# Patient Record
Sex: FEMALE | Race: OTHER | HISPANIC OR LATINO | URBAN - METROPOLITAN AREA
[De-identification: names, ages, dates, MRNs, and addresses within clinical notes are randomized per-mention and may not be internally consistent; named-entity substitution may affect disease eponyms.]

---

## 2017-05-01 ENCOUNTER — OUTPATIENT (OUTPATIENT)
Dept: OUTPATIENT SERVICES | Facility: HOSPITAL | Age: 52
LOS: 1 days | End: 2017-05-01
Payer: MEDICAID

## 2017-05-01 DIAGNOSIS — Z98.89 OTHER SPECIFIED POSTPROCEDURAL STATES: Chronic | ICD-10-CM

## 2017-05-01 DIAGNOSIS — N18.9 CHRONIC KIDNEY DISEASE, UNSPECIFIED: Chronic | ICD-10-CM

## 2017-05-01 DIAGNOSIS — I77.0 ARTERIOVENOUS FISTULA, ACQUIRED: Chronic | ICD-10-CM

## 2017-05-03 DIAGNOSIS — R69 ILLNESS, UNSPECIFIED: ICD-10-CM

## 2017-07-01 PROCEDURE — G9001: CPT

## 2018-02-18 ENCOUNTER — INPATIENT (INPATIENT)
Facility: HOSPITAL | Age: 53
LOS: 12 days | Discharge: HOME | DRG: 557 | End: 2018-03-03
Attending: INTERNAL MEDICINE | Admitting: INTERNAL MEDICINE
Payer: COMMERCIAL

## 2018-02-18 VITALS
SYSTOLIC BLOOD PRESSURE: 165 MMHG | TEMPERATURE: 98 F | HEIGHT: 65.5 IN | RESPIRATION RATE: 18 BRPM | HEART RATE: 65 BPM | DIASTOLIC BLOOD PRESSURE: 85 MMHG | WEIGHT: 194.01 LBS | OXYGEN SATURATION: 99 %

## 2018-02-18 DIAGNOSIS — K29.70 GASTRITIS, UNSPECIFIED, WITHOUT BLEEDING: ICD-10-CM

## 2018-02-18 DIAGNOSIS — Z98.89 OTHER SPECIFIED POSTPROCEDURAL STATES: Chronic | ICD-10-CM

## 2018-02-18 DIAGNOSIS — E78.5 HYPERLIPIDEMIA, UNSPECIFIED: ICD-10-CM

## 2018-02-18 DIAGNOSIS — N18.9 CHRONIC KIDNEY DISEASE, UNSPECIFIED: Chronic | ICD-10-CM

## 2018-02-18 DIAGNOSIS — I10 ESSENTIAL (PRIMARY) HYPERTENSION: ICD-10-CM

## 2018-02-18 DIAGNOSIS — E11.9 TYPE 2 DIABETES MELLITUS WITHOUT COMPLICATIONS: ICD-10-CM

## 2018-02-18 DIAGNOSIS — M79.601 PAIN IN RIGHT ARM: ICD-10-CM

## 2018-02-18 DIAGNOSIS — Z29.9 ENCOUNTER FOR PROPHYLACTIC MEASURES, UNSPECIFIED: ICD-10-CM

## 2018-02-18 DIAGNOSIS — I77.0 ARTERIOVENOUS FISTULA, ACQUIRED: Chronic | ICD-10-CM

## 2018-02-18 DIAGNOSIS — M25.511 PAIN IN RIGHT SHOULDER: ICD-10-CM

## 2018-02-18 DIAGNOSIS — M79.89 OTHER SPECIFIED SOFT TISSUE DISORDERS: ICD-10-CM

## 2018-02-18 DIAGNOSIS — N18.6 END STAGE RENAL DISEASE: ICD-10-CM

## 2018-02-18 LAB
ALBUMIN SERPL ELPH-MCNC: 3.5 G/DL — SIGNIFICANT CHANGE UP (ref 3.5–5)
ALP SERPL-CCNC: 131 U/L — HIGH (ref 40–120)
ALT FLD-CCNC: 23 U/L DA — SIGNIFICANT CHANGE UP (ref 10–60)
ANION GAP SERPL CALC-SCNC: 10 MMOL/L — SIGNIFICANT CHANGE UP (ref 5–17)
APTT BLD: 33.5 SEC — SIGNIFICANT CHANGE UP (ref 27.5–37.4)
AST SERPL-CCNC: 14 U/L — SIGNIFICANT CHANGE UP (ref 10–40)
BASOPHILS # BLD AUTO: 0.1 K/UL — SIGNIFICANT CHANGE UP (ref 0–0.2)
BASOPHILS NFR BLD AUTO: 0.8 % — SIGNIFICANT CHANGE UP (ref 0–2)
BILIRUB SERPL-MCNC: 0.4 MG/DL — SIGNIFICANT CHANGE UP (ref 0.2–1.2)
BUN SERPL-MCNC: 38 MG/DL — HIGH (ref 7–18)
CALCIUM SERPL-MCNC: 9.5 MG/DL — SIGNIFICANT CHANGE UP (ref 8.4–10.5)
CHLORIDE SERPL-SCNC: 98 MMOL/L — SIGNIFICANT CHANGE UP (ref 96–108)
CO2 SERPL-SCNC: 26 MMOL/L — SIGNIFICANT CHANGE UP (ref 22–31)
CREAT SERPL-MCNC: 8.14 MG/DL — HIGH (ref 0.5–1.3)
EOSINOPHIL # BLD AUTO: 0.1 K/UL — SIGNIFICANT CHANGE UP (ref 0–0.5)
EOSINOPHIL NFR BLD AUTO: 1.3 % — SIGNIFICANT CHANGE UP (ref 0–6)
GLUCOSE SERPL-MCNC: 93 MG/DL — SIGNIFICANT CHANGE UP (ref 70–99)
HCT VFR BLD CALC: 36.8 % — SIGNIFICANT CHANGE UP (ref 34.5–45)
HGB BLD-MCNC: 12.1 G/DL — SIGNIFICANT CHANGE UP (ref 11.5–15.5)
INR BLD: 1.08 RATIO — SIGNIFICANT CHANGE UP (ref 0.88–1.16)
LYMPHOCYTES # BLD AUTO: 1.6 K/UL — SIGNIFICANT CHANGE UP (ref 1–3.3)
LYMPHOCYTES # BLD AUTO: 20.2 % — SIGNIFICANT CHANGE UP (ref 13–44)
MCHC RBC-ENTMCNC: 32.5 PG — SIGNIFICANT CHANGE UP (ref 27–34)
MCHC RBC-ENTMCNC: 32.8 GM/DL — SIGNIFICANT CHANGE UP (ref 32–36)
MCV RBC AUTO: 98.9 FL — SIGNIFICANT CHANGE UP (ref 80–100)
MONOCYTES # BLD AUTO: 0.4 K/UL — SIGNIFICANT CHANGE UP (ref 0–0.9)
MONOCYTES NFR BLD AUTO: 5.6 % — SIGNIFICANT CHANGE UP (ref 2–14)
NEUTROPHILS # BLD AUTO: 5.6 K/UL — SIGNIFICANT CHANGE UP (ref 1.8–7.4)
NEUTROPHILS NFR BLD AUTO: 72.1 % — SIGNIFICANT CHANGE UP (ref 43–77)
PLATELET # BLD AUTO: 195 K/UL — SIGNIFICANT CHANGE UP (ref 150–400)
POTASSIUM SERPL-MCNC: 5 MMOL/L — SIGNIFICANT CHANGE UP (ref 3.5–5.3)
POTASSIUM SERPL-SCNC: 5 MMOL/L — SIGNIFICANT CHANGE UP (ref 3.5–5.3)
PROT SERPL-MCNC: 7.5 G/DL — SIGNIFICANT CHANGE UP (ref 6–8.3)
PROTHROM AB SERPL-ACNC: 11.8 SEC — SIGNIFICANT CHANGE UP (ref 9.8–12.7)
RBC # BLD: 3.72 M/UL — LOW (ref 3.8–5.2)
RBC # FLD: 14.4 % — SIGNIFICANT CHANGE UP (ref 10.3–14.5)
SODIUM SERPL-SCNC: 134 MMOL/L — LOW (ref 135–145)
WBC # BLD: 7.8 K/UL — SIGNIFICANT CHANGE UP (ref 3.8–10.5)
WBC # FLD AUTO: 7.8 K/UL — SIGNIFICANT CHANGE UP (ref 3.8–10.5)

## 2018-02-18 PROCEDURE — 73200 CT UPPER EXTREMITY W/O DYE: CPT | Mod: 26,RT

## 2018-02-18 PROCEDURE — 99285 EMERGENCY DEPT VISIT HI MDM: CPT

## 2018-02-18 PROCEDURE — 99221 1ST HOSP IP/OBS SF/LOW 40: CPT

## 2018-02-18 PROCEDURE — 73030 X-RAY EXAM OF SHOULDER: CPT | Mod: 26,RT

## 2018-02-18 RX ORDER — LABETALOL HCL 100 MG
200 TABLET ORAL DAILY
Qty: 0 | Refills: 0 | Status: DISCONTINUED | OUTPATIENT
Start: 2018-02-18 | End: 2018-03-03

## 2018-02-18 RX ORDER — ATORVASTATIN CALCIUM 80 MG/1
40 TABLET, FILM COATED ORAL AT BEDTIME
Qty: 0 | Refills: 0 | Status: DISCONTINUED | OUTPATIENT
Start: 2018-02-18 | End: 2018-03-03

## 2018-02-18 RX ORDER — AMLODIPINE BESYLATE 2.5 MG/1
10 TABLET ORAL DAILY
Qty: 0 | Refills: 0 | Status: DISCONTINUED | OUTPATIENT
Start: 2018-02-18 | End: 2018-03-03

## 2018-02-18 RX ORDER — PANTOPRAZOLE SODIUM 20 MG/1
40 TABLET, DELAYED RELEASE ORAL
Qty: 0 | Refills: 0 | Status: DISCONTINUED | OUTPATIENT
Start: 2018-02-18 | End: 2018-03-03

## 2018-02-18 RX ORDER — HYDROMORPHONE HYDROCHLORIDE 2 MG/ML
0.5 INJECTION INTRAMUSCULAR; INTRAVENOUS; SUBCUTANEOUS ONCE
Qty: 0 | Refills: 0 | Status: DISCONTINUED | OUTPATIENT
Start: 2018-02-18 | End: 2018-02-18

## 2018-02-18 RX ORDER — OMEGA-3 ACID ETHYL ESTERS 1 G
2 CAPSULE ORAL
Qty: 0 | Refills: 0 | Status: DISCONTINUED | OUTPATIENT
Start: 2018-02-18 | End: 2018-03-03

## 2018-02-18 RX ORDER — OXYCODONE AND ACETAMINOPHEN 5; 325 MG/1; MG/1
1 TABLET ORAL ONCE
Qty: 0 | Refills: 0 | Status: DISCONTINUED | OUTPATIENT
Start: 2018-02-18 | End: 2018-02-18

## 2018-02-18 RX ORDER — GABAPENTIN 400 MG/1
100 CAPSULE ORAL DAILY
Qty: 0 | Refills: 0 | Status: DISCONTINUED | OUTPATIENT
Start: 2018-02-18 | End: 2018-03-03

## 2018-02-18 RX ORDER — ASPIRIN/CALCIUM CARB/MAGNESIUM 324 MG
81 TABLET ORAL DAILY
Qty: 0 | Refills: 0 | Status: DISCONTINUED | OUTPATIENT
Start: 2018-02-18 | End: 2018-03-03

## 2018-02-18 RX ORDER — INSULIN LISPRO 100/ML
VIAL (ML) SUBCUTANEOUS
Qty: 0 | Refills: 0 | Status: DISCONTINUED | OUTPATIENT
Start: 2018-02-18 | End: 2018-02-19

## 2018-02-18 RX ORDER — SODIUM CHLORIDE 9 MG/ML
500 INJECTION INTRAMUSCULAR; INTRAVENOUS; SUBCUTANEOUS ONCE
Qty: 0 | Refills: 0 | Status: COMPLETED | OUTPATIENT
Start: 2018-02-18 | End: 2018-02-18

## 2018-02-18 RX ORDER — MORPHINE SULFATE 50 MG/1
6 CAPSULE, EXTENDED RELEASE ORAL ONCE
Qty: 0 | Refills: 0 | Status: DISCONTINUED | OUTPATIENT
Start: 2018-02-18 | End: 2018-02-18

## 2018-02-18 RX ORDER — HEPARIN SODIUM 5000 [USP'U]/ML
5000 INJECTION INTRAVENOUS; SUBCUTANEOUS EVERY 8 HOURS
Qty: 0 | Refills: 0 | Status: DISCONTINUED | OUTPATIENT
Start: 2018-02-18 | End: 2018-03-03

## 2018-02-18 RX ORDER — HYDROMORPHONE HYDROCHLORIDE 2 MG/ML
0.5 INJECTION INTRAMUSCULAR; INTRAVENOUS; SUBCUTANEOUS EVERY 6 HOURS
Qty: 0 | Refills: 0 | Status: DISCONTINUED | OUTPATIENT
Start: 2018-02-18 | End: 2018-02-24

## 2018-02-18 RX ADMIN — HEPARIN SODIUM 5000 UNIT(S): 5000 INJECTION INTRAVENOUS; SUBCUTANEOUS at 23:59

## 2018-02-18 RX ADMIN — OXYCODONE AND ACETAMINOPHEN 1 TABLET(S): 5; 325 TABLET ORAL at 17:59

## 2018-02-18 RX ADMIN — HYDROMORPHONE HYDROCHLORIDE 0.5 MILLIGRAM(S): 2 INJECTION INTRAMUSCULAR; INTRAVENOUS; SUBCUTANEOUS at 21:14

## 2018-02-18 RX ADMIN — MORPHINE SULFATE 6 MILLIGRAM(S): 50 CAPSULE, EXTENDED RELEASE ORAL at 15:46

## 2018-02-18 RX ADMIN — ATORVASTATIN CALCIUM 40 MILLIGRAM(S): 80 TABLET, FILM COATED ORAL at 23:59

## 2018-02-18 RX ADMIN — OXYCODONE AND ACETAMINOPHEN 1 TABLET(S): 5; 325 TABLET ORAL at 14:49

## 2018-02-18 RX ADMIN — SODIUM CHLORIDE 1000 MILLILITER(S): 9 INJECTION INTRAMUSCULAR; INTRAVENOUS; SUBCUTANEOUS at 20:49

## 2018-02-18 RX ADMIN — HYDROMORPHONE HYDROCHLORIDE 0.5 MILLIGRAM(S): 2 INJECTION INTRAMUSCULAR; INTRAVENOUS; SUBCUTANEOUS at 20:41

## 2018-02-18 RX ADMIN — MORPHINE SULFATE 6 MILLIGRAM(S): 50 CAPSULE, EXTENDED RELEASE ORAL at 17:59

## 2018-02-18 NOTE — CONSULT NOTE ADULT - SUBJECTIVE AND OBJECTIVE BOX
HPI: 52 y/o F pt with PMHx of Anemia, Asthma, CKD, Claustrophobia (on CPAP), DM (type II), ESRD (on dialysis Monday, Wednesday, and Friday), HTN, GERD, HLD, Morbid Obesity, MI, AUSTIN, Uterine Leiomyoma, and Vertigo and PSHx of AV Fistula, R Antecubital AVF, , Craniotomy, and Hernia Repair.    Presents to ED c/o R arm pain with associated R arm swelling x4 days. Pt describes R arm pain as severe. Per pt, pt visited [place] ED yesterday for R arm pain and swelling; a CTA of the R arm was performed at the time, as well as labs which were normal, and pt was d/c home. Pt denies fever, chills, or any other complaints. Pt also denies recent trauma to the R arm. NKDA.      PAST MEDICAL & SURGICAL HISTORY:  Myocardial infarct, old:   ESRD (end stage renal disease) on dialysis: since 6/15/2015 (M/W/F)  Asthma occurring only with upper respiratory infection: never hospitalized or intubated, last use of inhalator last year with winter  Anemia in chronic renal disease  Claustrophobia: when CPAP mask was put on her  Uterine leiomyoma, unspecified location  AUSTIN (obstructive sleep apnea)  Gastroesophageal reflux disease without esophagitis  Vertigo  HLD (hyperlipidemia)  Essential hypertension  Chronic kidney disease, unspecified stage  Diabetes mellitus, type 2  Morbid obesity  AV fistula: 3/18/2016  S/P craniotomy: - s/p fall from 3 floors, no hx of seizure after surgery  S/P hernia repair: incisional-  S/P  section: ,   CRF (chronic renal failure): s/p right antecubital AV formation- 2015, s/p left antecubital formation AV - - not working, s/p right chest permacath 07/15/15      MEDICATIONS  (STANDING):    MEDICATIONS  (PRN):      Allergies    No Known Drug Allergies  pineapple (Hives)    Intolerances        SOCIAL HISTORY:  No drug use    FAMILY HISTORY:  Family history of chronic renal failure (Mother)  Family history of hypertension (Father, Mother)  Family history of stroke (Father, Mother)  Diabetes mellitus, type 2 (Father, Mother)      REVIEW OF SYSTEMS:  CONSTITUTIONAL: In no acute distress.  EYES: No eye pain, visual disturbances, or discharge  ENMT:  No difficulty hearing, tinnitus, vertigo; No sinus or throat pain  NECK: No pain or stiffness  BREASTS: No pain, masses, or nipple discharge  RESPIRATORY: No cough, wheezing, chills or hemoptysis; No shortness of breath  CARDIOVASCULAR: No chest pain, palpitations, dizziness, or leg swelling  GASTROINTESTINAL: No abdominal or epigastric pain. No nausea, vomiting, or hematemesis; No diarrhea or constipation. No melena or hematochezia.  GENITOURINARY: No dysuria, frequency, hematuria, or incontinence  NEUROLOGICAL: No headaches, memory loss, loss of strength, numbness, or tremors  SKIN: No itching, burning, rashes, or lesions   LYMPH NODES: No enlarged glands  ENDOCRINE: No heat or cold intolerance; No hair loss  MUSCULOSKELETAL: right arm pain  PSYCHIATRIC: No depression, anxiety, mood swings, or difficulty sleeping  HEME/LYMPH: No easy bruising, or bleeding gums  ALLERGY AND IMMUNOLOGIC: No hives or eczema      Vital Signs Last 24 Hrs  T(C): 36.7 (2018 12:14), Max: 36.7 (2018 12:14)  T(F): 98.1 (2018 12:14), Max: 98.1 (2018 12:14)  HR: 65 (2018 12:14) (65 - 65)  BP: 165/85 (2018 12:14) (165/85 - 165/85)  BP(mean): --  RR: 18 (2018 12:14) (18 - 18)  SpO2: 99% (2018 12:14) (99% - 99%)    Daily Height in cm: 166.37 (2018 12:14)    Daily     PHYSICAL EXAM:  GENERAL: NAD, well-groomed, well-developed  HEAD:  Atraumatic, Normocephalic  EYES: EOMI, PERRL, conjunctiva and sclera clear  ENMT: Moist mucous membranes, Good dentition, No lesions  NECK: Supple, No JVD  NERVOUS SYSTEM:  Alert & Oriented X3, Good concentration  CHEST/LUNG: Clear to percussion bilaterally; Normal respiratory effort  HEART: Regular rate and rhythm  ABDOMEN: Soft, Nontender, Nondistended; Bowel sounds present  : normal external genitalia  BREASTS: no breast lumps  EXTREMITIES:  right shoulder and arm tenderness, mild swelling, no cellulitis  palpable thrill in AVF, palpable radial and ulnar pulses  VASC: equal peripheral pulses  LYMPH: No lymphadenopathy noted  SKIN: No rashes or lesions  PSYCH: normal affect      RADIOLOGY & ADDITIONAL STUDIES:    < from: CT Upper Extremity No Cont, Right (18 @ 15:19) >  There is a right upper extremity dialysis fistula. This is not well   evaluated without intravenous contrast. Evaluation is also limited   secondary to patient motion. The distal extent of the fistula was not   completely imaged. Parts of the fistula show peripheral calcifications.    There is no obvious subcutaneous swelling or edema. There is no   intramuscular abnormality. There is no hematoma.    The visualized lung is unremarkable.    The osseous structures show calcification of the supraspinatus tendon   insertion at the humeral head. No acute fracture or dislocation is noted.   There are mild degenerativechanges at the acromioclavicular joint.    Impression:    No finding to explain the patient's right arm pain and swelling on this   motion limited noncontrast examination. Evaluation of the right upper   extremity fistula would be better performed with ultrasound.    < end of copied text >      < from: Xray Shoulder 2 Views, Right (18 @ 14:15) >  FINDINGS:  The bones are intact. Rotator cuff calcific tendinosis.  The joint spaces are preserved.  No significant soft tissue swelling.  Surgical staples project over the right upper arm.    IMPRESSION:  No radiographic evidence of fracture. Rotatorcuff calcific tendinosis.      < end of copied text > HPI: 54 y/o F pt with PMHx of Anemia, Asthma, CKD, Claustrophobia (on CPAP), DM (type II), ESRD (on dialysis Monday, Wednesday, and Friday), HTN, GERD, HLD, Morbid Obesity, MI, AUSTIN, Uterine Leiomyoma, and Vertigo and PSHx of AV Fistula, R Antecubital AVF, , Craniotomy, and Hernia Repair.    Presents to ED c/o R arm pain with associated R arm swelling x4 days. Pt describes R arm pain as severe. Per pt, pt visited [place] ED yesterday for R arm pain and swelling; a CTA of the R arm was performed at the time, as well as labs which were normal, and pt was d/c home. Pt denies fever, chills, or any other complaints. Pt also denies recent trauma to the R arm. NKDA.  pt denies any injuries falls or any problems with last dialysis      PAST MEDICAL & SURGICAL HISTORY:  Myocardial infarct, old:   ESRD (end stage renal disease) on dialysis: since 6/15/2015 (M/W/F)  Asthma occurring only with upper respiratory infection: never hospitalized or intubated, last use of inhalator last year with winter  Anemia in chronic renal disease  Claustrophobia: when CPAP mask was put on her  Uterine leiomyoma, unspecified location  AUSTIN (obstructive sleep apnea)  Gastroesophageal reflux disease without esophagitis  Vertigo  HLD (hyperlipidemia)  Essential hypertension  Chronic kidney disease, unspecified stage  Diabetes mellitus, type 2  Morbid obesity  AV fistula: 3/18/2016  S/P craniotomy: - s/p fall from 3 floors, no hx of seizure after surgery  S/P hernia repair: incisional-  S/P  section: ,   CRF (chronic renal failure): s/p right antecubital AV formation- 2015, s/p left antecubital formation AV - - not working, s/p right chest permacath 07/15/15      MEDICATIONS  (STANDING):    MEDICATIONS  (PRN):      Allergies    No Known Drug Allergies  pineapple (Hives)    Intolerances        SOCIAL HISTORY:  No drug use    FAMILY HISTORY:  Family history of chronic renal failure (Mother)  Family history of hypertension (Father, Mother)  Family history of stroke (Father, Mother)  Diabetes mellitus, type 2 (Father, Mother)      REVIEW OF SYSTEMS:  CONSTITUTIONAL: In no acute distress.  EYES: No eye pain, visual disturbances, or discharge  ENMT:  No difficulty hearing, tinnitus, vertigo; No sinus or throat pain  NECK: No pain or stiffness  BREASTS: No pain, masses, or nipple discharge  RESPIRATORY: No cough, wheezing, chills or hemoptysis; No shortness of breath  CARDIOVASCULAR: No chest pain, palpitations, dizziness, or leg swelling  GASTROINTESTINAL: No abdominal or epigastric pain. No nausea, vomiting, or hematemesis; No diarrhea or constipation. No melena or hematochezia.  GENITOURINARY: No dysuria, frequency, hematuria, or incontinence  NEUROLOGICAL: No headaches, memory loss, loss of strength, numbness, or tremors  SKIN: No itching, burning, rashes, or lesions   LYMPH NODES: No enlarged glands  ENDOCRINE: No heat or cold intolerance; No hair loss  MUSCULOSKELETAL: right arm pain  PSYCHIATRIC: No depression, anxiety, mood swings, or difficulty sleeping  HEME/LYMPH: No easy bruising, or bleeding gums  ALLERGY AND IMMUNOLOGIC: No hives or eczema      Vital Signs Last 24 Hrs  T(C): 36.7 (2018 12:14), Max: 36.7 (2018 12:14)  T(F): 98.1 (2018 12:14), Max: 98.1 (2018 12:14)  HR: 65 (2018 12:14) (65 - 65)  BP: 165/85 (2018 12:14) (165/85 - 165/85)  BP(mean): --  RR: 18 (2018 12:14) (18 - 18)  SpO2: 99% (2018 12:14) (99% - 99%)    Daily Height in cm: 166.37 (2018 12:14)    Daily     PHYSICAL EXAM:  GENERAL: NAD, well-groomed, well-developed  HEAD:  Atraumatic, Normocephalic  EYES: EOMI, PERRL, conjunctiva and sclera clear  ENMT: Moist mucous membranes, Good dentition, No lesions  NECK: Supple, No JVD  NERVOUS SYSTEM:  Alert & Oriented X3, Good concentration  CHEST/LUNG: Clear to percussion bilaterally; Normal respiratory effort  HEART: Regular rate and rhythm  ABDOMEN: Soft, Nontender, Nondistended; Bowel sounds present  : normal external genitalia  BREASTS: no breast lumps  EXTREMITIES:  right shoulder and arm tenderness, mild swelling, no cellulitis  palpable thrill in AVF, palpable radial and ulnar pulses  VASC: equal peripheral pulses  LYMPH: No lymphadenopathy noted  SKIN: No rashes or lesions  PSYCH: normal affect      RADIOLOGY & ADDITIONAL STUDIES:    < from: CT Upper Extremity No Cont, Right (18 @ 15:19) >  There is a right upper extremity dialysis fistula. This is not well   evaluated without intravenous contrast. Evaluation is also limited   secondary to patient motion. The distal extent of the fistula was not   completely imaged. Parts of the fistula show peripheral calcifications.    There is no obvious subcutaneous swelling or edema. There is no   intramuscular abnormality. There is no hematoma.    The visualized lung is unremarkable.    The osseous structures show calcification of the supraspinatus tendon   insertion at the humeral head. No acute fracture or dislocation is noted.   There are mild degenerativechanges at the acromioclavicular joint.    Impression:    No finding to explain the patient's right arm pain and swelling on this   motion limited noncontrast examination. Evaluation of the right upper   extremity fistula would be better performed with ultrasound.    < end of copied text >      < from: Xray Shoulder 2 Views, Right (18 @ 14:15) >  FINDINGS:  The bones are intact. Rotator cuff calcific tendinosis.  The joint spaces are preserved.  No significant soft tissue swelling.  Surgical staples project over the right upper arm.    IMPRESSION:  No radiographic evidence of fracture. Rotatorcuff calcific tendinosis.      < end of copied text >

## 2018-02-18 NOTE — H&P ADULT - RS GEN PE MLT RESP DETAILS PC
good air movement/normal/respirations non-labored/airway patent/clear to auscultation bilaterally/breath sounds equal

## 2018-02-18 NOTE — ED PROVIDER NOTE - PSH
AV fistula  3/18/2016  CRF (chronic renal failure)  s/p right antecubital AV formation- 2015, s/p left antecubital formation AV - - not working, s/p right chest permacath 07/15/15  S/P  section  ,   S/P craniotomy  - s/p fall from 3 floors, no hx of seizure after surgery  S/P hernia repair  incisional-

## 2018-02-18 NOTE — ED PROVIDER NOTE - UPPER EXTREMITY EXAM, RIGHT
swelling and TTP to R shoulder and R arm; 2+ radial pulse in R upper extremity; good thrill on AV Fistula in R arm; R arm AV Fistula site is non-tender and not erythematous; swelling of R arm compared to L arm; slightly dusky colored R hand; R hand is slightly cool-to-touch swelling and TTP to R shoulder and R arm; 2+ radial pulse in R upper extremity; good thrill on AV Fistula in R arm; R arm AV Fistula site is non-tender and not erythematous; swelling of R arm compared to L arm; slightly dusky colored R hand; R hand is slightly cooler-to-touch than left hand

## 2018-02-18 NOTE — H&P ADULT - NEUROLOGICAL DETAILS
sensation intact/deep reflexes intact/alert and oriented x 3/cranial nerves intact/responds to pain/responds to verbal commands

## 2018-02-18 NOTE — H&P ADULT - EXTREMITIES COMMENTS
no warmth, redness, pain over fistula site noted   tenderness noted over right posterior arm and shoulder region

## 2018-02-18 NOTE — H&P ADULT - HISTORY OF PRESENT ILLNESS
53 y/oF with PMHx of Anemia, Asthma, ESRD on HD(MWF), Claustrophobia, Morbid Obesity (on CPAP), DMII,HTN, GERD, HLD, MI, AUSTIN, Uterine Leiomyoma, and Vertigo and PSHx of AV Fistula, R Antecubital AVF, , Craniotomy, and Hernia Repair presented to ED c/o R arm pain with associated R arm swelling x3 days. Pt describes R arm pain as severe. Park visited [place] ED yesterday for R arm pain and swelling; a CTA of the R arm and labs performed at [place] which were negative so patient was discharged, Pain did not improved [facility]. Pt denies fever, chills, recent trauma to the R arm. 53 y/oF with PMHx of Anemia, Asthma, ESRD on HD(MWF), Claustrophobia, Morbid Obesity (on CPAP), DMII,HTN, GERD, HLD, MI, AUSTIN, Uterine Leiomyoma, and Vertigo and PSHx of AV Fistula, R Antecubital AVF, , Craniotomy, and Hernia Repair presented to ED c/o R arm pain with associated R arm swelling x4 days. Pt describes R arm pain as severe, 10/10 in severity, on and off, started over elbow and moved to shoulder(no more pain over elbow). Patient have right arm AV fistula which does not look infected and pain not related to fistula site.  Park visited [place] ED yesterday for R arm pain and swelling; a CTA of the R arm was done with patent arteries and labsh were WNL so patient was discharged, Pain did not improved so came to [facility] today. Pt denies fever, chills, recent trauma to the R arm.   Patient reports similar pain last year on left arm, injection in bone was given after which pain improved. 53 y/oF with PMHx of Anemia, Asthma, ESRD on HD(MWF), Claustrophobia, Morbid Obesity (on CPAP), DMII,HTN, GERD, HLD, MI, AUSTIN, Uterine Leiomyoma, and Vertigo and PSHx of AV Fistula, R Antecubital AVF, , Craniotomy, and Hernia Repair presented to ED c/o R arm and shoulder pain x4 days. Pt describes R arm pain as severe, 10/10 in severity, on and off, started over elbow and moved to shoulder(no more pain over elbow). Patient have right arm AV fistula which does not look infected and pain not related to fistula site.  Park visited [place] ED yesterday for R arm pain and swelling; a CTA of the R arm was done with patent arteries and labsh were WNL so patient was discharged, Pain did not improved so came to [facility] today. Pt denies fever, chills, recent trauma to the R arm.   Patient reports similar pain last year on left arm, injection in bone was given after which pain improved.

## 2018-02-18 NOTE — ED PROVIDER NOTE - NS_ATTENDINGSCRIBE_ED_ALL_ED
Aortic valve stenosis    BPH (benign prostatic hypertrophy)    CAD (coronary artery disease)    GERD (gastroesophageal reflux disease)    Hashimoto's thyroiditis    Hemorrhoids    Murmur    Right inguinal hernia I personally performed the service described in the documentation recorded by the scribe in my presence, and it accurately and completely records my words and actions.

## 2018-02-18 NOTE — H&P ADULT - ASSESSMENT
53 y/oF with PMHx of Anemia, Asthma, ESRD on HD(MWF), Claustrophobia, Morbid Obesity (on CPAP), DMII,HTN, GERD, HLD, MI, AUSTIN, Uterine Leiomyoma, and Vertigo and PSHx of AV Fistula, R Antecubital AVF, , Craniotomy, and Hernia Repair presented to ED c/o R arm and shoulder pain x4 days.

## 2018-02-18 NOTE — ED PROVIDER NOTE - SKIN, MLM
Skin normal color for race, warm, dry and intact. No evidence of rash. R hand is slightly cool-to-touch.

## 2018-02-18 NOTE — ED PROVIDER NOTE - MEDICAL DECISION MAKING DETAILS
52 y/o F pt presents with severe, intractable R arm pain and notable R arm swelling. Will admit for pain control, vascular evaluation, and orthopedic evaluation. Pt requires dialysis tomorrow. 54 y/o F pt presents with severe, intractable R arm pain and notable R arm swelling. Will admit for pain control, vascular evaluation, and orthopedic evaluation. Pt requires dialysis tomorrow. Concern for possible vascular compromise

## 2018-02-18 NOTE — H&P ADULT - FAMILY HISTORY
Diabetes mellitus, type 2     Family history of stroke     Family history of chronic renal failure     Father  Still living? Unknown  Family history of hypertension, Age at diagnosis: Age Unknown     Mother  Still living? Unknown  Family history of hypertension, Age at diagnosis: Age Unknown

## 2018-02-18 NOTE — H&P ADULT - PROBLEM SELECTOR PLAN 8
IMPROVE VTE Individual Risk Assessment          RISK                                                          Points  [  ] Previous VTE                                                3  [  ] Thrombophilia                                             2  [  ] Lower limb paralysis                                   2        (unable to hold up >15 seconds)    [  ] Current Cancer                                             2         (within 6 months)  [ x ] Immobilization > 24 hrs                              1  [  ] ICU/CCU stay > 24 hours                             1  [  ] Age > 60                                                         1    IMPROVE VTE Score: 1  heparin 5000 q8

## 2018-02-18 NOTE — H&P ADULT - PROBLEM SELECTOR PLAN 5
home medication rosuvastatin 10 mg once aday  continue atorvastatin 40 mg at bedtime  follow up with lipid profile

## 2018-02-18 NOTE — ED ADULT TRIAGE NOTE - CHIEF COMPLAINT QUOTE
C/o RIGHT SHOULDER PAIN X 3 DAYS. Dialysis pt M-W-F. DIALYSIS ACCESS RIGHT ARM, used last Friday but it was hurting her per daughter

## 2018-02-18 NOTE — ED ADULT NURSE NOTE - OBJECTIVE STATEMENT
pt complained  right shoulder pain X 3 DAYS. Dialysis pt M-W-F. DIALYSIS ACCESS RIGHT ARM, used last Friday but it was hurting her per daughter

## 2018-02-18 NOTE — H&P ADULT - PROBLEM SELECTOR PLAN 6
on lantus 10 units at home   holding Lantus as patient is not eating   on accucheck and sliding scale

## 2018-02-18 NOTE — H&P ADULT - PROBLEM SELECTOR PLAN 1
severe pain radiating to right arm  CT Cervical spine done in [facility] done yesterday- was normal  Xray shoulder - Rotatorcuff calcific tendinosis.  Dilaudid 0.5 mg PRN q6 for pain management    primary team to consult ortho as per surgery recommendation

## 2018-02-18 NOTE — H&P ADULT - PROBLEM SELECTOR PLAN 2
over posterior arm   not related to fistula site- no fistula site infection/pain noted  CTA arm done at [facility] done - arteries are patent  vascular surgery consulted - follow up with us duplex right upper extrimity

## 2018-02-18 NOTE — H&P ADULT - NSHPLABSRESULTS_GEN_ALL_CORE
12.1   7.8   )-----------( 195      ( 18 Feb 2018 17:53 )             36.8       02-18    134<L>  |  98  |  38<H>  ----------------------------<  93  5.0   |  26  |  8.14<H>    Ca    9.5      18 Feb 2018 17:53    TPro  7.5  /  Alb  3.5  /  TBili  0.4  /  DBili  x   /  AST  14  /  ALT  23  /  AlkPhos  131<H>  02-18        < from: CT Upper Extremity No Cont, Right (02.18.18 @ 15:19) >    Impression:    No finding to explain the patient's right arm pain and swelling on this   motion limited noncontrast examination. Evaluation of the right upper   extremity fistula would be better performed with ultrasound.      < end of copied text >    < from: Xray Shoulder 2 Views, Right (02.18.18 @ 14:15) >    IMPRESSION:  No radiographic evidence of fracture. Rotatorcuff calcific tendinosis.      < end of copied text >

## 2018-02-18 NOTE — CONSULT NOTE ADULT - PROBLEM SELECTOR RECOMMENDATION 9
Right shoulder and arm pain  Ho Right AVF  Right shoulder and arm tenderness, mild swelling, no cellulitis.  Palpable thrill in AVF, palpable radial and ulnar pulses.    Recommend Duplex US  Obtain Ortho consult as above

## 2018-02-18 NOTE — CONSULT NOTE ADULT - ASSESSMENT
53 y old female w Right shoulder and arm pain of acute onset    no acute vascular  issues at this time'  recommend rue avf duplex   recommend ortho eval  will follow

## 2018-02-18 NOTE — ED PROVIDER NOTE - OBJECTIVE STATEMENT
52 y/o F pt with PMHx of Anemia, Asthma, CKD, Claustrophobia (on CPAP), DM (type II), ESRD (on dialysis Monday, Wednesday, and Friday), HTN, GERD, HLD, Morbid Obesity, MI, AUSTIN, Uterine Leiomyoma, and Vertigo and PSHx of AV Fistula, R Antecubital AV Formation, , Craniotomy, and Hernia Repair presents to ED c/o R arm pain with associated R arm swelling x3 days. Pt describes R arm pain as severe. Per pt, pt visited [place] ED yesterday for R arm pain and swelling; a CTA of the R arm was performed at the time, which was normal, and pt was d/c home. Pt denies fever, chills, or any other complaints. Pt also denies recent trauma to the R arm. NKDA. 52 y/o F pt with PMHx of Anemia, Asthma, CKD, Claustrophobia (on CPAP), DM (type II), ESRD (on dialysis Monday, Wednesday, and Friday), HTN, GERD, HLD, Morbid Obesity, MI, AUSTIN, Uterine Leiomyoma, and Vertigo and PSHx of AV Fistula, R Antecubital AVF, , Craniotomy, and Hernia Repair presents to ED c/o R arm pain with associated R arm swelling x3 days. Pt describes R arm pain as severe. Per pt, pt visited [place] ED yesterday for R arm pain and swelling; a CTA of the R arm was performed at the time, as well as labs which were normal, and pt was d/c home. Pt denies fever, chills, or any other complaints. Pt also denies recent trauma to the R arm. NKDA.

## 2018-02-18 NOTE — ED PROVIDER NOTE - PMH
Anemia in chronic renal disease    Asthma occurring only with upper respiratory infection  never hospitalized or intubated, last use of inhalator last year with winter  Chronic kidney disease, unspecified stage    Claustrophobia  when CPAP mask was put on her  Diabetes mellitus, type 2    ESRD (end stage renal disease) on dialysis  since 6/15/2015 (M/W/F)  Essential hypertension    Gastroesophageal reflux disease without esophagitis    HLD (hyperlipidemia)    Morbid obesity    Myocardial infarct, old  2011  AUSTIN (obstructive sleep apnea)    Uterine leiomyoma, unspecified location    Vertigo

## 2018-02-19 DIAGNOSIS — M25.511 PAIN IN RIGHT SHOULDER: ICD-10-CM

## 2018-02-19 LAB
24R-OH-CALCIDIOL SERPL-MCNC: 16.7 NG/ML — LOW (ref 30–80)
ANION GAP SERPL CALC-SCNC: 13 MMOL/L — SIGNIFICANT CHANGE UP (ref 5–17)
BUN SERPL-MCNC: 44 MG/DL — HIGH (ref 7–18)
CALCIUM SERPL-MCNC: 8.9 MG/DL — SIGNIFICANT CHANGE UP (ref 8.4–10.5)
CHLORIDE SERPL-SCNC: 99 MMOL/L — SIGNIFICANT CHANGE UP (ref 96–108)
CHOLEST SERPL-MCNC: 141 MG/DL — SIGNIFICANT CHANGE UP (ref 10–199)
CO2 SERPL-SCNC: 23 MMOL/L — SIGNIFICANT CHANGE UP (ref 22–31)
CREAT SERPL-MCNC: 8.9 MG/DL — HIGH (ref 0.5–1.3)
FOLATE SERPL-MCNC: 7.1 NG/ML — SIGNIFICANT CHANGE UP (ref 4.8–24.2)
GLUCOSE BLDC GLUCOMTR-MCNC: 103 MG/DL — HIGH (ref 70–99)
GLUCOSE BLDC GLUCOMTR-MCNC: 109 MG/DL — HIGH (ref 70–99)
GLUCOSE BLDC GLUCOMTR-MCNC: 117 MG/DL — HIGH (ref 70–99)
GLUCOSE BLDC GLUCOMTR-MCNC: 117 MG/DL — HIGH (ref 70–99)
GLUCOSE BLDC GLUCOMTR-MCNC: 75 MG/DL — SIGNIFICANT CHANGE UP (ref 70–99)
GLUCOSE SERPL-MCNC: 67 MG/DL — LOW (ref 70–99)
HBA1C BLD-MCNC: 4.6 % — SIGNIFICANT CHANGE UP (ref 4–5.6)
HBV SURFACE AG SER-ACNC: SIGNIFICANT CHANGE UP
HCT VFR BLD CALC: 36.5 % — SIGNIFICANT CHANGE UP (ref 34.5–45)
HDLC SERPL-MCNC: 42 MG/DL — SIGNIFICANT CHANGE UP (ref 40–125)
HGB BLD-MCNC: 11.7 G/DL — SIGNIFICANT CHANGE UP (ref 11.5–15.5)
LIPID PNL WITH DIRECT LDL SERPL: 74 MG/DL — SIGNIFICANT CHANGE UP
MAGNESIUM SERPL-MCNC: 2.2 MG/DL — SIGNIFICANT CHANGE UP (ref 1.6–2.6)
MCHC RBC-ENTMCNC: 31.3 PG — SIGNIFICANT CHANGE UP (ref 27–34)
MCHC RBC-ENTMCNC: 32 GM/DL — SIGNIFICANT CHANGE UP (ref 32–36)
MCV RBC AUTO: 97.9 FL — SIGNIFICANT CHANGE UP (ref 80–100)
PHOSPHATE SERPL-MCNC: 8.4 MG/DL — HIGH (ref 2.5–4.5)
PLATELET # BLD AUTO: 182 K/UL — SIGNIFICANT CHANGE UP (ref 150–400)
POTASSIUM SERPL-MCNC: 4.7 MMOL/L — SIGNIFICANT CHANGE UP (ref 3.5–5.3)
POTASSIUM SERPL-SCNC: 4.7 MMOL/L — SIGNIFICANT CHANGE UP (ref 3.5–5.3)
PTH-INTACT FLD-MCNC: 340 PG/ML — HIGH (ref 15–65)
RBC # BLD: 3.73 M/UL — LOW (ref 3.8–5.2)
RBC # FLD: 14.3 % — SIGNIFICANT CHANGE UP (ref 10.3–14.5)
SODIUM SERPL-SCNC: 135 MMOL/L — SIGNIFICANT CHANGE UP (ref 135–145)
TOTAL CHOLESTEROL/HDL RATIO MEASUREMENT: 3.4 RATIO — SIGNIFICANT CHANGE UP (ref 3.3–7.1)
TRIGL SERPL-MCNC: 124 MG/DL — SIGNIFICANT CHANGE UP (ref 10–149)
TSH SERPL-MCNC: 1.59 UU/ML — SIGNIFICANT CHANGE UP (ref 0.34–4.82)
URATE SERPL-MCNC: 7 MG/DL — SIGNIFICANT CHANGE UP (ref 2.5–7)
VIT B12 SERPL-MCNC: 334 PG/ML — SIGNIFICANT CHANGE UP (ref 232–1245)
WBC # BLD: 7.4 K/UL — SIGNIFICANT CHANGE UP (ref 3.8–10.5)
WBC # FLD AUTO: 7.4 K/UL — SIGNIFICANT CHANGE UP (ref 3.8–10.5)

## 2018-02-19 PROCEDURE — 93971 EXTREMITY STUDY: CPT | Mod: 26,RT

## 2018-02-19 RX ORDER — SEVELAMER CARBONATE 2400 MG/1
1600 POWDER, FOR SUSPENSION ORAL
Qty: 0 | Refills: 0 | Status: DISCONTINUED | OUTPATIENT
Start: 2018-02-19 | End: 2018-03-03

## 2018-02-19 RX ORDER — HYDROMORPHONE HYDROCHLORIDE 2 MG/ML
0.5 INJECTION INTRAMUSCULAR; INTRAVENOUS; SUBCUTANEOUS ONCE
Qty: 0 | Refills: 0 | Status: DISCONTINUED | OUTPATIENT
Start: 2018-02-19 | End: 2018-02-19

## 2018-02-19 RX ADMIN — Medication 2 GRAM(S): at 06:00

## 2018-02-19 RX ADMIN — HYDROMORPHONE HYDROCHLORIDE 0.5 MILLIGRAM(S): 2 INJECTION INTRAMUSCULAR; INTRAVENOUS; SUBCUTANEOUS at 18:23

## 2018-02-19 RX ADMIN — HYDROMORPHONE HYDROCHLORIDE 0.5 MILLIGRAM(S): 2 INJECTION INTRAMUSCULAR; INTRAVENOUS; SUBCUTANEOUS at 17:45

## 2018-02-19 RX ADMIN — SEVELAMER CARBONATE 1600 MILLIGRAM(S): 2400 POWDER, FOR SUSPENSION ORAL at 17:11

## 2018-02-19 RX ADMIN — HYDROMORPHONE HYDROCHLORIDE 0.5 MILLIGRAM(S): 2 INJECTION INTRAMUSCULAR; INTRAVENOUS; SUBCUTANEOUS at 06:59

## 2018-02-19 RX ADMIN — HEPARIN SODIUM 5000 UNIT(S): 5000 INJECTION INTRAVENOUS; SUBCUTANEOUS at 21:06

## 2018-02-19 RX ADMIN — Medication 81 MILLIGRAM(S): at 12:06

## 2018-02-19 RX ADMIN — AMLODIPINE BESYLATE 10 MILLIGRAM(S): 2.5 TABLET ORAL at 05:55

## 2018-02-19 RX ADMIN — HYDROMORPHONE HYDROCHLORIDE 0.5 MILLIGRAM(S): 2 INJECTION INTRAMUSCULAR; INTRAVENOUS; SUBCUTANEOUS at 17:12

## 2018-02-19 RX ADMIN — HYDROMORPHONE HYDROCHLORIDE 0.5 MILLIGRAM(S): 2 INJECTION INTRAMUSCULAR; INTRAVENOUS; SUBCUTANEOUS at 12:08

## 2018-02-19 RX ADMIN — PANTOPRAZOLE SODIUM 40 MILLIGRAM(S): 20 TABLET, DELAYED RELEASE ORAL at 06:00

## 2018-02-19 RX ADMIN — HYDROMORPHONE HYDROCHLORIDE 0.5 MILLIGRAM(S): 2 INJECTION INTRAMUSCULAR; INTRAVENOUS; SUBCUTANEOUS at 00:00

## 2018-02-19 RX ADMIN — HYDROMORPHONE HYDROCHLORIDE 0.5 MILLIGRAM(S): 2 INJECTION INTRAMUSCULAR; INTRAVENOUS; SUBCUTANEOUS at 19:05

## 2018-02-19 RX ADMIN — GABAPENTIN 100 MILLIGRAM(S): 400 CAPSULE ORAL at 12:06

## 2018-02-19 RX ADMIN — HEPARIN SODIUM 5000 UNIT(S): 5000 INJECTION INTRAVENOUS; SUBCUTANEOUS at 05:55

## 2018-02-19 RX ADMIN — ATORVASTATIN CALCIUM 40 MILLIGRAM(S): 80 TABLET, FILM COATED ORAL at 21:06

## 2018-02-19 RX ADMIN — Medication 200 MILLIGRAM(S): at 05:56

## 2018-02-19 RX ADMIN — Medication 1 TABLET(S): at 12:06

## 2018-02-19 RX ADMIN — SEVELAMER CARBONATE 1600 MILLIGRAM(S): 2400 POWDER, FOR SUSPENSION ORAL at 12:05

## 2018-02-19 RX ADMIN — HYDROMORPHONE HYDROCHLORIDE 0.5 MILLIGRAM(S): 2 INJECTION INTRAMUSCULAR; INTRAVENOUS; SUBCUTANEOUS at 05:56

## 2018-02-19 RX ADMIN — HYDROMORPHONE HYDROCHLORIDE 0.5 MILLIGRAM(S): 2 INJECTION INTRAMUSCULAR; INTRAVENOUS; SUBCUTANEOUS at 12:40

## 2018-02-19 RX ADMIN — Medication 2 GRAM(S): at 17:16

## 2018-02-19 RX ADMIN — HYDROMORPHONE HYDROCHLORIDE 0.5 MILLIGRAM(S): 2 INJECTION INTRAMUSCULAR; INTRAVENOUS; SUBCUTANEOUS at 00:30

## 2018-02-19 NOTE — PROGRESS NOTE ADULT - SUBJECTIVE AND OBJECTIVE BOX
HPI:  53 y/oF with PMHx of Anemia, Asthma, ESRD on HD(MWF), Claustrophobia, Morbid Obesity (on CPAP), DMII,HTN, GERD, HLD, MI, AUSTIN, Uterine Leiomyoma, and Vertigo and PSHx of AV Fistula, R Antecubital AVF, , Craniotomy, and Hernia Repair presented to ED c/o R arm and shoulder pain x4 days.    Interval history:  Patient is still complaining the shoulder pain which worsens on movements. She is on Dilaudid prn for pain control. She denies fever, chills, SOB, palpitations, chest pain, abdominal pain, nausea, vomiting, diarrhea, constipation, dizziness.      INTERVAL HPI/OVERNIGHT EVENTS:  T(C): 37.2 (18 @ 12:20), Max: 37.2 (18 @ 12:20)  HR: 72 (18 @ 12:20) (71 - 74)  BP: 131/59 (18 @ 12:20) (131/59 - 150/77)  RR: 16 (18 @ 12:20) (16 - 18)  SpO2: 98% (18 @ 12:20) (95% - 99%)    MEDICATIONS  (STANDING):  amLODIPine   Tablet 10 milliGRAM(s) Oral daily  aspirin  chewable 81 milliGRAM(s) Oral daily  atorvastatin 40 milliGRAM(s) Oral at bedtime  calcium carbonate  625 mG + Vitamin D (OsCal 250 + D) 1 Tablet(s) Oral daily  gabapentin 100 milliGRAM(s) Oral daily  heparin  Injectable 5000 Unit(s) SubCutaneous every 8 hours  insulin lispro (HumaLOG) corrective regimen sliding scale   SubCutaneous three times a day before meals  labetalol 200 milliGRAM(s) Oral daily  omega-3-Acid Ethyl Esters 2 Gram(s) Oral two times a day  pantoprazole    Tablet 40 milliGRAM(s) Oral before breakfast  sevelamer hydrochloride 1600 milliGRAM(s) Oral three times a day with meals    MEDICATIONS  (PRN):  HYDROmorphone  Injectable 0.5 milliGRAM(s) IV Push every 6 hours PRN Moderate Pain (4 - 6)      PHYSICAL EXAM:  GENERAL: NAD, well-groomed, well-developed  HEAD:  Atraumatic, Normocephalic  EYES: EOMI, PERRLA, conjunctiva and sclera clear  ENMT: No tonsillar erythema, exudates, or enlargement; Moist mucous membranes, Good dentition, No lesions  NECK: Supple, No JVD, Normal thyroid  NERVOUS SYSTEM:  Alert & Oriented X3, Good concentration; Motor Strength 5/5 B/L upper and lower extremities; DTRs 2+ intact and symmetric  CHEST/LUNG: Clear to percussion bilaterally; No rales, rhonchi, wheezing, or rubs  HEART: Regular rate and rhythm; No murmurs, rubs, or gallops  ABDOMEN: Soft, Nontender, Nondistended; Bowel sounds present  EXTREMITIES:  right shoulder tenderness worsens with movement.  LYMPH: No lymphadenopathy noted  SKIN: No rashes or lesions  LABS:                        11.7   7.4   )-----------( 182      ( 2018 06:00 )             36.5     -    135  |  99  |  44<H>  ----------------------------<  67<L>  4.7   |  23  |  8.90<H>    Ca    8.9      2018 06:00  Phos  8.4     -  Mg     2.2     -    TPro  7.5  /  Alb  3.5  /  TBili  0.4  /  DBili  x   /  AST  14  /  ALT  23  /  AlkPhos  131<H>  02-18    PT/INR - ( 2018 20:34 )   PT: 11.8 sec;   INR: 1.08 ratio         PTT - ( 2018 20:34 )  PTT:33.5 sec    CAPILLARY BLOOD GLUCOSE      POCT Blood Glucose.: 117 mg/dL (2018 11:37)  POCT Blood Glucose.: 103 mg/dL (2018 08:09)  POCT Blood Glucose.: 109 mg/dL (2018 00:26)

## 2018-02-19 NOTE — CONSULT NOTE ADULT - SUBJECTIVE AND OBJECTIVE BOX
Patient is a 53y Female whom presented to the hospital with     HPI:  53 y/oF with PMHx of Anemia, Asthma, ESRD on HD(MWF), Claustrophobia, Morbid Obesity (on CPAP), DMII,HTN, GERD, HLD, MI, AUSTIN, Uterine Leiomyoma, and Vertigo and PSHx of AV Fistula, R Antecubital AVF, , Craniotomy, and Hernia Repair presented to ED c/o R arm and shoulder pain x4 days. Pt describes R arm pain as severe, 10/10 in severity, on and off, started over elbow and moved to shoulder(no more pain over elbow). Patient have right arm AV fistula which does not look infected and pain not related to fistula site.  Park visited [place] ED yesterday for R arm pain and swelling; a CTA of the R arm was done with patent arteries and labsh were WNL so patient was discharged, Pain did not improved so came to [facility] today. Pt denies fever, chills, recent trauma to the R arm.   Patient reports similar pain last year on left arm, injection in bone was given after which pain improved. (2018 17:59)    pts current chart reviewed and case discussed with resident  pt denies pmh of renal ds, proteinuria, renal stones, recurrent uti or nephrotic edema or nephrotic syndrome  pt give pmh of retinopathy and s/p laser treatment  pt denies Nsaids use or otc other nephrotoxic supplements  PAST MEDICAL & SURGICAL HISTORY:  Myocardial infarct, old:   ESRD (end stage renal disease) on dialysis: since 6/15/2015 (M/W/F)  Asthma occurring only with upper respiratory infection: never hospitalized or intubated, last use of inhalator last year with winter  Anemia in chronic renal disease  Claustrophobia: when CPAP mask was put on her  Uterine leiomyoma, unspecified location  AUSTIN (obstructive sleep apnea)  Gastroesophageal reflux disease without esophagitis  Vertigo  HLD (hyperlipidemia)  Essential hypertension  Chronic kidney disease, unspecified stage  Diabetes mellitus, type 2  Morbid obesity  AV fistula: 3/18/2016  S/P craniotomy: - s/p fall from 3 floors, no hx of seizure after surgery  S/P hernia repair: incisional-  S/P  section: ,   CRF (chronic renal failure): s/p right antecubital AV formation- 2015, s/p left antecubital formation AV - - not working, s/p right chest permacath 07/15/15      Home Medications: Reviewed    MEDICATIONS  (STANDING):  amLODIPine   Tablet 10 milliGRAM(s) Oral daily  aspirin  chewable 81 milliGRAM(s) Oral daily  atorvastatin 40 milliGRAM(s) Oral at bedtime  calcium carbonate  625 mG + Vitamin D (OsCal 250 + D) 1 Tablet(s) Oral daily  gabapentin 100 milliGRAM(s) Oral daily  heparin  Injectable 5000 Unit(s) SubCutaneous every 8 hours  insulin lispro (HumaLOG) corrective regimen sliding scale   SubCutaneous three times a day before meals  labetalol 200 milliGRAM(s) Oral daily  omega-3-Acid Ethyl Esters 2 Gram(s) Oral two times a day  pantoprazole    Tablet 40 milliGRAM(s) Oral before breakfast  sevelamer hydrochloride 1600 milliGRAM(s) Oral three times a day with meals    MEDICATIONS  (PRN):  HYDROmorphone  Injectable 0.5 milliGRAM(s) IV Push every 6 hours PRN Moderate Pain (4 - 6)      Allergies    No Known Drug Allergies  pineapple (Hives)    Intolerances        SOCIAL HISTORY:  Alcohol use [X ] No  [ ] Yes  Smoking  [ X] No  [ ] Yes  Drug Abuse [X ] No  [ ] Yes  Tattoo [X ] No  [ ] Yes  History of Blood transfusion [ X] No  [ ] Yes    FAMILY HISTORY:  Family history of chronic renal failure  Family history of hypertension (Father, Mother)  Family history of stroke  Diabetes mellitus, type 2      Diabetes [ ]  Hypertension [ ]  Kidney Stones [ ]  Heart Disease [ ]  Hyperlipidemia [ ]  Cancer [ ]    REVIEW OF SYSTEMS:  CONSTITUTIONAL: No weakness, fevers or chills  EYES/ENT: No visual changes;  No vertigo or throat pain   NECK: No pain or stiffness  RESPIRATORY: No cough, wheezing, hemoptysis; No shortness of breath  CARDIOVASCULAR: No chest pain or palpitations  GASTROINTESTINAL: No abdominal or epigastric pain. No nausea, vomiting, or hematemesis; No diarrhea or constipation. No melena or hematochezia.  GENITOURINARY: No dysuria, frequency or hematuria  NEUROLOGICAL: No numbness or weakness  SKIN: No itching, burning, rashes, or lesions   All other review of systems is negative unless indicated above    Vital Signs  T(F): 99 (18 @ 12:20), Max: 99 (18 @ 12:20)  HR: 72 (18 @ 12:20) (71 - 74)  BP: 131/59 (18 @ 12:20) (131/59 - 150/77)  ABP: --  RR: 16 (18 @ 12:20) (16 - 18)  SpO2: 98% (18 @ 12:20) (95% - 99%)  Wt(kg): --  CVP(cm H2O): --  CO: --  PCWP: --    I and O's:    Daily     Daily Weight in k.7 (2018 12:20)    PHYSICAL EXAM:  Constitutional: well developed, well nourished  and in nad  HEENT: PERRLA,  no icteric sclera and mild pallor of conjunctiva noted  Neck: No JVD, thyromegaly or adenopathy  Respiratory: reduced air entry lower lungs with no rales, wheezing or rhonchi  Cardiovascular: S1 and S2 normally heard  Gastrointestinal: soft, nondistended, nontender and normal bowel sounds heard  Extremities: No peripheral edema or cyanosis  Neurological: A/O x 3, no focal deficits  Psychiatric: Normal mood, normal affect  : No flank tenderness  Skin: No rashes        LABS:                        11.7   7.4   )-----------( 182      ( 2018 06:00 )             36.5     8.4  --            135  |  99  |  44<H>  ----------------------------<  67<L>  4.7   |  23  |  8.90<H>      134<L>  |  98  |  38<H>  ----------------------------<  93  5.0   |  26  |  8.14<H>    Ca    8.9      2018 06:00  Ca    9.5      2018 17:53  Phos  8.4       Mg     2.2         TPro  7.5  /  Alb  3.5  /  TBili  0.4  /  DBili  x   /  AST  14  /  ALT  23  /  AlkPhos  131<H>            URINE STUDIES:                    RADIOLOGY & ADDITIONAL STUDIES: Patient is a 53y Female whom presented to the hospital with Rt upper arm and Rt Shoulder pain, needs hd for esrd.    Medical HPI:  53 y/oF with PMHx of Anemia, Asthma, ESRD on HD(MWF), Claustrophobia, Morbid Obesity (on CPAP), DMII,HTN, GERD, HLD, MI, AUSTIN, Uterine Leiomyoma, and Vertigo and PSHx of AV Fistula, R Antecubital AVF, , Craniotomy, and Hernia Repair presented to ED c/o R arm and shoulder pain x4 days. Pt describes R arm pain as severe, 10/10 in severity, on and off, started over elbow and moved to shoulder(no more pain over elbow). Patient have right arm AV fistula which does not look infected and pain not related to fistula site.  Park visited [place] ED yesterday for R arm pain and swelling; a CTA of the R arm was done with patent arteries and labsh were WNL so patient was discharged, Pain did not improved so came to [facility] today. Pt denies fever, chills, recent trauma to the R arm.   Patient reports similar pain last year on left arm, injection in bone was given after which pain improved. (2018 17:59)  Renal HPI:Pt seen in dialysis unit  pts current chart reviewed and case discussed with resident  pt gets hd om M/W/F at [facility] since   She denies pmh of Hepatitis B or C infection and denies allergy to dialyzer or Heparin  pt denies pmh of  renal stones, recurrent uti or nephrotic edema or nephrotic syndrome  pt currently denies cp,sob, gi or uremic symptons    PAST MEDICAL & SURGICAL HISTORY:  Myocardial infarct, old:   ESRD (end stage renal disease) on dialysis: since 6/15/2015 (M/W/F)  Asthma occurring only with upper respiratory infection: never hospitalized or intubated, last use of inhalator last year with winter  Anemia in chronic renal disease  Claustrophobia: when CPAP mask was put on her  Uterine leiomyoma, unspecified location  AUSTIN (obstructive sleep apnea)  Gastroesophageal reflux disease without esophagitis  Vertigo  HLD (hyperlipidemia)  Essential hypertension  Chronic kidney disease, unspecified stage  Diabetes mellitus, type 2  Morbid obesity  AV fistula: 3/18/2016  S/P craniotomy: - s/p fall from 3 floors, no hx of seizure after surgery  S/P hernia repair: incisional-  S/P  section: ,   CRF (chronic renal failure): s/p right antecubital AV formation- 2015, s/p left antecubital formation AV - - not working, s/p right chest permacath 07/15/15      Home Medications: Reviewed    MEDICATIONS  (STANDING):  amLODIPine   Tablet 10 milliGRAM(s) Oral daily  aspirin  chewable 81 milliGRAM(s) Oral daily  atorvastatin 40 milliGRAM(s) Oral at bedtime  calcium carbonate  625 mG + Vitamin D (OsCal 250 + D) 1 Tablet(s) Oral daily  gabapentin 100 milliGRAM(s) Oral daily  heparin  Injectable 5000 Unit(s) SubCutaneous every 8 hours  insulin lispro (HumaLOG) corrective regimen sliding scale   SubCutaneous three times a day before meals  labetalol 200 milliGRAM(s) Oral daily  omega-3-Acid Ethyl Esters 2 Gram(s) Oral two times a day  pantoprazole    Tablet 40 milliGRAM(s) Oral before breakfast  sevelamer hydrochloride 1600 milliGRAM(s) Oral three times a day with meals    MEDICATIONS  (PRN):  HYDROmorphone  Injectable 0.5 milliGRAM(s) IV Push every 6 hours PRN Moderate Pain (4 - 6)      Allergies    No Known Drug Allergies  pineapple (Hives)    Intolerances        SOCIAL HISTORY:  Alcohol use [X ] No  [ ] Yes  Smoking  [ X] No  [ ] Yes  Drug Abuse [X ] No  [ ] Yes  Tattoo [X ] No  [ ] Yes  History of Blood transfusion [ X] No  [ ] Yes    FAMILY HISTORY:  Family history of chronic renal failure  Family history of hypertension (Father, Mother)  Family history of stroke  Diabetes mellitus, type 2    REVIEW OF SYSTEMS:  CONSTITUTIONAL: No weakness, fevers or chills  EYES/ENT: No visual changes;  No vertigo or throat pain   NECK: No pain or stiffness  RESPIRATORY: No cough, wheezing, hemoptysis; No shortness of breath  CARDIOVASCULAR: No chest pain or palpitations  GASTROINTESTINAL: No abdominal or epigastric pain. No nausea, vomiting, or hematemesis; No diarrhea or constipation. No melena or hematochezia.  GENITOURINARY: No dysuria, frequency or hematuria  NEUROLOGICAL: No numbness or weakness  SKIN: No itching, burning, rashes, or lesions   pt is c/o pain in rt.upper arm and Rt shoulder    Vital Signs  T(F): 99 (18 @ 12:20), Max: 99 (18 @ 12:20)  HR: 72 (18 @ 12:20) (71 - 74)  BP: 131/59 (18 @ 12:20) (131/59 - 150/77)  ABP: --  RR: 16 (18 @ 12:20) (16 - 18)  SpO2: 98% (18 @ 12:20) (95% - 99%)  Wt(kg): --  CVP(cm H2O): --  CO: --  PCWP: --    I and O's:    Daily     Daily Weight in k.7 (2018 12:20)    PHYSICAL EXAM:  Constitutional: well developed, well nourished  and in nad  HEENT: PERRLA,  no icteric sclera and mild pallor of conjunctiva noted  Neck: No JVD, thyromegaly or adenopathy  Respiratory: reduced air entry lower lungs with no rales, wheezing or rhonchi  Cardiovascular: S1 and S2 normally heard  Gastrointestinal: soft, nondistended, nontender and normal bowel sounds heard  Extremities: No peripheral edema or cyanosis  pt has good palpable thrill over rt.upper arm AVF  Neurological: A/O x 3, no focal deficits  Psychiatric: Normal mood, normal affect  : No flank tenderness  Skin: No rashes        LABS:                        11.7   7.4   )-----------( 182      ( 2018 06:00 )             36.5     8.4  --        135  |  99  |  44<H>  ----------------------------<  67<L>  4.7   |  23  |  8.90<H>      134<L>  |  98  |  38<H>  ----------------------------<  93  5.0   |  26  |  8.14<H>    Ca    8.9      2018 06:00  Ca    9.5      2018 17:53  Phos  8.4       Mg     2.2         TPro  7.5  /  Alb  3.5  /  TBili  0.4  /  DBili  x   /  AST  14  /  ALT  23  /  AlkPhos  131<H>                RADIOLOGY & ADDITIONAL STUDIES:    < from: CT Upper Extremity No Cont, Right (18 @ 15:19) >  EXAM:  CT UPR EXT RT                            PROCEDURE DATE:  2018          INTERPRETATION:  Clinical information: Right arm pain and swelling.   Atraumatic. History of right arm dialysis fistula.    Noncontrast CT of the right upper extremity obtained. Sagittal and   coronal reconstructions were obtained.    Comparison is made to right shoulder x-ray from 2018.    There is a right upper extremity dialysis fistula. This is not well   evaluated without intravenous contrast. Evaluation is also limited   secondary to patient motion. The distal extent of the fistula was not   completely imaged. Parts of the fistula show peripheral calcifications.    There is no obvious subcutaneous swelling or edema. There is no   intramuscular abnormality. There is no hematoma.    The visualized lung is unremarkable.    The osseous structures show calcification of the supraspinatus tendon   insertion at the humeral head. No acute fracture or dislocation is noted.   There are mild degenerativechanges at the acromioclavicular joint.    Impression:    No finding to explain the patient's right arm pain and swelling on this   motion limited noncontrast examination. Evaluation of the right upper   extremity fistula would be better performed with ultrasound.        < end of copied text >    < from: US Duplex Venous Upper Ext Ltd, Right (18 @ 12:33) >  EXAM:  US DPLX UPR EXT VEINS LTD RT                            PROCEDURE DATE:  2018          INTERPRETATION:  CLINICAL INFORMATION: Right shoulder pain.    COMPARISON: None available.    TECHNIQUE: Duplex sonography of the RIGHT UPPER extremity with color and   spectral Doppler, with and without compression.      FINDINGS:    The right internal jugular, subclavian, axillary, brachial, basilic and   cephalic veins are patent and compressible where applicable.     Doppler examination showsnormal spontaneous and phasic flow.    The right upper zone AV graft is patent.    IMPRESSION: No evidence of right upper extremity deep venous thrombosis.    < end of copied text >    < from: Xray Chest 2 Views PA/Lat (10.08.15 @ 19:07) >  EXAM:  CHEST PA & LAT        PROCEDURE DATE:  10/08/2015      INTERPRETATION:  HISTORY: Preop. History of asthma.    COMPARISON: None.    PROJECTION: PA and lateral views.    The lungs appear clear of infiltrates and effusions. A right-sided   double-lumen catheter is identified. The cardiac and mediastinal contours   as well as osseous structures appear unremarkable.    IMPRESSION: 1. No evidence of acute pulmonary disease.    < end of copied text >

## 2018-02-19 NOTE — PROGRESS NOTE ADULT - SUBJECTIVE AND OBJECTIVE BOX
53 y/oF with PMHx of Anemia, Asthma, ESRD on HD(MWF), Claustrophobia, Morbid Obesity (on CPAP), DMII,HTN, GERD, HLD, MI, AUSTIN, Uterine Leiomyoma, and Vertigo and PSHx of AV Fistula, R Antecubital AVF, , Craniotomy, and Hernia Repair presented to ED c/o R arm and shoulder pain x4 days. Pt describes R arm pain as severe, 10/10 in severity, on and off, started over elbow and moved to shoulder(no more pain over elbow). Patient have right arm AV fistula which does not look infected and pain not related to fistula site.  Park visited [place] ED yesterday for R arm pain and swelling; a CTA of the R arm was done with patent arteries and labsh were WNL so patient was discharged, Pain did not improved so came to [facility] today. Pt denies fever, chills, recent trauma to the R arm.   Patient reports similar pain last year on left arm, injection in bone was given after which pain improved.       Review of Systems:  · Negative General Symptoms	no fever; no chills; no sweating; no anorexia	  · Negative Skin Symptoms	no rash; no itching; no dryness	  · Negative Ophthalmologic Symptoms	no diplopia; no photophobia; no lacrimation L	  · Negative Respiratory and Thorax Symptoms	no wheezing; no dyspnea; no cough; no hemoptysis	  · Negative Cardiovascular Symptoms	no chest pain; no palpitations; no dyspnea on exertion; no orthopnea; no paroxysmal nocturnal dyspnea	  · Negative Gastrointestinal Symptoms	no nausea; no vomiting; no diarrhea; no constipation	  · Negative Musculoskeletal Symptoms	no arthralgia; no arthritis	  · Musculoskeletal Symptoms	joint pain; myalgia	  · Negative Neurological Symptoms	no transient paralysis; no weakness; no paresthesias; no generalized seizures; no focal seizures	      pt seen in icu [  ], reg med floor [ x  ], bed [ x ], chair at bedside [   ], a+o x3 [ x ], lethargic [  ],  nad [ x ]        Allergies    No Known Drug Allergies  pineapple (Hives)        Vitals    T(F): 97.7 (18 @ 05:00), Max: 98.2 (18 @ 23:10)  HR: 72 (18 @ 05:00) (65 - 74)  BP: 150/77 (18 @ 05:00) (143/62 - 165/85)  RR: 17 (18 @ 05:00) (17 - 18)  SpO2: 95% (18 @ 05:00) (95% - 99%)  Wt(kg): --  CAPILLARY BLOOD GLUCOSE      POCT Blood Glucose.: 109 mg/dL (2018 00:26)      Labs                          11.7   7.4   )-----------( 182      ( 2018 06:00 )             36.5           135  |  99  |  44<H>  ----------------------------<  67<L>  4.7   |  23  |  8.90<H>    Ca    8.9      2018 06:00  Phos  8.4       Mg     2.2         TPro  7.5  /  Alb  3.5  /  TBili  0.4  /  DBili  x   /  AST  14  /  ALT  23  /  AlkPhos  131<H>          Radiology Results      < from: CT Upper Extremity No Cont, Right (18 @ 15:19) >  INTERPRETATION:  Clinical information: Right arm pain and swelling.   Atraumatic. History of right arm dialysis fistula.    Noncontrast CT of the right upper extremity obtained. Sagittal and   coronal reconstructions were obtained.    Comparison is made to right shoulder x-ray from 2018.    There is a right upper extremity dialysis fistula. This is not well   evaluated without intravenous contrast. Evaluation is also limited   secondary to patient motion. The distal extent of the fistula was not   completely imaged. Parts of the fistula show peripheral calcifications.    There is no obvious subcutaneous swelling or edema. There is no   intramuscular abnormality. There is no hematoma.    The visualized lung is unremarkable.    The osseous structures show calcification of the supraspinatus tendon   insertion at the humeral head. No acute fracture or dislocation is noted.   There are mild degenerativechanges at the acromioclavicular joint.    Impression:    No finding to explain the patient's right arm pain and swelling on this   motion limited noncontrast examination. Evaluation of the right upper   extremity fistula would be better performed with ultrasound.    < end of copied text >      < from: Xray Shoulder 2 Views, Right (18 @ 14:15) >  IMPRESSION:  No radiographic evidence of fracture. Rotatorcuff calcific tendinosis.    < end of copied text >      Meds    MEDICATIONS  (STANDING):  amLODIPine   Tablet 10 milliGRAM(s) Oral daily  aspirin  chewable 81 milliGRAM(s) Oral daily  atorvastatin 40 milliGRAM(s) Oral at bedtime  calcium carbonate  625 mG + Vitamin D (OsCal 250 + D) 1 Tablet(s) Oral daily  gabapentin 100 milliGRAM(s) Oral daily  heparin  Injectable 5000 Unit(s) SubCutaneous every 8 hours  insulin lispro (HumaLOG) corrective regimen sliding scale   SubCutaneous three times a day before meals  labetalol 200 milliGRAM(s) Oral daily  omega-3-Acid Ethyl Esters 2 Gram(s) Oral two times a day  pantoprazole    Tablet 40 milliGRAM(s) Oral before breakfast      MEDICATIONS  (PRN):  HYDROmorphone  Injectable 0.5 milliGRAM(s) IV Push every 6 hours PRN Moderate Pain (4 - 6)      Physical Exam    Neuro :  no focal deficits  Respiratory: CTA B/L  CV: RRR, S1S2, no murmurs,   Abdominal: Soft, NT, ND +BS,  Extremities: No edema, + peripheral pulses    ASSESSMENT    intractible right shoulder pain 2nd to Rotatorcuff calcific tendinosis.  h/o Myocardial infarct, old  ESRD (end stage renal disease) on dialysis  Asthma occurring only with upper respiratory infection  Anemia in chronic renal disease  Claustrophobia  Uterine leiomyoma, unspecified location  AUSTIN (obstructive sleep apnea)  Gastroesophageal reflux disease without esophagitis  Vertigo  HLD (hyperlipidemia)  Essential hypertension  Diabetes mellitus, type 2  Morbid obesity  AV fistula  S/P craniotomy  S/P hernia repair  S/P  section        PLAN    cont dilaudid prn pain  ortho cons  shoulder xray with Rotatorcuff calcific tendinosis noted above  ct right ue with No finding to explain the patient's right arm pain and swelling noted above  vascular cons noted  f/u duplex rue  renal cons  hd as per renal  cont current meds 53 y/oF with PMHx of Anemia, Asthma, ESRD on HD(MWF), Claustrophobia, Morbid Obesity (on CPAP), DMII,HTN, GERD, HLD, MI, AUSTIN, Uterine Leiomyoma, and Vertigo and PSHx of AV Fistula, R Antecubital AVF, , Craniotomy, and Hernia Repair presented to ED c/o R arm and shoulder pain x4 days. Pt describes R arm pain as severe, 10/10 in severity, on and off, started over elbow and moved to shoulder(no more pain over elbow). Patient have right arm AV fistula which does not look infected and pain not related to fistula site.  Park visited [place] ED yesterday for R arm pain and swelling; a CTA of the R arm was done with patent arteries and labsh were WNL so patient was discharged, Pain did not improved so came to [facility] today. Pt denies fever, chills, recent trauma to the R arm.   Patient reports similar pain last year on left arm, injection in bone was given after which pain improved.       Review of Systems:  · Negative General Symptoms	no fever; no chills; no sweating; no anorexia	  · Negative Skin Symptoms	no rash; no itching; no dryness	  · Negative Ophthalmologic Symptoms	no diplopia; no photophobia; no lacrimation L	  · Negative Respiratory and Thorax Symptoms	no wheezing; no dyspnea; no cough; no hemoptysis	  · Negative Cardiovascular Symptoms	no chest pain; no palpitations; no dyspnea on exertion; no orthopnea; no paroxysmal nocturnal dyspnea	  · Negative Gastrointestinal Symptoms	no nausea; no vomiting; no diarrhea; no constipation	  · Negative Musculoskeletal Symptoms	no arthralgia; no arthritis	  · Musculoskeletal Symptoms	joint pain; myalgia	  · Negative Neurological Symptoms	no transient paralysis; no weakness; no paresthesias; no generalized seizures; no focal seizures	      pt seen in icu [  ], reg med floor [ x  ], bed [ x ], chair at bedside [   ], a+o x3 [ x ], lethargic [  ],  nad [ x ]        Allergies    No Known Drug Allergies  pineapple (Hives)        Vitals    T(F): 97.7 (18 @ 05:00), Max: 98.2 (18 @ 23:10)  HR: 72 (18 @ 05:00) (65 - 74)  BP: 150/77 (18 @ 05:00) (143/62 - 165/85)  RR: 17 (18 @ 05:00) (17 - 18)  SpO2: 95% (18 @ 05:00) (95% - 99%)  Wt(kg): --  CAPILLARY BLOOD GLUCOSE      POCT Blood Glucose.: 109 mg/dL (2018 00:26)      Labs                          11.7   7.4   )-----------( 182      ( 2018 06:00 )             36.5           135  |  99  |  44<H>  ----------------------------<  67<L>  4.7   |  23  |  8.90<H>    Ca    8.9      2018 06:00  Phos  8.4       Mg     2.2         TPro  7.5  /  Alb  3.5  /  TBili  0.4  /  DBili  x   /  AST  14  /  ALT  23  /  AlkPhos  131<H>          Radiology Results      < from: CT Upper Extremity No Cont, Right (18 @ 15:19) >  INTERPRETATION:  Clinical information: Right arm pain and swelling.   Atraumatic. History of right arm dialysis fistula.    Noncontrast CT of the right upper extremity obtained. Sagittal and   coronal reconstructions were obtained.    Comparison is made to right shoulder x-ray from 2018.    There is a right upper extremity dialysis fistula. This is not well   evaluated without intravenous contrast. Evaluation is also limited   secondary to patient motion. The distal extent of the fistula was not   completely imaged. Parts of the fistula show peripheral calcifications.    There is no obvious subcutaneous swelling or edema. There is no   intramuscular abnormality. There is no hematoma.    The visualized lung is unremarkable.    The osseous structures show calcification of the supraspinatus tendon   insertion at the humeral head. No acute fracture or dislocation is noted.   There are mild degenerativechanges at the acromioclavicular joint.    Impression:    No finding to explain the patient's right arm pain and swelling on this   motion limited noncontrast examination. Evaluation of the right upper   extremity fistula would be better performed with ultrasound.    < end of copied text >      < from: Xray Shoulder 2 Views, Right (18 @ 14:15) >  IMPRESSION:  No radiographic evidence of fracture. Rotatorcuff calcific tendinosis.    < end of copied text >      Meds    MEDICATIONS  (STANDING):  amLODIPine   Tablet 10 milliGRAM(s) Oral daily  aspirin  chewable 81 milliGRAM(s) Oral daily  atorvastatin 40 milliGRAM(s) Oral at bedtime  calcium carbonate  625 mG + Vitamin D (OsCal 250 + D) 1 Tablet(s) Oral daily  gabapentin 100 milliGRAM(s) Oral daily  heparin  Injectable 5000 Unit(s) SubCutaneous every 8 hours  insulin lispro (HumaLOG) corrective regimen sliding scale   SubCutaneous three times a day before meals  labetalol 200 milliGRAM(s) Oral daily  omega-3-Acid Ethyl Esters 2 Gram(s) Oral two times a day  pantoprazole    Tablet 40 milliGRAM(s) Oral before breakfast      MEDICATIONS  (PRN):  HYDROmorphone  Injectable 0.5 milliGRAM(s) IV Push every 6 hours PRN Moderate Pain (4 - 6)      Physical Exam    Neuro :  no focal deficits  Respiratory: CTA B/L  CV: RRR, S1S2, no murmurs,   Abdominal: Soft, NT, ND +BS,  Extremities: + peripheral pulses, right upper arm mild swelling, avf with good thrill and non tender,   decr rom rue 2nd to pain, posterior shoul adelia tender to palp        ASSESSMENT    intractible right shoulder pain 2nd to Rotatorcuff calcific tendinosis.  h/o Myocardial infarct, old  ESRD (end stage renal disease) on dialysis  Asthma occurring only with upper respiratory infection  Anemia in chronic renal disease  Claustrophobia  Uterine leiomyoma, unspecified location  AUSTIN (obstructive sleep apnea)  Gastroesophageal reflux disease without esophagitis  Vertigo  HLD (hyperlipidemia)  Essential hypertension  Diabetes mellitus, type 2  Morbid obesity  AV fistula  S/P craniotomy  S/P hernia repair  S/P  section        PLAN    cont dilaudid prn pain  ortho cons  shoulder xray with Rotatorcuff calcific tendinosis noted above  ct right ue with No finding to explain the patient's right arm pain and swelling noted above  vascular cons noted  f/u duplex rue  renal cons  hd as per renal  cont current meds

## 2018-02-19 NOTE — CONSULT NOTE ADULT - ASSESSMENT
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  -pt has severe Hyperphosphatemia  -add Renagel 1600 mg po tid with meals and d/c phoslo  -f/u pth intact 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

## 2018-02-19 NOTE — PATIENT PROFILE ADULT. - EXTENSIONS OF SELF_ADULT
Eyeglasses/1 cell phone, , 1 jacket, 1 bag, 1 wrist watch, 4 rings, 1 bracelet, 2 credit cards, 1 pair of earings

## 2018-02-20 LAB
ANION GAP SERPL CALC-SCNC: 8 MMOL/L — SIGNIFICANT CHANGE UP (ref 5–17)
BUN SERPL-MCNC: 32 MG/DL — HIGH (ref 7–18)
CALCIUM SERPL-MCNC: 9.1 MG/DL — SIGNIFICANT CHANGE UP (ref 8.4–10.5)
CALCIUM SERPL-MCNC: 9.5 MG/DL — SIGNIFICANT CHANGE UP (ref 8.4–10.5)
CHLORIDE SERPL-SCNC: 105 MMOL/L — SIGNIFICANT CHANGE UP (ref 96–108)
CO2 SERPL-SCNC: 27 MMOL/L — SIGNIFICANT CHANGE UP (ref 22–31)
CREAT SERPL-MCNC: 6.77 MG/DL — HIGH (ref 0.5–1.3)
GLUCOSE BLDC GLUCOMTR-MCNC: 105 MG/DL — HIGH (ref 70–99)
GLUCOSE BLDC GLUCOMTR-MCNC: 115 MG/DL — HIGH (ref 70–99)
GLUCOSE BLDC GLUCOMTR-MCNC: 116 MG/DL — HIGH (ref 70–99)
GLUCOSE BLDC GLUCOMTR-MCNC: 91 MG/DL — SIGNIFICANT CHANGE UP (ref 70–99)
GLUCOSE SERPL-MCNC: 102 MG/DL — HIGH (ref 70–99)
HCT VFR BLD CALC: 35 % — SIGNIFICANT CHANGE UP (ref 34.5–45)
HGB BLD-MCNC: 11 G/DL — LOW (ref 11.5–15.5)
MAGNESIUM SERPL-MCNC: 2.2 MG/DL — SIGNIFICANT CHANGE UP (ref 1.6–2.6)
MCHC RBC-ENTMCNC: 31 PG — SIGNIFICANT CHANGE UP (ref 27–34)
MCHC RBC-ENTMCNC: 31.5 GM/DL — LOW (ref 32–36)
MCV RBC AUTO: 98.5 FL — SIGNIFICANT CHANGE UP (ref 80–100)
PLATELET # BLD AUTO: 166 K/UL — SIGNIFICANT CHANGE UP (ref 150–400)
POTASSIUM SERPL-MCNC: 4.2 MMOL/L — SIGNIFICANT CHANGE UP (ref 3.5–5.3)
POTASSIUM SERPL-SCNC: 4.2 MMOL/L — SIGNIFICANT CHANGE UP (ref 3.5–5.3)
RBC # BLD: 3.55 M/UL — LOW (ref 3.8–5.2)
RBC # FLD: 14.2 % — SIGNIFICANT CHANGE UP (ref 10.3–14.5)
SODIUM SERPL-SCNC: 140 MMOL/L — SIGNIFICANT CHANGE UP (ref 135–145)
WBC # BLD: 5.4 K/UL — SIGNIFICANT CHANGE UP (ref 3.8–10.5)
WBC # FLD AUTO: 5.4 K/UL — SIGNIFICANT CHANGE UP (ref 3.8–10.5)

## 2018-02-20 PROCEDURE — 99232 SBSQ HOSP IP/OBS MODERATE 35: CPT

## 2018-02-20 RX ORDER — MORPHINE SULFATE 50 MG/1
1 CAPSULE, EXTENDED RELEASE ORAL ONCE
Qty: 0 | Refills: 0 | Status: DISCONTINUED | OUTPATIENT
Start: 2018-02-20 | End: 2018-02-20

## 2018-02-20 RX ADMIN — HEPARIN SODIUM 5000 UNIT(S): 5000 INJECTION INTRAVENOUS; SUBCUTANEOUS at 13:28

## 2018-02-20 RX ADMIN — PANTOPRAZOLE SODIUM 40 MILLIGRAM(S): 20 TABLET, DELAYED RELEASE ORAL at 05:07

## 2018-02-20 RX ADMIN — HYDROMORPHONE HYDROCHLORIDE 0.5 MILLIGRAM(S): 2 INJECTION INTRAMUSCULAR; INTRAVENOUS; SUBCUTANEOUS at 18:23

## 2018-02-20 RX ADMIN — Medication 2 GRAM(S): at 05:08

## 2018-02-20 RX ADMIN — Medication 2 GRAM(S): at 17:52

## 2018-02-20 RX ADMIN — HYDROMORPHONE HYDROCHLORIDE 0.5 MILLIGRAM(S): 2 INJECTION INTRAMUSCULAR; INTRAVENOUS; SUBCUTANEOUS at 07:31

## 2018-02-20 RX ADMIN — HYDROMORPHONE HYDROCHLORIDE 0.5 MILLIGRAM(S): 2 INJECTION INTRAMUSCULAR; INTRAVENOUS; SUBCUTANEOUS at 11:55

## 2018-02-20 RX ADMIN — HYDROMORPHONE HYDROCHLORIDE 0.5 MILLIGRAM(S): 2 INJECTION INTRAMUSCULAR; INTRAVENOUS; SUBCUTANEOUS at 05:07

## 2018-02-20 RX ADMIN — GABAPENTIN 100 MILLIGRAM(S): 400 CAPSULE ORAL at 11:25

## 2018-02-20 RX ADMIN — SEVELAMER CARBONATE 1600 MILLIGRAM(S): 2400 POWDER, FOR SUSPENSION ORAL at 08:34

## 2018-02-20 RX ADMIN — HEPARIN SODIUM 5000 UNIT(S): 5000 INJECTION INTRAVENOUS; SUBCUTANEOUS at 05:07

## 2018-02-20 RX ADMIN — HYDROMORPHONE HYDROCHLORIDE 0.5 MILLIGRAM(S): 2 INJECTION INTRAMUSCULAR; INTRAVENOUS; SUBCUTANEOUS at 17:53

## 2018-02-20 RX ADMIN — ATORVASTATIN CALCIUM 40 MILLIGRAM(S): 80 TABLET, FILM COATED ORAL at 20:11

## 2018-02-20 RX ADMIN — Medication 81 MILLIGRAM(S): at 11:25

## 2018-02-20 RX ADMIN — Medication 200 MILLIGRAM(S): at 05:07

## 2018-02-20 RX ADMIN — HEPARIN SODIUM 5000 UNIT(S): 5000 INJECTION INTRAVENOUS; SUBCUTANEOUS at 20:11

## 2018-02-20 RX ADMIN — HYDROMORPHONE HYDROCHLORIDE 0.5 MILLIGRAM(S): 2 INJECTION INTRAMUSCULAR; INTRAVENOUS; SUBCUTANEOUS at 11:25

## 2018-02-20 RX ADMIN — SEVELAMER CARBONATE 1600 MILLIGRAM(S): 2400 POWDER, FOR SUSPENSION ORAL at 11:26

## 2018-02-20 RX ADMIN — MORPHINE SULFATE 1 MILLIGRAM(S): 50 CAPSULE, EXTENDED RELEASE ORAL at 20:42

## 2018-02-20 RX ADMIN — AMLODIPINE BESYLATE 10 MILLIGRAM(S): 2.5 TABLET ORAL at 05:07

## 2018-02-20 RX ADMIN — MORPHINE SULFATE 1 MILLIGRAM(S): 50 CAPSULE, EXTENDED RELEASE ORAL at 20:12

## 2018-02-20 RX ADMIN — SEVELAMER CARBONATE 1600 MILLIGRAM(S): 2400 POWDER, FOR SUSPENSION ORAL at 17:53

## 2018-02-20 NOTE — PROGRESS NOTE ADULT - ASSESSMENT
rue upper arm pain  well functioning avf right upper arm              f/u ortho consult, possible neuro consult

## 2018-02-20 NOTE — PROGRESS NOTE ADULT - SUBJECTIVE AND OBJECTIVE BOX
pt seen and examined.pts current chart reviewed and case discussed with resident covering.    SUBJECTIVE:  pt feels fine and denies cp,sob,gi or gu/uremic  symptons    REVIEW OF SYSTEMS:  CONSTITUTIONAL: No weakness, fevers or chills  EYES/ENT: No visual changes;  No vertigo or throat pain   NECK: No pain or stiffness  RESPIRATORY: No cough, wheezing, hemoptysis; No shortness of breath  CARDIOVASCULAR: No chest pain or palpitations  GASTROINTESTINAL: No abdominal or epigastric pain. No nausea, vomiting, or hematemesis; No diarrhea or constipation. No melena or hematochezia.  GENITOURINARY: No dysuria, frequency , hematuria, flank pain or nocturia  NEUROLOGICAL: No numbness or weakness  SKIN: No itching, burning, rashes, or lesions   All other review of systems is negative unless indicated above    Current meds:    amLODIPine   Tablet 10 milliGRAM(s) Oral daily  aspirin  chewable 81 milliGRAM(s) Oral daily  atorvastatin 40 milliGRAM(s) Oral at bedtime  gabapentin 100 milliGRAM(s) Oral daily  heparin  Injectable 5000 Unit(s) SubCutaneous every 8 hours  HYDROmorphone  Injectable 0.5 milliGRAM(s) IV Push every 6 hours PRN  labetalol 200 milliGRAM(s) Oral daily  omega-3-Acid Ethyl Esters 2 Gram(s) Oral two times a day  pantoprazole    Tablet 40 milliGRAM(s) Oral before breakfast  sevelamer hydrochloride 1600 milliGRAM(s) Oral three times a day with meals      Vital Signs    T(F): 98.4 (02-20-18 @ 14:59), Max: 99.3 (02-19-18 @ 20:52)  HR: 72 (02-20-18 @ 14:59) (72 - 88)  BP: 124/72 (02-20-18 @ 14:59) (124/72 - 148/67)  ABP: --  RR: 18 (02-20-18 @ 14:59) (16 - 18)  SpO2: 96% (02-20-18 @ 14:59) (96% - 99%)  Wt(kg): --  CVP(cm H2O): --  CO: --  PCWP: --    I and O's:    Daily     Daily     PHYSICAL EXAM:  Constitutional: well developed, well nourished  and in nad  HEENT: PERRLA,  no icteric sclera and mild pallor of conjunctiva noted  Neck: No JVD, thyromegaly or adenopathy  Respiratory: reduced air entry lower lungs with no rales, wheezing or rhonchi  Cardiovascular: S1 and S2 normally heard  Gastrointestinal: soft, nondistended, nontender and normal bowel sounds heard  Extremities: No peripheral edema or cyanosis  Neurological: A/O x 3, no focal deficits  : No flank or cva tenderness palpated.  Skin: No rashes      LABS:    CBC:                          11.0   5.4   )-----------( 166      ( 20 Feb 2018 08:10 )             35.0           BMP:    02-20    140  |  105  |  32<H>  ----------------------------<  102<H>  4.2   |  27  |  6.77<H>  02-19    135  |  99  |  44<H>  ----------------------------<  67<L>  4.7   |  23  |  8.90<H>  02-18    134<L>  |  98  |  38<H>  ----------------------------<  93  5.0   |  26  |  8.14<H>    Ca    9.1      20 Feb 2018 08:10  Ca    8.9      19 Feb 2018 06:00  Ca    9.5      18 Feb 2018 17:53  Phos  8.4     02-19  Mg     2.2     02-20  Mg     2.2     02-19    TPro  7.5  /  Alb  3.5  /  TBili  0.4  /  DBili  x   /  AST  14  /  ALT  23  /  AlkPhos  131<H>  02-18      Intact PTH: 340 pg/mL (02-19 @ 22:19)  Vitamin D, 25-Hydroxy: 16.7 ng/mL (02-19 @ 09:49)  Thyroid Stimulating Hormone, Serum: 1.59 uU/mL (02-19 @ 06:00)      URINE STUDIES:                        RADIOLOGY & ADDITIONAL STUDIES: pt seen and examined.    SUBJECTIVE:  pt denies cp,sob,gi or uremic  symptons  pt continue to have rt.shoulder and upper arm apin    REVIEW OF SYSTEMS:  CONSTITUTIONAL: No weakness, fevers or chills  EYES/ENT: No visual changes;  No vertigo or throat pain   NECK: No pain or stiffness  RESPIRATORY: No cough, wheezing, hemoptysis; No shortness of breath  CARDIOVASCULAR: No chest pain or palpitations  GASTROINTESTINAL: No abdominal or epigastric pain. No nausea, vomiting, or hematemesis; No diarrhea or constipation. No melena or hematochezia.  GENITOURINARY: No dysuria, frequency , hematuria, flank pain or nocturia  NEUROLOGICAL: No numbness or weakness  SKIN: No itching, burning, rashes, or lesions   All other review of systems is negative unless indicated above    Current meds:    amLODIPine   Tablet 10 milliGRAM(s) Oral daily  aspirin  chewable 81 milliGRAM(s) Oral daily  atorvastatin 40 milliGRAM(s) Oral at bedtime  gabapentin 100 milliGRAM(s) Oral daily  heparin  Injectable 5000 Unit(s) SubCutaneous every 8 hours  HYDROmorphone  Injectable 0.5 milliGRAM(s) IV Push every 6 hours PRN  labetalol 200 milliGRAM(s) Oral daily  omega-3-Acid Ethyl Esters 2 Gram(s) Oral two times a day  pantoprazole    Tablet 40 milliGRAM(s) Oral before breakfast  sevelamer hydrochloride 1600 milliGRAM(s) Oral three times a day with meals      Vital Signs    T(F): 98.4 (02-20-18 @ 14:59), Max: 99.3 (02-19-18 @ 20:52)  HR: 72 (02-20-18 @ 14:59) (72 - 88)  BP: 124/72 (02-20-18 @ 14:59) (124/72 - 148/67)  ABP: --  RR: 18 (02-20-18 @ 14:59) (16 - 18)  SpO2: 96% (02-20-18 @ 14:59) (96% - 99%)  Wt(kg): --  CVP(cm H2O): --  CO: --  PCWP: --    I and O's:    Daily     Daily     PHYSICAL EXAM:  Constitutional: well developed, well nourished  and in nad  HEENT: PERRLA,  no icteric sclera and mild pallor of conjunctiva noted  Neck: No JVD, thyromegaly or adenopathy  Respiratory: reduced air entry lower lungs with no rales, wheezing or rhonchi  Cardiovascular: S1 and S2 normally heard  Gastrointestinal: soft, nondistended, nontender and normal bowel sounds heard  Extremities: No peripheral edema or cyanosis  pt has good palpable thrill over rt.upper arm AVF  Neurological: A/O x 3, no focal deficits  Psychiatric: Normal mood, normal affect  : No flank tenderness  Skin: No rashes      LABS:    CBC:                          11.0   5.4   )-----------( 166      ( 20 Feb 2018 08:10 )             35.0           BMP:    02-20    140  |  105  |  32<H>  ----------------------------<  102<H>  4.2   |  27  |  6.77<H>  02-19    135  |  99  |  44<H>  ----------------------------<  67<L>  4.7   |  23  |  8.90<H>  02-18    134<L>  |  98  |  38<H>  ----------------------------<  93  5.0   |  26  |  8.14<H>    Ca    9.1      20 Feb 2018 08:10  Ca    8.9      19 Feb 2018 06:00  Ca    9.5      18 Feb 2018 17:53  Phos  8.4     02-19  Mg     2.2     02-20  Mg     2.2     02-19    TPro  7.5  /  Alb  3.5  /  TBili  0.4  /  DBili  x   /  AST  14  /  ALT  23  /  AlkPhos  131<H>  02-18      Intact PTH: 340 pg/mL (02-19 @ 22:19)  Vitamin D, 25-Hydroxy: 16.7 ng/mL (02-19 @ 09:49)  Thyroid Stimulating Hormone, Serum: 1.59 uU/mL (02-19 @ 06:00)      URINE STUDIES:                        RADIOLOGY & ADDITIONAL STUDIES:

## 2018-02-20 NOTE — PROGRESS NOTE ADULT - ASSESSMENT
53 y old female w Right shoulder and arm pain of acute onset    no acute vascular  issues at this time'  agree w ortho eval and w/u  rue avf hd as needed  reconsult prn

## 2018-02-20 NOTE — PROGRESS NOTE ADULT - ASSESSMENT
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

## 2018-02-20 NOTE — PROGRESS NOTE ADULT - SUBJECTIVE AND OBJECTIVE BOX
Patient is a 53y old  Female who presents with a chief complaint of right arm and shoulder pain for 3 days (18 Feb 2018 17:59)      Vascular Surgery Attending Progress Note    Interval HPI: pt continues to c/o rt shoulder pain no c/o re rue avf     Medications:  amLODIPine   Tablet 10 milliGRAM(s) Oral daily  aspirin  chewable 81 milliGRAM(s) Oral daily  atorvastatin 40 milliGRAM(s) Oral at bedtime  gabapentin 100 milliGRAM(s) Oral daily  heparin  Injectable 5000 Unit(s) SubCutaneous every 8 hours  HYDROmorphone  Injectable 0.5 milliGRAM(s) IV Push every 6 hours PRN  labetalol 200 milliGRAM(s) Oral daily  omega-3-Acid Ethyl Esters 2 Gram(s) Oral two times a day  pantoprazole    Tablet 40 milliGRAM(s) Oral before breakfast  sevelamer hydrochloride 1600 milliGRAM(s) Oral three times a day with meals      Vital Signs Last 24 Hrs  T(C): 36.8 (20 Feb 2018 20:52), Max: 37 (20 Feb 2018 05:05)  T(F): 98.3 (20 Feb 2018 20:52), Max: 98.6 (20 Feb 2018 05:05)  HR: 77 (20 Feb 2018 20:52) (72 - 77)  BP: 141/72 (20 Feb 2018 20:52) (124/72 - 141/72)  BP(mean): --  RR: 16 (20 Feb 2018 20:52) (16 - 18)  SpO2: 95% (20 Feb 2018 20:52) (95% - 99%)  I&O's Summary      Physical Exam:  Neuro  A&Ox3 VSS  Vascular:  rue avf w excellent bruit and thrill, rue warm well perfused   Musculoskeletal:rt soulder sig dec rom due to pain     LABS:                        11.0   5.4   )-----------( 166      ( 20 Feb 2018 08:10 )             35.0     02-20    140  |  105  |  32<H>  ----------------------------<  102<H>  4.2   |  27  |  6.77<H>    Ca    9.1      20 Feb 2018 08:10  Phos  8.4     02-19  Mg     2.2     02-20      RUE US findings reviewed     GIL ROJO MD  [phone]

## 2018-02-20 NOTE — PROGRESS NOTE ADULT - SUBJECTIVE AND OBJECTIVE BOX
Patient is a 53y old  Female who presents with a chief complaint of right arm and shoulder pain for 3 days (2018 17:59)    pt seen in icu [  ], reg med floor [ x  ], bed [ x ], chair at bedside [   ], a+o x3 [ x ], lethargic [  ],  nad [ x ]        Allergies    No Known Drug Allergies  pineapple (Hives)        Vitals    T(F): 98.6 (18 @ 05:05), Max: 99.3 (18 @ 20:52)  HR: 72 (18 @ 05:05) (69 - 88)  BP: 138/65 (18 @ 05:05) (131/59 - 148/67)  RR: 16 (18 @ 05:05) (16 - 16)  SpO2: 99% (18 @ 05:05) (97% - 99%)  Wt(kg): --  CAPILLARY BLOOD GLUCOSE      POCT Blood Glucose.: 116 mg/dL (2018 08:16)      Labs                          11.0   5.4   )-----------( 166      ( 2018 08:10 )             35.0       02-    140  |  105  |  32<H>  ----------------------------<  102<H>  4.2   |  27  |  6.77<H>    Ca    9.1      2018 08:10  Phos  8.4       Mg     2.2         TPro  7.5  /  Alb  3.5  /  TBili  0.4  /  DBili  x   /  AST  14  /  ALT  23  /  AlkPhos  131<H>          Radiology Results    < from: US Duplex Venous Upper Ext Ltd, Right (18 @ 12:33) >  FINDINGS:    The right internal jugular, subclavian, axillary, brachial, basilic and   cephalic veins are patent and compressible where applicable.     Doppler examination showsnormal spontaneous and phasic flow.    The right upper zone AV graft is patent.    IMPRESSION: No evidence of right upper extremity deep venous thrombosis.    < end of copied text >      Meds    MEDICATIONS  (STANDING):  amLODIPine   Tablet 10 milliGRAM(s) Oral daily  aspirin  chewable 81 milliGRAM(s) Oral daily  atorvastatin 40 milliGRAM(s) Oral at bedtime  gabapentin 100 milliGRAM(s) Oral daily  heparin  Injectable 5000 Unit(s) SubCutaneous every 8 hours  labetalol 200 milliGRAM(s) Oral daily  omega-3-Acid Ethyl Esters 2 Gram(s) Oral two times a day  pantoprazole    Tablet 40 milliGRAM(s) Oral before breakfast  sevelamer hydrochloride 1600 milliGRAM(s) Oral three times a day with meals      MEDICATIONS  (PRN):  HYDROmorphone  Injectable 0.5 milliGRAM(s) IV Push every 6 hours PRN Moderate Pain (4 - 6)      Physical Exam    Neuro :  no focal deficits  Respiratory: CTA B/L  CV: RRR, S1S2, no murmurs,   Abdominal: Soft, NT, ND +BS,  Extremities: + peripheral pulses, right upper arm mild swelling, avf with good thrill and non tender,   decr rom rue 2nd to pain, posterior shoul adelia tender to palp        ASSESSMENT    intractible right shoulder pain 2nd to Rotatorcuff calcific tendinosis.  h/o Myocardial infarct, old  ESRD (end stage renal disease) on dialysis  Asthma occurring only with upper respiratory infection  Anemia in chronic renal disease  Claustrophobia  Uterine leiomyoma, unspecified location  AUSTIN (obstructive sleep apnea)  Gastroesophageal reflux disease without esophagitis  Vertigo  HLD (hyperlipidemia)  Essential hypertension  Diabetes mellitus, type 2  Morbid obesity  AV fistula  S/P craniotomy  S/P hernia repair  S/P  section        PLAN    cont dilaudid prn pain  ortho cons  shoulder xray with Rotatorcuff calcific tendinosis noted   ct right ue with No finding to explain the patient's right arm pain and swelling noted above  vascular f/u noted  duplex rue neg for dvt noted above  renal cons noted  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

## 2018-02-20 NOTE — PROGRESS NOTE ADULT - SUBJECTIVE AND OBJECTIVE BOX
pt s- new complaints  vss a/f  RUE: warm, motor strength intact, sensation intact. avf thrill. no hematoma  no erythema, no fluctuance                          11.7   7.4   )-----------( 182      ( 19 Feb 2018 06:00 )             36.5   < from: US Duplex Venous Upper Ext Ltd, Right (02.19.18 @ 12:33) >  The right internal jugular, subclavian, axillary, brachial, basilic and   cephalic veins are patent and compressible where applicable.     Doppler examination showsnormal spontaneous and phasic flow.    The right upper zone AV graft is patent.    IMPRESSION: No evidence of right upper extremity deep venous thrombosis.    < end of copied text >

## 2018-02-20 NOTE — CONSULT NOTE ADULT - SUBJECTIVE AND OBJECTIVE BOX
Pt Name: ORI JAMESON  MRN: [id number]    ORTHOPEDIC CONSULT:    Orthopedic diagnosis:    HPI: Patient is a 53y Female c/o right upper arm pain for about 6 days. Patient states pain started in right elbow and radiates to right shoulder. Pain has decreased in right elbow but still present in right shoulder. Pain is relieved with rest and meds. Patient denies any significant incident when pain had started. Denies any trauma or falls to right upper arm. Denies any CP, SOB, paresthesias, fever, chills. Patient has history of AV fistula at [facility]. As per H&P, patient had visited [facility] when pain started and had CTA of [facility] which was WNL.       PAST MEDICAL & SURGICAL HISTORY:  Myocardial infarct, old:   ESRD (end stage renal disease) on dialysis: since 6/15/2015 (M/W/F)  Asthma occurring only with upper respiratory infection: never hospitalized or intubated, last use of inhalator last year with winter  Anemia in chronic renal disease  Claustrophobia: when CPAP mask was put on her  Uterine leiomyoma, unspecified location  AUSTIN (obstructive sleep apnea)  Gastroesophageal reflux disease without esophagitis  Vertigo  HLD (hyperlipidemia)  Essential hypertension  Chronic kidney disease, unspecified stage  Diabetes mellitus, type 2  Morbid obesity  AV fistula: 3/18/2016  S/P craniotomy: - s/p fall from 3 floors, no hx of seizure after surgery  S/P hernia repair: incisional-  S/P  section: ,   CRF (chronic renal failure): s/p right antecubital AV formation- 2015, s/p left antecubital formation AV - - not working, s/p right chest permacath 07/15/15      ALLERGIES: No Known Drug Allergies  pineapple (Hives)      MEDICATIONS: amLODIPine   Tablet 10 milliGRAM(s) Oral daily  aspirin  chewable 81 milliGRAM(s) Oral daily  atorvastatin 40 milliGRAM(s) Oral at bedtime  gabapentin 100 milliGRAM(s) Oral daily  heparin  Injectable 5000 Unit(s) SubCutaneous every 8 hours  HYDROmorphone  Injectable 0.5 milliGRAM(s) IV Push every 6 hours PRN  labetalol 200 milliGRAM(s) Oral daily  omega-3-Acid Ethyl Esters 2 Gram(s) Oral two times a day  pantoprazole    Tablet 40 milliGRAM(s) Oral before breakfast  sevelamer hydrochloride 1600 milliGRAM(s) Oral three times a day with meals      PHYSICAL EXAM:    Vital Signs Last 24 Hrs  T(C): 37 (2018 05:05), Max: 37.4 (2018 20:52)  T(F): 98.6 (2018 05:05), Max: 99.3 (2018 20:52)  HR: 72 (2018 05:05) (69 - 88)  BP: 138/65 (2018 05:05) (131/59 - 148/67)  BP(mean): --  RR: 16 (2018 05:05) (16 - 16)  SpO2: 99% (2018 05:05) (97% - 99%)    Right Upper Extremity: Scars to RUE at Av Fistula. No erythema or drainage. Tenderness to lateral aspect of right shoulder and lateral aspect of RUE. No swelling noted. Sensation intact to light touch in UE B/L. Compartments at forearm soft and NT B/L. ROM of right elbow 0-100 degrees. Decreased ROM of right shoulder due to pain.     LABS:                        11.0   5.4   )-----------( 166      ( 2018 08:10 )             35.0     02-20    140  |  105  |  32<H>  ----------------------------<  102<H>  4.2   |  27  |  6.77<H>    Ca    9.1      2018 08:10  Phos  8.4     02-19  Mg     2.2     02-20    TPro  7.5  /  Alb  3.5  /  TBili  0.4  /  DBili  x   /  AST  14  /  ALT  23  /  AlkPhos  131<H>  02-18    PT/INR - ( 2018 20:34 )   PT: 11.8 sec;   INR: 1.08 ratio         PTT - ( 2018 20:34 )  PTT:33.5 sec    RADIOLOGY:   Xray of Right Shoulder: No fracture or dislocation. Rotator Cuff Calcific tendonosis.  CT of RUE: no swelling or edema. no intramuscular abnormality. no acute fracture or dislocation. Calcification of supraspinatous tendon    IMPRESSION: Pt is a  53y Female with right shoulder rotator cuff calcific tendonosis    PLAN:  -  Pain management  -  DVT prophylaxis  -  Daily PT  -  Conservative management  -  No orthopedic surgical intervention  -  Continue care as per medicine  -  Follow up as outpatient. May need MRI of right shoulder as outpatient  -  Case discussed with Dr. Meyers

## 2018-02-21 DIAGNOSIS — J45.909 UNSPECIFIED ASTHMA, UNCOMPLICATED: ICD-10-CM

## 2018-02-21 LAB
GLUCOSE BLDC GLUCOMTR-MCNC: 114 MG/DL — HIGH (ref 70–99)
GLUCOSE BLDC GLUCOMTR-MCNC: 146 MG/DL — HIGH (ref 70–99)
GLUCOSE BLDC GLUCOMTR-MCNC: 93 MG/DL — SIGNIFICANT CHANGE UP (ref 70–99)
GLUCOSE BLDC GLUCOMTR-MCNC: 96 MG/DL — SIGNIFICANT CHANGE UP (ref 70–99)
PHOSPHATE SERPL-MCNC: 6.4 MG/DL — HIGH (ref 2.5–4.5)

## 2018-02-21 RX ORDER — ONDANSETRON 8 MG/1
4 TABLET, FILM COATED ORAL ONCE
Qty: 0 | Refills: 0 | Status: COMPLETED | OUTPATIENT
Start: 2018-02-21 | End: 2018-02-21

## 2018-02-21 RX ORDER — HYDROMORPHONE HYDROCHLORIDE 2 MG/ML
0.5 INJECTION INTRAMUSCULAR; INTRAVENOUS; SUBCUTANEOUS ONCE
Qty: 0 | Refills: 0 | Status: DISCONTINUED | OUTPATIENT
Start: 2018-02-21 | End: 2018-02-21

## 2018-02-21 RX ADMIN — PANTOPRAZOLE SODIUM 40 MILLIGRAM(S): 20 TABLET, DELAYED RELEASE ORAL at 05:43

## 2018-02-21 RX ADMIN — HYDROMORPHONE HYDROCHLORIDE 0.5 MILLIGRAM(S): 2 INJECTION INTRAMUSCULAR; INTRAVENOUS; SUBCUTANEOUS at 21:25

## 2018-02-21 RX ADMIN — HYDROMORPHONE HYDROCHLORIDE 0.5 MILLIGRAM(S): 2 INJECTION INTRAMUSCULAR; INTRAVENOUS; SUBCUTANEOUS at 21:55

## 2018-02-21 RX ADMIN — ONDANSETRON 4 MILLIGRAM(S): 8 TABLET, FILM COATED ORAL at 16:48

## 2018-02-21 RX ADMIN — Medication 200 MILLIGRAM(S): at 05:43

## 2018-02-21 RX ADMIN — SEVELAMER CARBONATE 1600 MILLIGRAM(S): 2400 POWDER, FOR SUSPENSION ORAL at 16:49

## 2018-02-21 RX ADMIN — HYDROMORPHONE HYDROCHLORIDE 0.5 MILLIGRAM(S): 2 INJECTION INTRAMUSCULAR; INTRAVENOUS; SUBCUTANEOUS at 06:00

## 2018-02-21 RX ADMIN — AMLODIPINE BESYLATE 10 MILLIGRAM(S): 2.5 TABLET ORAL at 05:43

## 2018-02-21 RX ADMIN — ATORVASTATIN CALCIUM 40 MILLIGRAM(S): 80 TABLET, FILM COATED ORAL at 21:25

## 2018-02-21 RX ADMIN — HYDROMORPHONE HYDROCHLORIDE 0.5 MILLIGRAM(S): 2 INJECTION INTRAMUSCULAR; INTRAVENOUS; SUBCUTANEOUS at 16:48

## 2018-02-21 RX ADMIN — Medication 2 GRAM(S): at 17:16

## 2018-02-21 RX ADMIN — SEVELAMER CARBONATE 1600 MILLIGRAM(S): 2400 POWDER, FOR SUSPENSION ORAL at 08:45

## 2018-02-21 RX ADMIN — Medication 2 GRAM(S): at 05:44

## 2018-02-21 RX ADMIN — HYDROMORPHONE HYDROCHLORIDE 0.5 MILLIGRAM(S): 2 INJECTION INTRAMUSCULAR; INTRAVENOUS; SUBCUTANEOUS at 06:30

## 2018-02-21 RX ADMIN — HEPARIN SODIUM 5000 UNIT(S): 5000 INJECTION INTRAVENOUS; SUBCUTANEOUS at 21:26

## 2018-02-21 NOTE — CONSULT NOTE ADULT - SUBJECTIVE AND OBJECTIVE BOX
PULMONARY CONSULT NOTE      ORI JAMESON  MRN-[id number]    Patient is a 53y old  Female who presents with a chief complaint of right arm and shoulder pain for 3 days (2018 17:59)  · Patient/Family of Limited English Proficiency	No	      History of Present Illness:  Chief Complaint/Reason for Admission: right arm and shoulder pain for 3 days	  History of Present Illness: 	  53 y/oF with PMHx of Anemia, Asthma, ESRD on HD(MWF), Claustrophobia, Morbid Obesity (on CPAP), DMII,HTN, GERD, HLD, MI, AUSTIN, Uterine Leiomyoma, and Vertigo and PSHx of AV Fistula, R Antecubital AVF, , Craniotomy, and Hernia Repair presented to ED c/o R arm and shoulder pain x4 days. Pt describes R arm pain as severe, 10/10 in severity, on and off, started over elbow and moved to shoulder(no more pain over elbow). Patient have right arm AV fistula which does not look infected and pain not related to fistula site.  Park visited [place] ED yesterday for R arm pain and swelling; a CTA of the R arm was done with patent arteries and labsh were WNL so patient was discharged, Pain did not improved so came to [facility] today. Pt denies fever, chills, recent trauma to the R arm.   Patient reports similar pain last year on left arm, injection in bone was given after which pain improved.    HISTORY OF PRESENT ILLNESS:    MEDICATIONS  (STANDING):  amLODIPine   Tablet 10 milliGRAM(s) Oral daily  aspirin  chewable 81 milliGRAM(s) Oral daily  atorvastatin 40 milliGRAM(s) Oral at bedtime  gabapentin 100 milliGRAM(s) Oral daily  heparin  Injectable 5000 Unit(s) SubCutaneous every 8 hours  labetalol 200 milliGRAM(s) Oral daily  omega-3-Acid Ethyl Esters 2 Gram(s) Oral two times a day  pantoprazole    Tablet 40 milliGRAM(s) Oral before breakfast  sevelamer hydrochloride 1600 milliGRAM(s) Oral three times a day with meals      MEDICATIONS  (PRN):  HYDROmorphone  Injectable 0.5 milliGRAM(s) IV Push every 6 hours PRN Moderate Pain (4 - 6)      Allergies    No Known Drug Allergies  pineapple (Hives)    Intolerances        PAST MEDICAL & SURGICAL HISTORY:  Myocardial infarct, old:   ESRD (end stage renal disease) on dialysis: since 6/15/2015 (M/W/F)  Asthma occurring only with upper respiratory infection: never hospitalized or intubated, last use of inhalator last year with winter  Anemia in chronic renal disease  Claustrophobia: when CPAP mask was put on her  Uterine leiomyoma, unspecified location  AUSTIN (obstructive sleep apnea)  Gastroesophageal reflux disease without esophagitis  Vertigo  HLD (hyperlipidemia)  Essential hypertension  Chronic kidney disease, unspecified stage  Diabetes mellitus, type 2  Morbid obesity  AV fistula: 3/18/2016  S/P craniotomy: - s/p fall from 3 floors, no hx of seizure after surgery  S/P hernia repair: incisional-  S/P  section: ,   CRF (chronic renal failure): s/p right antecubital AV formation- 2015, s/p left antecubital formation AV - - not working, s/p right chest permacath 07/15/15      FAMILY HISTORY:  Family history of chronic renal failure  Family history of hypertension (Father, Mother)  Family history of stroke  Diabetes mellitus, type 2      SOCIAL HISTORY  Smoking History:     REVIEW OF SYSTEMS:    CONSTITUTIONAL:  No fevers, chills, sweats    HEENT:  Eyes:  No diplopia or blurred vision. ENT:  No earache, sore throat or runny nose.    CARDIOVASCULAR:  No pressure, squeezing, tightness, or heaviness about the chest; no palpitations.    RESPIRATORY:  Per HPI    GASTROINTESTINAL:  No abdominal pain, nausea, vomiting or diarrhea.    GENITOURINARY:  No dysuria, frequency or urgency.    NEUROLOGIC:  No paresthesias, fasciculations, seizures or weakness.    PSYCHIATRIC:  No disorder of thought or mood.    Vital Signs Last 24 Hrs  T(C): 36.7 (2018 04:59), Max: 36.9 (2018 14:59)  T(F): 98.1 (2018 04:59), Max: 98.4 (2018 14:59)  HR: 65 (2018 04:59) (65 - 77)  BP: 137/67 (2018 04:59) (124/72 - 141/72)  BP(mean): --  RR: 18 (2018 04:59) (16 - 18)  SpO2: 100% (2018 04:59) (95% - 100%)  I&O's Detail      PHYSICAL EXAMINATION:    GENERAL: The patient is a well-developed, well-nourished _____in no apparent distress.     HEENT: Head is normocephalic and atraumatic. Extraocular muscles are intact. Mucous membranes are moist.     NECK: Supple.     LUNGS: Clear to auscultation without wheezing, rales, or rhonchi. Respirations unlabored    HEART: Regular rate and rhythm without murmur.    ABDOMEN: Soft, nontender, and nondistended.  No hepatosplenomegaly is noted.    EXTREMITIES: Without any cyanosis, clubbing, rash, lesions or edema.    NEUROLOGIC: Grossly intact.      LABS:                        11.0   5.4   )-----------( 166      ( 2018 08:10 )             35.0     02-20    140  |  105  |  32<H>  ----------------------------<  102<H>  4.2   |  27  |  6.77<H>    Ca    9.1      2018 08:10  Phos  6.4     02-21  Mg     2.2     02-20                          MICROBIOLOGY:    RADIOLOGY & ADDITIONAL STUDIES:    CXR:    < from: Xray Chest 2 Views PA/Lat (10.08.15 @ 19:07) >  IMPRESSION: 1. No evidence of acute pulmonary disease.    < end of copied text >  Ct scan chest:    ekg;    echo:

## 2018-02-21 NOTE — PROGRESS NOTE ADULT - ASSESSMENT
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

## 2018-02-21 NOTE — PROGRESS NOTE ADULT - SUBJECTIVE AND OBJECTIVE BOX
HPI:  53 y/oF with PMHx of Anemia, Asthma, ESRD on HD(MWF), Claustrophobia, Morbid Obesity (on CPAP), DMII,HTN, GERD, HLD, MI, AUSTIN, Uterine Leiomyoma, and Vertigo and PSHx of AV Fistula, R Antecubital AVF, , Craniotomy, and Hernia Repair presented to ED c/o R arm and shoulder pain x4 days.    Interval history:  Patient is still complaining the shoulder pain which worsens on movements. She is on Dilaudid prn for pain control. She denies fever, chills, SOB, palpitations, chest pain, abdominal pain, nausea, vomiting, diarrhea, constipation, dizziness.    Vital Signs Last 24 Hrs  T(C): 36.7 (2018 13:16), Max: 36.9 (2018 14:59)  T(F): 98 (2018 13:16), Max: 98.4 (2018 14:59)  HR: 61 (2018 10:20) (61 - 77)  BP: 123/61 (2018 13:16) (120/62 - 141/72)  RR: 18 (2018 13:16) (16 - 18)  SpO2: 100% (2018 13:16) (95% - 100%)    MEDICATIONS  (STANDING):  amLODIPine   Tablet 10 milliGRAM(s) Oral daily  aspirin  chewable 81 milliGRAM(s) Oral daily  atorvastatin 40 milliGRAM(s) Oral at bedtime  calcium carbonate  625 mG + Vitamin D (OsCal 250 + D) 1 Tablet(s) Oral daily  gabapentin 100 milliGRAM(s) Oral daily  heparin  Injectable 5000 Unit(s) SubCutaneous every 8 hours  insulin lispro (HumaLOG) corrective regimen sliding scale   SubCutaneous three times a day before meals  labetalol 200 milliGRAM(s) Oral daily  omega-3-Acid Ethyl Esters 2 Gram(s) Oral two times a day  pantoprazole    Tablet 40 milliGRAM(s) Oral before breakfast  sevelamer hydrochloride 1600 milliGRAM(s) Oral three times a day with meals    MEDICATIONS  (PRN):  HYDROmorphone  Injectable 0.5 milliGRAM(s) IV Push every 6 hours PRN Moderate Pain (4 - 6)      PHYSICAL EXAM:  GENERAL: NAD, well-groomed, well-developed  HEAD:  Atraumatic, Normocephalic  EYES: EOMI, PERRLA, conjunctiva and sclera clear  ENMT: No tonsillar erythema, exudates, or enlargement; Moist mucous membranes, Good dentition, No lesions  NECK: Supple, No JVD, Normal thyroid  NERVOUS SYSTEM:  Alert & Oriented X3, Good concentration; Motor Strength 5/5 B/L upper and lower extremities; DTRs 2+ intact and symmetric  CHEST/LUNG: Clear to percussion bilaterally; No rales, rhonchi, wheezing, or rubs  HEART: Regular rate and rhythm; No murmurs, rubs, or gallops  ABDOMEN: Soft, Nontender, Nondistended; Bowel sounds present  EXTREMITIES:  right shoulder tenderness worsens with movement.  LYMPH: No lymphadenopathy noted  SKIN: No rashes or lesions      LABS:                             11.0   5.4   )-----------( 166      ( 2018 08:10 )             35.0   02-20    140  |  105  |  32<H>  ----------------------------<  102<H>  4.2   |  27  |  6.77<H>    Ca    9.1      2018 08:10  Phos  6.4     02-21  Mg     2.2     02-20

## 2018-02-21 NOTE — PROGRESS NOTE ADULT - SUBJECTIVE AND OBJECTIVE BOX
Patient is a 53y old  Female who presents with a chief complaint of right arm and shoulder pain for 3 days (2018 17:59)    pt seen in icu [  ], reg med floor [ x  ], bed [ x ], chair at bedside [   ], a+o x3 [ x ], lethargic [  ],  nad [ x ]      Allergies    No Known Drug Allergies  pineapple (Hives)        Vitals    T(F): 98.1 (18 @ 04:59), Max: 98.4 (18 @ 14:59)  HR: 65 (18 @ 04:59) (65 - 77)  BP: 137/67 (18 @ 04:59) (124/72 - 141/72)  RR: 18 (18 @ 04:59) (16 - 18)  SpO2: 100% (18 @ 04:59) (95% - 100%)  Wt(kg): --  CAPILLARY BLOOD GLUCOSE      POCT Blood Glucose.: 93 mg/dL (2018 08:19)      Labs                          11.0   5.4   )-----------( 166      ( 2018 08:10 )             35.0       02-20    140  |  105  |  32<H>  ----------------------------<  102<H>  4.2   |  27  |  6.77<H>    Ca    9.1      2018 08:10  Mg     2.2     02-                  Radiology Results      Meds    MEDICATIONS  (STANDING):  amLODIPine   Tablet 10 milliGRAM(s) Oral daily  aspirin  chewable 81 milliGRAM(s) Oral daily  atorvastatin 40 milliGRAM(s) Oral at bedtime  gabapentin 100 milliGRAM(s) Oral daily  heparin  Injectable 5000 Unit(s) SubCutaneous every 8 hours  labetalol 200 milliGRAM(s) Oral daily  omega-3-Acid Ethyl Esters 2 Gram(s) Oral two times a day  pantoprazole    Tablet 40 milliGRAM(s) Oral before breakfast  sevelamer hydrochloride 1600 milliGRAM(s) Oral three times a day with meals      MEDICATIONS  (PRN):  HYDROmorphone  Injectable 0.5 milliGRAM(s) IV Push every 6 hours PRN Moderate Pain (4 - 6)      Physical Exam      Neuro :  no focal deficits  Respiratory: CTA B/L  CV: RRR, S1S2, no murmurs,   Abdominal: Soft, NT, ND +BS,  Extremities: + peripheral pulses, avf with good thrill and non tender,   decr rom rue 2nd to pain, posterior shoul adelia tender to palp        ASSESSMENT    intractible right shoulder pain 2nd to Rotatorcuff calcific tendinosis.  h/o Myocardial infarct, old  ESRD (end stage renal disease) on dialysis  Asthma occurring only with upper respiratory infection  Anemia in chronic renal disease  Claustrophobia  Uterine leiomyoma, unspecified location  AUSTIN (obstructive sleep apnea)  Gastroesophageal reflux disease without esophagitis  Vertigo  HLD (hyperlipidemia)  Essential hypertension  Diabetes mellitus, type 2  Morbid obesity  AV fistula  S/P craniotomy  S/P hernia repair  S/P  section        PLAN    shoulder xray with Rotatorcuff calcific tendinosis noted   cont dilaudid prn pain  ortho cons noted  Pain management  Daily Physical tx  No orthopedic surgical intervention at this time  mri shoulder outpt  ct right ue with No finding to explain the patient's right arm pain and swelling noted   vascular f/u noted  duplex rue neg for dvt noted above  renal cons noted  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

## 2018-02-21 NOTE — PROGRESS NOTE ADULT - SUBJECTIVE AND OBJECTIVE BOX
pt seen and examined.pts current chart reviewed and case discussed with resident covering.    SUBJECTIVE:  pt feels fine and denies cp,sob,gi or gu/uremic  symptons    REVIEW OF SYSTEMS:  CONSTITUTIONAL: No weakness, fevers or chills  EYES/ENT: No visual changes;  No vertigo or throat pain   NECK: No pain or stiffness  RESPIRATORY: No cough, wheezing, hemoptysis; No shortness of breath  CARDIOVASCULAR: No chest pain or palpitations  GASTROINTESTINAL: No abdominal or epigastric pain. No nausea, vomiting, or hematemesis; No diarrhea or constipation. No melena or hematochezia.  GENITOURINARY: No dysuria, frequency , hematuria, flank pain or nocturia  NEUROLOGICAL: No numbness or weakness  SKIN: No itching, burning, rashes, or lesions   All other review of systems is negative unless indicated above    Current meds:    amLODIPine   Tablet 10 milliGRAM(s) Oral daily  aspirin  chewable 81 milliGRAM(s) Oral daily  atorvastatin 40 milliGRAM(s) Oral at bedtime  gabapentin 100 milliGRAM(s) Oral daily  heparin  Injectable 5000 Unit(s) SubCutaneous every 8 hours  HYDROmorphone  Injectable 0.5 milliGRAM(s) IV Push every 6 hours PRN  labetalol 200 milliGRAM(s) Oral daily  omega-3-Acid Ethyl Esters 2 Gram(s) Oral two times a day  pantoprazole    Tablet 40 milliGRAM(s) Oral before breakfast  sevelamer hydrochloride 1600 milliGRAM(s) Oral three times a day with meals      Vital Signs    T(F): 97.5 (18 @ 14:54), Max: 98.4 (18 @ 10:20)  HR: 62 (18 @ 14:54) (61 - 77)  BP: 109/68 (18 @ 14:54) (109/68 - 141/72)  ABP: --  RR: 18 (18 @ 14:54) (16 - 18)  SpO2: 95% (18 @ 14:54) (95% - 100%)  Wt(kg): --  CVP(cm H2O): --  CO: --  PCWP: --    I and O's:    Daily     Daily Weight in k.9 (2018 13:16)    PHYSICAL EXAM:  Constitutional: well developed, well nourished  and in nad  HEENT: PERRLA,  no icteric sclera and mild pallor of conjunctiva noted  Neck: No JVD, thyromegaly or adenopathy  Respiratory: reduced air entry lower lungs with no rales, wheezing or rhonchi  Cardiovascular: S1 and S2 normally heard  Gastrointestinal: soft, nondistended, nontender and normal bowel sounds heard  Extremities: No peripheral edema or cyanosis  Neurological: A/O x 3, no focal deficits  : No flank or cva tenderness palpated.  Skin: No rashes      LABS:    CBC:                          11.0   5.4   )-----------( 166      ( 2018 08:10 )             35.0           BMP:        140  |  105  |  32<H>  ----------------------------<  102<H>  4.2   |  27  |  6.77<H>      135  |  99  |  44<H>  ----------------------------<  67<L>  4.7   |  23  |  8.90<H>      134<L>  |  98  |  38<H>  ----------------------------<  93  5.0   |  26  |  8.14<H>    Ca    9.1      2018 08:10  Ca    8.9      2018 06:00  Ca    9.5      2018 17:53  Phos  6.4       Phos  8.4       Mg     2.2       Mg     2.2         TPro  7.5  /  Alb  3.5  /  TBili  0.4  /  DBili  x   /  AST  14  /  ALT  23  /  AlkPhos  131<H>        Intact PTH: 340 pg/mL ( @ 22:19)  Vitamin D, 25-Hydroxy: 16.7 ng/mL ( @ 09:49)  Thyroid Stimulating Hormone, Serum: 1.59 uU/mL ( @ 06:00)      URINE STUDIES:                        RADIOLOGY & ADDITIONAL STUDIES: pt seen and examined    SUBJECTIVE:  pt  denies cp,sob,gi or uremic  symptons  pt continue to have mild pain over rt.shoulder and rt.upper arm      Current meds:    amLODIPine   Tablet 10 milliGRAM(s) Oral daily  aspirin  chewable 81 milliGRAM(s) Oral daily  atorvastatin 40 milliGRAM(s) Oral at bedtime  gabapentin 100 milliGRAM(s) Oral daily  heparin  Injectable 5000 Unit(s) SubCutaneous every 8 hours  HYDROmorphone  Injectable 0.5 milliGRAM(s) IV Push every 6 hours PRN  labetalol 200 milliGRAM(s) Oral daily  omega-3-Acid Ethyl Esters 2 Gram(s) Oral two times a day  pantoprazole    Tablet 40 milliGRAM(s) Oral before breakfast  sevelamer hydrochloride 1600 milliGRAM(s) Oral three times a day with meals      Vital Signs    T(F): 97.5 (18 @ 14:54), Max: 98.4 (18 @ 10:20)  HR: 62 (18 @ 14:54) (61 - 77)  BP: 109/68 (18 @ 14:54) (109/68 - 141/72)  ABP: --  RR: 18 (18 @ 14:54) (16 - 18)  SpO2: 95% (18 @ 14:54) (95% - 100%)  Wt(kg): --  CVP(cm H2O): --  CO: --  PCWP: --    I and O's:    Daily     Daily Weight in k.9 (2018 13:16)    PHYSICAL EXAM:  Constitutional: well developed, well nourished  and in nad  HEENT: PERRLA,  no icteric sclera and mild pallor of conjunctiva noted  Neck: No JVD, thyromegaly or adenopathy  Respiratory: reduced air entry lower lungs with no rales, wheezing or rhonchi  Cardiovascular: S1 and S2 normally heard  Gastrointestinal: soft, nondistended, nontender and normal bowel sounds heard  Extremities: No peripheral edema or cyanosis  pt has good palpable thrill over rt.upper arm AVF  Neurological: A/O x 3, no focal deficits  Psychiatric: Normal mood, normal affect  : No flank tenderness  Skin: No rashes      LABS: NO NEW LABS TODAY    CBC:                          11.0   5.4   )-----------( 166      ( 2018 08:10 )             35.0           BMP:        140  |  105  |  32<H>  ----------------------------<  102<H>  4.2   |  27  |  6.77<H>      135  |  99  |  44<H>  ----------------------------<  67<L>  4.7   |  23  |  8.90<H>      134<L>  |  98  |  38<H>  ----------------------------<  93  5.0   |  26  |  8.14<H>    Ca    9.1      2018 08:10  Ca    8.9      2018 06:00  Ca    9.5      2018 17:53  Phos  6.4       Phos  8.4       Mg     2.2       Mg     2.2         TPro  7.5  /  Alb  3.5  /  TBili  0.4  /  DBili  x   /  AST  14  /  ALT  23  /  AlkPhos  131<H>        Intact PTH: 340 pg/mL ( @ 22:19)  Vitamin D, 25-Hydroxy: 16.7 ng/mL ( @ 09:49)  Thyroid Stimulating Hormone, Serum: 1.59 uU/mL ( @ 06:00)

## 2018-02-22 ENCOUNTER — TRANSCRIPTION ENCOUNTER (OUTPATIENT)
Age: 53
End: 2018-02-22

## 2018-02-22 LAB
GLUCOSE BLDC GLUCOMTR-MCNC: 103 MG/DL — HIGH (ref 70–99)
GLUCOSE BLDC GLUCOMTR-MCNC: 156 MG/DL — HIGH (ref 70–99)
GLUCOSE BLDC GLUCOMTR-MCNC: 75 MG/DL — SIGNIFICANT CHANGE UP (ref 70–99)
GLUCOSE BLDC GLUCOMTR-MCNC: 86 MG/DL — SIGNIFICANT CHANGE UP (ref 70–99)
GLUCOSE BLDC GLUCOMTR-MCNC: 91 MG/DL — SIGNIFICANT CHANGE UP (ref 70–99)

## 2018-02-22 PROCEDURE — 73221 MRI JOINT UPR EXTREM W/O DYE: CPT | Mod: 26,RT

## 2018-02-22 RX ORDER — SEVELAMER CARBONATE 2400 MG/1
2 POWDER, FOR SUSPENSION ORAL
Qty: 0 | Refills: 0 | DISCHARGE
Start: 2018-02-22

## 2018-02-22 RX ORDER — ONDANSETRON 8 MG/1
4 TABLET, FILM COATED ORAL EVERY 8 HOURS
Qty: 0 | Refills: 0 | Status: DISCONTINUED | OUTPATIENT
Start: 2018-02-22 | End: 2018-03-03

## 2018-02-22 RX ORDER — POLYETHYLENE GLYCOL 3350 17 G/17G
17 POWDER, FOR SOLUTION ORAL ONCE
Qty: 0 | Refills: 0 | Status: COMPLETED | OUTPATIENT
Start: 2018-02-22 | End: 2018-02-22

## 2018-02-22 RX ADMIN — HEPARIN SODIUM 5000 UNIT(S): 5000 INJECTION INTRAVENOUS; SUBCUTANEOUS at 13:22

## 2018-02-22 RX ADMIN — AMLODIPINE BESYLATE 10 MILLIGRAM(S): 2.5 TABLET ORAL at 05:19

## 2018-02-22 RX ADMIN — HYDROMORPHONE HYDROCHLORIDE 0.5 MILLIGRAM(S): 2 INJECTION INTRAMUSCULAR; INTRAVENOUS; SUBCUTANEOUS at 21:22

## 2018-02-22 RX ADMIN — Medication 2 GRAM(S): at 17:57

## 2018-02-22 RX ADMIN — HYDROMORPHONE HYDROCHLORIDE 0.5 MILLIGRAM(S): 2 INJECTION INTRAMUSCULAR; INTRAVENOUS; SUBCUTANEOUS at 13:22

## 2018-02-22 RX ADMIN — Medication 81 MILLIGRAM(S): at 13:22

## 2018-02-22 RX ADMIN — ATORVASTATIN CALCIUM 40 MILLIGRAM(S): 80 TABLET, FILM COATED ORAL at 21:18

## 2018-02-22 RX ADMIN — HYDROMORPHONE HYDROCHLORIDE 0.5 MILLIGRAM(S): 2 INJECTION INTRAMUSCULAR; INTRAVENOUS; SUBCUTANEOUS at 13:40

## 2018-02-22 RX ADMIN — SEVELAMER CARBONATE 1600 MILLIGRAM(S): 2400 POWDER, FOR SUSPENSION ORAL at 08:18

## 2018-02-22 RX ADMIN — SEVELAMER CARBONATE 1600 MILLIGRAM(S): 2400 POWDER, FOR SUSPENSION ORAL at 17:56

## 2018-02-22 RX ADMIN — ONDANSETRON 4 MILLIGRAM(S): 8 TABLET, FILM COATED ORAL at 14:19

## 2018-02-22 RX ADMIN — GABAPENTIN 100 MILLIGRAM(S): 400 CAPSULE ORAL at 13:22

## 2018-02-22 RX ADMIN — HEPARIN SODIUM 5000 UNIT(S): 5000 INJECTION INTRAVENOUS; SUBCUTANEOUS at 21:19

## 2018-02-22 RX ADMIN — Medication 10 MILLIGRAM(S): at 15:00

## 2018-02-22 RX ADMIN — PANTOPRAZOLE SODIUM 40 MILLIGRAM(S): 20 TABLET, DELAYED RELEASE ORAL at 05:19

## 2018-02-22 RX ADMIN — HYDROMORPHONE HYDROCHLORIDE 0.5 MILLIGRAM(S): 2 INJECTION INTRAMUSCULAR; INTRAVENOUS; SUBCUTANEOUS at 21:52

## 2018-02-22 RX ADMIN — Medication 200 MILLIGRAM(S): at 05:19

## 2018-02-22 RX ADMIN — HYDROMORPHONE HYDROCHLORIDE 0.5 MILLIGRAM(S): 2 INJECTION INTRAMUSCULAR; INTRAVENOUS; SUBCUTANEOUS at 05:19

## 2018-02-22 RX ADMIN — SEVELAMER CARBONATE 1600 MILLIGRAM(S): 2400 POWDER, FOR SUSPENSION ORAL at 13:22

## 2018-02-22 RX ADMIN — Medication 2 GRAM(S): at 05:20

## 2018-02-22 RX ADMIN — HEPARIN SODIUM 5000 UNIT(S): 5000 INJECTION INTRAVENOUS; SUBCUTANEOUS at 05:19

## 2018-02-22 RX ADMIN — POLYETHYLENE GLYCOL 3350 17 GRAM(S): 17 POWDER, FOR SOLUTION ORAL at 20:16

## 2018-02-22 NOTE — DISCHARGE NOTE ADULT - PLAN OF CARE
Pain management, prevent complication f/u ortho outpatient c/w HD Monday, Wednesday and Friday f/u ortho outpatient with Dr. Meyers. Pt received local steroid injection on the right shoulder.  Take tapering dose of dexamethasone Continue hemodialysis c/w HD Monday, Wednesday and Friday  Avoid nephrotoxins such as NSAIDS control blood glucose Diet control, currently patient does not require standing dose of insulins  Monitor fingerstick and f/u with PMD rule out stroke MRI showed: ** MRI showed: no acute stroke. Take meclizine as needed for vertigo. Please see ENT doctor as an outpatient for dizziness as your symptom is likely from ear issue. Please see ENT doctor and gastroenterologist as soon as possible. Please see ENT doctor and gastroenterologist as soon as possible. For hard of swallowing, we performed modified barium swallow test which showed esophageal stricture(narrowed food pipe). You can take soft food. Take omeprazole to prevent further deterioration of condition. See your PMD in 1 week.

## 2018-02-22 NOTE — DISCHARGE NOTE ADULT - CARE PROVIDER_API CALL
Gregory Meyers (DO), Orthopaedic Surgery  [address] [place] [address]  Phone: [phone]  Fax: [phone] Gregory Meyers (DO), Orthopaedic Surgery  [address] [place] [address]  Phone: [phone]  Fax: [phone]    Obinna Waddell), Otolaryngology  [address] [address]  Phone: [phone]  Fax: [phone]

## 2018-02-22 NOTE — PROGRESS NOTE ADULT - SUBJECTIVE AND OBJECTIVE BOX
HPI:  53 y/oF with PMHx of Anemia, Asthma, ESRD on HD(MWF), Claustrophobia, Morbid Obesity (on CPAP), DMII,HTN, GERD, HLD, MI, [place], Uterine Leiomyoma, and Vertigo and PSHx of AV Fistula, R Antecubital AVF, , Craniotomy, and Hernia Repair presented to ED c/o R arm and shoulder pain x4 days.    Interval history:  Patient is still complaining the shoulder pain which worsens on movements. She is on Dilaudid prn for pain control. She denies fever, chills, SOB, palpitations, chest pain, abdominal pain, nausea, vomiting, diarrhea, constipation, dizziness.    Vital Signs Last 24 Hrs  T(C): 37.1 (2018 05:12), Max: 37.1 (2018 20:25)  T(F): 98.8 (2018 05:12), Max: 98.8 (2018 20:25)  HR: 62 (2018 05:12) (62 - 70)  BP: 122/72 (2018 05:12) (114/71 - 122/72)  RR: 16 (2018 05:12) (16 - 17)  SpO2: 96% (2018 05:12) (96% - 96%)    MEDICATIONS  (STANDING):  amLODIPine   Tablet 10 milliGRAM(s) Oral daily  aspirin  chewable 81 milliGRAM(s) Oral daily  atorvastatin 40 milliGRAM(s) Oral at bedtime  calcium carbonate  625 mG + Vitamin D (OsCal 250 + D) 1 Tablet(s) Oral daily  gabapentin 100 milliGRAM(s) Oral daily  heparin  Injectable 5000 Unit(s) SubCutaneous every 8 hours  insulin lispro (HumaLOG) corrective regimen sliding scale   SubCutaneous three times a day before meals  labetalol 200 milliGRAM(s) Oral daily  omega-3-Acid Ethyl Esters 2 Gram(s) Oral two times a day  pantoprazole    Tablet 40 milliGRAM(s) Oral before breakfast  sevelamer hydrochloride 1600 milliGRAM(s) Oral three times a day with meals    MEDICATIONS  (PRN):  HYDROmorphone  Injectable 0.5 milliGRAM(s) IV Push every 6 hours PRN Moderate Pain (4 - 6)      PHYSICAL EXAM:  GENERAL: NAD, well-groomed, well-developed  HEAD:  Atraumatic, Normocephalic  EYES: EOMI, PERRLA, conjunctiva and sclera clear  ENMT: No tonsillar erythema, exudates, or enlargement; Moist mucous membranes, Good dentition, No lesions  NECK: Supple, No JVD, Normal thyroid  NERVOUS SYSTEM:  Alert & Oriented X3, Good concentration; Motor Strength 5/5 B/L upper and lower extremities; DTRs 2+ intact and symmetric  CHEST/LUNG: Clear to percussion bilaterally; No rales, rhonchi, wheezing, or rubs  HEART: Regular rate and rhythm; No murmurs, rubs, or gallops  ABDOMEN: Soft, Nontender, Nondistended; Bowel sounds present  EXTREMITIES:  right shoulder tenderness worsens with movement.  LYMPH: No lymphadenopathy noted  SKIN: No rashes or lesions      LABS:                          No labs today

## 2018-02-22 NOTE — DISCHARGE NOTE ADULT - PATIENT PORTAL LINK FT
You can access the [facility], offered by [facility], by registering with the following website: [email or web address]

## 2018-02-22 NOTE — DISCHARGE NOTE ADULT - MEDICATION SUMMARY - MEDICATIONS TO TAKE
I will START or STAY ON the medications listed below when I get home from the hospital:    dexamethasone 4 mg oral tablet  -- 1 tab(s) by mouth every 6 hours x 3 days  2 mg every 6 hours x  2 days  1 mg every 6 hours 2 days  -- Indication: For Right shoulder pain    acetaminophen-oxyCODONE 325 mg-5 mg oral tablet  -- 1 tab(s) by mouth every 6 hours prn pain MDD:4  -- Caution federal law prohibits the transfer of this drug to any person other  than the person for whom it was prescribed.  May cause drowsiness.  Alcohol may intensify this effect.  Use care when operating dangerous machinery.  This prescription cannot be refilled.  This product contains acetaminophen.  Do not use  with any other product containing acetaminophen to prevent possible liver damage.  Using more of this medication than prescribed may cause serious breathing problems.    -- Indication: For Right shoulder pain as needed    aspirin 81 mg oral tablet  -- 1 tab(s) by mouth once a day  -- Indication: For prophylaxis    lisinopril 40 mg oral tablet  -- 1 tab(s) by mouth once a day  -- Indication: For HTN (hypertension)    gabapentin 100 mg oral capsule  -- 1 cap(s) by mouth once a day  -- Indication: For Neuropathy    meclizine 25 mg oral tablet  -- 1 tab(s) by mouth 2 times a day  -- Indication: For Dizziness as needed    Crestor 10 mg oral tablet  -- 1 tab(s) by mouth once a day (at bedtime)  -- Indication: For HLD (hyperlipidemia)    labetalol 200 mg oral tablet  -- 1 tab(s) by mouth once a day  -- Indication: For HTN (hypertension)    amLODIPine 10 mg oral tablet  -- 1 tab(s) by mouth once a day  -- Indication: For HTN (hypertension)    omega-3 polyunsaturated fatty acids 1000 mg oral capsule  -- 1 cap(s) by mouth 2 times a day  -- Indication: For HLD (hyperlipidemia)    sevelamer hydrochloride 800 mg oral tablet  -- 2 tab(s) by mouth 3 times a day (with meals)  -- Indication: For Hyperphosphatemia    omeprazole 40 mg oral delayed release capsule  -- 1 cap(s) by mouth once a day  -- Indication: For GERD    Calcium 600+D 600 mg-200 intl units oral tablet  -- 1 tab(s) by mouth once a day  -- Indication: For Supplement I will START or STAY ON the medications listed below when I get home from the hospital:    aspirin 81 mg oral tablet  -- 1 tab(s) by mouth once a day  -- Indication: For prophylaxis    acetaminophen-oxyCODONE 325 mg-5 mg oral tablet  -- 1 tab(s) by mouth every 6 hours prn pain MDD:4  -- Caution federal law prohibits the transfer of this drug to any person other  than the person for whom it was prescribed.  May cause drowsiness.  Alcohol may intensify this effect.  Use care when operating dangerous machinery.  This prescription cannot be refilled.  This product contains acetaminophen.  Do not use  with any other product containing acetaminophen to prevent possible liver damage.  Using more of this medication than prescribed may cause serious breathing problems.    -- Indication: For Right shoulder pain as needed    lisinopril 40 mg oral tablet  -- 1 tab(s) by mouth once a day  -- Indication: For HTN (hypertension)    gabapentin 100 mg oral capsule  -- 1 cap(s) by mouth once a day  -- Indication: For Neuropathy    meclizine 25 mg oral tablet  -- 1 tab(s) by mouth 3 times a day , as needed for dizziness   -- Indication: For Vertigo    Crestor 10 mg oral tablet  -- 1 tab(s) by mouth once a day (at bedtime)  -- Indication: For HLD (hyperlipidemia)    labetalol 200 mg oral tablet  -- 1 tab(s) by mouth once a day  -- Indication: For HTN (hypertension)    amLODIPine 10 mg oral tablet  -- 1 tab(s) by mouth once a day  -- Indication: For HTN (hypertension)    omega-3 polyunsaturated fatty acids 1000 mg oral capsule  -- 1 cap(s) by mouth 2 times a day  -- Indication: For HLD (hyperlipidemia)    sevelamer hydrochloride 800 mg oral tablet  -- 2 tab(s) by mouth 3 times a day (with meals)  -- Indication: For Hyperphosphatemia    omeprazole 40 mg oral delayed release capsule  -- 1 cap(s) by mouth once a day  -- Indication: For Gastritis    Calcium 600+D 600 mg-200 intl units oral tablet  -- 1 tab(s) by mouth once a day  -- Indication: For Supplement

## 2018-02-22 NOTE — PROGRESS NOTE ADULT - SUBJECTIVE AND OBJECTIVE BOX
Patient is a 53y old  Female who presents with a chief complaint of right arm and shoulder pain for 3 days (18 Feb 2018 17:59)  Awake, alert, comfortable in bed in NAD. Still with moderate pain on right shoulder. No neck pain    INTERVAL HPI/OVERNIGHT EVENTS:      VITAL SIGNS:  T(F): 98.8 (02-22-18 @ 05:12)  HR: 62 (02-22-18 @ 05:12)  BP: 122/72 (02-22-18 @ 05:12)  RR: 16 (02-22-18 @ 05:12)  SpO2: 96% (02-22-18 @ 05:12)  Wt(kg): --  I&O's Detail          REVIEW OF SYSTEMS:    CONSTITUTIONAL:  No fevers, chills, sweats    HEENT:  Eyes:  No diplopia or blurred vision. ENT:  No earache, sore throat or runny nose.    CARDIOVASCULAR:  No pressure, squeezing, tightness, or heaviness about the chest; no palpitations.    RESPIRATORY:  Per HPI    GASTROINTESTINAL:  No abdominal pain, nausea, vomiting or diarrhea.    GENITOURINARY:  No dysuria, frequency or urgency.    NEUROLOGIC:  No paresthesias, fasciculations, seizures or weakness.    PSYCHIATRIC:  No disorder of thought or mood.      PHYSICAL EXAM:    Constitutional: Well developed and nourished  Eyes:Perrla  ENMT: normal  Neck:supple  Respiratory: good air entry  Cardiovascular: S1 S2 regular  Gastrointestinal: Soft, Non tender  Extremities: No edema  Vascular:normal  Neurological:Awake, alert,Ox3  Musculoskeletal:right shoulder pain      MEDICATIONS  (STANDING):  amLODIPine   Tablet 10 milliGRAM(s) Oral daily  aspirin  chewable 81 milliGRAM(s) Oral daily  atorvastatin 40 milliGRAM(s) Oral at bedtime  gabapentin 100 milliGRAM(s) Oral daily  heparin  Injectable 5000 Unit(s) SubCutaneous every 8 hours  labetalol 200 milliGRAM(s) Oral daily  omega-3-Acid Ethyl Esters 2 Gram(s) Oral two times a day  pantoprazole    Tablet 40 milliGRAM(s) Oral before breakfast  sevelamer hydrochloride 1600 milliGRAM(s) Oral three times a day with meals    MEDICATIONS  (PRN):  HYDROmorphone  Injectable 0.5 milliGRAM(s) IV Push every 6 hours PRN Moderate Pain (4 - 6)      Allergies    No Known Drug Allergies  pineapple (Hives)    Intolerances        LABS:      Phos  6.4     02-21                CAPILLARY BLOOD GLUCOSE      POCT Blood Glucose.: 91 mg/dL (22 Feb 2018 07:35)  POCT Blood Glucose.: 114 mg/dL (21 Feb 2018 21:31)  POCT Blood Glucose.: 146 mg/dL (21 Feb 2018 16:30)  POCT Blood Glucose.: 96 mg/dL (21 Feb 2018 11:26)        RADIOLOGY & ADDITIONAL TESTS:  < from: Xray Shoulder 2 Views, Right (02.18.18 @ 14:15) >  IMPRESSION:  No radiographic evidence of fracture. Rotatorcuff calcific tendinosis.      < end of copied text >    CXR:    Ct scan chest:    ekg;    echo:

## 2018-02-22 NOTE — DISCHARGE NOTE ADULT - MEDICATION SUMMARY - MEDICATIONS TO STOP TAKING
I will STOP taking the medications listed below when I get home from the hospital:    Lantus 100 units/mL subcutaneous solution  -- 65 unit(s) subcutaneous once a day  -- in the morning    NovoLIN N 100 units/mL subcutaneous suspension  -- 12 unit(s) subcutaneous once a day    Lantus  -- 10 unit(s) subcutaneous once a day (at bedtime)

## 2018-02-22 NOTE — PROGRESS NOTE ADULT - SUBJECTIVE AND OBJECTIVE BOX
pt seen and examined.pts current chart reviewed and case discussed with resident covering.    SUBJECTIVE:  pt feels fine and denies cp,sob,gi or gu/uremic  symptons    REVIEW OF SYSTEMS:  CONSTITUTIONAL: No weakness, fevers or chills  EYES/ENT: No visual changes;  No vertigo or throat pain   NECK: No pain or stiffness  RESPIRATORY: No cough, wheezing, hemoptysis; No shortness of breath  CARDIOVASCULAR: No chest pain or palpitations  GASTROINTESTINAL: No abdominal or epigastric pain. No nausea, vomiting, or hematemesis; No diarrhea or constipation. No melena or hematochezia.  GENITOURINARY: No dysuria, frequency , hematuria, flank pain or nocturia  NEUROLOGICAL: No numbness or weakness  SKIN: No itching, burning, rashes, or lesions   All other review of systems is negative unless indicated above    Current meds:    amLODIPine   Tablet 10 milliGRAM(s) Oral daily  aspirin  chewable 81 milliGRAM(s) Oral daily  atorvastatin 40 milliGRAM(s) Oral at bedtime  gabapentin 100 milliGRAM(s) Oral daily  heparin  Injectable 5000 Unit(s) SubCutaneous every 8 hours  HYDROmorphone  Injectable 0.5 milliGRAM(s) IV Push every 6 hours PRN  labetalol 200 milliGRAM(s) Oral daily  omega-3-Acid Ethyl Esters 2 Gram(s) Oral two times a day  pantoprazole    Tablet 40 milliGRAM(s) Oral before breakfast  sevelamer hydrochloride 1600 milliGRAM(s) Oral three times a day with meals      Vital Signs    T(F): 98.8 (02-22-18 @ 05:12), Max: 98.8 (02-21-18 @ 20:25)  HR: 62 (02-22-18 @ 05:12) (62 - 70)  BP: 122/72 (02-22-18 @ 05:12) (109/68 - 122/72)  ABP: --  RR: 16 (02-22-18 @ 05:12) (16 - 18)  SpO2: 96% (02-22-18 @ 05:12) (95% - 96%)  Wt(kg): --  CVP(cm H2O): --  CO: --  PCWP: --    I and O's:    Daily     Daily     PHYSICAL EXAM:  Constitutional: well developed, well nourished  and in nad  HEENT: PERRLA,  no icteric sclera and mild pallor of conjunctiva noted  Neck: No JVD, thyromegaly or adenopathy  Respiratory: reduced air entry lower lungs with no rales, wheezing or rhonchi  Cardiovascular: S1 and S2 normally heard  Gastrointestinal: soft, nondistended, nontender and normal bowel sounds heard  Extremities: No peripheral edema or cyanosis  Neurological: A/O x 3, no focal deficits  : No flank or cva tenderness palpated.  Skin: No rashes      LABS:    CBC:            BMP:    02-20    140  |  105  |  32<H>  ----------------------------<  102<H>  4.2   |  27  |  6.77<H>    Ca    9.1      20 Feb 2018 08:10  Phos  6.4     02-21  Mg     2.2     02-20        Intact PTH: 340 pg/mL (02-19 @ 22:19)      URINE STUDIES:                        RADIOLOGY & ADDITIONAL STUDIES: pt seen and examined.    SUBJECTIVE:  pt feels fine and denies cp,sob,gi or uremic  symptons  pts spouse and daughter at bedside  pt went for MRI today    Current meds:    amLODIPine   Tablet 10 milliGRAM(s) Oral daily  aspirin  chewable 81 milliGRAM(s) Oral daily  atorvastatin 40 milliGRAM(s) Oral at bedtime  gabapentin 100 milliGRAM(s) Oral daily  heparin  Injectable 5000 Unit(s) SubCutaneous every 8 hours  HYDROmorphone  Injectable 0.5 milliGRAM(s) IV Push every 6 hours PRN  labetalol 200 milliGRAM(s) Oral daily  omega-3-Acid Ethyl Esters 2 Gram(s) Oral two times a day  pantoprazole    Tablet 40 milliGRAM(s) Oral before breakfast  sevelamer hydrochloride 1600 milliGRAM(s) Oral three times a day with meals      Vital Signs    T(F): 98.8 (02-22-18 @ 05:12), Max: 98.8 (02-21-18 @ 20:25)  HR: 62 (02-22-18 @ 05:12) (62 - 70)  BP: 122/72 (02-22-18 @ 05:12) (109/68 - 122/72)  ABP: --  RR: 16 (02-22-18 @ 05:12) (16 - 18)  SpO2: 96% (02-22-18 @ 05:12) (95% - 96%)  Wt(kg): --  CVP(cm H2O): --  CO: --  PCWP: --    I and O's:    Daily     Daily     PHYSICAL EXAM:  Constitutional: well developed, well nourished  and in nad  HEENT: PERRLA,  no icteric sclera and mild pallor of conjunctiva noted  Neck: No JVD, thyromegaly or adenopathy  Respiratory: reduced air entry lower lungs with no rales, wheezing or rhonchi  Cardiovascular: S1 and S2 normally heard  Gastrointestinal: soft, nondistended, nontender and normal bowel sounds heard  Extremities: No peripheral edema or cyanosis  pt has good palpable thrill over rt.upper arm AVF  Neurological: A/O x 3, no focal deficits  Psychiatric: Normal mood, normal affect  : No flank tenderness  Skin: No rashes      LABS:no new labs today    BMP:    02-20    140  |  105  |  32<H>  ----------------------------<  102<H>  4.2   |  27  |  6.77<H>    Ca    9.1      20 Feb 2018 08:10  Phos  6.4     02-21  Mg     2.2     02-20        Intact PTH: 340 pg/mL (02-19 @ 22:19)      URINE STUDIES:                        RADIOLOGY & ADDITIONAL STUDIES: pt seen and examined.    SUBJECTIVE:  pt feels fine and denies cp,sob,gi or uremic  symptons  pts spouse and daughter at bedside  pt went for MRI today    Current meds:    amLODIPine   Tablet 10 milliGRAM(s) Oral daily  aspirin  chewable 81 milliGRAM(s) Oral daily  atorvastatin 40 milliGRAM(s) Oral at bedtime  gabapentin 100 milliGRAM(s) Oral daily  heparin  Injectable 5000 Unit(s) SubCutaneous every 8 hours  HYDROmorphone  Injectable 0.5 milliGRAM(s) IV Push every 6 hours PRN  labetalol 200 milliGRAM(s) Oral daily  omega-3-Acid Ethyl Esters 2 Gram(s) Oral two times a day  pantoprazole    Tablet 40 milliGRAM(s) Oral before breakfast  sevelamer hydrochloride 1600 milliGRAM(s) Oral three times a day with meals      Vital Signs    T(F): 98.8 (02-22-18 @ 05:12), Max: 98.8 (02-21-18 @ 20:25)  HR: 62 (02-22-18 @ 05:12) (62 - 70)  BP: 122/72 (02-22-18 @ 05:12) (109/68 - 122/72)  ABP: --  RR: 16 (02-22-18 @ 05:12) (16 - 18)  SpO2: 96% (02-22-18 @ 05:12) (95% - 96%)  Wt(kg): --  CVP(cm H2O): --  CO: --  PCWP: --    I and O's:    Daily     Daily     PHYSICAL EXAM:  Constitutional: well developed, well nourished  and in nad  HEENT: PERRLA,  no icteric sclera and mild pallor of conjunctiva noted  Neck: No JVD, thyromegaly or adenopathy  Respiratory: reduced air entry lower lungs with no rales, wheezing or rhonchi  Cardiovascular: S1 and S2 normally heard  Gastrointestinal: soft, nondistended, nontender and normal bowel sounds heard  Extremities: No peripheral edema or cyanosis  pt has good palpable thrill over rt.upper arm AVF  Neurological: A/O x 3, no focal deficits  Psychiatric: Normal mood, normal affect  : No flank tenderness  Skin: No rashes      LABS:no new labs today    BMP:    02-20    140  |  105  |  32<H>  ----------------------------<  102<H>  4.2   |  27  |  6.77<H>    Ca    9.1      20 Feb 2018 08:10  Phos  6.4     02-21  Mg     2.2     02-20        Intact PTH: 340 pg/mL (02-19 @ 22:19)

## 2018-02-22 NOTE — PROGRESS NOTE ADULT - ASSESSMENT
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

## 2018-02-22 NOTE — DISCHARGE NOTE ADULT - CARE PLAN
Principal Discharge DX:	Supraspinatus tendon tear  Goal:	Pain management, prevent complication  Assessment and plan of treatment:	f/u ortho outpatient  Secondary Diagnosis:	Asthma  Secondary Diagnosis:	ESRD (end stage renal disease)  Secondary Diagnosis:	Diabetes mellitus, type 2 Principal Discharge DX:	Supraspinatus tendon tear  Goal:	Pain management, prevent complication  Assessment and plan of treatment:	f/u ortho outpatient  Secondary Diagnosis:	Asthma  Secondary Diagnosis:	ESRD (end stage renal disease)  Assessment and plan of treatment:	c/w HD Monday, Wednesday and Friday  Secondary Diagnosis:	Diabetes mellitus, type 2 Principal Discharge DX:	Supraspinatus tendon tear  Goal:	Pain management, prevent complication  Assessment and plan of treatment:	f/u ortho outpatient with Dr. Meyers. Pt received local steroid injection on the right shoulder.  Take tapering dose of dexamethasone  Secondary Diagnosis:	ESRD (end stage renal disease)  Goal:	Continue hemodialysis  Assessment and plan of treatment:	c/w HD Monday, Wednesday and Friday  Avoid nephrotoxins such as NSAIDS  Secondary Diagnosis:	Diabetes mellitus, type 2  Goal:	control blood glucose  Assessment and plan of treatment:	Diet control, currently patient does not require standing dose of insulins  Monitor fingerstick and f/u with PMD Principal Discharge DX:	Supraspinatus tendon tear  Goal:	Pain management, prevent complication  Assessment and plan of treatment:	f/u ortho outpatient with Dr. Meyers. Pt received local steroid injection on the right shoulder.  Take tapering dose of dexamethasone  Secondary Diagnosis:	ESRD (end stage renal disease)  Goal:	Continue hemodialysis  Assessment and plan of treatment:	c/w HD Monday, Wednesday and Friday  Avoid nephrotoxins such as NSAIDS  Secondary Diagnosis:	Diabetes mellitus, type 2  Goal:	control blood glucose  Assessment and plan of treatment:	Diet control, currently patient does not require standing dose of insulins  Monitor fingerstick and f/u with PMD  Secondary Diagnosis:	Vertigo  Goal:	rule out stroke  Assessment and plan of treatment:	MRI showed: ** Principal Discharge DX:	Supraspinatus tendon tear  Goal:	Pain management, prevent complication  Assessment and plan of treatment:	f/u ortho outpatient with Dr. Meyers. Pt received local steroid injection on the right shoulder.  Take tapering dose of dexamethasone  Secondary Diagnosis:	ESRD (end stage renal disease)  Goal:	Continue hemodialysis  Assessment and plan of treatment:	c/w HD Monday, Wednesday and Friday  Avoid nephrotoxins such as NSAIDS  Secondary Diagnosis:	Diabetes mellitus, type 2  Goal:	control blood glucose  Assessment and plan of treatment:	Diet control, currently patient does not require standing dose of insulins  Monitor fingerstick and f/u with PMD  Secondary Diagnosis:	Vertigo  Goal:	rule out stroke  Assessment and plan of treatment:	MRI showed: no acute stroke. Take meclizine as needed for vertigo. Please see ENT doctor as an outpatient for dizziness as your symptom is likely from ear issue.  Secondary Diagnosis:	Oropharyngeal dysphagia  Goal:	Please see ENT doctor and gastroenterologist as soon as possible.  Assessment and plan of treatment:	Please see ENT doctor and gastroenterologist as soon as possible. For hard of swallowing, we performed modified barium swallow test which showed esophageal stricture(narrowed food pipe). You can take soft food. Take omeprazole to prevent further deterioration of condition. See your PMD in 1 week.

## 2018-02-22 NOTE — DISCHARGE NOTE ADULT - HOSPITAL COURSE
53 y/oF with PMHx of Anemia, Asthma, ESRD on HD(MWF), Claustrophobia, Morbid Obesity (on CPAP), DMII,HTN, GERD, HLD, MI, AUST[place], Uterine Leiomyoma, and Vertigo and PSHx of AV Fistula, R Antecubital AVF, , Craniotomy, and Hernia Repair presented to ED c/o R arm and shoulder pain x4 days. X ray shows rotator cuff tendinosis. MRI right shoulder shows Calcific tendinosis within the anterior/mid fibers of the infraspinatus tendon with surrounding peritendinous edema and subacromial/subdeltoid bursitis. Shallow bursal surface fraying is also noted along the infraspinatus tendon.   Full-thickness tear of the anterior to mid insertional fibers of the supraspinatus tendon. Ortho evaluated the patient, Dr. Meyers evaluated the patient and pain was managed with Dilaudid. Patient is medically stable and will be discharged home    F/u outpatient with ortho for supraspinatus tear 53 y/oF with PMHx of Anemia, Asthma, ESRD on HD(MWF), Claustrophobia, Morbid Obesity (on CPAP), DMII,HTN, GERD, HLD, MI, [place], Uterine Leiomyoma, and Vertigo and PSHx of AV Fistula, R Antecubital AVF, , Craniotomy, and Hernia Repair presented to ED c/o R arm and shoulder pain x4 days. X ray shows rotator cuff tendinosis. MRI right shoulder shows Calcific tendinosis within the anterior/mid fibers of the infraspinatus tendon with surrounding peritendinous edema and subacromial/subdeltoid bursitis. Shallow bursal surface fraying is also noted along the infraspinatus tendon. Full-thickness tear of the anterior to mid insertional fibers of the supraspinatus tendon. Ortho evaluated the patient, Dr. Meyers evaluated the patient and pain was managed with Dilaudid. Patient is medically stable and will be discharged home.    F/u outpatient with ortho for supraspinatus tear 53 y/oF with PMHx of Anemia, Asthma, ESRD on HD(MWF), Claustrophobia, Morbid Obesity (on CPAP), DMII,HTN, GERD, HLD, MI, [place], Uterine Leiomyoma, and Vertigo and PSHx of AV Fistula, R Antecubital AVF, , Craniotomy, and Hernia Repair presented to ED c/o R arm and shoulder pain x4 days. X ray shows rotator cuff tendinosis. MRI right shoulder shows Calcific tendinosis within the anterior/mid fibers of the infraspinatus tendon with surrounding peritendinous edema and subacromial/subdeltoid bursitis. Shallow bursal surface fraying is also noted along the infraspinatus tendon. Full-thickness tear of the anterior to mid insertional fibers of the supraspinatus tendon. Pt was seen by vascular surgeon Dr. Diaz for possible AV fistula malfunction and related pain. However it was not considered to be the source of the pain and pt received HD sessions through right arm AV fistula without any problem. Ortho Dr. Meyers evaluated the patient and pain was managed with PRN percocet and Dilaudid. However, pt continuously complained of severe right shoulder pain which prolonged her hospital course. 7 days of dexamethasone PO was tried but patient's pain persisted. Dr. Meyers gave her local pain control injection on 2018, and since then her pain was better controlled. Patient is medically stable to be discharged at this point, and will be discharged home.    F/u outpatient with ortho for supraspinatus tear at Dr. Meyers's office. 53 y/oF with PMHx of Anemia, Asthma, ESRD on HD(MWF), Claustrophobia, Morbid Obesity (on CPAP), DMII,HTN, GERD, HLD, MI, [place], Uterine Leiomyoma, and Vertigo and PSHx of AV Fistula, R Antecubital AVF, , Craniotomy, and Hernia Repair presented to ED c/o R arm and shoulder pain x4 days. X ray shows rotator cuff tendinosis. MRI right shoulder shows Calcific tendinosis within the anterior/mid fibers of the infraspinatus tendon with surrounding peritendinous edema and subacromial/subdeltoid bursitis. Shallow bursal surface fraying is also noted along the infraspinatus tendon. Full-thickness tear of the anterior to mid insertional fibers of the supraspinatus tendon. Pt was seen by vascular surgeon Dr. Diaz for possible AV fistula malfunction and related pain. However it was not considered to be the source of the pain and pt received HD sessions through right arm AV fistula without any problem. Ortho Dr. Meyers evaluated the patient and pain was managed with PRN percocet and Dilaudid. However, pt continuously complained of severe right shoulder pain which prolonged her hospital course. 7 days of dexamethasone PO was tried but patient's pain persisted. Dr. Meyers gave her local pain control injection on 2018, and since then her pain was better controlled.     Pt developed sudden onset vertigo and evaluated by neurologist. MRI showed no acute stroke. Vertigo is likely from peripheral problem from ear. Pt will be discharged with meclizine PRN dose.   Pt also c/o dysphagia, was seen and evaluated by speech and swallow team. Modified barium study showed esophageal stricture causing food regurgitation. Pt was cleared by speech team that she can take regular food. Outpatient ENT and GI doctor is recommended as soon as possible, and pt will be discharged with omeprazole PO.  Patient is medically stable to be discharged at this point, and will be discharged home.    For shoulder pain, F/u outpatient with ortho for supraspinatus tear at Dr. Meyers's office.

## 2018-02-22 NOTE — PROGRESS NOTE ADULT - SUBJECTIVE AND OBJECTIVE BOX
Patient is a 53y old  Female who presents with a chief complaint of right arm and shoulder pain for 3 days (2018 17:59)    pt seen in icu [  ], reg med floor [ x  ], bed [ x ], chair at bedside [   ], a+o x3 [ x ], lethargic [  ],  nad [ x ]      Allergies    No Known Drug Allergies  pineapple (Hives)        Vitals    T(F): 98.8 (18 @ 05:12), Max: 98.8 (18 @ 20:25)  HR: 62 (18 @ 05:12) (61 - 70)  BP: 122/72 (18 @ 05:12) (109/68 - 123/61)  RR: 16 (18 @ 05:12) (16 - 18)  SpO2: 96% (18 @ 05:12) (95% - 100%)  Wt(kg): --  CAPILLARY BLOOD GLUCOSE      POCT Blood Glucose.: 91 mg/dL (2018 07:35)      Labs          Phos  6.4     -                  Radiology Results      Meds    MEDICATIONS  (STANDING):  amLODIPine   Tablet 10 milliGRAM(s) Oral daily  aspirin  chewable 81 milliGRAM(s) Oral daily  atorvastatin 40 milliGRAM(s) Oral at bedtime  gabapentin 100 milliGRAM(s) Oral daily  heparin  Injectable 5000 Unit(s) SubCutaneous every 8 hours  labetalol 200 milliGRAM(s) Oral daily  omega-3-Acid Ethyl Esters 2 Gram(s) Oral two times a day  pantoprazole    Tablet 40 milliGRAM(s) Oral before breakfast  sevelamer hydrochloride 1600 milliGRAM(s) Oral three times a day with meals      MEDICATIONS  (PRN):  HYDROmorphone  Injectable 0.5 milliGRAM(s) IV Push every 6 hours PRN Moderate Pain (4 - 6)      Physical Exam      Neuro :  no focal deficits  Respiratory: CTA B/L  CV: RRR, S1S2, no murmurs,   Abdominal: Soft, NT, ND +BS,  Extremities: + peripheral pulses, avf with good thrill and non tender,   decr rom rue 2nd to pain, posterior shoul adelia tender to palp        ASSESSMENT    intractible right shoulder pain 2nd to Rotatorcuff calcific tendinosis.  h/o Myocardial infarct, old  ESRD (end stage renal disease) on dialysis  Asthma occurring only with upper respiratory infection  Anemia in chronic renal disease  Claustrophobia  Uterine leiomyoma, unspecified location  [place] (obstructive sleep apnea)  Gastroesophageal reflux disease without esophagitis  Vertigo  HLD (hyperlipidemia)  Essential hypertension  Diabetes mellitus, type 2  Morbid obesity  AV fistula  S/P craniotomy  S/P hernia repair  S/P  section        PLAN    shoulder xray with Rotatorcuff calcific tendinosis noted   cont dilaudid prn pain  ortho f/u  Pain management  Daily Physical tx  No orthopedic surgical intervention at this time  f/u mri r shoulder  ct right ue with No finding to explain the patient's right arm pain and swelling noted   vascular f/u noted  duplex rue neg for dvt noted above  renal f/u  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

## 2018-02-22 NOTE — PROGRESS NOTE ADULT - SUBJECTIVE AND OBJECTIVE BOX
Patient seen and evaluated  States doing some what better    MRI results reviewed and discussed with patient    Rec: PT, pain control, f/u as outpatient

## 2018-02-23 LAB
GLUCOSE BLDC GLUCOMTR-MCNC: 135 MG/DL — HIGH (ref 70–99)
GLUCOSE BLDC GLUCOMTR-MCNC: 140 MG/DL — HIGH (ref 70–99)
GLUCOSE BLDC GLUCOMTR-MCNC: 165 MG/DL — HIGH (ref 70–99)
GLUCOSE BLDC GLUCOMTR-MCNC: 87 MG/DL — SIGNIFICANT CHANGE UP (ref 70–99)

## 2018-02-23 RX ORDER — DEXAMETHASONE 0.5 MG/5ML
1 ELIXIR ORAL
Qty: 20 | Refills: 0 | OUTPATIENT
Start: 2018-02-23 | End: 2018-02-27

## 2018-02-23 RX ORDER — INSULIN GLARGINE 100 [IU]/ML
10 INJECTION, SOLUTION SUBCUTANEOUS
Qty: 0 | Refills: 0 | COMMUNITY

## 2018-02-23 RX ORDER — DEXAMETHASONE 0.5 MG/5ML
4 ELIXIR ORAL EVERY 6 HOURS
Qty: 0 | Refills: 0 | Status: DISCONTINUED | OUTPATIENT
Start: 2018-02-23 | End: 2018-02-28

## 2018-02-23 RX ADMIN — Medication 2 GRAM(S): at 18:30

## 2018-02-23 RX ADMIN — HYDROMORPHONE HYDROCHLORIDE 0.5 MILLIGRAM(S): 2 INJECTION INTRAMUSCULAR; INTRAVENOUS; SUBCUTANEOUS at 22:00

## 2018-02-23 RX ADMIN — HEPARIN SODIUM 5000 UNIT(S): 5000 INJECTION INTRAVENOUS; SUBCUTANEOUS at 06:04

## 2018-02-23 RX ADMIN — HYDROMORPHONE HYDROCHLORIDE 0.5 MILLIGRAM(S): 2 INJECTION INTRAMUSCULAR; INTRAVENOUS; SUBCUTANEOUS at 07:53

## 2018-02-23 RX ADMIN — AMLODIPINE BESYLATE 10 MILLIGRAM(S): 2.5 TABLET ORAL at 06:03

## 2018-02-23 RX ADMIN — Medication 4 MILLIGRAM(S): at 10:43

## 2018-02-23 RX ADMIN — GABAPENTIN 100 MILLIGRAM(S): 400 CAPSULE ORAL at 12:44

## 2018-02-23 RX ADMIN — ATORVASTATIN CALCIUM 40 MILLIGRAM(S): 80 TABLET, FILM COATED ORAL at 21:26

## 2018-02-23 RX ADMIN — Medication 2 GRAM(S): at 06:07

## 2018-02-23 RX ADMIN — HYDROMORPHONE HYDROCHLORIDE 0.5 MILLIGRAM(S): 2 INJECTION INTRAMUSCULAR; INTRAVENOUS; SUBCUTANEOUS at 21:26

## 2018-02-23 RX ADMIN — HEPARIN SODIUM 5000 UNIT(S): 5000 INJECTION INTRAVENOUS; SUBCUTANEOUS at 21:26

## 2018-02-23 RX ADMIN — HYDROMORPHONE HYDROCHLORIDE 0.5 MILLIGRAM(S): 2 INJECTION INTRAMUSCULAR; INTRAVENOUS; SUBCUTANEOUS at 14:33

## 2018-02-23 RX ADMIN — HEPARIN SODIUM 5000 UNIT(S): 5000 INJECTION INTRAVENOUS; SUBCUTANEOUS at 14:33

## 2018-02-23 RX ADMIN — SEVELAMER CARBONATE 1600 MILLIGRAM(S): 2400 POWDER, FOR SUSPENSION ORAL at 16:56

## 2018-02-23 RX ADMIN — PANTOPRAZOLE SODIUM 40 MILLIGRAM(S): 20 TABLET, DELAYED RELEASE ORAL at 06:03

## 2018-02-23 RX ADMIN — HYDROMORPHONE HYDROCHLORIDE 0.5 MILLIGRAM(S): 2 INJECTION INTRAMUSCULAR; INTRAVENOUS; SUBCUTANEOUS at 08:30

## 2018-02-23 RX ADMIN — Medication 4 MILLIGRAM(S): at 18:29

## 2018-02-23 RX ADMIN — Medication 200 MILLIGRAM(S): at 06:04

## 2018-02-23 RX ADMIN — SEVELAMER CARBONATE 1600 MILLIGRAM(S): 2400 POWDER, FOR SUSPENSION ORAL at 12:44

## 2018-02-23 RX ADMIN — Medication 81 MILLIGRAM(S): at 12:44

## 2018-02-23 NOTE — PROGRESS NOTE ADULT - ASSESSMENT
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

## 2018-02-23 NOTE — PROGRESS NOTE ADULT - PROBLEM SELECTOR PLAN 2
pain control  Ortho follow up pain control  Ortho follow up  MRI showed tendinitis  May benefit from po steroids  PT/Rehab

## 2018-02-23 NOTE — PROGRESS NOTE ADULT - SUBJECTIVE AND OBJECTIVE BOX
Patient is a 53y old  Female who presents with a chief complaint of right arm and shoulder pain for 3 days (22 Feb 2018 15:18)  Awake, alert, comfortable having HD. Had MRI of right shoulder.     INTERVAL HPI/OVERNIGHT EVENTS:      VITAL SIGNS:  T(F): 98.3 (02-23-18 @ 14:31)  HR: 61 (02-23-18 @ 14:31)  BP: 107/47 (02-23-18 @ 14:31)  RR: 16 (02-23-18 @ 14:31)  SpO2: 97% (02-23-18 @ 14:31)  Wt(kg): --  I&O's Detail          REVIEW OF SYSTEMS:    CONSTITUTIONAL:  No fevers, chills, sweats    HEENT:  Eyes:  No diplopia or blurred vision. ENT:  No earache, sore throat or runny nose.    CARDIOVASCULAR:  No pressure, squeezing, tightness, or heaviness about the chest; no palpitations.    RESPIRATORY:  Per HPI    GASTROINTESTINAL:  No abdominal pain, nausea, vomiting or diarrhea.    GENITOURINARY:  No dysuria, frequency or urgency.    NEUROLOGIC:  No paresthesias, fasciculations, seizures or weakness.    PSYCHIATRIC:  No disorder of thought or mood.      PHYSICAL EXAM:    Constitutional: Well developed and nourished  Eyes:Perrla  ENMT: normal  Neck:supple  Respiratory: good air entry  Cardiovascular: S1 S2 regular  Gastrointestinal: Soft, Non tender  Extremities: No edema. Tender right shoulder  Vascular:normal  Neurological:Awake, alert,Ox3  Musculoskeletal:Normal      MEDICATIONS  (STANDING):  amLODIPine   Tablet 10 milliGRAM(s) Oral daily  aspirin  chewable 81 milliGRAM(s) Oral daily  atorvastatin 40 milliGRAM(s) Oral at bedtime  dexamethasone     Tablet 4 milliGRAM(s) Oral every 6 hours  gabapentin 100 milliGRAM(s) Oral daily  heparin  Injectable 5000 Unit(s) SubCutaneous every 8 hours  labetalol 200 milliGRAM(s) Oral daily  omega-3-Acid Ethyl Esters 2 Gram(s) Oral two times a day  pantoprazole    Tablet 40 milliGRAM(s) Oral before breakfast  sevelamer hydrochloride 1600 milliGRAM(s) Oral three times a day with meals    MEDICATIONS  (PRN):  HYDROmorphone  Injectable 0.5 milliGRAM(s) IV Push every 6 hours PRN Moderate Pain (4 - 6)  ondansetron Injectable 4 milliGRAM(s) IV Push every 8 hours PRN Nausea and/or Vomiting      Allergies    No Known Drug Allergies  pineapple (Hives)    Intolerances        LABS:                    CAPILLARY BLOOD GLUCOSE      POCT Blood Glucose.: 135 mg/dL (23 Feb 2018 10:49)  POCT Blood Glucose.: 87 mg/dL (23 Feb 2018 07:57)  POCT Blood Glucose.: 75 mg/dL (22 Feb 2018 21:10)  POCT Blood Glucose.: 103 mg/dL (22 Feb 2018 16:21)        RADIOLOGY & ADDITIONAL TESTS:  < from: MR Shoulder Joint No Cont, Right (02.22.18 @ 14:23) >  Impression:    Calcific tendinosis within the anterior/mid fibers of the infraspinatus   tendon with surrounding peritendinous edema and subacromial/subdeltoid   bursitis. Shallow bursal surface fraying is also noted along the   infraspinatus tendon.    Full-thickness tear of the anterior to mid insertional fibers of the   supraspinatus tendon.    < end of copied text >    CXR:    Ct scan chest:    ekg;    echo:

## 2018-02-23 NOTE — PROGRESS NOTE ADULT - SUBJECTIVE AND OBJECTIVE BOX
pt seen and examined.pts current chart reviewed and case discussed with resident covering.    SUBJECTIVE:  pt feels fine and denies cp,sob,gi or gu/uremic  symptons    REVIEW OF SYSTEMS:  CONSTITUTIONAL: No weakness, fevers or chills  EYES/ENT: No visual changes;  No vertigo or throat pain   NECK: No pain or stiffness  RESPIRATORY: No cough, wheezing, hemoptysis; No shortness of breath  CARDIOVASCULAR: No chest pain or palpitations  GASTROINTESTINAL: No abdominal or epigastric pain. No nausea, vomiting, or hematemesis; No diarrhea or constipation. No melena or hematochezia.  GENITOURINARY: No dysuria, frequency , hematuria, flank pain or nocturia  NEUROLOGICAL: No numbness or weakness  SKIN: No itching, burning, rashes, or lesions   All other review of systems is negative unless indicated above    Current meds:    amLODIPine   Tablet 10 milliGRAM(s) Oral daily  aspirin  chewable 81 milliGRAM(s) Oral daily  atorvastatin 40 milliGRAM(s) Oral at bedtime  dexamethasone     Tablet 4 milliGRAM(s) Oral every 6 hours  gabapentin 100 milliGRAM(s) Oral daily  heparin  Injectable 5000 Unit(s) SubCutaneous every 8 hours  HYDROmorphone  Injectable 0.5 milliGRAM(s) IV Push every 6 hours PRN  labetalol 200 milliGRAM(s) Oral daily  omega-3-Acid Ethyl Esters 2 Gram(s) Oral two times a day  ondansetron Injectable 4 milliGRAM(s) IV Push every 8 hours PRN  pantoprazole    Tablet 40 milliGRAM(s) Oral before breakfast  sevelamer hydrochloride 1600 milliGRAM(s) Oral three times a day with meals      Vital Signs    T(F): 98.3 (18 @ 14:31), Max: 98.3 (18 @ 14:31)  HR: 61 (18 @ 14:31) (57 - 64)  BP: 107/47 (18 @ 14:31) (98/67 - 126/76)  ABP: --  RR: 16 (18 @ 14:31) (16 - 18)  SpO2: 97% (18 @ 14:31) (96% - 100%)  Wt(kg): --  CVP(cm H2O): --  CO: --  PCWP: --    I and O's:    Daily     Daily Weight in k.9 (2018 11:30)    PHYSICAL EXAM:  Constitutional: well developed, well nourished  and in nad  HEENT: PERRLA,  no icteric sclera and mild pallor of conjunctiva noted  Neck: No JVD, thyromegaly or adenopathy  Respiratory: reduced air entry lower lungs with no rales, wheezing or rhonchi  Cardiovascular: S1 and S2 normally heard  Gastrointestinal: soft, nondistended, nontender and normal bowel sounds heard  Extremities: No peripheral edema or cyanosis  Neurological: A/O x 3, no focal deficits  : No flank or cva tenderness palpated.  Skin: No rashes      LABS:    CBC:            BMP:      Phos  6.4     02-21            URINE STUDIES:                        RADIOLOGY & ADDITIONAL STUDIES: pt seen and examined.    SUBJECTIVE:  pt still with severe pain over rt.shoulder and upper arm  pt was dialysed today.pt had some cramping and dizziness during dialyiss  pt currently denies cp,sob or leg edema    REVIEW OF SYSTEMS:  CONSTITUTIONAL: No weakness, fevers or chills  EYES/ENT: No visual changes;  No vertigo or throat pain   NECK: No pain or stiffness  RESPIRATORY: No cough, wheezing, hemoptysis; No shortness of breath  CARDIOVASCULAR: No chest pain or palpitations  GASTROINTESTINAL: No abdominal or epigastric pain. No nausea, vomiting, or hematemesis; No diarrhea or constipation. No melena or hematochezia.  GENITOURINARY: No dysuria, frequency , hematuria, flank pain or nocturia  NEUROLOGICAL: No numbness or weakness.+dizziness  SKIN: No itching, burning, rashes, or lesions   All other review of systems is negative unless indicated above    Current meds:    amLODIPine   Tablet 10 milliGRAM(s) Oral daily  aspirin  chewable 81 milliGRAM(s) Oral daily  atorvastatin 40 milliGRAM(s) Oral at bedtime  dexamethasone     Tablet 4 milliGRAM(s) Oral every 6 hours  gabapentin 100 milliGRAM(s) Oral daily  heparin  Injectable 5000 Unit(s) SubCutaneous every 8 hours  HYDROmorphone  Injectable 0.5 milliGRAM(s) IV Push every 6 hours PRN  labetalol 200 milliGRAM(s) Oral daily  omega-3-Acid Ethyl Esters 2 Gram(s) Oral two times a day  ondansetron Injectable 4 milliGRAM(s) IV Push every 8 hours PRN  pantoprazole    Tablet 40 milliGRAM(s) Oral before breakfast  sevelamer hydrochloride 1600 milliGRAM(s) Oral three times a day with meals      Vital Signs    T(F): 98.3 (18 @ 14:31), Max: 98.3 (18 @ 14:31)  HR: 61 (18 @ 14:31) (57 - 64)  BP: 107/47 (18 @ 14:31) (98/67 - 126/76)  ABP: --  RR: 16 (18 @ 14:31) (16 - 18)  SpO2: 97% (18 @ 14:31) (96% - 100%)  Wt(kg): --  CVP(cm H2O): --  CO: --  PCWP: --    I and O's:    Daily     Daily Weight in k.9 (2018 11:30)    PHYSICAL EXAM:  Constitutional: well developed, well nourished  and in nad  HEENT: PERRLA,  no icteric sclera and mild pallor of conjunctiva noted  Neck: No JVD, thyromegaly or adenopathy  Respiratory: reduced air entry lower lungs with no rales, wheezing or rhonchi  Cardiovascular: S1 and S2 normally heard  Gastrointestinal: soft, nondistended, nontender and normal bowel sounds heard  Extremities: No peripheral edema or cyanosis  pt has good palpable thrill over rt.upper arm AVF  Neurological: A/O x 3, no focal deficits  Psychiatric: Normal mood, normal affect  : No flank tenderness  Skin: No rashes      LABS: no new labs       RADIOLOGY & ADDITIONAL STUDIES:      < from: MR Shoulder Joint No Cont, Right (18 @ 14:23) >  EXAM:  MR SHOULDER RT                            PROCEDURE DATE:  2018          INTERPRETATION:  History: Right-sided shoulder pain.    Multiplanar multisequence noncontrast MRI of the right shoulder was   performed.    Correlation is made with prior CT from 2018.    Findings:    Rotator cuff: There is a full-thickness tear of the anterior insertional   fibers of the supraspinatus tendon measuring approximately 0.5 cm in   transverse dimension and approximately 0.9 cm in anteroposterior   dimension. Findings are seen in the setting of moderate supraspinatus   tendinosis. There is moderate to severe tendinosis of the infraspinatus   tendon. Globular intermediate signal is noted within the anterior/mid   fibers of the infraspinatus tendon measuring approximately 0.6 x 0.6 cm.   This corresponds to a focus of mineralization on the prior CT. These   findings are consistent with calcific tendinosis. There is surrounding   peritendinous edema and overlying subacromial/subdeltoid bursitis. There   is shallow bursal surface fraying of the infraspinatus tendon. There is   mild tendinosis of the subscapularis tendon without discrete tear. The   teres minor tendon is intact. There is no fatty atrophy of the rotator   cuff muscles.    Biceps tendon: Intra-articular and extra articular portions of the long   head biceps tendon are intact.    Glenohumeral joint: Posterior superior to posterior inferior labrum are   blunted and diminutive in appearance consistent with degenerative   tearing. Deep chondral fissuring is noted along the posterior rim of the   glenoid. No full-thickness humeral chondral defect is demonstrated. There   is a small glenohumeral joint effusion.    Acromioclavicular joint: There is mild acromioclavicular joint arthrosis.   There is small lateral subacromial enthesophyte. There is no thickening   of the coracoacromial ligament.    There is no evidence of acute fracture or osteonecrosis. The deltoid   muscle is within normal limits.    Impression:    Calcific tendinosis within the anterior/mid fibers of the infraspinatus   tendon with surrounding peritendinous edema and subacromial/subdeltoid   bursitis. Shallow bursal surface fraying is also noted along the   infraspinatus tendon.    Full-thickness tear of the anterior to mid insertional fibers of the   supraspinatus tendon.          < end of copied text >

## 2018-02-23 NOTE — PROGRESS NOTE ADULT - SUBJECTIVE AND OBJECTIVE BOX
Patient is a 53y old  Female who presents with a chief complaint of right arm and shoulder pain for 3 days (2018 15:18)    pt seen in icu [  ], reg med floor [ x  ], bed [ x ], chair at bedside [   ], a+o x3 [ x ], lethargic [  ],  nad [ x ]      Allergies    No Known Drug Allergies  pineapple (Hives)        Vitals    T(F): 97.3 (18 @ 05:25), Max: 98.1 (18 @ 15:17)  HR: 57 (18 @ 05:25) (57 - 64)  BP: 126/76 (18 @ 05:25) (112/61 - 126/76)  RR: 16 (18 @ 05:25) (16 - 18)  SpO2: 97% (18 @ 05:25) (96% - 100%)  Wt(kg): --  CAPILLARY BLOOD GLUCOSE      POCT Blood Glucose.: 87 mg/dL (2018 07:57)      Labs          Phos  6.4     -          Radiology Results    < from: MR Shoulder Joint No Cont, Right (18 @ 14:23) >    Impression:    Calcific tendinosis within the anterior/mid fibers of the infraspinatus   tendon with surrounding peritendinous edema and subacromial/subdeltoid   bursitis. Shallow bursal surface fraying is also noted along the   infraspinatus tendon.    Full-thickness tear of the anterior to mid insertional fibers of the   supraspinatus tendon.      < end of copied text >        Meds    MEDICATIONS  (STANDING):  amLODIPine   Tablet 10 milliGRAM(s) Oral daily  aspirin  chewable 81 milliGRAM(s) Oral daily  atorvastatin 40 milliGRAM(s) Oral at bedtime  dexamethasone     Tablet 4 milliGRAM(s) Oral every 6 hours  gabapentin 100 milliGRAM(s) Oral daily  heparin  Injectable 5000 Unit(s) SubCutaneous every 8 hours  labetalol 200 milliGRAM(s) Oral daily  omega-3-Acid Ethyl Esters 2 Gram(s) Oral two times a day  pantoprazole    Tablet 40 milliGRAM(s) Oral before breakfast  sevelamer hydrochloride 1600 milliGRAM(s) Oral three times a day with meals      MEDICATIONS  (PRN):  HYDROmorphone  Injectable 0.5 milliGRAM(s) IV Push every 6 hours PRN Moderate Pain (4 - 6)  ondansetron Injectable 4 milliGRAM(s) IV Push every 8 hours PRN Nausea and/or Vomiting      Physical Exam    Neuro :  no focal deficits  Respiratory: CTA B/L  CV: RRR, S1S2, no murmurs,   Abdominal: Soft, NT, ND +BS,  Extremities: + peripheral pulses, avf with good thrill and non tender,   decr rom rue 2nd to pain, posterior shoul adelia tender to palp        ASSESSMENT    intractible right shoulder pain 2nd to Rotatorcuff calcific tendinosis.  h/o Myocardial infarct, old  ESRD (end stage renal disease) on dialysis  Asthma occurring only with upper respiratory infection  Anemia in chronic renal disease  Claustrophobia  Uterine leiomyoma, unspecified location  AUSTIN (obstructive sleep apnea)  Gastroesophageal reflux disease without esophagitis  Vertigo  HLD (hyperlipidemia)  Essential hypertension  Diabetes mellitus, type 2  Morbid obesity  AV fistula  S/P craniotomy  S/P hernia repair  S/P  section        PLAN    xray shoulder with Rotatorcuff calcific tendinosis noted   cont dilaudid prn pain  start decadron 4 mg q6 x 7 days  Daily Physical tx  mri r shoulder with Calcific tendinosis and Full-thickness tear of the anterior to mid insertional fibers of the supraspinatus tendon.  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

## 2018-02-24 LAB
ANION GAP SERPL CALC-SCNC: 12 MMOL/L — SIGNIFICANT CHANGE UP (ref 5–17)
BUN SERPL-MCNC: 50 MG/DL — HIGH (ref 7–18)
CALCIUM SERPL-MCNC: 9.7 MG/DL — SIGNIFICANT CHANGE UP (ref 8.4–10.5)
CHLORIDE SERPL-SCNC: 99 MMOL/L — SIGNIFICANT CHANGE UP (ref 96–108)
CO2 SERPL-SCNC: 23 MMOL/L — SIGNIFICANT CHANGE UP (ref 22–31)
CREAT SERPL-MCNC: 7.09 MG/DL — HIGH (ref 0.5–1.3)
GLUCOSE BLDC GLUCOMTR-MCNC: 127 MG/DL — HIGH (ref 70–99)
GLUCOSE BLDC GLUCOMTR-MCNC: 181 MG/DL — HIGH (ref 70–99)
GLUCOSE BLDC GLUCOMTR-MCNC: 201 MG/DL — HIGH (ref 70–99)
GLUCOSE SERPL-MCNC: 111 MG/DL — HIGH (ref 70–99)
HCT VFR BLD CALC: 36.8 % — SIGNIFICANT CHANGE UP (ref 34.5–45)
HGB BLD-MCNC: 11.7 G/DL — SIGNIFICANT CHANGE UP (ref 11.5–15.5)
MCHC RBC-ENTMCNC: 31.5 PG — SIGNIFICANT CHANGE UP (ref 27–34)
MCHC RBC-ENTMCNC: 31.8 GM/DL — LOW (ref 32–36)
MCV RBC AUTO: 99 FL — SIGNIFICANT CHANGE UP (ref 80–100)
PHOSPHATE SERPL-MCNC: 4 MG/DL — SIGNIFICANT CHANGE UP (ref 2.5–4.5)
PLATELET # BLD AUTO: 195 K/UL — SIGNIFICANT CHANGE UP (ref 150–400)
POTASSIUM SERPL-MCNC: 4.9 MMOL/L — SIGNIFICANT CHANGE UP (ref 3.5–5.3)
POTASSIUM SERPL-SCNC: 4.9 MMOL/L — SIGNIFICANT CHANGE UP (ref 3.5–5.3)
RBC # BLD: 3.71 M/UL — LOW (ref 3.8–5.2)
RBC # FLD: 14.1 % — SIGNIFICANT CHANGE UP (ref 10.3–14.5)
SODIUM SERPL-SCNC: 134 MMOL/L — LOW (ref 135–145)
WBC # BLD: 5.2 K/UL — SIGNIFICANT CHANGE UP (ref 3.8–10.5)
WBC # FLD AUTO: 5.2 K/UL — SIGNIFICANT CHANGE UP (ref 3.8–10.5)

## 2018-02-24 RX ORDER — OXYCODONE AND ACETAMINOPHEN 5; 325 MG/1; MG/1
2 TABLET ORAL EVERY 4 HOURS
Qty: 0 | Refills: 0 | Status: DISCONTINUED | OUTPATIENT
Start: 2018-02-24 | End: 2018-02-24

## 2018-02-24 RX ORDER — HYDROMORPHONE HYDROCHLORIDE 2 MG/ML
0.5 INJECTION INTRAMUSCULAR; INTRAVENOUS; SUBCUTANEOUS ONCE
Qty: 0 | Refills: 0 | Status: DISCONTINUED | OUTPATIENT
Start: 2018-02-24 | End: 2018-02-24

## 2018-02-24 RX ORDER — HYDROMORPHONE HYDROCHLORIDE 2 MG/ML
0.5 INJECTION INTRAMUSCULAR; INTRAVENOUS; SUBCUTANEOUS EVERY 6 HOURS
Qty: 0 | Refills: 0 | Status: DISCONTINUED | OUTPATIENT
Start: 2018-02-24 | End: 2018-03-03

## 2018-02-24 RX ORDER — OXYCODONE AND ACETAMINOPHEN 5; 325 MG/1; MG/1
2 TABLET ORAL EVERY 4 HOURS
Qty: 0 | Refills: 0 | Status: DISCONTINUED | OUTPATIENT
Start: 2018-02-24 | End: 2018-03-02

## 2018-02-24 RX ADMIN — SEVELAMER CARBONATE 1600 MILLIGRAM(S): 2400 POWDER, FOR SUSPENSION ORAL at 08:03

## 2018-02-24 RX ADMIN — Medication 4 MILLIGRAM(S): at 17:00

## 2018-02-24 RX ADMIN — Medication 4 MILLIGRAM(S): at 11:02

## 2018-02-24 RX ADMIN — HYDROMORPHONE HYDROCHLORIDE 0.5 MILLIGRAM(S): 2 INJECTION INTRAMUSCULAR; INTRAVENOUS; SUBCUTANEOUS at 06:30

## 2018-02-24 RX ADMIN — HYDROMORPHONE HYDROCHLORIDE 0.5 MILLIGRAM(S): 2 INJECTION INTRAMUSCULAR; INTRAVENOUS; SUBCUTANEOUS at 17:00

## 2018-02-24 RX ADMIN — PANTOPRAZOLE SODIUM 40 MILLIGRAM(S): 20 TABLET, DELAYED RELEASE ORAL at 05:32

## 2018-02-24 RX ADMIN — HYDROMORPHONE HYDROCHLORIDE 0.5 MILLIGRAM(S): 2 INJECTION INTRAMUSCULAR; INTRAVENOUS; SUBCUTANEOUS at 17:31

## 2018-02-24 RX ADMIN — AMLODIPINE BESYLATE 10 MILLIGRAM(S): 2.5 TABLET ORAL at 05:33

## 2018-02-24 RX ADMIN — HYDROMORPHONE HYDROCHLORIDE 0.5 MILLIGRAM(S): 2 INJECTION INTRAMUSCULAR; INTRAVENOUS; SUBCUTANEOUS at 05:33

## 2018-02-24 RX ADMIN — HEPARIN SODIUM 5000 UNIT(S): 5000 INJECTION INTRAVENOUS; SUBCUTANEOUS at 22:02

## 2018-02-24 RX ADMIN — Medication 81 MILLIGRAM(S): at 11:03

## 2018-02-24 RX ADMIN — OXYCODONE AND ACETAMINOPHEN 2 TABLET(S): 5; 325 TABLET ORAL at 23:00

## 2018-02-24 RX ADMIN — Medication 4 MILLIGRAM(S): at 05:32

## 2018-02-24 RX ADMIN — Medication 4 MILLIGRAM(S): at 01:28

## 2018-02-24 RX ADMIN — Medication 2 GRAM(S): at 17:03

## 2018-02-24 RX ADMIN — HEPARIN SODIUM 5000 UNIT(S): 5000 INJECTION INTRAVENOUS; SUBCUTANEOUS at 05:32

## 2018-02-24 RX ADMIN — GABAPENTIN 100 MILLIGRAM(S): 400 CAPSULE ORAL at 11:03

## 2018-02-24 RX ADMIN — HEPARIN SODIUM 5000 UNIT(S): 5000 INJECTION INTRAVENOUS; SUBCUTANEOUS at 13:18

## 2018-02-24 RX ADMIN — SEVELAMER CARBONATE 1600 MILLIGRAM(S): 2400 POWDER, FOR SUSPENSION ORAL at 17:00

## 2018-02-24 RX ADMIN — SEVELAMER CARBONATE 1600 MILLIGRAM(S): 2400 POWDER, FOR SUSPENSION ORAL at 11:03

## 2018-02-24 RX ADMIN — Medication 200 MILLIGRAM(S): at 05:32

## 2018-02-24 RX ADMIN — Medication 4 MILLIGRAM(S): at 23:13

## 2018-02-24 RX ADMIN — HYDROMORPHONE HYDROCHLORIDE 0.5 MILLIGRAM(S): 2 INJECTION INTRAMUSCULAR; INTRAVENOUS; SUBCUTANEOUS at 11:02

## 2018-02-24 RX ADMIN — ATORVASTATIN CALCIUM 40 MILLIGRAM(S): 80 TABLET, FILM COATED ORAL at 22:02

## 2018-02-24 RX ADMIN — HYDROMORPHONE HYDROCHLORIDE 0.5 MILLIGRAM(S): 2 INJECTION INTRAMUSCULAR; INTRAVENOUS; SUBCUTANEOUS at 11:32

## 2018-02-24 RX ADMIN — Medication 2 GRAM(S): at 05:38

## 2018-02-24 RX ADMIN — OXYCODONE AND ACETAMINOPHEN 2 TABLET(S): 5; 325 TABLET ORAL at 22:02

## 2018-02-24 NOTE — PROGRESS NOTE ADULT - SUBJECTIVE AND OBJECTIVE BOX
pt seen and examined.pts current chart reviewed and case discussed with resident covering.    SUBJECTIVE:  pt feels fine and denies cp,sob,gi or gu/uremic  symptons    REVIEW OF SYSTEMS:  CONSTITUTIONAL: No weakness, fevers or chills  EYES/ENT: No visual changes;  No vertigo or throat pain   NECK: No pain or stiffness  RESPIRATORY: No cough, wheezing, hemoptysis; No shortness of breath  CARDIOVASCULAR: No chest pain or palpitations  GASTROINTESTINAL: No abdominal or epigastric pain. No nausea, vomiting, or hematemesis; No diarrhea or constipation. No melena or hematochezia.  GENITOURINARY: No dysuria, frequency , hematuria, flank pain or nocturia  NEUROLOGICAL: No numbness or weakness  SKIN: No itching, burning, rashes, or lesions   All other review of systems is negative unless indicated above    Current meds:    amLODIPine   Tablet 10 milliGRAM(s) Oral daily  aspirin  chewable 81 milliGRAM(s) Oral daily  atorvastatin 40 milliGRAM(s) Oral at bedtime  dexamethasone     Tablet 4 milliGRAM(s) Oral every 6 hours  gabapentin 100 milliGRAM(s) Oral daily  heparin  Injectable 5000 Unit(s) SubCutaneous every 8 hours  HYDROmorphone  Injectable 0.5 milliGRAM(s) IV Push every 6 hours PRN  labetalol 200 milliGRAM(s) Oral daily  omega-3-Acid Ethyl Esters 2 Gram(s) Oral two times a day  ondansetron Injectable 4 milliGRAM(s) IV Push every 8 hours PRN  pantoprazole    Tablet 40 milliGRAM(s) Oral before breakfast  sevelamer hydrochloride 1600 milliGRAM(s) Oral three times a day with meals      Vital Signs    T(F): 97.8 (18 @ 13:05), Max: 98.8 (18 @ 16:36)  HR: 69 (18 @ 13:05) (59 - 69)  BP: 118/58 (18 @ 13:05) (112/65 - 132/64)  ABP: --  RR: 18 (18 @ 13:05) (16 - 18)  SpO2: 95% (18 @ 13:05) (94% - 99%)  Wt(kg): --  CVP(cm H2O): --  CO: --  PCWP: --    I and O's:    Daily     Daily Weight in k.1 (2018 05:26)    PHYSICAL EXAM:  Constitutional: well developed, well nourished  and in nad  HEENT: PERRLA,  no icteric sclera and mild pallor of conjunctiva noted  Neck: No JVD, thyromegaly or adenopathy  Respiratory: reduced air entry lower lungs with no rales, wheezing or rhonchi  Cardiovascular: S1 and S2 normally heard  Gastrointestinal: soft, nondistended, nontender and normal bowel sounds heard  Extremities: No peripheral edema or cyanosis  Neurological: A/O x 3, no focal deficits  : No flank or cva tenderness palpated.  Skin: No rashes      LABS:    CBC:                          11.7   5.2   )-----------( 195      ( 2018 06:25 )             36.8           BMP:        134<L>  |  99  |  50<H>  ----------------------------<  111<H>  4.9   |  23  |  7.09<H>    Ca    9.7      2018 06:25  Phos  4.0     -            URINE STUDIES:                        RADIOLOGY & ADDITIONAL STUDIES: pt seen and examined.    SUBJECTIVE:  pt  denies cp,sob,gi or uremic  symptons  pts Rt Shoulder pain is better today  pts family at bedside      Current meds:    amLODIPine   Tablet 10 milliGRAM(s) Oral daily  aspirin  chewable 81 milliGRAM(s) Oral daily  atorvastatin 40 milliGRAM(s) Oral at bedtime  dexamethasone     Tablet 4 milliGRAM(s) Oral every 6 hours  gabapentin 100 milliGRAM(s) Oral daily  heparin  Injectable 5000 Unit(s) SubCutaneous every 8 hours  HYDROmorphone  Injectable 0.5 milliGRAM(s) IV Push every 6 hours PRN  labetalol 200 milliGRAM(s) Oral daily  omega-3-Acid Ethyl Esters 2 Gram(s) Oral two times a day  ondansetron Injectable 4 milliGRAM(s) IV Push every 8 hours PRN  pantoprazole    Tablet 40 milliGRAM(s) Oral before breakfast  sevelamer hydrochloride 1600 milliGRAM(s) Oral three times a day with meals      Vital Signs    T(F): 97.8 (18 @ 13:05), Max: 98.8 (18 @ 16:36)  HR: 69 (18 @ 13:05) (59 - 69)  BP: 118/58 (18 @ 13:05) (112/65 - 132/64)  ABP: --  RR: 18 (18 @ 13:05) (16 - 18)  SpO2: 95% (18 @ 13:05) (94% - 99%)  Wt(kg): --  CVP(cm H2O): --  CO: --  PCWP: --    I and O's:    Daily     Daily Weight in k.1 (2018 05:26)    PHYSICAL EXAM:  Constitutional: well developed, well nourished  and in nad  HEENT: PERRLA,  no icteric sclera and mild pallor of conjunctiva noted  Neck: No JVD, thyromegaly or adenopathy  Respiratory: reduced air entry lower lungs with no rales, wheezing or rhonchi  Cardiovascular: S1 and S2 normally heard  Gastrointestinal: soft, nondistended, nontender and normal bowel sounds heard  Extremities: No peripheral edema or cyanosis  pt has good palpable thrill over rt.upper arm AVF  Neurological: A/O x 3, no focal deficits  Psychiatric: Normal mood, normal affect  : No flank tenderness  Skin: No rashes      LABS:    CBC:                          11.7   5.2   )-----------( 195      ( 2018 06:25 )             36.8           BMP:        134<L>  |  99  |  50<H>  ----------------------------<  111<H>  4.9   |  23  |  7.09<H>    Ca    9.7      2018 06:25  Phos  4.0

## 2018-02-24 NOTE — PROGRESS NOTE ADULT - SUBJECTIVE AND OBJECTIVE BOX
Patient is a 53y old  Female who presents with a chief complaint of right arm and shoulder pain for 3 days (22 Feb 2018 15:18)  Pt alert, awake, oriented x3, resting in bed in NAD.    INTERVAL HPI/OVERNIGHT EVENTS:      VITAL SIGNS:  T(F): 98 (02-24-18 @ 05:26)  HR: 59 (02-24-18 @ 05:26)  BP: 126/81 (02-24-18 @ 05:26)  RR: 18 (02-24-18 @ 05:26)  SpO2: 96% (02-24-18 @ 05:26)  Wt(kg): --  I&O's Detail          REVIEW OF SYSTEMS:    CONSTITUTIONAL:  No fevers, chills, sweats    HEENT:  Eyes:  No diplopia or blurred vision. ENT:  No earache, sore throat or runny nose.    CARDIOVASCULAR:  No pressure, squeezing, tightness, or heaviness about the chest; no palpitations.    RESPIRATORY:  Per HPI    GASTROINTESTINAL:  No abdominal pain, nausea, vomiting or diarrhea.    GENITOURINARY:  No dysuria, frequency or urgency.    NEUROLOGIC:  No paresthesias, fasciculations, seizures or weakness.    PSYCHIATRIC:  No disorder of thought or mood.      PHYSICAL EXAM:    Constitutional: Well developed and nourished  Eyes:Perrla  ENMT: normal  Neck:supple  Respiratory: good air entry  Cardiovascular: S1 S2 regular  Gastrointestinal: Soft, Non tender  Extremities: No edema  Vascular:normal  Neurological:Awake, alert,Ox3  Musculoskeletal:Normal      MEDICATIONS  (STANDING):  amLODIPine   Tablet 10 milliGRAM(s) Oral daily  aspirin  chewable 81 milliGRAM(s) Oral daily  atorvastatin 40 milliGRAM(s) Oral at bedtime  dexamethasone     Tablet 4 milliGRAM(s) Oral every 6 hours  gabapentin 100 milliGRAM(s) Oral daily  heparin  Injectable 5000 Unit(s) SubCutaneous every 8 hours  labetalol 200 milliGRAM(s) Oral daily  omega-3-Acid Ethyl Esters 2 Gram(s) Oral two times a day  pantoprazole    Tablet 40 milliGRAM(s) Oral before breakfast  sevelamer hydrochloride 1600 milliGRAM(s) Oral three times a day with meals    MEDICATIONS  (PRN):  HYDROmorphone  Injectable 0.5 milliGRAM(s) IV Push every 6 hours PRN Moderate Pain (4 - 6)  ondansetron Injectable 4 milliGRAM(s) IV Push every 8 hours PRN Nausea and/or Vomiting      Allergies    No Known Drug Allergies  pineapple (Hives)    Intolerances        LABS:                        11.7   5.2   )-----------( 195      ( 24 Feb 2018 06:25 )             36.8     02-24    134<L>  |  99  |  50<H>  ----------------------------<  111<H>  4.9   |  23  |  7.09<H>    Ca    9.7      24 Feb 2018 06:25  Phos  4.0     02-24                CAPILLARY BLOOD GLUCOSE      POCT Blood Glucose.: 201 mg/dL (24 Feb 2018 11:12)  POCT Blood Glucose.: 127 mg/dL (24 Feb 2018 07:46)  POCT Blood Glucose.: 140 mg/dL (23 Feb 2018 21:38)  POCT Blood Glucose.: 165 mg/dL (23 Feb 2018 16:31)        RADIOLOGY & ADDITIONAL TESTS:    CXR:  < from: Xray Chest 2 Views PA/Lat (10.08.15 @ 19:07) >  PROJECTION: PA and lateral views.    The lungs appear clear of infiltrates and effusions. A right-sided   double-lumen catheter is identified. The cardiac and mediastinal contours   as well as osseous structures appear unremarkable.    IMPRESSION: 1. No evidence of acute pulmonary disease.    < end of copied text >    Ct scan chest:    ekg;    echo: Patient is a 53y old  Female who presents with a chief complaint of right arm and shoulder pain for 3 days (22 Feb 2018 15:18)  Pt alert, awake, oriented x3, resting in bed in NAD. Still with pain on right shoulder    INTERVAL HPI/OVERNIGHT EVENTS:      VITAL SIGNS:  T(F): 98 (02-24-18 @ 05:26)  HR: 59 (02-24-18 @ 05:26)  BP: 126/81 (02-24-18 @ 05:26)  RR: 18 (02-24-18 @ 05:26)  SpO2: 96% (02-24-18 @ 05:26)  Wt(kg): --  I&O's Detail          REVIEW OF SYSTEMS:    CONSTITUTIONAL:  No fevers, chills, sweats    HEENT:  Eyes:  No diplopia or blurred vision. ENT:  No earache, sore throat or runny nose.    CARDIOVASCULAR:  No pressure, squeezing, tightness, or heaviness about the chest; no palpitations.    RESPIRATORY:  Per HPI    GASTROINTESTINAL:  No abdominal pain, nausea, vomiting or diarrhea.    GENITOURINARY:  No dysuria, frequency or urgency.    NEUROLOGIC:  No paresthesias, fasciculations, seizures or weakness.    PSYCHIATRIC:  No disorder of thought or mood.      PHYSICAL EXAM:    Constitutional: Well developed and nourished  Eyes:Perrla  ENMT: normal  Neck:supple  Respiratory: good air entry  Cardiovascular: S1 S2 regular  Gastrointestinal: Soft, Non tender  Extremities: No edema  Vascular:normal  Neurological:Awake, alert,Ox3  Musculoskeletal:Normal      MEDICATIONS  (STANDING):  amLODIPine   Tablet 10 milliGRAM(s) Oral daily  aspirin  chewable 81 milliGRAM(s) Oral daily  atorvastatin 40 milliGRAM(s) Oral at bedtime  dexamethasone     Tablet 4 milliGRAM(s) Oral every 6 hours  gabapentin 100 milliGRAM(s) Oral daily  heparin  Injectable 5000 Unit(s) SubCutaneous every 8 hours  labetalol 200 milliGRAM(s) Oral daily  omega-3-Acid Ethyl Esters 2 Gram(s) Oral two times a day  pantoprazole    Tablet 40 milliGRAM(s) Oral before breakfast  sevelamer hydrochloride 1600 milliGRAM(s) Oral three times a day with meals    MEDICATIONS  (PRN):  HYDROmorphone  Injectable 0.5 milliGRAM(s) IV Push every 6 hours PRN Moderate Pain (4 - 6)  ondansetron Injectable 4 milliGRAM(s) IV Push every 8 hours PRN Nausea and/or Vomiting      Allergies    No Known Drug Allergies  pineapple (Hives)    Intolerances        LABS:                        11.7   5.2   )-----------( 195      ( 24 Feb 2018 06:25 )             36.8     02-24    134<L>  |  99  |  50<H>  ----------------------------<  111<H>  4.9   |  23  |  7.09<H>    Ca    9.7      24 Feb 2018 06:25  Phos  4.0     02-24                CAPILLARY BLOOD GLUCOSE      POCT Blood Glucose.: 201 mg/dL (24 Feb 2018 11:12)  POCT Blood Glucose.: 127 mg/dL (24 Feb 2018 07:46)  POCT Blood Glucose.: 140 mg/dL (23 Feb 2018 21:38)  POCT Blood Glucose.: 165 mg/dL (23 Feb 2018 16:31)        RADIOLOGY & ADDITIONAL TESTS:    CXR:  < from: Xray Chest 2 Views PA/Lat (10.08.15 @ 19:07) >  PROJECTION: PA and lateral views.    The lungs appear clear of infiltrates and effusions. A right-sided   double-lumen catheter is identified. The cardiac and mediastinal contours   as well as osseous structures appear unremarkable.    IMPRESSION: 1. No evidence of acute pulmonary disease.    < end of copied text >    Ct scan chest:    ekg;    echo:

## 2018-02-24 NOTE — PROGRESS NOTE ADULT - SUBJECTIVE AND OBJECTIVE BOX
Patient is a 53y old  Female who presents with a chief complaint of right arm and shoulder pain for 3 days (2018 15:18)    pt seen in icu [  ], reg med floor [ x  ], bed [ x ], chair at bedside [   ], a+o x3 [ x ], lethargic [  ],  nad [ x ]      Allergies    No Known Drug Allergies  pineapple (Hives)        Vitals    T(F): 98 (18 @ 05:26), Max: 98.8 (18 @ 16:36)  HR: 59 (18 @ 05:26) (58 - 66)  BP: 126/81 (18 @ 05:26) (98/67 - 132/64)  RR: 18 (18 @ 05:26) (16 - 18)  SpO2: 96% (18 @ 05:26) (94% - 100%)  Wt(kg): --  CAPILLARY BLOOD GLUCOSE      POCT Blood Glucose.: 140 mg/dL (2018 21:38)      Labs                          11.7   5.2   )-----------( 195      ( 2018 06:25 )             36.8                         Radiology Results      Meds    MEDICATIONS  (STANDING):  amLODIPine   Tablet 10 milliGRAM(s) Oral daily  aspirin  chewable 81 milliGRAM(s) Oral daily  atorvastatin 40 milliGRAM(s) Oral at bedtime  dexamethasone     Tablet 4 milliGRAM(s) Oral every 6 hours  gabapentin 100 milliGRAM(s) Oral daily  heparin  Injectable 5000 Unit(s) SubCutaneous every 8 hours  labetalol 200 milliGRAM(s) Oral daily  omega-3-Acid Ethyl Esters 2 Gram(s) Oral two times a day  pantoprazole    Tablet 40 milliGRAM(s) Oral before breakfast  sevelamer hydrochloride 1600 milliGRAM(s) Oral three times a day with meals      MEDICATIONS  (PRN):  HYDROmorphone  Injectable 0.5 milliGRAM(s) IV Push every 6 hours PRN Moderate Pain (4 - 6)  ondansetron Injectable 4 milliGRAM(s) IV Push every 8 hours PRN Nausea and/or Vomiting      Physical Exam    Neuro :  no focal deficits  Respiratory: CTA B/L  CV: RRR, S1S2, no murmurs,   Abdominal: Soft, NT, ND +BS,  Extremities: + peripheral pulses, avf with good thrill and non tender,   decr rom rue 2nd to pain, posterior shoul adelia tender to palp        ASSESSMENT    intractible right shoulder pain 2nd to Rotatorcuff calcific tendinosis.  h/o Myocardial infarct, old  ESRD (end stage renal disease) on dialysis  Asthma occurring only with upper respiratory infection  Anemia in chronic renal disease  Claustrophobia  Uterine leiomyoma, unspecified location  AUSTIN (obstructive sleep apnea)  Gastroesophageal reflux disease without esophagitis  Vertigo  HLD (hyperlipidemia)  Essential hypertension  Diabetes mellitus, type 2  Morbid obesity  AV fistula  S/P craniotomy  S/P hernia repair  S/P  section        PLAN    xray shoulder with Rotatorcuff calcific tendinosis noted   cont dilaudid prn pain  start decadron 4 mg q6 x 7 days  Daily Physical tx  mri r shoulder with Calcific tendinosis and Full-thickness tear of the anterior to mid insertional fibers of the supraspinatus tendon.  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  pt has severe Hyperphosphatemia  cont Renagel 1600 mg po tid with meals   phoslo d/c'd to avoid metastatic calcification from high ca/phos product   cont low phos diet  hd as per renal  cont current meds  d/c planning Patient is a 53y old  Female who presents with a chief complaint of right arm and shoulder pain for 3 days (2018 15:18)    pt seen in icu [  ], reg med floor [ x  ], bed [ x ], chair at bedside [   ], a+o x3 [ x ], lethargic [  ],  nad [ x ]    still with c/o pain on minimal movemint of rue    Allergies    No Known Drug Allergies  pineapple (Hives)        Vitals    T(F): 98 (18 @ 05:26), Max: 98.8 (18 @ 16:36)  HR: 59 (18 @ 05:26) (58 - 66)  BP: 126/81 (18 @ 05:26) (98/67 - 132/64)  RR: 18 (18 @ 05:26) (16 - 18)  SpO2: 96% (18 @ 05:26) (94% - 100%)  Wt(kg): --  CAPILLARY BLOOD GLUCOSE      POCT Blood Glucose.: 140 mg/dL (2018 21:38)      Labs                          11.7   5.2   )-----------( 195      ( 2018 06:25 )             36.8                         Radiology Results      Meds    MEDICATIONS  (STANDING):  amLODIPine   Tablet 10 milliGRAM(s) Oral daily  aspirin  chewable 81 milliGRAM(s) Oral daily  atorvastatin 40 milliGRAM(s) Oral at bedtime  dexamethasone     Tablet 4 milliGRAM(s) Oral every 6 hours  gabapentin 100 milliGRAM(s) Oral daily  heparin  Injectable 5000 Unit(s) SubCutaneous every 8 hours  labetalol 200 milliGRAM(s) Oral daily  omega-3-Acid Ethyl Esters 2 Gram(s) Oral two times a day  pantoprazole    Tablet 40 milliGRAM(s) Oral before breakfast  sevelamer hydrochloride 1600 milliGRAM(s) Oral three times a day with meals      MEDICATIONS  (PRN):  HYDROmorphone  Injectable 0.5 milliGRAM(s) IV Push every 6 hours PRN Moderate Pain (4 - 6)  ondansetron Injectable 4 milliGRAM(s) IV Push every 8 hours PRN Nausea and/or Vomiting      Physical Exam    Neuro :  no focal deficits  Respiratory: CTA B/L  CV: RRR, S1S2, no murmurs,   Abdominal: Soft, NT, ND +BS,  Extremities: + peripheral pulses, avf with good thrill and non tender,   decr rom rue 2nd to pain, posterior shoul adelia tender to palp        ASSESSMENT    intractible right shoulder pain 2nd to Rotatorcuff calcific tendinosis.  h/o Myocardial infarct, old  ESRD (end stage renal disease) on dialysis  Asthma occurring only with upper respiratory infection  Anemia in chronic renal disease  Claustrophobia  Uterine leiomyoma, unspecified location  AUSTIN (obstructive sleep apnea)  Gastroesophageal reflux disease without esophagitis  Vertigo  HLD (hyperlipidemia)  Essential hypertension  Diabetes mellitus, type 2  Morbid obesity  AV fistula  S/P craniotomy  S/P hernia repair  S/P  section        PLAN    xray shoulder with Rotatorcuff calcific tendinosis noted   cont dilaudid prn pain  start decadron 4 mg q6 x 7 days  Daily Physical tx  mri r shoulder with Calcific tendinosis and Full-thickness tear of the anterior to mid insertional fibers of the supraspinatus tendon.  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

## 2018-02-24 NOTE — PROGRESS NOTE ADULT - ASSESSMENT
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 A/P:  1.ESRD: secondary to dm/htn  -pt s renal status is stable  -Keep patient euvolemic and renal diet  -Avoid Nephrotoxic Meds/ Agents such as (NSAIDs, IV contrast, Aminoglycosides such as gentamicin, -Gadolinium contrast, Phosphate containing enemas, etc..)  -Adjust Medications according to eGFR  -f/u bmp in am  2.HTN: bp is acceptble  -keep sbp>120 and <120  -low salt diet  3.ANEMIA:mild, no need for procrit  -f/u Hb daily  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

## 2018-02-25 LAB
GLUCOSE BLDC GLUCOMTR-MCNC: 109 MG/DL — HIGH (ref 70–99)
GLUCOSE BLDC GLUCOMTR-MCNC: 135 MG/DL — HIGH (ref 70–99)
GLUCOSE BLDC GLUCOMTR-MCNC: 189 MG/DL — HIGH (ref 70–99)
GLUCOSE BLDC GLUCOMTR-MCNC: 199 MG/DL — HIGH (ref 70–99)

## 2018-02-25 RX ADMIN — HYDROMORPHONE HYDROCHLORIDE 0.5 MILLIGRAM(S): 2 INJECTION INTRAMUSCULAR; INTRAVENOUS; SUBCUTANEOUS at 22:43

## 2018-02-25 RX ADMIN — HYDROMORPHONE HYDROCHLORIDE 0.5 MILLIGRAM(S): 2 INJECTION INTRAMUSCULAR; INTRAVENOUS; SUBCUTANEOUS at 01:54

## 2018-02-25 RX ADMIN — Medication 4 MILLIGRAM(S): at 22:12

## 2018-02-25 RX ADMIN — PANTOPRAZOLE SODIUM 40 MILLIGRAM(S): 20 TABLET, DELAYED RELEASE ORAL at 06:23

## 2018-02-25 RX ADMIN — HYDROMORPHONE HYDROCHLORIDE 0.5 MILLIGRAM(S): 2 INJECTION INTRAMUSCULAR; INTRAVENOUS; SUBCUTANEOUS at 01:24

## 2018-02-25 RX ADMIN — SEVELAMER CARBONATE 1600 MILLIGRAM(S): 2400 POWDER, FOR SUSPENSION ORAL at 11:36

## 2018-02-25 RX ADMIN — Medication 4 MILLIGRAM(S): at 06:23

## 2018-02-25 RX ADMIN — SEVELAMER CARBONATE 1600 MILLIGRAM(S): 2400 POWDER, FOR SUSPENSION ORAL at 17:15

## 2018-02-25 RX ADMIN — OXYCODONE AND ACETAMINOPHEN 2 TABLET(S): 5; 325 TABLET ORAL at 17:14

## 2018-02-25 RX ADMIN — HEPARIN SODIUM 5000 UNIT(S): 5000 INJECTION INTRAVENOUS; SUBCUTANEOUS at 06:23

## 2018-02-25 RX ADMIN — HEPARIN SODIUM 5000 UNIT(S): 5000 INJECTION INTRAVENOUS; SUBCUTANEOUS at 13:28

## 2018-02-25 RX ADMIN — Medication 4 MILLIGRAM(S): at 17:15

## 2018-02-25 RX ADMIN — HYDROMORPHONE HYDROCHLORIDE 0.5 MILLIGRAM(S): 2 INJECTION INTRAMUSCULAR; INTRAVENOUS; SUBCUTANEOUS at 22:13

## 2018-02-25 RX ADMIN — OXYCODONE AND ACETAMINOPHEN 2 TABLET(S): 5; 325 TABLET ORAL at 17:18

## 2018-02-25 RX ADMIN — OXYCODONE AND ACETAMINOPHEN 2 TABLET(S): 5; 325 TABLET ORAL at 06:23

## 2018-02-25 RX ADMIN — SEVELAMER CARBONATE 1600 MILLIGRAM(S): 2400 POWDER, FOR SUSPENSION ORAL at 08:47

## 2018-02-25 RX ADMIN — OXYCODONE AND ACETAMINOPHEN 2 TABLET(S): 5; 325 TABLET ORAL at 06:53

## 2018-02-25 RX ADMIN — HYDROMORPHONE HYDROCHLORIDE 0.5 MILLIGRAM(S): 2 INJECTION INTRAMUSCULAR; INTRAVENOUS; SUBCUTANEOUS at 11:33

## 2018-02-25 RX ADMIN — GABAPENTIN 100 MILLIGRAM(S): 400 CAPSULE ORAL at 11:36

## 2018-02-25 RX ADMIN — HEPARIN SODIUM 5000 UNIT(S): 5000 INJECTION INTRAVENOUS; SUBCUTANEOUS at 22:13

## 2018-02-25 RX ADMIN — HYDROMORPHONE HYDROCHLORIDE 0.5 MILLIGRAM(S): 2 INJECTION INTRAMUSCULAR; INTRAVENOUS; SUBCUTANEOUS at 12:41

## 2018-02-25 RX ADMIN — Medication 4 MILLIGRAM(S): at 11:36

## 2018-02-25 RX ADMIN — Medication 2 GRAM(S): at 17:16

## 2018-02-25 RX ADMIN — Medication 2 GRAM(S): at 06:24

## 2018-02-25 RX ADMIN — Medication 81 MILLIGRAM(S): at 11:35

## 2018-02-25 RX ADMIN — ATORVASTATIN CALCIUM 40 MILLIGRAM(S): 80 TABLET, FILM COATED ORAL at 22:12

## 2018-02-25 NOTE — DIETITIAN INITIAL EVALUATION ADULT. - OTHER INFO
nutrition assessment for length of stay; lives home with family; skin intact; denied GI distress, chewing or swallowing problem at present, no specific food choices reported, tolerating meals well, 100%  meal intake at times per flow sheet; on HD x 2y, follow by RD at dialysis center; per pt, had Bariatric surgery ( unclear details) about 1 y ago at [facility] to lose wt, however, unable to verify from medical record, discussed with RN

## 2018-02-25 NOTE — DIETITIAN INITIAL EVALUATION ADULT. - NUTRITION INTERVENTION
Meals and Snack/Collaboration and Referral of Nutrition Care/Discharge and Transfer of Nutrition Care to New Setting/Feeding Assistance

## 2018-02-25 NOTE — DIETITIAN INITIAL EVALUATION ADULT. - NS AS NUTRI INTERV DISCHARGE
Discharge and transfer to another nutrition professional/dietitian at out-pt dialysis center when medically ready to be discharged

## 2018-02-25 NOTE — DIETITIAN INITIAL EVALUATION ADULT. - DIET TYPE
renal replacement pts:no protein restr,no conc K & phos, low sodium/consistent carbohydrate (evening snack)/DASH/TLC (sodium and cholesterol restricted diet)

## 2018-02-25 NOTE — DIETITIAN INITIAL EVALUATION ADULT. - FACTORS AFF FOOD INTAKE
[employer]/ethnic/cultural/personal food preferences/s/p Bariatric Gastric surgery about 1 y ago to lose wt

## 2018-02-25 NOTE — DIETITIAN INITIAL EVALUATION ADULT. - SOURCE
family at bedside; chart review. Pt speaks Croatian, some English, prefers family to  for her/other (specify)/patient/family/significant other

## 2018-02-25 NOTE — PROGRESS NOTE ADULT - SUBJECTIVE AND OBJECTIVE BOX
Patient is a 53y old  Female who presents with a chief complaint of right arm and shoulder pain for 3 days (22 Feb 2018 15:18)  Pt alert, awake, oriented x3, resting comfortable in bed in NAD.    INTERVAL HPI/OVERNIGHT EVENTS:      VITAL SIGNS:  T(F): 98.1 (02-25-18 @ 05:45)  HR: 64 (02-25-18 @ 05:45)  BP: 112/63 (02-25-18 @ 05:45)  RR: 17 (02-25-18 @ 05:45)  SpO2: 94% (02-25-18 @ 05:45)  Wt(kg): --  I&O's Detail          REVIEW OF SYSTEMS:    CONSTITUTIONAL:  No fevers, chills, sweats    HEENT:  Eyes:  No diplopia or blurred vision. ENT:  No earache, sore throat or runny nose.    CARDIOVASCULAR:  No pressure, squeezing, tightness, or heaviness about the chest; no palpitations.    RESPIRATORY:  Per HPI    GASTROINTESTINAL:  No abdominal pain, nausea, vomiting or diarrhea.    GENITOURINARY:  No dysuria, frequency or urgency.    NEUROLOGIC:  No paresthesias, fasciculations, seizures or weakness.    PSYCHIATRIC:  No disorder of thought or mood.      PHYSICAL EXAM:    Constitutional: Well developed and nourished  Eyes:Perrla  ENMT: normal  Neck:supple  Respiratory: good air entry  Cardiovascular: S1 S2 regular  Gastrointestinal: Soft, Non tender  Extremities: No edema  Vascular:normal  Neurological:Awake, alert,Ox3  Musculoskeletal:Normal      MEDICATIONS  (STANDING):  amLODIPine   Tablet 10 milliGRAM(s) Oral daily  aspirin  chewable 81 milliGRAM(s) Oral daily  atorvastatin 40 milliGRAM(s) Oral at bedtime  dexamethasone     Tablet 4 milliGRAM(s) Oral every 6 hours  gabapentin 100 milliGRAM(s) Oral daily  heparin  Injectable 5000 Unit(s) SubCutaneous every 8 hours  labetalol 200 milliGRAM(s) Oral daily  omega-3-Acid Ethyl Esters 2 Gram(s) Oral two times a day  pantoprazole    Tablet 40 milliGRAM(s) Oral before breakfast  sevelamer hydrochloride 1600 milliGRAM(s) Oral three times a day with meals    MEDICATIONS  (PRN):  HYDROmorphone  Injectable 0.5 milliGRAM(s) IV Push every 6 hours PRN Severe Pain (7 - 10)  ondansetron Injectable 4 milliGRAM(s) IV Push every 8 hours PRN Nausea and/or Vomiting  oxyCODONE    5 mG/acetaminophen 325 mG 2 Tablet(s) Oral every 4 hours PRN Moderate Pain (4 - 6)      Allergies    No Known Drug Allergies  pineapple (Hives)    Intolerances        LABS:                        11.7   5.2   )-----------( 195      ( 24 Feb 2018 06:25 )             36.8     02-24    134<L>  |  99  |  50<H>  ----------------------------<  111<H>  4.9   |  23  |  7.09<H>    Ca    9.7      24 Feb 2018 06:25  Phos  4.0     02-24                CAPILLARY BLOOD GLUCOSE      POCT Blood Glucose.: 109 mg/dL (25 Feb 2018 08:10)  POCT Blood Glucose.: 181 mg/dL (24 Feb 2018 15:51)  POCT Blood Glucose.: 201 mg/dL (24 Feb 2018 11:12)        RADIOLOGY & ADDITIONAL TESTS:    CXR:  < from: Xray Chest 2 Views PA/Lat (10.08.15 @ 19:07) >  PROJECTION: PA and lateral views.    The lungs appear clear of infiltrates and effusions. A right-sided   double-lumen catheter is identified. The cardiac and mediastinal contours   as well as osseous structures appear unremarkable.    IMPRESSION: 1. No evidence of acute pulmonary disease.      < end of copied text >    Ct scan chest:    ekg;    echo: Patient is a 53y old  Female who presents with a chief complaint of right arm and shoulder pain for 3 days (22 Feb 2018 15:18)  Pt alert, awake, oriented x3, complaining of pain on right arm. No cough , sob or wheezing    INTERVAL HPI/OVERNIGHT EVENTS:      VITAL SIGNS:  T(F): 98.1 (02-25-18 @ 05:45)  HR: 64 (02-25-18 @ 05:45)  BP: 112/63 (02-25-18 @ 05:45)  RR: 17 (02-25-18 @ 05:45)  SpO2: 94% (02-25-18 @ 05:45)  Wt(kg): --  I&O's Detail          REVIEW OF SYSTEMS:    CONSTITUTIONAL:  No fevers, chills, sweats    HEENT:  Eyes:  No diplopia or blurred vision. ENT:  No earache, sore throat or runny nose.    CARDIOVASCULAR:  No pressure, squeezing, tightness, or heaviness about the chest; no palpitations.    RESPIRATORY:  Per HPI    GASTROINTESTINAL:  No abdominal pain, nausea, vomiting or diarrhea.    GENITOURINARY:  No dysuria, frequency or urgency.    NEUROLOGIC:  No paresthesias, fasciculations, seizures or weakness.    PSYCHIATRIC:  No disorder of thought or mood.      PHYSICAL EXAM:    Constitutional: Well developed and nourished  Eyes:Perrla  ENMT: normal  Neck:supple  Respiratory: good air entry  Cardiovascular: S1 S2 regular  Gastrointestinal: Soft, Non tender  Extremities: No edema  Vascular:normal  Neurological:Awake, alert,Ox3  Musculoskeletal:Normal      MEDICATIONS  (STANDING):  amLODIPine   Tablet 10 milliGRAM(s) Oral daily  aspirin  chewable 81 milliGRAM(s) Oral daily  atorvastatin 40 milliGRAM(s) Oral at bedtime  dexamethasone     Tablet 4 milliGRAM(s) Oral every 6 hours  gabapentin 100 milliGRAM(s) Oral daily  heparin  Injectable 5000 Unit(s) SubCutaneous every 8 hours  labetalol 200 milliGRAM(s) Oral daily  omega-3-Acid Ethyl Esters 2 Gram(s) Oral two times a day  pantoprazole    Tablet 40 milliGRAM(s) Oral before breakfast  sevelamer hydrochloride 1600 milliGRAM(s) Oral three times a day with meals    MEDICATIONS  (PRN):  HYDROmorphone  Injectable 0.5 milliGRAM(s) IV Push every 6 hours PRN Severe Pain (7 - 10)  ondansetron Injectable 4 milliGRAM(s) IV Push every 8 hours PRN Nausea and/or Vomiting  oxyCODONE    5 mG/acetaminophen 325 mG 2 Tablet(s) Oral every 4 hours PRN Moderate Pain (4 - 6)      Allergies    No Known Drug Allergies  pineapple (Hives)    Intolerances        LABS:                        11.7   5.2   )-----------( 195      ( 24 Feb 2018 06:25 )             36.8     02-24    134<L>  |  99  |  50<H>  ----------------------------<  111<H>  4.9   |  23  |  7.09<H>    Ca    9.7      24 Feb 2018 06:25  Phos  4.0     02-24                CAPILLARY BLOOD GLUCOSE      POCT Blood Glucose.: 109 mg/dL (25 Feb 2018 08:10)  POCT Blood Glucose.: 181 mg/dL (24 Feb 2018 15:51)  POCT Blood Glucose.: 201 mg/dL (24 Feb 2018 11:12)        RADIOLOGY & ADDITIONAL TESTS:    CXR:  < from: Xray Chest 2 Views PA/Lat (10.08.15 @ 19:07) >  PROJECTION: PA and lateral views.    The lungs appear clear of infiltrates and effusions. A right-sided   double-lumen catheter is identified. The cardiac and mediastinal contours   as well as osseous structures appear unremarkable.    IMPRESSION: 1. No evidence of acute pulmonary disease.      < end of copied text >    Ct scan chest:    ekg;    echo:

## 2018-02-25 NOTE — PROGRESS NOTE ADULT - SUBJECTIVE AND OBJECTIVE BOX
pt seen and examined.    SUBJECTIVE:  pt  denies cp,sob,gi or uremic  symptons  pts Rt Shoulder pain is better today  pts family at bedside      Current meds:    amLODIPine   Tablet 10 milliGRAM(s) Oral daily  aspirin  chewable 81 milliGRAM(s) Oral daily  atorvastatin 40 milliGRAM(s) Oral at bedtime  dexamethasone     Tablet 4 milliGRAM(s) Oral every 6 hours  gabapentin 100 milliGRAM(s) Oral daily  heparin  Injectable 5000 Unit(s) SubCutaneous every 8 hours  HYDROmorphone  Injectable 0.5 milliGRAM(s) IV Push every 6 hours PRN  labetalol 200 milliGRAM(s) Oral daily  omega-3-Acid Ethyl Esters 2 Gram(s) Oral two times a day  ondansetron Injectable 4 milliGRAM(s) IV Push every 8 hours PRN  pantoprazole    Tablet 40 milliGRAM(s) Oral before breakfast  sevelamer hydrochloride 1600 milliGRAM(s) Oral three times a day with meals      Vital Signs Last 24 Hrs  T(C): 36.6 (25 Feb 2018 14:50), Max: 36.7 (25 Feb 2018 05:45)  T(F): 97.9 (25 Feb 2018 14:50), Max: 98.1 (25 Feb 2018 05:45)  HR: 68 (25 Feb 2018 14:50) (64 - 72)  BP: 130/60 (25 Feb 2018 14:50) (112/63 - 147/74)  BP(mean): --  RR: 17 (25 Feb 2018 14:50) (16 - 17)  SpO2: 97% (25 Feb 2018 14:50) (94% - 97%)    PHYSICAL EXAM:  Constitutional: well developed, well nourished  and in nad  HEENT: PERRLA,  no icteric sclera and mild pallor of conjunctiva noted  Neck: No JVD, thyromegaly or adenopathy  Respiratory: reduced air entry lower lungs with no rales, wheezing or rhonchi  Cardiovascular: S1 and S2 normally heard  Gastrointestinal: soft, nondistended, nontender and normal bowel sounds heard  Extremities: No peripheral edema or cyanosis  pt has good palpable thrill over rt.upper arm AVF  Neurological: A/O x 3, no focal deficits  Psychiatric: Normal mood, normal affect  : No flank tenderness  Skin: No rashes      LABS: no new labs today    CBC:                     11.7   5.2   )-----------( 195      ( 24 Feb 2018 06:25 )             36.8     BMP:    02-24    134<L>  |  99  |  50<H>  ----------------------------<  111<H>  4.9   |  23  |  7.09<H>    Ca    9.7      24 Feb 2018 06:25  Phos  4.0     02-24

## 2018-02-25 NOTE — PROGRESS NOTE ADULT - ASSESSMENT
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

## 2018-02-25 NOTE — PROGRESS NOTE ADULT - SUBJECTIVE AND OBJECTIVE BOX
Patient is a 53y old  Female who presents with a chief complaint of right arm and shoulder pain for 3 days (2018 15:18)      pt seen in icu [  ], reg med floor [ x  ], bed [ x ], chair at bedside [   ], a+o x3 [ x ], lethargic [  ],  nad [ x ]        Allergies    No Known Drug Allergies  pineapple (Hives)        Vitals    T(F): 98.1 (18 @ 05:45), Max: 98.1 (18 @ 05:45)  HR: 64 (18 @ 05:45) (64 - 72)  BP: 112/63 (18 @ 05:45) (112/63 - 147/74)  RR: 17 (18 @ 05:45) (16 - 18)  SpO2: 94% (18 @ 05:45) (94% - 95%)  Wt(kg): --  CAPILLARY BLOOD GLUCOSE      POCT Blood Glucose.: 109 mg/dL (2018 08:10)      Labs                          11.7   5.2   )-----------( 195      ( 2018 06:25 )             36.8       02-24    134<L>  |  99  |  50<H>  ----------------------------<  111<H>  4.9   |  23  |  7.09<H>    Ca    9.7      2018 06:25  Phos  4.0     02-24                  Radiology Results      Meds    MEDICATIONS  (STANDING):  amLODIPine   Tablet 10 milliGRAM(s) Oral daily  aspirin  chewable 81 milliGRAM(s) Oral daily  atorvastatin 40 milliGRAM(s) Oral at bedtime  dexamethasone     Tablet 4 milliGRAM(s) Oral every 6 hours  gabapentin 100 milliGRAM(s) Oral daily  heparin  Injectable 5000 Unit(s) SubCutaneous every 8 hours  labetalol 200 milliGRAM(s) Oral daily  omega-3-Acid Ethyl Esters 2 Gram(s) Oral two times a day  pantoprazole    Tablet 40 milliGRAM(s) Oral before breakfast  sevelamer hydrochloride 1600 milliGRAM(s) Oral three times a day with meals      MEDICATIONS  (PRN):  HYDROmorphone  Injectable 0.5 milliGRAM(s) IV Push every 6 hours PRN Severe Pain (7 - 10)  ondansetron Injectable 4 milliGRAM(s) IV Push every 8 hours PRN Nausea and/or Vomiting  oxyCODONE    5 mG/acetaminophen 325 mG 2 Tablet(s) Oral every 4 hours PRN Moderate Pain (4 - 6)      Physical Exam    Neuro :  no focal deficits  Respiratory: CTA B/L  CV: RRR, S1S2, no murmurs,   Abdominal: Soft, NT, ND +BS,  Extremities: + peripheral pulses, avf with good thrill and non tender, mild improvment in rom rue         ASSESSMENT    intractible right shoulder pain 2nd to Rotatorcuff calcific tendinosis.  h/o Myocardial infarct, old  ESRD (end stage renal disease) on dialysis  Asthma occurring only with upper respiratory infection  Anemia in chronic renal disease  Claustrophobia  Uterine leiomyoma, unspecified location  AUSTIN (obstructive sleep apnea)  Gastroesophageal reflux disease without esophagitis  Vertigo  HLD (hyperlipidemia)  Essential hypertension  Diabetes mellitus, type 2  Morbid obesity  AV fistula  S/P craniotomy  S/P hernia repair  S/P  section        PLAN    xray shoulder with Rotatorcuff calcific tendinosis noted   cont dilaudid prn pain  start decadron 4 mg q6 x 7 days  Daily Physical tx  mri r shoulder with Calcific tendinosis and Full-thickness tear of the anterior to mid insertional fibers of the supraspinatus tendon.  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

## 2018-02-26 DIAGNOSIS — I10 ESSENTIAL (PRIMARY) HYPERTENSION: ICD-10-CM

## 2018-02-26 LAB
GLUCOSE BLDC GLUCOMTR-MCNC: 129 MG/DL — HIGH (ref 70–99)
GLUCOSE BLDC GLUCOMTR-MCNC: 162 MG/DL — HIGH (ref 70–99)
GLUCOSE BLDC GLUCOMTR-MCNC: 174 MG/DL — HIGH (ref 70–99)
GLUCOSE BLDC GLUCOMTR-MCNC: 186 MG/DL — HIGH (ref 70–99)

## 2018-02-26 RX ORDER — INSULIN LISPRO 100/ML
VIAL (ML) SUBCUTANEOUS
Qty: 0 | Refills: 0 | Status: DISCONTINUED | OUTPATIENT
Start: 2018-02-26 | End: 2018-03-03

## 2018-02-26 RX ORDER — LIDOCAINE HCL 20 MG/ML
10 VIAL (ML) INJECTION ONCE
Qty: 0 | Refills: 0 | Status: COMPLETED | OUTPATIENT
Start: 2018-02-26 | End: 2018-02-26

## 2018-02-26 RX ADMIN — HYDROMORPHONE HYDROCHLORIDE 0.5 MILLIGRAM(S): 2 INJECTION INTRAMUSCULAR; INTRAVENOUS; SUBCUTANEOUS at 12:53

## 2018-02-26 RX ADMIN — Medication 4 MILLIGRAM(S): at 12:13

## 2018-02-26 RX ADMIN — Medication 200 MILLIGRAM(S): at 05:29

## 2018-02-26 RX ADMIN — Medication 4 MILLIGRAM(S): at 05:29

## 2018-02-26 RX ADMIN — Medication 2 GRAM(S): at 22:05

## 2018-02-26 RX ADMIN — Medication 4 MILLIGRAM(S): at 17:21

## 2018-02-26 RX ADMIN — Medication 81 MILLIGRAM(S): at 12:13

## 2018-02-26 RX ADMIN — OXYCODONE AND ACETAMINOPHEN 2 TABLET(S): 5; 325 TABLET ORAL at 23:15

## 2018-02-26 RX ADMIN — SEVELAMER CARBONATE 1600 MILLIGRAM(S): 2400 POWDER, FOR SUSPENSION ORAL at 17:21

## 2018-02-26 RX ADMIN — HYDROMORPHONE HYDROCHLORIDE 0.5 MILLIGRAM(S): 2 INJECTION INTRAMUSCULAR; INTRAVENOUS; SUBCUTANEOUS at 17:32

## 2018-02-26 RX ADMIN — AMLODIPINE BESYLATE 10 MILLIGRAM(S): 2.5 TABLET ORAL at 05:29

## 2018-02-26 RX ADMIN — ATORVASTATIN CALCIUM 40 MILLIGRAM(S): 80 TABLET, FILM COATED ORAL at 21:54

## 2018-02-26 RX ADMIN — PANTOPRAZOLE SODIUM 40 MILLIGRAM(S): 20 TABLET, DELAYED RELEASE ORAL at 05:29

## 2018-02-26 RX ADMIN — GABAPENTIN 100 MILLIGRAM(S): 400 CAPSULE ORAL at 12:13

## 2018-02-26 RX ADMIN — HYDROMORPHONE HYDROCHLORIDE 0.5 MILLIGRAM(S): 2 INJECTION INTRAMUSCULAR; INTRAVENOUS; SUBCUTANEOUS at 12:18

## 2018-02-26 RX ADMIN — Medication 1: at 21:54

## 2018-02-26 RX ADMIN — OXYCODONE AND ACETAMINOPHEN 2 TABLET(S): 5; 325 TABLET ORAL at 22:05

## 2018-02-26 RX ADMIN — Medication 2 GRAM(S): at 05:29

## 2018-02-26 RX ADMIN — HYDROMORPHONE HYDROCHLORIDE 0.5 MILLIGRAM(S): 2 INJECTION INTRAMUSCULAR; INTRAVENOUS; SUBCUTANEOUS at 19:23

## 2018-02-26 RX ADMIN — HYDROMORPHONE HYDROCHLORIDE 0.5 MILLIGRAM(S): 2 INJECTION INTRAMUSCULAR; INTRAVENOUS; SUBCUTANEOUS at 05:59

## 2018-02-26 RX ADMIN — Medication 10 MILLILITER(S): at 12:27

## 2018-02-26 RX ADMIN — Medication 40 MILLIGRAM(S): at 12:52

## 2018-02-26 RX ADMIN — Medication 1: at 17:21

## 2018-02-26 RX ADMIN — SEVELAMER CARBONATE 1600 MILLIGRAM(S): 2400 POWDER, FOR SUSPENSION ORAL at 12:13

## 2018-02-26 RX ADMIN — HYDROMORPHONE HYDROCHLORIDE 0.5 MILLIGRAM(S): 2 INJECTION INTRAMUSCULAR; INTRAVENOUS; SUBCUTANEOUS at 05:29

## 2018-02-26 RX ADMIN — HEPARIN SODIUM 5000 UNIT(S): 5000 INJECTION INTRAVENOUS; SUBCUTANEOUS at 21:55

## 2018-02-26 NOTE — PROGRESS NOTE ADULT - SUBJECTIVE AND OBJECTIVE BOX
Patient is a 53y old  Female who presents with a chief complaint of right arm and shoulder pain for 3 days (22 Feb 2018 15:18)  Pt still complains of severe right shoulder pain with tenderness  s/p local injection to the shoulder.      INTERVAL HPI/OVERNIGHT EVENTS:  T(C): 36.7 (02-26-18 @ 15:08), Max: 36.9 (02-26-18 @ 07:40)  HR: 59 (02-26-18 @ 15:08) (54 - 59)  BP: 114/66 (02-26-18 @ 15:08) (112/57 - 128/66)  RR: 16 (02-26-18 @ 15:08) (16 - 17)  SpO2: 95% (02-26-18 @ 15:08) (94% - 100%)  Wt(kg): --  I&O's Summary      CAPILLARY BLOOD GLUCOSE      POCT Blood Glucose.: 162 mg/dL (26 Feb 2018 11:41)  POCT Blood Glucose.: 129 mg/dL (26 Feb 2018 07:42)  POCT Blood Glucose.: 189 mg/dL (25 Feb 2018 20:57)  POCT Blood Glucose.: 135 mg/dL (25 Feb 2018 16:02)              MEDICATIONS  (STANDING):  amLODIPine   Tablet 10 milliGRAM(s) Oral daily  aspirin  chewable 81 milliGRAM(s) Oral daily  atorvastatin 40 milliGRAM(s) Oral at bedtime  dexamethasone     Tablet 4 milliGRAM(s) Oral every 6 hours  gabapentin 100 milliGRAM(s) Oral daily  heparin  Injectable 5000 Unit(s) SubCutaneous every 8 hours  insulin lispro (HumaLOG) corrective regimen sliding scale   SubCutaneous Before meals and at bedtime  labetalol 200 milliGRAM(s) Oral daily  omega-3-Acid Ethyl Esters 2 Gram(s) Oral two times a day  pantoprazole    Tablet 40 milliGRAM(s) Oral before breakfast  sevelamer hydrochloride 1600 milliGRAM(s) Oral three times a day with meals    MEDICATIONS  (PRN):  HYDROmorphone  Injectable 0.5 milliGRAM(s) IV Push every 6 hours PRN Severe Pain (7 - 10)  ondansetron Injectable 4 milliGRAM(s) IV Push every 8 hours PRN Nausea and/or Vomiting  oxyCODONE    5 mG/acetaminophen 325 mG 2 Tablet(s) Oral every 4 hours PRN Moderate Pain (4 - 6)      RADIOLOGY & ADDITIONAL TESTS:    Imaging Personally Reviewed:  [ ] YES  [ ] NO    Consultant(s) Notes Reviewed:  [ ] YES  [ ] NO    REVIEW OF SYSTEMS:  CONSTITUTIONAL: No fever, weight loss, or fatigue  EYES: No eye pain, visual disturbances, or discharge  ENMT:  No difficulty hearing, tinnitus, vertigo; No sinus or throat pain  NECK: No pain or stiffness  BREASTS: No pain, masses, or nipple discharge  RESPIRATORY: No cough, wheezing, chills or hemoptysis; No shortness of breath  CARDIOVASCULAR: No chest pain, palpitations, dizziness, or leg swelling  GASTROINTESTINAL: No abdominal or epigastric pain. No nausea, vomiting, or hematemesis; No diarrhea or constipation. No melena or hematochezia.  GENITOURINARY: No dysuria, frequency, hematuria, or incontinence  NEUROLOGICAL: No headaches, memory loss, loss of strength, numbness, or tremors  SKIN: No itching, burning, rashes, or lesions   LYMPH NODES: No enlarged glands  ENDOCRINE: No heat or cold intolerance; No hair loss  MUSCULOSKELETAL: No joint pain or swelling; No muscle, back, or extremity pain  PSYCHIATRIC: No depression, anxiety, mood swings, or difficulty sleeping  HEME/LYMPH: No easy bruising, or bleeding gums  ALLERY AND IMMUNOLOGIC: No hives or eczema    PHYSICAL EXAM:  GENERAL: NAD, well-groomed, well-developed  HEAD:  Atraumatic, Normocephalic  EYES: EOMI, PERRLA, conjunctiva and sclera clear  ENMT: No tonsillar erythema, exudates, or enlargement; Moist mucous membranes, Good dentition, No lesions  NECK: Supple, No JVD, Normal thyroid  NERVOUS SYSTEM:  Alert & Oriented X3, Good concentration; Motor Strength 5/5 B/L upper and lower extremities; DTRs 2+ intact and symmetric  CHEST/LUNG: Clear to percussion bilaterally; No rales, rhonchi, wheezing, or rubs  HEART: Regular rate and rhythm; No murmurs, rubs, or gallops  ABDOMEN: Soft, Nontender, Nondistended; Bowel sounds present  EXTREMITIES:  2+ Peripheral Pulses bilaterally, No clubbing, cyanosis, or edema  LYMPH: No lymphadenopathy noted  SKIN: No rashes or lesions    Care Discussed with Consultants/Other Providers [ ] YES  [ ] NO Patient is a 53y old  Female who presents with a chief complaint of right arm and shoulder pain for 3 days (22 Feb 2018 15:18)  Pt still complains of severe right shoulder pain with tenderness  s/p local injection to the shoulder.      INTERVAL HPI/OVERNIGHT EVENTS:  T(C): 36.7 (02-26-18 @ 15:08), Max: 36.9 (02-26-18 @ 07:40)  HR: 59 (02-26-18 @ 15:08) (54 - 59)  BP: 114/66 (02-26-18 @ 15:08) (112/57 - 128/66)  RR: 16 (02-26-18 @ 15:08) (16 - 17)  SpO2: 95% (02-26-18 @ 15:08) (94% - 100%)  Wt(kg): --  I&O's Summary      CAPILLARY BLOOD GLUCOSE      POCT Blood Glucose.: 162 mg/dL (26 Feb 2018 11:41)  POCT Blood Glucose.: 129 mg/dL (26 Feb 2018 07:42)  POCT Blood Glucose.: 189 mg/dL (25 Feb 2018 20:57)  POCT Blood Glucose.: 135 mg/dL (25 Feb 2018 16:02)              MEDICATIONS  (STANDING):  amLODIPine   Tablet 10 milliGRAM(s) Oral daily  aspirin  chewable 81 milliGRAM(s) Oral daily  atorvastatin 40 milliGRAM(s) Oral at bedtime  dexamethasone     Tablet 4 milliGRAM(s) Oral every 6 hours  gabapentin 100 milliGRAM(s) Oral daily  heparin  Injectable 5000 Unit(s) SubCutaneous every 8 hours  insulin lispro (HumaLOG) corrective regimen sliding scale   SubCutaneous Before meals and at bedtime  labetalol 200 milliGRAM(s) Oral daily  omega-3-Acid Ethyl Esters 2 Gram(s) Oral two times a day  pantoprazole    Tablet 40 milliGRAM(s) Oral before breakfast  sevelamer hydrochloride 1600 milliGRAM(s) Oral three times a day with meals    MEDICATIONS  (PRN):  HYDROmorphone  Injectable 0.5 milliGRAM(s) IV Push every 6 hours PRN Severe Pain (7 - 10)  ondansetron Injectable 4 milliGRAM(s) IV Push every 8 hours PRN Nausea and/or Vomiting  oxyCODONE    5 mG/acetaminophen 325 mG 2 Tablet(s) Oral every 4 hours PRN Moderate Pain (4 - 6)      RADIOLOGY & ADDITIONAL TESTS:    Consultant(s) Notes Reviewed:  [ x] YES  [ ] NO    REVIEW OF SYSTEMS:  CONSTITUTIONAL: No fever, weight loss, or fatigue  EYES: No eye pain, visual disturbances, or discharge  ENMT:  No difficulty hearing, tinnitus, vertigo; No sinus or throat pain  NECK: No pain or stiffness  BREASTS: No pain, masses, or nipple discharge  RESPIRATORY: No cough, wheezing, chills or hemoptysis; No shortness of breath  CARDIOVASCULAR: No chest pain, palpitations, dizziness, or leg swelling  GASTROINTESTINAL: No abdominal or epigastric pain. No nausea, vomiting, or hematemesis; No diarrhea or constipation. No melena or hematochezia.  GENITOURINARY: No dysuria, frequency, hematuria, or incontinence  NEUROLOGICAL: No headaches, memory loss, loss of strength, numbness, or tremors  SKIN: No itching, burning, rashes, or lesions   LYMPH NODES: No enlarged glands  ENDOCRINE: No heat or cold intolerance; No hair loss  MUSCULOSKELETAL: Right shoulder pain, with tenderness on the anterior shoulder.  PSYCHIATRIC: No depression, anxiety, mood swings, or difficulty sleeping  HEME/LYMPH: No easy bruising, or bleeding gums  ALLERY AND IMMUNOLOGIC: No hives or eczema    PHYSICAL EXAM:  GENERAL: NAD, well-groomed, well-developed  HEAD:  Atraumatic, Normocephalic  EYES: EOMI, PERRLA, conjunctiva and sclera clear  ENMT: No tonsillar erythema, exudates, or enlargement; Moist mucous membranes, Good dentition, No lesions  NECK: Supple, No JVD, Normal thyroid  NERVOUS SYSTEM:  Alert & Oriented X3, Good concentration; Motor Strength 5/5 B/L upper and lower extremities; DTRs 2+ intact and symmetric  CHEST/LUNG: Clear to percussion bilaterally; No rales, rhonchi, wheezing, or rubs  HEART: Regular rate and rhythm; No murmurs, rubs, or gallops  ABDOMEN: Soft, Nontender, Nondistended; Bowel sounds present  EXTREMITIES:  2+ Peripheral Pulses bilaterally, No clubbing, cyanosis, or edema.  Right anterior shoulder tenderness. Right arm AVF bruit(+) and thrill.  LYMPH: No lymphadenopathy noted  SKIN: No rashes or lesions    Care Discussed with Consultants/Other Providers [ x] YES  [ ] NO

## 2018-02-26 NOTE — PROGRESS NOTE ADULT - PROBLEM SELECTOR PLAN 2
-C/W HD M/W/F  -continue Renagel 1600 mg po tid with meals and d/c phoslo to avoid metastatic calcification from high ca/phos product

## 2018-02-26 NOTE — PROGRESS NOTE ADULT - SUBJECTIVE AND OBJECTIVE BOX
pt seen and examined.pts current chart reviewed and case discussed with resident covering.    SUBJECTIVE:  pt feels fine and denies cp,sob,gi or gu/uremic  symptons    REVIEW OF SYSTEMS:  CONSTITUTIONAL: No weakness, fevers or chills  EYES/ENT: No visual changes;  No vertigo or throat pain   NECK: No pain or stiffness  RESPIRATORY: No cough, wheezing, hemoptysis; No shortness of breath  CARDIOVASCULAR: No chest pain or palpitations  GASTROINTESTINAL: No abdominal or epigastric pain. No nausea, vomiting, or hematemesis; No diarrhea or constipation. No melena or hematochezia.  GENITOURINARY: No dysuria, frequency , hematuria, flank pain or nocturia  NEUROLOGICAL: No numbness or weakness  SKIN: No itching, burning, rashes, or lesions   All other review of systems is negative unless indicated above    Current meds:    amLODIPine   Tablet 10 milliGRAM(s) Oral daily  aspirin  chewable 81 milliGRAM(s) Oral daily  atorvastatin 40 milliGRAM(s) Oral at bedtime  dexamethasone     Tablet 4 milliGRAM(s) Oral every 6 hours  gabapentin 100 milliGRAM(s) Oral daily  heparin  Injectable 5000 Unit(s) SubCutaneous every 8 hours  HYDROmorphone  Injectable 0.5 milliGRAM(s) IV Push every 6 hours PRN  insulin lispro (HumaLOG) corrective regimen sliding scale   SubCutaneous Before meals and at bedtime  labetalol 200 milliGRAM(s) Oral daily  omega-3-Acid Ethyl Esters 2 Gram(s) Oral two times a day  ondansetron Injectable 4 milliGRAM(s) IV Push every 8 hours PRN  oxyCODONE    5 mG/acetaminophen 325 mG 2 Tablet(s) Oral every 4 hours PRN  pantoprazole    Tablet 40 milliGRAM(s) Oral before breakfast  sevelamer hydrochloride 1600 milliGRAM(s) Oral three times a day with meals      Vital Signs    T(F): 98.6 (18 @ 17:24), Max: 98.6 (18 @ 17:24)  HR: 62 (18 @ 17:24) (54 - 62)  BP: 113/68 (18 @ 17:24) (112/57 - 128/66)  ABP: --  RR: 16 (18 @ 15:08) (16 - 17)  SpO2: 95% (18 @ 15:08) (94% - 100%)  Wt(kg): --  CVP(cm H2O): --  CO: --  PCWP: --    I and O's:    Daily     Daily Weight in k (2018 10:48)    PHYSICAL EXAM:  Constitutional: well developed, well nourished  and in nad  HEENT: PERRLA,  no icteric sclera and mild pallor of conjunctiva noted  Neck: No JVD, thyromegaly or adenopathy  Respiratory: reduced air entry lower lungs with no rales, wheezing or rhonchi  Cardiovascular: S1 and S2 normally heard  Gastrointestinal: soft, nondistended, nontender and normal bowel sounds heard  Extremities: No peripheral edema or cyanosis  Neurological: A/O x 3, no focal deficits  : No flank or cva tenderness palpated.  Skin: No rashes      LABS:    CBC:            BMP:        134<L>  |  99  |  50<H>  ----------------------------<  111<H>  4.9   |  23  |  7.09<H>    Ca    9.7      2018 06:25  Phos  4.0                 URINE STUDIES:                        RADIOLOGY & ADDITIONAL STUDIES: pt seen and examined.    SUBJECTIVE:  pt  denies cp,sob,gi or uremic  symptons  Current meds:    amLODIPine   Tablet 10 milliGRAM(s) Oral daily  aspirin  chewable 81 milliGRAM(s) Oral daily  atorvastatin 40 milliGRAM(s) Oral at bedtime  dexamethasone     Tablet 4 milliGRAM(s) Oral every 6 hours  gabapentin 100 milliGRAM(s) Oral daily  heparin  Injectable 5000 Unit(s) SubCutaneous every 8 hours  HYDROmorphone  Injectable 0.5 milliGRAM(s) IV Push every 6 hours PRN  insulin lispro (HumaLOG) corrective regimen sliding scale   SubCutaneous Before meals and at bedtime  labetalol 200 milliGRAM(s) Oral daily  omega-3-Acid Ethyl Esters 2 Gram(s) Oral two times a day  ondansetron Injectable 4 milliGRAM(s) IV Push every 8 hours PRN  oxyCODONE    5 mG/acetaminophen 325 mG 2 Tablet(s) Oral every 4 hours PRN  pantoprazole    Tablet 40 milliGRAM(s) Oral before breakfast  sevelamer hydrochloride 1600 milliGRAM(s) Oral three times a day with meals      Vital Signs    T(F): 98.6 (18 @ 17:24), Max: 98.6 (18 @ 17:24)  HR: 62 (18 @ 17:24) (54 - 62)  BP: 113/68 (18 @ 17:24) (112/57 - 128/66)  ABP: --  RR: 16 (18 @ 15:08) (16 - 17)  SpO2: 95% (18 @ 15:08) (94% - 100%)  Wt(kg): --  CVP(cm H2O): --  CO: --  PCWP: --    I and O's:    Daily     Daily Weight in k (2018 10:48)    PHYSICAL EXAM:  Constitutional: well developed, well nourished  and in nad  HEENT: PERRLA,  no icteric sclera and mild pallor of conjunctiva noted  Neck: No JVD, thyromegaly or adenopathy  Respiratory: reduced air entry lower lungs with no rales, wheezing or rhonchi  Cardiovascular: S1 and S2 normally heard  Gastrointestinal: soft, nondistended, nontender and normal bowel sounds heard  Extremities: No peripheral edema or cyanosis  pt has good palpable thrill over rt.upper arm AVF  Neurological: A/O x 3, no focal deficits  Psychiatric: Normal mood, normal affect  : No flank tenderness  Skin: No rashes      LABS: no new labs today        BMP:        134<L>  |  99  |  50<H>  ----------------------------<  111<H>  4.9   |  23  |  7.09<H>    Ca    9.7      2018 06:25  Phos  4.0

## 2018-02-26 NOTE — PROGRESS NOTE ADULT - SUBJECTIVE AND OBJECTIVE BOX
Pt seen and evaluated at bedside today for unresolved R shoulder pain. A steroid injection was administered to the R shoulder joint.  Procedure tolerated well by patient. Patient will follow up with Dr Meyers at his office.

## 2018-02-26 NOTE — PROGRESS NOTE ADULT - SUBJECTIVE AND OBJECTIVE BOX
Patient is a 53y old  Female who presents with a chief complaint of right arm and shoulder pain for 3 days (22 Feb 2018 15:18)  awake, alert, comfortable having HD. Still with right shoulder pain    INTERVAL HPI/OVERNIGHT EVENTS:      VITAL SIGNS:  T(F): 97.8 (02-26-18 @ 10:48)  HR: 55 (02-26-18 @ 10:48)  BP: 116/60 (02-26-18 @ 10:48)  RR: 16 (02-26-18 @ 10:48)  SpO2: 100% (02-26-18 @ 10:48)  Wt(kg): --  I&O's Detail          REVIEW OF SYSTEMS:    CONSTITUTIONAL:  No fevers, chills, sweats    HEENT:  Eyes:  No diplopia or blurred vision. ENT:  No earache, sore throat or runny nose.    CARDIOVASCULAR:  No pressure, squeezing, tightness, or heaviness about the chest; no palpitations.    RESPIRATORY:  Per HPI    GASTROINTESTINAL:  No abdominal pain, nausea, vomiting or diarrhea.    GENITOURINARY:  No dysuria, frequency or urgency.    NEUROLOGIC:  No paresthesias, fasciculations, seizures or weakness.    PSYCHIATRIC:  No disorder of thought or mood.      PHYSICAL EXAM:    Constitutional: Well developed and nourished  Eyes:Perrla  ENMT: normal  Neck:supple  Respiratory: good air entry  Cardiovascular: S1 S2 regular  Gastrointestinal: Soft, Non tender  Extremities: No edema  Vascular:normal  Neurological:Awake, alert,Ox3  Musculoskeletal:Tender right shoulder      MEDICATIONS  (STANDING):  amLODIPine   Tablet 10 milliGRAM(s) Oral daily  aspirin  chewable 81 milliGRAM(s) Oral daily  atorvastatin 40 milliGRAM(s) Oral at bedtime  dexamethasone     Tablet 4 milliGRAM(s) Oral every 6 hours  gabapentin 100 milliGRAM(s) Oral daily  heparin  Injectable 5000 Unit(s) SubCutaneous every 8 hours  labetalol 200 milliGRAM(s) Oral daily  lidocaine 1% Injectable 10 milliLiter(s) Local Injection once  methylPREDNISolone acetate Injectable 40 milliGRAM(s) IntraArticular once  omega-3-Acid Ethyl Esters 2 Gram(s) Oral two times a day  pantoprazole    Tablet 40 milliGRAM(s) Oral before breakfast  sevelamer hydrochloride 1600 milliGRAM(s) Oral three times a day with meals    MEDICATIONS  (PRN):  HYDROmorphone  Injectable 0.5 milliGRAM(s) IV Push every 6 hours PRN Severe Pain (7 - 10)  ondansetron Injectable 4 milliGRAM(s) IV Push every 8 hours PRN Nausea and/or Vomiting  oxyCODONE    5 mG/acetaminophen 325 mG 2 Tablet(s) Oral every 4 hours PRN Moderate Pain (4 - 6)      Allergies    No Known Drug Allergies  pineapple (Hives)    Intolerances        LABS:                    CAPILLARY BLOOD GLUCOSE      POCT Blood Glucose.: 129 mg/dL (26 Feb 2018 07:42)  POCT Blood Glucose.: 189 mg/dL (25 Feb 2018 20:57)  POCT Blood Glucose.: 135 mg/dL (25 Feb 2018 16:02)  POCT Blood Glucose.: 199 mg/dL (25 Feb 2018 11:33)        RADIOLOGY & ADDITIONAL TESTS:    CXR:    Ct scan chest:    ekg;    echo:

## 2018-02-26 NOTE — PROGRESS NOTE ADULT - ASSESSMENT
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 A/P:  1.ESRD: secondary to dm/htn  -pt s renal status is stable  -pt was dialysed today.pt tolerated the dialysis well.Hd orders written and discussed with dialysis RN  -Keep patient euvolemic and renal diet  -Avoid Nephrotoxic Meds/ Agents such as (NSAIDs, IV contrast, Aminoglycosides such as gentamicin, -Gadolinium contrast, Phosphate containing enemas, etc..)  -Adjust Medications according to eGFR  -f/u bmp in am  2.HTN: bp is acceptble  -keep sbp>120 and <120  -low salt diet  3.ANEMIA:mild, no need for procrit  -f/u Hb daily  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

## 2018-02-26 NOTE — PROGRESS NOTE ADULT - SUBJECTIVE AND OBJECTIVE BOX
Patient is a 53y old  Female who presents with a chief complaint of right arm and shoulder pain for 3 days (2018 15:18)    pt seen in icu [  ], reg med floor [ x  ], bed [ x ], chair at bedside [   ], a+o x3 [ x ], lethargic [  ],  nad [ x ]      Allergies    No Known Drug Allergies  pineapple (Hives)        Vitals    T(F): 98.4 (18 @ 07:40), Max: 98.4 (18 @ 07:40)  HR: 54 (18 @ 07:40) (54 - 68)  BP: 126/57 (18 @ 07:40) (112/57 - 130/60)  RR: 17 (18 @ 07:40) (17 - 17)  SpO2: 99% (18 @ 07:40) (94% - 99%)  Wt(kg): --  CAPILLARY BLOOD GLUCOSE      POCT Blood Glucose.: 129 mg/dL (2018 07:42)      Labs        Radiology Results      Meds    MEDICATIONS  (STANDING):  amLODIPine   Tablet 10 milliGRAM(s) Oral daily  aspirin  chewable 81 milliGRAM(s) Oral daily  atorvastatin 40 milliGRAM(s) Oral at bedtime  dexamethasone     Tablet 4 milliGRAM(s) Oral every 6 hours  gabapentin 100 milliGRAM(s) Oral daily  heparin  Injectable 5000 Unit(s) SubCutaneous every 8 hours  labetalol 200 milliGRAM(s) Oral daily  lidocaine 1% Injectable 10 milliLiter(s) Local Injection once  methylPREDNISolone acetate Injectable 40 milliGRAM(s) IntraArticular once  omega-3-Acid Ethyl Esters 2 Gram(s) Oral two times a day  pantoprazole    Tablet 40 milliGRAM(s) Oral before breakfast  sevelamer hydrochloride 1600 milliGRAM(s) Oral three times a day with meals      MEDICATIONS  (PRN):  HYDROmorphone  Injectable 0.5 milliGRAM(s) IV Push every 6 hours PRN Severe Pain (7 - 10)  ondansetron Injectable 4 milliGRAM(s) IV Push every 8 hours PRN Nausea and/or Vomiting  oxyCODONE    5 mG/acetaminophen 325 mG 2 Tablet(s) Oral every 4 hours PRN Moderate Pain (4 - 6)      Physical Exam    Neuro :  no focal deficits  Respiratory: CTA B/L  CV: RRR, S1S2, no murmurs,   Abdominal: Soft, NT, ND +BS,  Extremities: + peripheral pulses, avf with good thrill and non tender, mild improvment in rom rue         ASSESSMENT    intractible right shoulder pain 2nd to Rotatorcuff calcific tendinosis.  h/o Myocardial infarct, old  ESRD (end stage renal disease) on dialysis  Asthma occurring only with upper respiratory infection  Anemia in chronic renal disease  Claustrophobia  Uterine leiomyoma, unspecified location  AUSTIN (obstructive sleep apnea)  Gastroesophageal reflux disease without esophagitis  Vertigo  HLD (hyperlipidemia)  Essential hypertension  Diabetes mellitus, type 2  Morbid obesity  AV fistula  S/P craniotomy  S/P hernia repair  S/P  section        PLAN    xray shoulder with Rotatorcuff calcific tendinosis noted   cont dilaudid prn pain  start decadron 4 mg q6 x 7 days  Daily Physical tx  mri r shoulder with Calcific tendinosis and Full-thickness tear of the anterior to mid insertional fibers of the supraspinatus tendon.  ortho reeval for shoulder injection  d/w ortho attending  ct right ue with No finding to explain the patient's right arm pain and swelling noted   vascular f/u noted  duplex rue neg for dvt noted above  renal f/u  suggest MBD: secondary to esrd  pt has severe Hyperphosphatemia  cont Renagel 1600 mg po tid with meals   phoslo d/c'd to avoid metastatic calcification from high ca/phos product   cont low phos diet  hd as per renal  cont current meds

## 2018-02-27 DIAGNOSIS — R42 DIZZINESS AND GIDDINESS: ICD-10-CM

## 2018-02-27 LAB
ANION GAP SERPL CALC-SCNC: 13 MMOL/L — SIGNIFICANT CHANGE UP (ref 5–17)
BUN SERPL-MCNC: 76 MG/DL — HIGH (ref 7–18)
CALCIUM SERPL-MCNC: 9 MG/DL — SIGNIFICANT CHANGE UP (ref 8.4–10.5)
CHLORIDE SERPL-SCNC: 99 MMOL/L — SIGNIFICANT CHANGE UP (ref 96–108)
CO2 SERPL-SCNC: 22 MMOL/L — SIGNIFICANT CHANGE UP (ref 22–31)
CREAT SERPL-MCNC: 8.11 MG/DL — HIGH (ref 0.5–1.3)
GLUCOSE BLDC GLUCOMTR-MCNC: 133 MG/DL — HIGH (ref 70–99)
GLUCOSE BLDC GLUCOMTR-MCNC: 169 MG/DL — HIGH (ref 70–99)
GLUCOSE BLDC GLUCOMTR-MCNC: 185 MG/DL — HIGH (ref 70–99)
GLUCOSE BLDC GLUCOMTR-MCNC: 242 MG/DL — HIGH (ref 70–99)
GLUCOSE SERPL-MCNC: 135 MG/DL — HIGH (ref 70–99)
HCT VFR BLD CALC: 37.3 % — SIGNIFICANT CHANGE UP (ref 34.5–45)
HGB BLD-MCNC: 12.1 G/DL — SIGNIFICANT CHANGE UP (ref 11.5–15.5)
MAGNESIUM SERPL-MCNC: 2.2 MG/DL — SIGNIFICANT CHANGE UP (ref 1.6–2.6)
MCHC RBC-ENTMCNC: 32 PG — SIGNIFICANT CHANGE UP (ref 27–34)
MCHC RBC-ENTMCNC: 32.4 GM/DL — SIGNIFICANT CHANGE UP (ref 32–36)
MCV RBC AUTO: 98.7 FL — SIGNIFICANT CHANGE UP (ref 80–100)
PHOSPHATE SERPL-MCNC: 5.6 MG/DL — HIGH (ref 2.5–4.5)
PLATELET # BLD AUTO: 203 K/UL — SIGNIFICANT CHANGE UP (ref 150–400)
POTASSIUM SERPL-MCNC: 5.2 MMOL/L — SIGNIFICANT CHANGE UP (ref 3.5–5.3)
POTASSIUM SERPL-SCNC: 5.2 MMOL/L — SIGNIFICANT CHANGE UP (ref 3.5–5.3)
RBC # BLD: 3.78 M/UL — LOW (ref 3.8–5.2)
RBC # FLD: 13.5 % — SIGNIFICANT CHANGE UP (ref 10.3–14.5)
SODIUM SERPL-SCNC: 134 MMOL/L — LOW (ref 135–145)
WBC # BLD: 7.7 K/UL — SIGNIFICANT CHANGE UP (ref 3.8–10.5)
WBC # FLD AUTO: 7.7 K/UL — SIGNIFICANT CHANGE UP (ref 3.8–10.5)

## 2018-02-27 PROCEDURE — 99222 1ST HOSP IP/OBS MODERATE 55: CPT

## 2018-02-27 PROCEDURE — 93880 EXTRACRANIAL BILAT STUDY: CPT | Mod: 26

## 2018-02-27 RX ORDER — MECLIZINE HCL 12.5 MG
25 TABLET ORAL EVERY 8 HOURS
Qty: 0 | Refills: 0 | Status: DISCONTINUED | OUTPATIENT
Start: 2018-02-27 | End: 2018-02-28

## 2018-02-27 RX ADMIN — Medication 2 GRAM(S): at 07:34

## 2018-02-27 RX ADMIN — OXYCODONE AND ACETAMINOPHEN 2 TABLET(S): 5; 325 TABLET ORAL at 21:08

## 2018-02-27 RX ADMIN — Medication 25 MILLIGRAM(S): at 12:37

## 2018-02-27 RX ADMIN — Medication 4 MILLIGRAM(S): at 23:25

## 2018-02-27 RX ADMIN — Medication 4 MILLIGRAM(S): at 00:03

## 2018-02-27 RX ADMIN — Medication 2: at 21:42

## 2018-02-27 RX ADMIN — HYDROMORPHONE HYDROCHLORIDE 0.5 MILLIGRAM(S): 2 INJECTION INTRAMUSCULAR; INTRAVENOUS; SUBCUTANEOUS at 06:15

## 2018-02-27 RX ADMIN — OXYCODONE AND ACETAMINOPHEN 2 TABLET(S): 5; 325 TABLET ORAL at 20:29

## 2018-02-27 RX ADMIN — HYDROMORPHONE HYDROCHLORIDE 0.5 MILLIGRAM(S): 2 INJECTION INTRAMUSCULAR; INTRAVENOUS; SUBCUTANEOUS at 22:30

## 2018-02-27 RX ADMIN — GABAPENTIN 100 MILLIGRAM(S): 400 CAPSULE ORAL at 12:36

## 2018-02-27 RX ADMIN — HYDROMORPHONE HYDROCHLORIDE 0.5 MILLIGRAM(S): 2 INJECTION INTRAMUSCULAR; INTRAVENOUS; SUBCUTANEOUS at 07:34

## 2018-02-27 RX ADMIN — HEPARIN SODIUM 5000 UNIT(S): 5000 INJECTION INTRAVENOUS; SUBCUTANEOUS at 21:40

## 2018-02-27 RX ADMIN — HYDROMORPHONE HYDROCHLORIDE 0.5 MILLIGRAM(S): 2 INJECTION INTRAMUSCULAR; INTRAVENOUS; SUBCUTANEOUS at 00:37

## 2018-02-27 RX ADMIN — SEVELAMER CARBONATE 1600 MILLIGRAM(S): 2400 POWDER, FOR SUSPENSION ORAL at 08:30

## 2018-02-27 RX ADMIN — Medication 4 MILLIGRAM(S): at 17:23

## 2018-02-27 RX ADMIN — Medication 81 MILLIGRAM(S): at 12:37

## 2018-02-27 RX ADMIN — SEVELAMER CARBONATE 1600 MILLIGRAM(S): 2400 POWDER, FOR SUSPENSION ORAL at 12:36

## 2018-02-27 RX ADMIN — ATORVASTATIN CALCIUM 40 MILLIGRAM(S): 80 TABLET, FILM COATED ORAL at 21:39

## 2018-02-27 RX ADMIN — HEPARIN SODIUM 5000 UNIT(S): 5000 INJECTION INTRAVENOUS; SUBCUTANEOUS at 07:33

## 2018-02-27 RX ADMIN — HEPARIN SODIUM 5000 UNIT(S): 5000 INJECTION INTRAVENOUS; SUBCUTANEOUS at 14:06

## 2018-02-27 RX ADMIN — Medication 4 MILLIGRAM(S): at 07:33

## 2018-02-27 RX ADMIN — HYDROMORPHONE HYDROCHLORIDE 0.5 MILLIGRAM(S): 2 INJECTION INTRAMUSCULAR; INTRAVENOUS; SUBCUTANEOUS at 00:03

## 2018-02-27 RX ADMIN — Medication 2 GRAM(S): at 16:42

## 2018-02-27 RX ADMIN — Medication 1: at 12:35

## 2018-02-27 RX ADMIN — Medication 4 MILLIGRAM(S): at 12:36

## 2018-02-27 RX ADMIN — Medication 1: at 16:38

## 2018-02-27 RX ADMIN — HYDROMORPHONE HYDROCHLORIDE 0.5 MILLIGRAM(S): 2 INJECTION INTRAMUSCULAR; INTRAVENOUS; SUBCUTANEOUS at 21:39

## 2018-02-27 RX ADMIN — HYDROMORPHONE HYDROCHLORIDE 0.5 MILLIGRAM(S): 2 INJECTION INTRAMUSCULAR; INTRAVENOUS; SUBCUTANEOUS at 15:07

## 2018-02-27 RX ADMIN — HYDROMORPHONE HYDROCHLORIDE 0.5 MILLIGRAM(S): 2 INJECTION INTRAMUSCULAR; INTRAVENOUS; SUBCUTANEOUS at 15:23

## 2018-02-27 RX ADMIN — PANTOPRAZOLE SODIUM 40 MILLIGRAM(S): 20 TABLET, DELAYED RELEASE ORAL at 07:34

## 2018-02-27 RX ADMIN — Medication 200 MILLIGRAM(S): at 07:34

## 2018-02-27 RX ADMIN — AMLODIPINE BESYLATE 10 MILLIGRAM(S): 2.5 TABLET ORAL at 07:33

## 2018-02-27 RX ADMIN — Medication 25 MILLIGRAM(S): at 21:39

## 2018-02-27 RX ADMIN — SEVELAMER CARBONATE 1600 MILLIGRAM(S): 2400 POWDER, FOR SUSPENSION ORAL at 16:38

## 2018-02-27 NOTE — PROGRESS NOTE ADULT - SUBJECTIVE AND OBJECTIVE BOX
Patient is a 53y old  Female who presents with a chief complaint of right arm and shoulder pain for 3 days (22 Feb 2018 15:18)  Interval progress: pain is better since yesterday, however pt developed new onset vertigo with room spinning sense.      INTERVAL HPI/OVERNIGHT EVENTS:  T(C): 36.7 (02-27-18 @ 14:33), Max: 37.2 (02-26-18 @ 20:42)  HR: 57 (02-27-18 @ 14:33) (57 - 61)  BP: 125/69 (02-27-18 @ 14:33) (125/69 - 134/73)  RR: 16 (02-27-18 @ 14:33) (16 - 18)  SpO2: 96% (02-27-18 @ 14:33) (93% - 96%)  Wt(kg): --  I&O's Summary      LABS:                        12.1   7.7   )-----------( 203      ( 27 Feb 2018 07:33 )             37.3     02-27    134<L>  |  99  |  76<H>  ----------------------------<  135<H>  5.2   |  22  |  8.11<H>    Ca    9.0      27 Feb 2018 07:32  Phos  5.6     02-27  Mg     2.2     02-27          CAPILLARY BLOOD GLUCOSE      POCT Blood Glucose.: 185 mg/dL (27 Feb 2018 16:06)  POCT Blood Glucose.: 169 mg/dL (27 Feb 2018 12:23)  POCT Blood Glucose.: 133 mg/dL (27 Feb 2018 08:02)  POCT Blood Glucose.: 174 mg/dL (26 Feb 2018 20:55)              MEDICATIONS  (STANDING):  amLODIPine   Tablet 10 milliGRAM(s) Oral daily  aspirin  chewable 81 milliGRAM(s) Oral daily  atorvastatin 40 milliGRAM(s) Oral at bedtime  dexamethasone     Tablet 4 milliGRAM(s) Oral every 6 hours  gabapentin 100 milliGRAM(s) Oral daily  heparin  Injectable 5000 Unit(s) SubCutaneous every 8 hours  insulin lispro (HumaLOG) corrective regimen sliding scale   SubCutaneous Before meals and at bedtime  labetalol 200 milliGRAM(s) Oral daily  meclizine 25 milliGRAM(s) Oral every 8 hours  omega-3-Acid Ethyl Esters 2 Gram(s) Oral two times a day  pantoprazole    Tablet 40 milliGRAM(s) Oral before breakfast  sevelamer hydrochloride 1600 milliGRAM(s) Oral three times a day with meals    MEDICATIONS  (PRN):  HYDROmorphone  Injectable 0.5 milliGRAM(s) IV Push every 6 hours PRN Severe Pain (7 - 10)  ondansetron Injectable 4 milliGRAM(s) IV Push every 8 hours PRN Nausea and/or Vomiting  oxyCODONE    5 mG/acetaminophen 325 mG 2 Tablet(s) Oral every 4 hours PRN Moderate Pain (4 - 6)      RADIOLOGY & ADDITIONAL TESTS:    Imaging Personally Reviewed:  [x ] YES  [ ] NO    Consultant(s) Notes Reviewed:  [x ] YES  [ ] NO    REVIEW OF SYSTEMS:  CONSTITUTIONAL: No fever, weight loss, or fatigue  EYES: No eye pain, visual disturbances, or discharge  ENMT:  No difficulty hearing, tinnitus, (+) vertigo; No sinus or throat pain  NECK: No pain or stiffness  BREASTS: No pain, masses, or nipple discharge  RESPIRATORY: No cough, wheezing, chills or hemoptysis; No shortness of breath  CARDIOVASCULAR: No chest pain, palpitations, dizziness, or leg swelling  GASTROINTESTINAL: No abdominal or epigastric pain. No nausea, vomiting, or hematemesis; No diarrhea or constipation. No melena or hematochezia.  GENITOURINARY: No dysuria, frequency, hematuria, or incontinence  NEUROLOGICAL: No headaches, memory loss, loss of strength, numbness, or tremors  SKIN: No itching, burning, rashes, or lesions   LYMPH NODES: No enlarged glands  ENDOCRINE: No heat or cold intolerance; No hair loss  MUSCULOSKELETAL: right shoulder pain, worse with movement  PSYCHIATRIC: No depression, anxiety, mood swings, or difficulty sleeping  HEME/LYMPH: No easy bruising, or bleeding gums  ALLERY AND IMMUNOLOGIC: No hives or eczema    PHYSICAL EXAM:  GENERAL: NAD, well-groomed, well-developed  HEAD:  Atraumatic, Normocephalic  EYES: EOMI, PERRLA, conjunctiva and sclera clear  ENMT: No tonsillar erythema, exudates, or enlargement; Moist mucous membranes, Good dentition, No lesions  NECK: Supple, No JVD, Normal thyroid  NERVOUS SYSTEM:  Alert & Oriented X3, Good concentration; Motor Strength 5/5 B/L upper and lower extremities; Bilateral horizontal nystagmus noted.  CHEST/LUNG: Clear to percussion bilaterally; No rales, rhonchi, wheezing, or rubs  HEART: Regular rate and rhythm; No murmurs, rubs, or gallops  ABDOMEN: Soft, Nontender, Nondistended; Bowel sounds present  EXTREMITIES:  2+ Peripheral Pulses bilaterally, No clubbing, cyanosis, or edema. Right anterior shoulder tender to palpation  LYMPH: No lymphadenopathy noted  SKIN: No rashes or lesions    Care Discussed with Consultants/Other Providers [x ] YES  [ ] NO

## 2018-02-27 NOTE — PROGRESS NOTE ADULT - PROBLEM SELECTOR PLAN 2
-Secondary to calcific tendonitis of infraspinatus/subacromial deltoid bursitis, Dr. Meyers gave local injection for severe shoulder pain.  -Pt also takes dexamethasone 4mg q 6hrs for 7 days  -Monitor clinically

## 2018-02-27 NOTE — PROGRESS NOTE ADULT - PROBLEM SELECTOR PLAN 3
-C/W HD M/W/F  -Nephro Dr. Jane  -continue Renagel 1600 mg po tid with meals and d/c phoslo to avoid metastatic calcification from high ca/phos product

## 2018-02-27 NOTE — CONSULT NOTE ADULT - CONSULT REASON
dizziness and horizontal nystagmus
Asthma
PT WITH ESRD NEEDS HD
Right arm pain, swelling; Rt Arm Fistula
right upper arm pain

## 2018-02-27 NOTE — PROGRESS NOTE ADULT - SUBJECTIVE AND OBJECTIVE BOX
pt seen and examined.pts current chart reviewed and case discussed with resident covering.    SUBJECTIVE:  pt feels fine and denies cp,sob,gi or gu/uremic  symptons    REVIEW OF SYSTEMS:  CONSTITUTIONAL: No weakness, fevers or chills  EYES/ENT: No visual changes;  No vertigo or throat pain   NECK: No pain or stiffness  RESPIRATORY: No cough, wheezing, hemoptysis; No shortness of breath  CARDIOVASCULAR: No chest pain or palpitations  GASTROINTESTINAL: No abdominal or epigastric pain. No nausea, vomiting, or hematemesis; No diarrhea or constipation. No melena or hematochezia.  GENITOURINARY: No dysuria, frequency , hematuria, flank pain or nocturia  NEUROLOGICAL: No numbness or weakness  SKIN: No itching, burning, rashes, or lesions   All other review of systems is negative unless indicated above    Current meds:    amLODIPine   Tablet 10 milliGRAM(s) Oral daily  aspirin  chewable 81 milliGRAM(s) Oral daily  atorvastatin 40 milliGRAM(s) Oral at bedtime  dexamethasone     Tablet 4 milliGRAM(s) Oral every 6 hours  gabapentin 100 milliGRAM(s) Oral daily  heparin  Injectable 5000 Unit(s) SubCutaneous every 8 hours  HYDROmorphone  Injectable 0.5 milliGRAM(s) IV Push every 6 hours PRN  insulin lispro (HumaLOG) corrective regimen sliding scale   SubCutaneous Before meals and at bedtime  labetalol 200 milliGRAM(s) Oral daily  meclizine 25 milliGRAM(s) Oral every 8 hours  omega-3-Acid Ethyl Esters 2 Gram(s) Oral two times a day  ondansetron Injectable 4 milliGRAM(s) IV Push every 8 hours PRN  oxyCODONE    5 mG/acetaminophen 325 mG 2 Tablet(s) Oral every 4 hours PRN  pantoprazole    Tablet 40 milliGRAM(s) Oral before breakfast  sevelamer hydrochloride 1600 milliGRAM(s) Oral three times a day with meals      Vital Signs    T(F): 97.7 (18 @ 10:22), Max: 99 (18 @ 20:42)  HR: 58 (18 @ 05:30) (58 - 62)  BP: 134/73 (18 @ 05:30) (113/68 - 134/73)  ABP: --  RR: 18 (02-27-18 @ 10:22) (16 - 18)  SpO2: 94% (-18 @ 10:22) (93% - 96%)  Wt(kg): --  CVP(cm H2O): --  CO: --  PCWP: --    I and O's:    Daily     Daily Weight in k (2018 05:30)    PHYSICAL EXAM:  Constitutional: well developed, well nourished  and in nad  HEENT: PERRLA,  no icteric sclera and mild pallor of conjunctiva noted  Neck: No JVD, thyromegaly or adenopathy  Respiratory: reduced air entry lower lungs with no rales, wheezing or rhonchi  Cardiovascular: S1 and S2 normally heard  Gastrointestinal: soft, nondistended, nontender and normal bowel sounds heard  Extremities: No peripheral edema or cyanosis  Neurological: A/O x 3, no focal deficits  : No flank or cva tenderness palpated.  Skin: No rashes      LABS:    CBC:                          12.1   7.7   )-----------( 203      ( 2018 07:33 )             37.3           BMP:        134<L>  |  99  |  76<H>  ----------------------------<  135<H>  5.2   |  22  |  8.11<H>    Ca    9.0      2018 07:32  Phos  5.6       Mg     2.2                 URINE STUDIES:                        RADIOLOGY & ADDITIONAL STUDIES: pt seen and examined.    SUBJECTIVE:  pt is c/o dizziness and unsteady gait , seen by Neurology  pt denies cp,sob,gi or uremic  symptons      Current meds:    amLODIPine   Tablet 10 milliGRAM(s) Oral daily  aspirin  chewable 81 milliGRAM(s) Oral daily  atorvastatin 40 milliGRAM(s) Oral at bedtime  dexamethasone     Tablet 4 milliGRAM(s) Oral every 6 hours  gabapentin 100 milliGRAM(s) Oral daily  heparin  Injectable 5000 Unit(s) SubCutaneous every 8 hours  HYDROmorphone  Injectable 0.5 milliGRAM(s) IV Push every 6 hours PRN  insulin lispro (HumaLOG) corrective regimen sliding scale   SubCutaneous Before meals and at bedtime  labetalol 200 milliGRAM(s) Oral daily  meclizine 25 milliGRAM(s) Oral every 8 hours  omega-3-Acid Ethyl Esters 2 Gram(s) Oral two times a day  ondansetron Injectable 4 milliGRAM(s) IV Push every 8 hours PRN  oxyCODONE    5 mG/acetaminophen 325 mG 2 Tablet(s) Oral every 4 hours PRN  pantoprazole    Tablet 40 milliGRAM(s) Oral before breakfast  sevelamer hydrochloride 1600 milliGRAM(s) Oral three times a day with meals      Vital Signs    T(F): 97.7 (18 @ 10:22), Max: 99 (18 @ 20:42)  HR: 58 (18 @ 05:30) (58 - 62)  BP: 134/73 (18 @ 05:30) (113/68 - 134/73)  ABP: --  RR: 18 (18 @ 10:22) (16 - 18)  SpO2: 94% (18 @ 10:22) (93% - 96%)  Wt(kg): --  CVP(cm H2O): --  CO: --  PCWP: --    I and O's:    Daily     Daily Weight in k (2018 05:30)    PHYSICAL EXAM:  Constitutional: well developed, well nourished  and in nad  HEENT: PERRLA,  no icteric sclera and mild pallor of conjunctiva noted  Neck: No JVD, thyromegaly or adenopathy  Respiratory: reduced air entry lower lungs with no rales, wheezing or rhonchi  Cardiovascular: S1 and S2 normally heard  Gastrointestinal: soft, nondistended, nontender and normal bowel sounds heard  Extremities: No peripheral edema or cyanosis  pt has good palpable thrill over rt.upper arm AVF  Neurological: A/O x 3, no focal deficits  Psychiatric: Normal mood, normal affect  : No flank tenderness  Skin: No rashes    LABS:    CBC:                          12.1   7.7   )-----------( 203      ( 2018 07:33 )             37.3     BMP:        134<L>  |  99  |  76<H>  ----------------------------<  135<H>  5.2   |  22  |  8.11<H>    Ca    9.0      2018 07:32  Phos  5.6       Mg     2.2

## 2018-02-27 NOTE — PROGRESS NOTE ADULT - SUBJECTIVE AND OBJECTIVE BOX
Patient is a 53y old  Female who presents with a chief complaint of right arm and shoulder pain for 3 days (22 Feb 2018 15:18)  Awake, alert, comfortable in bed in NAD. Less pain on right shoulder. Has dizziness today. No cough or sob  INTERVAL HPI/OVERNIGHT EVENTS:      VITAL SIGNS:  T(F): 97.7 (02-27-18 @ 10:22)  HR: 58 (02-27-18 @ 05:30)  BP: 134/73 (02-27-18 @ 05:30)  RR: 18 (02-27-18 @ 10:22)  SpO2: 94% (02-27-18 @ 10:22)  Wt(kg): --  I&O's Detail          REVIEW OF SYSTEMS:    CONSTITUTIONAL:  No fevers, chills, sweats    HEENT:  Eyes:  No diplopia or blurred vision. ENT:  No earache, sore throat or runny nose.    CARDIOVASCULAR:  No pressure, squeezing, tightness, or heaviness about the chest; no palpitations.    RESPIRATORY:  Per HPI    GASTROINTESTINAL:  No abdominal pain, nausea, vomiting or diarrhea.    GENITOURINARY:  No dysuria, frequency or urgency.    NEUROLOGIC:  No paresthesias, fasciculations, seizures or weakness.    PSYCHIATRIC:  No disorder of thought or mood.      PHYSICAL EXAM:    Constitutional: Well developed and nourished  Eyes:Perrla  ENMT: normal  Neck:supple  Respiratory: good air entry  Cardiovascular: S1 S2 regular  Gastrointestinal: Soft, Non tender  Extremities: No edema. Tender right shoulder  Vascular:normal  Neurological:Awake, alert,Ox3  Musculoskeletal:Normal      MEDICATIONS  (STANDING):  amLODIPine   Tablet 10 milliGRAM(s) Oral daily  aspirin  chewable 81 milliGRAM(s) Oral daily  atorvastatin 40 milliGRAM(s) Oral at bedtime  dexamethasone     Tablet 4 milliGRAM(s) Oral every 6 hours  gabapentin 100 milliGRAM(s) Oral daily  heparin  Injectable 5000 Unit(s) SubCutaneous every 8 hours  insulin lispro (HumaLOG) corrective regimen sliding scale   SubCutaneous Before meals and at bedtime  labetalol 200 milliGRAM(s) Oral daily  meclizine 25 milliGRAM(s) Oral every 8 hours  omega-3-Acid Ethyl Esters 2 Gram(s) Oral two times a day  pantoprazole    Tablet 40 milliGRAM(s) Oral before breakfast  sevelamer hydrochloride 1600 milliGRAM(s) Oral three times a day with meals    MEDICATIONS  (PRN):  HYDROmorphone  Injectable 0.5 milliGRAM(s) IV Push every 6 hours PRN Severe Pain (7 - 10)  ondansetron Injectable 4 milliGRAM(s) IV Push every 8 hours PRN Nausea and/or Vomiting  oxyCODONE    5 mG/acetaminophen 325 mG 2 Tablet(s) Oral every 4 hours PRN Moderate Pain (4 - 6)      Allergies    No Known Drug Allergies  pineapple (Hives)    Intolerances        LABS:                        12.1   7.7   )-----------( 203      ( 27 Feb 2018 07:33 )             37.3     02-27    134<L>  |  99  |  76<H>  ----------------------------<  135<H>  5.2   |  22  |  8.11<H>    Ca    9.0      27 Feb 2018 07:32  Phos  5.6     02-27  Mg     2.2     02-27                CAPILLARY BLOOD GLUCOSE      POCT Blood Glucose.: 133 mg/dL (27 Feb 2018 08:02)  POCT Blood Glucose.: 174 mg/dL (26 Feb 2018 20:55)  POCT Blood Glucose.: 186 mg/dL (26 Feb 2018 16:22)  POCT Blood Glucose.: 162 mg/dL (26 Feb 2018 11:41)        RADIOLOGY & ADDITIONAL TESTS:    CXR:    Ct scan chest:    ekg;    echo:

## 2018-02-27 NOTE — PROGRESS NOTE ADULT - SUBJECTIVE AND OBJECTIVE BOX
Patient is a 53y old  Female who presents with a chief complaint of right arm and shoulder pain for 3 days (2018 15:18)    pt seen in icu [  ], reg med floor [ x  ], bed [ x ], chair at bedside [   ], a+o x3 [ x ], lethargic [  ],  nad [ x ]      Allergies    No Known Drug Allergies  pineapple (Hives)        Vitals    T(F): 98 (18 @ 05:30), Max: 99 (18 @ 20:42)  HR: 58 (18 @ 05:30) (55 - 62)  BP: 134/73 (18 @ 05:30) (113/68 - 134/73)  RR: 18 (18 @ 05:30) (16 - 18)  SpO2: 96% (18 @ 05:30) (93% - 100%)  Wt(kg): --  CAPILLARY BLOOD GLUCOSE      POCT Blood Glucose.: 133 mg/dL (2018 08:02)      Labs                          12.1   7.7   )-----------( 203      ( 2018 07:33 )             37.3           134<L>  |  99  |  76<H>  ----------------------------<  135<H>  5.2   |  22  |  8.11<H>    Ca    9.0      2018 07:32  Phos  5.6       Mg     2.2                       Radiology Results      Meds    MEDICATIONS  (STANDING):  amLODIPine   Tablet 10 milliGRAM(s) Oral daily  aspirin  chewable 81 milliGRAM(s) Oral daily  atorvastatin 40 milliGRAM(s) Oral at bedtime  dexamethasone     Tablet 4 milliGRAM(s) Oral every 6 hours  gabapentin 100 milliGRAM(s) Oral daily  heparin  Injectable 5000 Unit(s) SubCutaneous every 8 hours  insulin lispro (HumaLOG) corrective regimen sliding scale   SubCutaneous Before meals and at bedtime  labetalol 200 milliGRAM(s) Oral daily  omega-3-Acid Ethyl Esters 2 Gram(s) Oral two times a day  pantoprazole    Tablet 40 milliGRAM(s) Oral before breakfast  sevelamer hydrochloride 1600 milliGRAM(s) Oral three times a day with meals      MEDICATIONS  (PRN):  HYDROmorphone  Injectable 0.5 milliGRAM(s) IV Push every 6 hours PRN Severe Pain (7 - 10)  ondansetron Injectable 4 milliGRAM(s) IV Push every 8 hours PRN Nausea and/or Vomiting  oxyCODONE    5 mG/acetaminophen 325 mG 2 Tablet(s) Oral every 4 hours PRN Moderate Pain (4 - 6)      Physical Exam      Neuro :  no focal deficits  Respiratory: CTA B/L  CV: RRR, S1S2, no murmurs,   Abdominal: Soft, NT, ND +BS,  Extremities: + peripheral pulses, avf with good thrill and non tender, mild improvment in rom rue         ASSESSMENT    intractible right shoulder pain 2nd to Rotatorcuff calcific tendinosis.  h/o Myocardial infarct, old  ESRD (end stage renal disease) on dialysis  Asthma occurring only with upper respiratory infection  Anemia in chronic renal disease  Claustrophobia  Uterine leiomyoma, unspecified location  AUSTIN (obstructive sleep apnea)  Gastroesophageal reflux disease without esophagitis  Vertigo  HLD (hyperlipidemia)  Essential hypertension  Diabetes mellitus, type 2  Morbid obesity  AV fistula  S/P craniotomy  S/P hernia repair  S/P  section        PLAN    xray shoulder with Rotatorcuff calcific tendinosis noted   cont dilaudid prn pain  d/c decadron  Daily Physical tx  mri r shoulder with Calcific tendinosis and Full-thickness tear of the anterior to mid insertional fibers of the supraspinatus tendon.  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  pt has severe Hyperphosphatemia  cont Renagel 1600 mg po tid with meals   phoslo d/c'd to avoid metastatic calcification from high ca/phos product   cont low phos diet  hd as per renal  cont current meds Patient is a 53y old  Female who presents with a chief complaint of right arm and shoulder pain for 3 days (2018 15:18)    pt seen in icu [  ], reg med floor [ x  ], bed [ x ], chair at bedside [   ], a+o x3 [ x ], lethargic [  ],  nad [ x ]    pt c/o feeling dizzy since yesterday    Allergies    No Known Drug Allergies  pineapple (Hives)        Vitals    T(F): 98 (18 @ 05:30), Max: 99 (18 @ 20:42)  HR: 58 (18 @ 05:30) (55 - 62)  BP: 134/73 (18 @ 05:30) (113/68 - 134/73)  RR: 18 (18 @ 05:30) (16 - 18)  SpO2: 96% (18 @ 05:30) (93% - 100%)  Wt(kg): --  CAPILLARY BLOOD GLUCOSE      POCT Blood Glucose.: 133 mg/dL (2018 08:02)      Labs                          12.1   7.7   )-----------( 203      ( 2018 07:33 )             37.3           134<L>  |  99  |  76<H>  ----------------------------<  135<H>  5.2   |  22  |  8.11<H>    Ca    9.0      2018 07:32  Phos  5.6       Mg     2.2                       Radiology Results      Meds    MEDICATIONS  (STANDING):  amLODIPine   Tablet 10 milliGRAM(s) Oral daily  aspirin  chewable 81 milliGRAM(s) Oral daily  atorvastatin 40 milliGRAM(s) Oral at bedtime  dexamethasone     Tablet 4 milliGRAM(s) Oral every 6 hours  gabapentin 100 milliGRAM(s) Oral daily  heparin  Injectable 5000 Unit(s) SubCutaneous every 8 hours  insulin lispro (HumaLOG) corrective regimen sliding scale   SubCutaneous Before meals and at bedtime  labetalol 200 milliGRAM(s) Oral daily  omega-3-Acid Ethyl Esters 2 Gram(s) Oral two times a day  pantoprazole    Tablet 40 milliGRAM(s) Oral before breakfast  sevelamer hydrochloride 1600 milliGRAM(s) Oral three times a day with meals      MEDICATIONS  (PRN):  HYDROmorphone  Injectable 0.5 milliGRAM(s) IV Push every 6 hours PRN Severe Pain (7 - 10)  ondansetron Injectable 4 milliGRAM(s) IV Push every 8 hours PRN Nausea and/or Vomiting  oxyCODONE    5 mG/acetaminophen 325 mG 2 Tablet(s) Oral every 4 hours PRN Moderate Pain (4 - 6)      Physical Exam      Neuro :  no focal deficits, horizontal nystagmus to b/l lateral gaze  Respiratory: CTA B/L  CV: RRR, S1S2, no murmurs,   Abdominal: Soft, NT, ND +BS,  Extremities: + peripheral pulses, avf with good thrill and non tender, mild improvment in rom rue         ASSESSMENT      dizziness  r/o cns patho  intractible right shoulder pain 2nd to Rotatorcuff calcific tendinosis.  h/o Myocardial infarct, old  ESRD (end stage renal disease) on dialysis  Asthma occurring only with upper respiratory infection  Anemia in chronic renal disease  Claustrophobia  Uterine leiomyoma, unspecified location  AUSTIN (obstructive sleep apnea)  Gastroesophageal reflux disease without esophagitis  Vertigo  HLD (hyperlipidemia)  Essential hypertension  Diabetes mellitus, type 2  Morbid obesity  AV fistula  S/P craniotomy  S/P hernia repair  S/P  section        PLAN    neuro cons  ck mra head and neck  xray shoulder with Rotatorcuff calcific tendinosis noted   cont dilaudid prn pain  d/c decadron  Daily Physical tx  mri r shoulder with Calcific tendinosis and Full-thickness tear of the anterior to mid insertional fibers of the supraspinatus tendon.  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

## 2018-02-27 NOTE — CONSULT NOTE ADULT - SUBJECTIVE AND OBJECTIVE BOX
Patient is a 53y old  Female who presents with a chief complaint of right arm and shoulder pain for 3 days (2018 15:18)      HPI:  53 y/oF with PMHx of Anemia, Asthma, ESRD on HD(MWF), Claustrophobia, Morbid Obesity (on CPAP), DMII,HTN, GERD, HLD, MI, AUSTIN, Uterine Leiomyoma, and Vertigo and PSHx of AV Fistula, R Antecubital AVF, , Craniotomy, and Hernia Repair presented to ED c/o R arm and shoulder pain x4 days. Pt describes R arm pain as severe, 10/10 in severity, on and off, started over elbow and moved to shoulder(no more pain over elbow). Patient have right arm AV fistula which does not look infected and pain not related to fistula site.  Park visited [place] ED yesterday for R arm pain and swelling; a CTA of the R arm was done with patent arteries and labsh were WNL so patient was discharged, Pain did not improved so came to [facility] today. Pt denies fever, chills, recent trauma to the R arm.   Patient reports similar pain last year on left arm, injection in bone was given after which pain improved. (2018 17:59)         Neurological Review of Systems:  No difficulty with language.  No vision loss or double vision.  No dizziness, vertigo or new hearing loss.  No difficulty with speech or swallowing.  No focal weakness.  No focal sensory changes.  No numbness or tingling in the bilateral lower extremities.  No difficulty with balance.  No difficulty with ambulation.        MEDICATIONS  (STANDING):  amLODIPine   Tablet 10 milliGRAM(s) Oral daily  aspirin  chewable 81 milliGRAM(s) Oral daily  atorvastatin 40 milliGRAM(s) Oral at bedtime  dexamethasone     Tablet 4 milliGRAM(s) Oral every 6 hours  gabapentin 100 milliGRAM(s) Oral daily  heparin  Injectable 5000 Unit(s) SubCutaneous every 8 hours  insulin lispro (HumaLOG) corrective regimen sliding scale   SubCutaneous Before meals and at bedtime  labetalol 200 milliGRAM(s) Oral daily  meclizine 25 milliGRAM(s) Oral every 8 hours  omega-3-Acid Ethyl Esters 2 Gram(s) Oral two times a day  pantoprazole    Tablet 40 milliGRAM(s) Oral before breakfast  sevelamer hydrochloride 1600 milliGRAM(s) Oral three times a day with meals    MEDICATIONS  (PRN):  HYDROmorphone  Injectable 0.5 milliGRAM(s) IV Push every 6 hours PRN Severe Pain (7 - 10)  ondansetron Injectable 4 milliGRAM(s) IV Push every 8 hours PRN Nausea and/or Vomiting  oxyCODONE    5 mG/acetaminophen 325 mG 2 Tablet(s) Oral every 4 hours PRN Moderate Pain (4 - 6)    Allergies    No Known Drug Allergies  pineapple (Hives)    Intolerances      PAST MEDICAL & SURGICAL HISTORY:  Myocardial infarct, old:   ESRD (end stage renal disease) on dialysis: since 6/15/2015 (M/W/F)  Asthma occurring only with upper respiratory infection: never hospitalized or intubated, last use of inhalator last year with winter  Anemia in chronic renal disease  Claustrophobia: when CPAP mask was put on her  Uterine leiomyoma, unspecified location  AUSTIN (obstructive sleep apnea)  Gastroesophageal reflux disease without esophagitis  Vertigo  HLD (hyperlipidemia)  Essential hypertension  Chronic kidney disease, unspecified stage  Diabetes mellitus, type 2  Morbid obesity  AV fistula: 3/18/2016  S/P craniotomy: - s/p fall from 3 floors, no hx of seizure after surgery  S/P hernia repair: incisional-  S/P  section: ,   CRF (chronic renal failure): s/p right antecubital AV formation- 2015, s/p left antecubital formation AV - - not working, s/p right chest permacath 07/15/15    FAMILY HISTORY:  Family history of chronic renal failure  Family history of hypertension (Father, Mother)  Family history of stroke  Diabetes mellitus, type 2    SOCIAL HISTORY: non smoker/ former smoker/ active smoker    Review of Systems:  Constitutional: No generalized weakness. No fevers or chills.                    Eyes, Ears, Mouth, Throat: No vision loss   Respiratory: No shortness of breath or cough.                                Cardiovascular: No chest pain or palpitations  Gastrointestinal: No nausea or vomiting.                                         Genitourinary: No urinary incontinence or burning on urination.  Musculoskeletal: No joint pain.                                                           Dermatologic: No rash.  Neurological: as per HPI                                                                      Psychiatric: No behavioral problems.  Endocrine: No known hypoglycemia.               Hematologic/Lymphatic: No easy bleeding.    O:  Vital Signs Last 24 Hrs  T(C): 36.5 (2018 10:22), Max: 37.2 (2018 20:42)  T(F): 97.7 (2018 10:22), Max: 99 (2018 20:42)  HR: 58 (2018 05:30) (58 - 62)  BP: 134/73 (2018 05:30) (113/68 - 134/73)  BP(mean): --  RR: 18 (2018 10:22) (16 - 18)  SpO2: 94% (2018 10:22) (93% - 96%)    General Exam:   General appearance: No acute distress                 Cardiovascular: Pedal dorsalis pulses intact bilaterally    Mental Status: Orientated to self, date and place.  Attention intact.  No dysarthria, aphasia or neglect.  Knowledge intact.  Registration intact.  Short and long term memory grossly intact.      Cranial Nerves: CN I - not tested.  PERRL, EOMI, VFF, no nystagmus or diplopia.  No APD.  Fundi not visualized.  CN V1-3 intact to light touch and pinprick.  No facial asymmetry.  Hearing intact to finger rub bilaterally.  Tongue, uvula and palate midline.  Sternocleidomastoid and Trapezius intact bilaterally.    Motor:   Tone: normal.                  Strength intact throughout  No pronator drift bilaterally                      No dysmetria on finger-nose-finger or heel-shin-heel  No truncal ataxia.  No resting, postural or action tremor.  No myoclonus.    Sensation: intact to light touch, pinprick, vibration and proprioception    Deep Tendon Reflexes: 1+ bilateral biceps, triceps, brachioradialis, knee and ankle  Toes flexor bilaterally    Gait: normal and stable.  Rhomberg -sunny.    Other:     LABS:                        12.1   7.7   )-----------( 203      ( 2018 07:33 )             37.3         134<L>  |  99  |  76<H>  ----------------------------<  135<H>  5.2   |  22  |  8.11<H>    Ca    9.0      2018 07:32  Phos  5.6       Mg     2.2                   RADIOLOGY & ADDITIONAL STUDIES:    EKG:  tele:  TTE:  EEG: Patient is a 53y old  Female who presents with a chief complaint of  (2018 15:18)    interpretor [id number]  HPI:  53 y/oF with PMHx of Anemia, Asthma, ESRD on HD(MWF), Claustrophobia, Morbid Obesity (on CPAP), DMII,HTN, GERD, HLD, MI, AUSTIN, Uterine Leiomyoma, and Vertigo and PSHx of AV Fistula, R Antecubital AVF, , Craniotomy, and Hernia Repair presented to ED c/o R arm and shoulder pain for few day, neurology consulted for dizziness and nystagmus.  The patient denies dizziness but says that this morning of awakening she noticed that the room was spinning, degardless of the head position. There is associated nausea or vomitting.  There is no recent infections.  NO hearing loss or ringing in the ear.  The patient continues to have the symptoms but they have mildly improved.       Neurological Review of Systems:  No difficulty with language.  No vision loss or double vision.  No new hearing loss.  No difficulty with speech or swallowing.  No focal weakness.  No focal sensory changes.  No numbness or tingling in the bilateral lower extremities.  + difficulty with balance and ambulation since start of symptoms.        MEDICATIONS  (STANDING):  amLODIPine   Tablet 10 milliGRAM(s) Oral daily  aspirin  chewable 81 milliGRAM(s) Oral daily  atorvastatin 40 milliGRAM(s) Oral at bedtime  dexamethasone     Tablet 4 milliGRAM(s) Oral every 6 hours  gabapentin 100 milliGRAM(s) Oral daily  heparin  Injectable 5000 Unit(s) SubCutaneous every 8 hours  insulin lispro (HumaLOG) corrective regimen sliding scale   SubCutaneous Before meals and at bedtime  labetalol 200 milliGRAM(s) Oral daily  meclizine 25 milliGRAM(s) Oral every 8 hours  omega-3-Acid Ethyl Esters 2 Gram(s) Oral two times a day  pantoprazole    Tablet 40 milliGRAM(s) Oral before breakfast  sevelamer hydrochloride 1600 milliGRAM(s) Oral three times a day with meals    MEDICATIONS  (PRN):  HYDROmorphone  Injectable 0.5 milliGRAM(s) IV Push every 6 hours PRN Severe Pain (7 - 10)  ondansetron Injectable 4 milliGRAM(s) IV Push every 8 hours PRN Nausea and/or Vomiting  oxyCODONE    5 mG/acetaminophen 325 mG 2 Tablet(s) Oral every 4 hours PRN Moderate Pain (4 - 6)    Allergies    No Known Drug Allergies  pineapple (Hives)    Intolerances      PAST MEDICAL & SURGICAL HISTORY:  Myocardial infarct, old:   ESRD (end stage renal disease) on dialysis: since 6/15/2015 (M/W/F)  Asthma occurring only with upper respiratory infection: never hospitalized or intubated, last use of inhalator last year with winter  Anemia in chronic renal disease  Claustrophobia: when CPAP mask was put on her  Uterine leiomyoma, unspecified location  AUSITN (obstructive sleep apnea)  Gastroesophageal reflux disease without esophagitis  Vertigo  HLD (hyperlipidemia)  Essential hypertension  Chronic kidney disease, unspecified stage  Diabetes mellitus, type 2  Morbid obesity  AV fistula: 3/18/2016  S/P craniotomy: - s/p fall from 3 floors, no hx of seizure after surgery  S/P hernia repair: incisional-  S/P  section: ,   CRF (chronic renal failure): s/p right antecubital AV formation- 2015, s/p left antecubital formation AV - - not working, s/p right chest permacath 07/15/15    FAMILY HISTORY:  Family history of chronic renal failure  Family history of hypertension (Father, Mother)  Family history of stroke  Diabetes mellitus, type 2    SOCIAL HISTORY:  former smoker    Review of Systems:  Constitutional:  No fevers or chills.                    Eyes, Ears, Mouth, Throat: No vision loss   Respiratory: No shortness of breath or cough.                                Cardiovascular: No chest pain.  Gastrointestinal: + nausea or vomiting.                                         Genitourinary: No urinary incontinence or burning on urination.  Musculoskeletal: No joint pain.                                                           Dermatologic: No rash.  Neurological: as per HPI                                                                      Psychiatric: No behavioral problems.  Endocrine: No known hypoglycemia.               Hematologic/Lymphatic: No easy bleeding.    O:  Vital Signs Last 24 Hrs  T(C): 36.5 (2018 10:22), Max: 37.2 (2018 20:42)  T(F): 97.7 (2018 10:22), Max: 99 (2018 20:42)  HR: 58 (2018 05:30) (58 - 62)  BP: 134/73 (2018 05:30) (113/68 - 134/73)  BP(mean): --  RR: 18 (2018 10:22) (16 - 18)  SpO2: 94% (2018 10:22) (93% - 96%)    General Exam:   General appearance: No acute distress                 Cardiovascular: Pedal dorsalis pulses intact bilaterally    Mental Status: Orientated to self, date and place.  Attention intact.  No dysarthria, aphasia or neglect.  Knowledge intact.  Registration intact.  Short and long term memory grossly intact.      Cranial Nerves: CN I - not tested.  PERRL, EOMI, VFF, no nystagmus or diplopia.  No APD.  Fundi not visualized.  CN V1-3 intact to light touch and pinprick.  No facial asymmetry.  Hearing intact to finger rub bilaterally.  Tongue, uvula and palate midline.  Sternocleidomastoid and Trapezius intact bilaterally.    Motor:   Tone: normal.                  Strength intact throughout  No pronator drift bilaterally                      No dysmetria on finger-nose-finger or heel-shin-heel    Sensation: intact to light touch, pinprick    Deep Tendon Reflexes: 1+ bilateral biceps, triceps, brachioradialis, knee and ankle  Toes flexor bilaterally    Gait: unsteady, falls to the left side    Other:     LABS:                        12.1   7.7   )-----------( 203      ( 2018 07:33 )             37.3         134<L>  |  99  |  76<H>  ----------------------------<  135<H>  5.2   |  22  |  8.11<H>    Ca    9.0      2018 07:32  Phos  5.6       Mg     2.2                   RADIOLOGY & ADDITIONAL STUDIES:

## 2018-02-27 NOTE — PROGRESS NOTE ADULT - ASSESSMENT
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

## 2018-02-27 NOTE — CONSULT NOTE ADULT - ASSESSMENT
dizziness and horizontal nystagmus  mri brain Vertigo and falling to the left side previously noted to have horizontal nystagmus, symptoms concerning for posterior circulation stroke vs. peripheral etiology of vertigo  mri brain  meclizine 50mg [id number] as needed for vertigo  Zofran prn for vomiting      dw primary MD

## 2018-02-28 LAB
GLUCOSE BLDC GLUCOMTR-MCNC: 122 MG/DL — HIGH (ref 70–99)
GLUCOSE BLDC GLUCOMTR-MCNC: 125 MG/DL — HIGH (ref 70–99)
GLUCOSE BLDC GLUCOMTR-MCNC: 162 MG/DL — HIGH (ref 70–99)
GLUCOSE BLDC GLUCOMTR-MCNC: 169 MG/DL — HIGH (ref 70–99)

## 2018-02-28 PROCEDURE — 70547 MR ANGIOGRAPHY NECK W/O DYE: CPT | Mod: 26

## 2018-02-28 PROCEDURE — 70551 MRI BRAIN STEM W/O DYE: CPT | Mod: 26

## 2018-02-28 PROCEDURE — 70544 MR ANGIOGRAPHY HEAD W/O DYE: CPT | Mod: 26,59

## 2018-02-28 RX ORDER — MECLIZINE HCL 12.5 MG
25 TABLET ORAL EVERY 8 HOURS
Qty: 0 | Refills: 0 | Status: DISCONTINUED | OUTPATIENT
Start: 2018-02-28 | End: 2018-03-02

## 2018-02-28 RX ADMIN — Medication 4 MILLIGRAM(S): at 05:59

## 2018-02-28 RX ADMIN — SEVELAMER CARBONATE 1600 MILLIGRAM(S): 2400 POWDER, FOR SUSPENSION ORAL at 18:21

## 2018-02-28 RX ADMIN — Medication 2 GRAM(S): at 06:00

## 2018-02-28 RX ADMIN — Medication 1: at 08:31

## 2018-02-28 RX ADMIN — Medication 25 MILLIGRAM(S): at 06:06

## 2018-02-28 RX ADMIN — HYDROMORPHONE HYDROCHLORIDE 0.5 MILLIGRAM(S): 2 INJECTION INTRAMUSCULAR; INTRAVENOUS; SUBCUTANEOUS at 06:00

## 2018-02-28 RX ADMIN — Medication 2 GRAM(S): at 18:22

## 2018-02-28 RX ADMIN — ATORVASTATIN CALCIUM 40 MILLIGRAM(S): 80 TABLET, FILM COATED ORAL at 21:44

## 2018-02-28 RX ADMIN — Medication 200 MILLIGRAM(S): at 05:59

## 2018-02-28 RX ADMIN — HEPARIN SODIUM 5000 UNIT(S): 5000 INJECTION INTRAVENOUS; SUBCUTANEOUS at 06:01

## 2018-02-28 RX ADMIN — HYDROMORPHONE HYDROCHLORIDE 0.5 MILLIGRAM(S): 2 INJECTION INTRAMUSCULAR; INTRAVENOUS; SUBCUTANEOUS at 18:20

## 2018-02-28 RX ADMIN — HEPARIN SODIUM 5000 UNIT(S): 5000 INJECTION INTRAVENOUS; SUBCUTANEOUS at 21:44

## 2018-02-28 RX ADMIN — SEVELAMER CARBONATE 1600 MILLIGRAM(S): 2400 POWDER, FOR SUSPENSION ORAL at 08:31

## 2018-02-28 RX ADMIN — OXYCODONE AND ACETAMINOPHEN 2 TABLET(S): 5; 325 TABLET ORAL at 22:29

## 2018-02-28 RX ADMIN — PANTOPRAZOLE SODIUM 40 MILLIGRAM(S): 20 TABLET, DELAYED RELEASE ORAL at 05:58

## 2018-02-28 RX ADMIN — OXYCODONE AND ACETAMINOPHEN 2 TABLET(S): 5; 325 TABLET ORAL at 21:53

## 2018-02-28 RX ADMIN — AMLODIPINE BESYLATE 10 MILLIGRAM(S): 2.5 TABLET ORAL at 05:59

## 2018-02-28 RX ADMIN — HYDROMORPHONE HYDROCHLORIDE 0.5 MILLIGRAM(S): 2 INJECTION INTRAMUSCULAR; INTRAVENOUS; SUBCUTANEOUS at 06:49

## 2018-02-28 RX ADMIN — Medication 1: at 21:44

## 2018-02-28 NOTE — PROGRESS NOTE ADULT - SUBJECTIVE AND OBJECTIVE BOX
Patient is a 53y old  Female who presents with a chief complaint of right arm and shoulder pain for 3 days (22 Feb 2018 15:18)  Still has right shoulder pain  awaiting MRI for vertigo      INTERVAL HPI/OVERNIGHT EVENTS:  T(C): 36.4 (02-28-18 @ 13:46), Max: 37.1 (02-28-18 @ 10:16)  HR: 67 (02-28-18 @ 13:46) (56 - 67)  BP: 112/68 (02-28-18 @ 13:46) (112/68 - 135/76)  RR: 18 (02-28-18 @ 13:46) (16 - 18)  SpO2: 98% (02-28-18 @ 13:46) (96% - 98%)  Wt(kg): --  I&O's Summary    27 Feb 2018 07:01  -  28 Feb 2018 07:00  --------------------------------------------------------  IN: 0 mL / OUT: 1 mL / NET: -1 mL        LABS:                        12.1   7.7   )-----------( 203      ( 27 Feb 2018 07:33 )             37.3     02-27    134<L>  |  99  |  76<H>  ----------------------------<  135<H>  5.2   |  22  |  8.11<H>    Ca    9.0      27 Feb 2018 07:32  Phos  5.6     02-27  Mg     2.2     02-27          CAPILLARY BLOOD GLUCOSE      POCT Blood Glucose.: 122 mg/dL (28 Feb 2018 11:40)  POCT Blood Glucose.: 169 mg/dL (28 Feb 2018 08:14)  POCT Blood Glucose.: 242 mg/dL (27 Feb 2018 21:14)  POCT Blood Glucose.: 185 mg/dL (27 Feb 2018 16:06)              MEDICATIONS  (STANDING):  amLODIPine   Tablet 10 milliGRAM(s) Oral daily  aspirin  chewable 81 milliGRAM(s) Oral daily  atorvastatin 40 milliGRAM(s) Oral at bedtime  gabapentin 100 milliGRAM(s) Oral daily  heparin  Injectable 5000 Unit(s) SubCutaneous every 8 hours  insulin lispro (HumaLOG) corrective regimen sliding scale   SubCutaneous Before meals and at bedtime  labetalol 200 milliGRAM(s) Oral daily  omega-3-Acid Ethyl Esters 2 Gram(s) Oral two times a day  pantoprazole    Tablet 40 milliGRAM(s) Oral before breakfast  sevelamer hydrochloride 1600 milliGRAM(s) Oral three times a day with meals    MEDICATIONS  (PRN):  HYDROmorphone  Injectable 0.5 milliGRAM(s) IV Push every 6 hours PRN Severe Pain (7 - 10)  meclizine 25 milliGRAM(s) Oral every 8 hours PRN Dizziness  ondansetron Injectable 4 milliGRAM(s) IV Push every 8 hours PRN Nausea and/or Vomiting  oxyCODONE    5 mG/acetaminophen 325 mG 2 Tablet(s) Oral every 4 hours PRN Moderate Pain (4 - 6)        REVIEW OF SYSTEMS:  CONSTITUTIONAL: No fever, weight loss, or fatigue  EYES: No eye pain, visual disturbances, or discharge  ENMT:  No difficulty hearing, tinnitus, vertigo; No sinus or throat pain  NECK: No pain or stiffness  BREASTS: No pain, masses, or nipple discharge  RESPIRATORY: No cough, wheezing, chills or hemoptysis; No shortness of breath  CARDIOVASCULAR: No chest pain, palpitations, dizziness, or leg swelling  GASTROINTESTINAL: No abdominal or epigastric pain. No nausea, vomiting, or hematemesis; No diarrhea or constipation. No melena or hematochezia.  GENITOURINARY: No dysuria, frequency, hematuria, or incontinence  NEUROLOGICAL: No headaches, memory loss, loss of strength, numbness, or tremors  SKIN: No itching, burning, rashes, or lesions   LYMPH NODES: No enlarged glands  ENDOCRINE: No heat or cold intolerance; No hair loss  MUSCULOSKELETAL: No joint pain or swelling; No muscle, back, or extremity pain  PSYCHIATRIC: No depression, anxiety, mood swings, or difficulty sleeping  HEME/LYMPH: No easy bruising, or bleeding gums  ALLERY AND IMMUNOLOGIC: No hives or eczema    PHYSICAL EXAM:  GENERAL: NAD, well-groomed, well-developed  HEAD:  Atraumatic, Normocephalic  EYES: EOMI, PERRLA, conjunctiva and sclera clear  ENMT: No tonsillar erythema, exudates, or enlargement; Moist mucous membranes, Good dentition, No lesions  NECK: Supple, No JVD, Normal thyroid  NERVOUS SYSTEM:  Alert & Oriented X3, Good concentration; Motor Strength 5/5 B/L upper and lower extremities; DTRs 2+ intact and symmetric  CHEST/LUNG: Clear to percussion bilaterally; No rales, rhonchi, wheezing, or rubs  HEART: Regular rate and rhythm; No murmurs, rubs, or gallops  ABDOMEN: Soft, Nontender, Nondistended; Bowel sounds present  EXTREMITIES:  2+ Peripheral Pulses bilaterally, No clubbing, cyanosis, or edema, Right upper arm AVF thrill(+), bruit(+)  LYMPH: No lymphadenopathy noted  SKIN: No rashes or lesions    Care Discussed with Consultants/Other Providers [x ] YES  [ ] NO

## 2018-02-28 NOTE — PROGRESS NOTE ADULT - SUBJECTIVE AND OBJECTIVE BOX
pt seen and examined.pts current chart reviewed and case discussed with resident covering.    SUBJECTIVE:  pt feels fine and denies cp,sob,gi or gu/uremic  symptons    REVIEW OF SYSTEMS:  CONSTITUTIONAL: No weakness, fevers or chills  EYES/ENT: No visual changes;  No vertigo or throat pain   NECK: No pain or stiffness  RESPIRATORY: No cough, wheezing, hemoptysis; No shortness of breath  CARDIOVASCULAR: No chest pain or palpitations  GASTROINTESTINAL: No abdominal or epigastric pain. No nausea, vomiting, or hematemesis; No diarrhea or constipation. No melena or hematochezia.  GENITOURINARY: No dysuria, frequency , hematuria, flank pain or nocturia  NEUROLOGICAL: No numbness or weakness  SKIN: No itching, burning, rashes, or lesions   All other review of systems is negative unless indicated above    Current meds:    amLODIPine   Tablet 10 milliGRAM(s) Oral daily  aspirin  chewable 81 milliGRAM(s) Oral daily  atorvastatin 40 milliGRAM(s) Oral at bedtime  gabapentin 100 milliGRAM(s) Oral daily  heparin  Injectable 5000 Unit(s) SubCutaneous every 8 hours  HYDROmorphone  Injectable 0.5 milliGRAM(s) IV Push every 6 hours PRN  insulin lispro (HumaLOG) corrective regimen sliding scale   SubCutaneous Before meals and at bedtime  labetalol 200 milliGRAM(s) Oral daily  meclizine 25 milliGRAM(s) Oral every 8 hours PRN  omega-3-Acid Ethyl Esters 2 Gram(s) Oral two times a day  ondansetron Injectable 4 milliGRAM(s) IV Push every 8 hours PRN  oxyCODONE    5 mG/acetaminophen 325 mG 2 Tablet(s) Oral every 4 hours PRN  pantoprazole    Tablet 40 milliGRAM(s) Oral before breakfast  sevelamer hydrochloride 1600 milliGRAM(s) Oral three times a day with meals      Vital Signs    T(F): 97.6 (18 @ 13:46), Max: 98.7 (18 @ 10:16)  HR: 67 (18 @ 13:46) (56 - 67)  BP: 112/68 (18 @ 13:46) (112/68 - 135/76)  ABP: --  RR: 18 (18 @ 13:46) (17 - 18)  SpO2: 98% (18 @ 13:46) (96% - 98%)  Wt(kg): --  CVP(cm H2O): --  CO: --  PCWP: --    I and O's:     @ 07:01  -   @ 07:00  --------------------------------------------------------  IN:  Total IN: 0 mL    OUT:    Stool: 1 mL  Total OUT: 1 mL    Total NET: -1 mL        Daily     Daily Weight in k (2018 13:46)    PHYSICAL EXAM:  Constitutional: well developed, well nourished  and in nad  HEENT: PERRLA,  no icteric sclera and mild pallor of conjunctiva noted  Neck: No JVD, thyromegaly or adenopathy  Respiratory: reduced air entry lower lungs with no rales, wheezing or rhonchi  Cardiovascular: S1 and S2 normally heard  Gastrointestinal: soft, nondistended, nontender and normal bowel sounds heard  Extremities: No peripheral edema or cyanosis  Neurological: A/O x 3, no focal deficits  : No flank or cva tenderness palpated.  Skin: No rashes      LABS:    CBC:                          12.1   7.7   )-----------( 203      ( 2018 07:33 )             37.3           BMP:        134<L>  |  99  |  76<H>  ----------------------------<  135<H>  5.2   |  22  |  8.11<H>    Ca    9.0      2018 07:32  Phos  5.6       Mg     2.2                 URINE STUDIES:                        RADIOLOGY & ADDITIONAL STUDIES: pt seen and examined.    SUBJECTIVE:  pt continue to have dizziness but  denies cp,sob,gi or uremic  symptons  pts family at bedside  pt was dialysed today      Current meds:    amLODIPine   Tablet 10 milliGRAM(s) Oral daily  aspirin  chewable 81 milliGRAM(s) Oral daily  atorvastatin 40 milliGRAM(s) Oral at bedtime  gabapentin 100 milliGRAM(s) Oral daily  heparin  Injectable 5000 Unit(s) SubCutaneous every 8 hours  HYDROmorphone  Injectable 0.5 milliGRAM(s) IV Push every 6 hours PRN  insulin lispro (HumaLOG) corrective regimen sliding scale   SubCutaneous Before meals and at bedtime  labetalol 200 milliGRAM(s) Oral daily  meclizine 25 milliGRAM(s) Oral every 8 hours PRN  omega-3-Acid Ethyl Esters 2 Gram(s) Oral two times a day  ondansetron Injectable 4 milliGRAM(s) IV Push every 8 hours PRN  oxyCODONE    5 mG/acetaminophen 325 mG 2 Tablet(s) Oral every 4 hours PRN  pantoprazole    Tablet 40 milliGRAM(s) Oral before breakfast  sevelamer hydrochloride 1600 milliGRAM(s) Oral three times a day with meals      Vital Signs    T(F): 97.6 (18 @ 13:46), Max: 98.7 (18 @ 10:16)  HR: 67 (18 @ 13:46) (56 - 67)  BP: 112/68 (18 @ 13:46) (112/68 - 135/76)  ABP: --  RR: 18 (18 @ 13:46) (17 - 18)  SpO2: 98% (18 @ 13:46) (96% - 98%)  Wt(kg): --  CVP(cm H2O): --  CO: --  PCWP: --    I and O's:     @ 07:01  -   @ 07:00  --------------------------------------------------------  IN:  Total IN: 0 mL    OUT:    Stool: 1 mL  Total OUT: 1 mL    Total NET: -1 mL        Daily     Daily Weight in k (2018 13:46)    PHYSICAL EXAM:    Constitutional: well developed, well nourished  and in nad  HEENT: PERRLA,  no icteric sclera and mild pallor of conjunctiva noted  Neck: No JVD, thyromegaly or adenopathy  Respiratory: reduced air entry lower lungs with no rales, wheezing or rhonchi  Cardiovascular: S1 and S2 normally heard  Gastrointestinal: soft, nondistended, nontender and normal bowel sounds heard  Extremities: No peripheral edema or cyanosis  pt has good palpable thrill over rt.upper arm AVF  Neurological: A/O x 3, no focal deficits  Psychiatric: Normal mood, normal affect  : No flank tenderness  Skin: No rashes      LABS:no new labs today    CBC:                          12.1   7.7   )-----------( 203      ( 2018 07:33 )             37.3           BMP:         134<L>  |  99  |  76<H>  ----------------------------<  135<H>  5.2   |  22  |  8.11<H>    Ca    9.0      2018 07:32  Phos  5.6       Mg     2.2                         RADIOLOGY & ADDITIONAL STUDIES:

## 2018-02-28 NOTE — PROGRESS NOTE ADULT - ASSESSMENT
A/P:  1.ESRD: secondary to dm/htn  -pt s renal status is stable  -pt will be dialysed tomorrow.  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

## 2018-02-28 NOTE — PROGRESS NOTE ADULT - SUBJECTIVE AND OBJECTIVE BOX
Patient is a 53y old  Female who presents with a chief complaint of right arm and shoulder pain for 3 days (2018 15:18)      pt seen in icu [  ], reg med floor [ x  ], bed [ x ], chair at bedside [   ], a+o x3 [ x ], lethargic [  ],  nad [ x ]    Allergies    No Known Drug Allergies  pineapple (Hives)        Vitals    T(F): 98.3 (18 @ 05:05), Max: 98.4 (18 @ 20:01)  HR: 56 (18 @ 05:05) (56 - 67)  BP: 135/76 (18 @ 05:05) (120/61 - 135/76)  RR: 17 (18 @ 05:05) (16 - 18)  SpO2: 97% (18 @ 05:05) (94% - 97%)  Wt(kg): --  CAPILLARY BLOOD GLUCOSE      POCT Blood Glucose.: 169 mg/dL (2018 08:14)      Labs                          12.1   7.7   )-----------( 203      ( 2018 07:33 )             37.3           134<L>  |  99  |  76<H>  ----------------------------<  135<H>  5.2   |  22  |  8.11<H>    Ca    9.0      2018 07:32  Phos  5.6       Mg     2.2                       Radiology Results      Meds    MEDICATIONS  (STANDING):  amLODIPine   Tablet 10 milliGRAM(s) Oral daily  aspirin  chewable 81 milliGRAM(s) Oral daily  atorvastatin 40 milliGRAM(s) Oral at bedtime  dexamethasone     Tablet 4 milliGRAM(s) Oral every 6 hours  gabapentin 100 milliGRAM(s) Oral daily  heparin  Injectable 5000 Unit(s) SubCutaneous every 8 hours  insulin lispro (HumaLOG) corrective regimen sliding scale   SubCutaneous Before meals and at bedtime  labetalol 200 milliGRAM(s) Oral daily  meclizine 25 milliGRAM(s) Oral every 8 hours  omega-3-Acid Ethyl Esters 2 Gram(s) Oral two times a day  pantoprazole    Tablet 40 milliGRAM(s) Oral before breakfast  sevelamer hydrochloride 1600 milliGRAM(s) Oral three times a day with meals      MEDICATIONS  (PRN):  HYDROmorphone  Injectable 0.5 milliGRAM(s) IV Push every 6 hours PRN Severe Pain (7 - 10)  ondansetron Injectable 4 milliGRAM(s) IV Push every 8 hours PRN Nausea and/or Vomiting  oxyCODONE    5 mG/acetaminophen 325 mG 2 Tablet(s) Oral every 4 hours PRN Moderate Pain (4 - 6)      Physical Exam    Neuro :  no focal deficits, horizontal nystagmus to b/l lateral gaze  Respiratory: CTA B/L  CV: RRR, S1S2, no murmurs,   Abdominal: Soft, NT, ND +BS,  Extremities: + peripheral pulses, avf with good thrill and non tender, mild improvment in rom rue         ASSESSMENT      dizziness  r/o cns patho  intractible right shoulder pain 2nd to Rotatorcuff calcific tendinosis.  h/o Myocardial infarct, old  ESRD (end stage renal disease) on dialysis  Asthma occurring only with upper respiratory infection  Anemia in chronic renal disease  Claustrophobia  Uterine leiomyoma, unspecified location  AUSTIN (obstructive sleep apnea)  Gastroesophageal reflux disease without esophagitis  Vertigo  HLD (hyperlipidemia)  Essential hypertension  Diabetes mellitus, type 2  Morbid obesity  AV fistula  S/P craniotomy  S/P hernia repair  S/P  section        PLAN    neuro f/u  cont meclizine 50 q8  cont zofran prn n/v  ck mra head and neck  xray shoulder with Rotatorcuff calcific tendinosis noted   cont dilaudid prn pain  d/c decadron  Daily Physical tx  mri r shoulder with Calcific tendinosis and Full-thickness tear of the anterior to mid insertional fibers of the supraspinatus tendon.  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  pt has severe Hyperphosphatemia  cont Renagel 1600 mg po tid with meals   phoslo d/c'd to avoid metastatic calcification from high ca/phos product   cont low phos diet  hd today as per renal  cont current meds Patient is a 53y old  Female who presents with a chief complaint of right arm and shoulder pain for 3 days (2018 15:18)      pt seen in icu [  ], reg med floor [ x  ], bed [ x ], chair at bedside [   ], a+o x3 [ x ], lethargic [  ],  nad [ x ]    Allergies    No Known Drug Allergies  pineapple (Hives)        Vitals    T(F): 98.3 (18 @ 05:05), Max: 98.4 (18 @ 20:01)  HR: 56 (18 @ 05:05) (56 - 67)  BP: 135/76 (18 @ 05:05) (120/61 - 135/76)  RR: 17 (18 @ 05:05) (16 - 18)  SpO2: 97% (18 @ 05:05) (94% - 97%)  Wt(kg): --  CAPILLARY BLOOD GLUCOSE      POCT Blood Glucose.: 169 mg/dL (2018 08:14)      Labs                          12.1   7.7   )-----------( 203      ( 2018 07:33 )             37.3           134<L>  |  99  |  76<H>  ----------------------------<  135<H>  5.2   |  22  |  8.11<H>    Ca    9.0      2018 07:32  Phos  5.6       Mg     2.2                       Radiology Results      Meds    MEDICATIONS  (STANDING):  amLODIPine   Tablet 10 milliGRAM(s) Oral daily  aspirin  chewable 81 milliGRAM(s) Oral daily  atorvastatin 40 milliGRAM(s) Oral at bedtime  dexamethasone     Tablet 4 milliGRAM(s) Oral every 6 hours  gabapentin 100 milliGRAM(s) Oral daily  heparin  Injectable 5000 Unit(s) SubCutaneous every 8 hours  insulin lispro (HumaLOG) corrective regimen sliding scale   SubCutaneous Before meals and at bedtime  labetalol 200 milliGRAM(s) Oral daily  meclizine 25 milliGRAM(s) Oral every 8 hours  omega-3-Acid Ethyl Esters 2 Gram(s) Oral two times a day  pantoprazole    Tablet 40 milliGRAM(s) Oral before breakfast  sevelamer hydrochloride 1600 milliGRAM(s) Oral three times a day with meals      MEDICATIONS  (PRN):  HYDROmorphone  Injectable 0.5 milliGRAM(s) IV Push every 6 hours PRN Severe Pain (7 - 10)  ondansetron Injectable 4 milliGRAM(s) IV Push every 8 hours PRN Nausea and/or Vomiting  oxyCODONE    5 mG/acetaminophen 325 mG 2 Tablet(s) Oral every 4 hours PRN Moderate Pain (4 - 6)      Physical Exam    Neuro :  no focal deficits, horizontal nystagmus to b/l lateral gaze  Respiratory: CTA B/L  CV: RRR, S1S2, no murmurs,   Abdominal: Soft, NT, ND +BS,  Extremities: + peripheral pulses, avf with good thrill and non tender, mild improvment in rom rue         ASSESSMENT      dizziness  r/o cns patho  intractible right shoulder pain 2nd to Rotatorcuff calcific tendinosis.  h/o Myocardial infarct, old  ESRD (end stage renal disease) on dialysis  Asthma occurring only with upper respiratory infection  Anemia in chronic renal disease  Claustrophobia  Uterine leiomyoma, unspecified location  AUSTIN (obstructive sleep apnea)  Gastroesophageal reflux disease without esophagitis  Vertigo  HLD (hyperlipidemia)  Essential hypertension  Diabetes mellitus, type 2  Morbid obesity  AV fistula  S/P craniotomy  S/P hernia repair  S/P  section        PLAN    neuro f/u  cont meclizine 50 q8 prn  cont zofran prn n/v  ck mra head and neck  xray shoulder with Rotatorcuff calcific tendinosis noted   cont dilaudid prn pain  d/c decadron  Daily Physical tx  mri r shoulder with Calcific tendinosis and Full-thickness tear of the anterior to mid insertional fibers of the supraspinatus tendon.  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  pt has severe Hyperphosphatemia  cont Renagel 1600 mg po tid with meals   phoslo d/c'd to avoid metastatic calcification from high ca/phos product   cont low phos diet  hd today as per renal  cont current meds Patient is a 53y old  Female who presents with a chief complaint of right arm and shoulder pain for 3 days (2018 15:18)      pt seen in icu [  ], reg med floor [ x  ], bed [ x ], chair at bedside [   ], a+o x3 [ x ], lethargic [  ],  nad [ x ]    Allergies    No Known Drug Allergies  pineapple (Hives)        Vitals    T(F): 98.3 (18 @ 05:05), Max: 98.4 (18 @ 20:01)  HR: 56 (18 @ 05:05) (56 - 67)  BP: 135/76 (18 @ 05:05) (120/61 - 135/76)  RR: 17 (18 @ 05:05) (16 - 18)  SpO2: 97% (18 @ 05:05) (94% - 97%)  Wt(kg): --  CAPILLARY BLOOD GLUCOSE      POCT Blood Glucose.: 169 mg/dL (2018 08:14)      Labs                          12.1   7.7   )-----------( 203      ( 2018 07:33 )             37.3           134<L>  |  99  |  76<H>  ----------------------------<  135<H>  5.2   |  22  |  8.11<H>    Ca    9.0      2018 07:32  Phos  5.6       Mg     2.2                       Radiology Results      Meds    MEDICATIONS  (STANDING):  amLODIPine   Tablet 10 milliGRAM(s) Oral daily  aspirin  chewable 81 milliGRAM(s) Oral daily  atorvastatin 40 milliGRAM(s) Oral at bedtime  dexamethasone     Tablet 4 milliGRAM(s) Oral every 6 hours  gabapentin 100 milliGRAM(s) Oral daily  heparin  Injectable 5000 Unit(s) SubCutaneous every 8 hours  insulin lispro (HumaLOG) corrective regimen sliding scale   SubCutaneous Before meals and at bedtime  labetalol 200 milliGRAM(s) Oral daily  meclizine 25 milliGRAM(s) Oral every 8 hours  omega-3-Acid Ethyl Esters 2 Gram(s) Oral two times a day  pantoprazole    Tablet 40 milliGRAM(s) Oral before breakfast  sevelamer hydrochloride 1600 milliGRAM(s) Oral three times a day with meals      MEDICATIONS  (PRN):  HYDROmorphone  Injectable 0.5 milliGRAM(s) IV Push every 6 hours PRN Severe Pain (7 - 10)  ondansetron Injectable 4 milliGRAM(s) IV Push every 8 hours PRN Nausea and/or Vomiting  oxyCODONE    5 mG/acetaminophen 325 mG 2 Tablet(s) Oral every 4 hours PRN Moderate Pain (4 - 6)      Physical Exam    Neuro :  no focal deficits, horizontal nystagmus to b/l lateral gaze  Respiratory: CTA B/L  CV: RRR, S1S2, no murmurs,   Abdominal: Soft, NT, ND +BS,  Extremities: + peripheral pulses, avf with good thrill and non tender, mild improvment in rom rue         ASSESSMENT      dizziness  r/o cns patho  intractible right shoulder pain 2nd to Rotatorcuff calcific tendinosis.  h/o Myocardial infarct, old  ESRD (end stage renal disease) on dialysis  Asthma occurring only with upper respiratory infection  Anemia in chronic renal disease  Claustrophobia  Uterine leiomyoma, unspecified location  AUSTIN (obstructive sleep apnea)  Gastroesophageal reflux disease without esophagitis  Vertigo  HLD (hyperlipidemia)  Essential hypertension  Diabetes mellitus, type 2  Morbid obesity  AV fistula  S/P craniotomy  S/P hernia repair  S/P  section        PLAN    neuro f/u  cont meclizine 25mg q8 prn  cont zofran prn n/v  ck mra head and neck  xray shoulder with Rotatorcuff calcific tendinosis noted   cont dilaudid prn pain  d/c decadron  Daily Physical tx  mri r shoulder with Calcific tendinosis and Full-thickness tear of the anterior to mid insertional fibers of the supraspinatus tendon.  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  pt has severe Hyperphosphatemia  cont Renagel 1600 mg po tid with meals   phoslo d/c'd to avoid metastatic calcification from high ca/phos product   cont low phos diet  hd today as per renal  cont current meds Patient is a 53y old  Female who presents with a chief complaint of right arm and shoulder pain for 3 days (2018 15:18)      pt seen in icu [  ], reg med floor [ x  ], bed [ x ], chair at bedside [   ], a+o x3 [ x ], lethargic [  ],  nad [ x ]    Allergies    No Known Drug Allergies  pineapple (Hives)        Vitals    T(F): 98.3 (18 @ 05:05), Max: 98.4 (18 @ 20:01)  HR: 56 (18 @ 05:05) (56 - 67)  BP: 135/76 (18 @ 05:05) (120/61 - 135/76)  RR: 17 (18 @ 05:05) (16 - 18)  SpO2: 97% (18 @ 05:05) (94% - 97%)  Wt(kg): --  CAPILLARY BLOOD GLUCOSE      POCT Blood Glucose.: 169 mg/dL (2018 08:14)      Labs                          12.1   7.7   )-----------( 203      ( 2018 07:33 )             37.3           134<L>  |  99  |  76<H>  ----------------------------<  135<H>  5.2   |  22  |  8.11<H>    Ca    9.0      2018 07:32  Phos  5.6       Mg     2.2                       Radiology Results    < from: US Duplex Carotid Arteries Complete, Bilateral (18 @ 12:00) >  IMPRESSION:   No velocity evidence of hemodynamically significant stenosis in either   internal carotid artery by NASCET criteria.    < end of copied text >        Meds    MEDICATIONS  (STANDING):  amLODIPine   Tablet 10 milliGRAM(s) Oral daily  aspirin  chewable 81 milliGRAM(s) Oral daily  atorvastatin 40 milliGRAM(s) Oral at bedtime  dexamethasone     Tablet 4 milliGRAM(s) Oral every 6 hours  gabapentin 100 milliGRAM(s) Oral daily  heparin  Injectable 5000 Unit(s) SubCutaneous every 8 hours  insulin lispro (HumaLOG) corrective regimen sliding scale   SubCutaneous Before meals and at bedtime  labetalol 200 milliGRAM(s) Oral daily  meclizine 25 milliGRAM(s) Oral every 8 hours  omega-3-Acid Ethyl Esters 2 Gram(s) Oral two times a day  pantoprazole    Tablet 40 milliGRAM(s) Oral before breakfast  sevelamer hydrochloride 1600 milliGRAM(s) Oral three times a day with meals      MEDICATIONS  (PRN):  HYDROmorphone  Injectable 0.5 milliGRAM(s) IV Push every 6 hours PRN Severe Pain (7 - 10)  ondansetron Injectable 4 milliGRAM(s) IV Push every 8 hours PRN Nausea and/or Vomiting  oxyCODONE    5 mG/acetaminophen 325 mG 2 Tablet(s) Oral every 4 hours PRN Moderate Pain (4 - 6)      Physical Exam    Neuro :  no focal deficits, horizontal nystagmus to b/l lateral gaze  Respiratory: CTA B/L  CV: RRR, S1S2, no murmurs,   Abdominal: Soft, NT, ND +BS,  Extremities: + peripheral pulses, avf with good thrill and non tender, more improvment in rom rue         ASSESSMENT      dizziness  r/o cns patho  intractible right shoulder pain 2nd to Rotatorcuff calcific tendinosis.  h/o Myocardial infarct, old  ESRD (end stage renal disease) on dialysis  Asthma occurring only with upper respiratory infection  Anemia in chronic renal disease  Claustrophobia  Uterine leiomyoma, unspecified location  AUSTIN (obstructive sleep apnea)  Gastroesophageal reflux disease without esophagitis  Vertigo  HLD (hyperlipidemia)  Essential hypertension  Diabetes mellitus, type 2  Morbid obesity  AV fistula  S/P craniotomy  S/P hernia repair  S/P  section        PLAN    neuro f/u  cont meclizine 25mg q8 prn  cont zofran prn n/v  ck mra head and neck  xray shoulder with Rotatorcuff calcific tendinosis noted   cont dilaudid prn pain  d/c decadron  Daily Physical tx  mri r shoulder with Calcific tendinosis and Full-thickness tear of the anterior to mid insertional fibers of the supraspinatus tendon.  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

## 2018-02-28 NOTE — PROGRESS NOTE ADULT - SUBJECTIVE AND OBJECTIVE BOX
Patient is a 53y old  Female who presents with a chief complaint of right arm and shoulder pain for 3 days (22 Feb 2018 15:18)  Awake, alert, comfortable in NAD. Still with dizziness and cough. No sob    INTERVAL HPI/OVERNIGHT EVENTS:      VITAL SIGNS:  T(F): 98.7 (02-28-18 @ 10:16)  HR: 60 (02-28-18 @ 10:16)  BP: 123/62 (02-28-18 @ 10:16)  RR: 18 (02-28-18 @ 10:16)  SpO2: 98% (02-28-18 @ 10:16)  Wt(kg): --  I&O's Detail    27 Feb 2018 07:01  -  28 Feb 2018 07:00  --------------------------------------------------------  IN:  Total IN: 0 mL    OUT:    Stool: 1 mL  Total OUT: 1 mL    Total NET: -1 mL              REVIEW OF SYSTEMS:    CONSTITUTIONAL:  No fevers, chills, sweats    HEENT:  Eyes:  No diplopia or blurred vision. ENT:  No earache, sore throat or runny nose.    CARDIOVASCULAR:  No pressure, squeezing, tightness, or heaviness about the chest; no palpitations.    RESPIRATORY:  Per HPI    GASTROINTESTINAL:  No abdominal pain, nausea, vomiting or diarrhea.    GENITOURINARY:  No dysuria, frequency or urgency.    NEUROLOGIC:  No paresthesias, fasciculations, seizures or weakness.    PSYCHIATRIC:  No disorder of thought or mood.      PHYSICAL EXAM:    Constitutional: Well developed and nourished  Eyes:Perrla  ENMT: normal  Neck:supple  Respiratory: good air entry  Cardiovascular: S1 S2 regular  Gastrointestinal: Soft, Non tender  Extremities: No edema  Vascular:normal  Neurological:Awake, alert,Ox3  Musculoskeletal:Normal      MEDICATIONS  (STANDING):  amLODIPine   Tablet 10 milliGRAM(s) Oral daily  aspirin  chewable 81 milliGRAM(s) Oral daily  atorvastatin 40 milliGRAM(s) Oral at bedtime  gabapentin 100 milliGRAM(s) Oral daily  heparin  Injectable 5000 Unit(s) SubCutaneous every 8 hours  insulin lispro (HumaLOG) corrective regimen sliding scale   SubCutaneous Before meals and at bedtime  labetalol 200 milliGRAM(s) Oral daily  omega-3-Acid Ethyl Esters 2 Gram(s) Oral two times a day  pantoprazole    Tablet 40 milliGRAM(s) Oral before breakfast  sevelamer hydrochloride 1600 milliGRAM(s) Oral three times a day with meals    MEDICATIONS  (PRN):  HYDROmorphone  Injectable 0.5 milliGRAM(s) IV Push every 6 hours PRN Severe Pain (7 - 10)  meclizine 25 milliGRAM(s) Oral every 8 hours PRN Dizziness  ondansetron Injectable 4 milliGRAM(s) IV Push every 8 hours PRN Nausea and/or Vomiting  oxyCODONE    5 mG/acetaminophen 325 mG 2 Tablet(s) Oral every 4 hours PRN Moderate Pain (4 - 6)      Allergies    No Known Drug Allergies  pineapple (Hives)    Intolerances        LABS:                        12.1   7.7   )-----------( 203      ( 27 Feb 2018 07:33 )             37.3     02-27    134<L>  |  99  |  76<H>  ----------------------------<  135<H>  5.2   |  22  |  8.11<H>    Ca    9.0      27 Feb 2018 07:32  Phos  5.6     02-27  Mg     2.2     02-27                CAPILLARY BLOOD GLUCOSE      POCT Blood Glucose.: 169 mg/dL (28 Feb 2018 08:14)  POCT Blood Glucose.: 242 mg/dL (27 Feb 2018 21:14)  POCT Blood Glucose.: 185 mg/dL (27 Feb 2018 16:06)  POCT Blood Glucose.: 169 mg/dL (27 Feb 2018 12:23)        RADIOLOGY & ADDITIONAL TESTS:    CXR:    Ct scan chest:    ekg;    echo:

## 2018-03-01 DIAGNOSIS — R13.10 DYSPHAGIA, UNSPECIFIED: ICD-10-CM

## 2018-03-01 LAB
ANION GAP SERPL CALC-SCNC: 7 MMOL/L — SIGNIFICANT CHANGE UP (ref 5–17)
BUN SERPL-MCNC: 58 MG/DL — HIGH (ref 7–18)
CALCIUM SERPL-MCNC: 8.3 MG/DL — LOW (ref 8.4–10.5)
CHLORIDE SERPL-SCNC: 97 MMOL/L — SIGNIFICANT CHANGE UP (ref 96–108)
CO2 SERPL-SCNC: 32 MMOL/L — HIGH (ref 22–31)
CREAT SERPL-MCNC: 6.24 MG/DL — HIGH (ref 0.5–1.3)
GLUCOSE BLDC GLUCOMTR-MCNC: 100 MG/DL — HIGH (ref 70–99)
GLUCOSE BLDC GLUCOMTR-MCNC: 158 MG/DL — HIGH (ref 70–99)
GLUCOSE BLDC GLUCOMTR-MCNC: 92 MG/DL — SIGNIFICANT CHANGE UP (ref 70–99)
GLUCOSE BLDC GLUCOMTR-MCNC: 93 MG/DL — SIGNIFICANT CHANGE UP (ref 70–99)
GLUCOSE SERPL-MCNC: 100 MG/DL — HIGH (ref 70–99)
HCT VFR BLD CALC: 38 % — SIGNIFICANT CHANGE UP (ref 34.5–45)
HGB BLD-MCNC: 11.8 G/DL — SIGNIFICANT CHANGE UP (ref 11.5–15.5)
MAGNESIUM SERPL-MCNC: 2 MG/DL — SIGNIFICANT CHANGE UP (ref 1.6–2.6)
MCHC RBC-ENTMCNC: 30.5 PG — SIGNIFICANT CHANGE UP (ref 27–34)
MCHC RBC-ENTMCNC: 31.2 GM/DL — LOW (ref 32–36)
MCV RBC AUTO: 97.8 FL — SIGNIFICANT CHANGE UP (ref 80–100)
PHOSPHATE SERPL-MCNC: 5 MG/DL — HIGH (ref 2.5–4.5)
PLATELET # BLD AUTO: 192 K/UL — SIGNIFICANT CHANGE UP (ref 150–400)
POTASSIUM SERPL-MCNC: 4.7 MMOL/L — SIGNIFICANT CHANGE UP (ref 3.5–5.3)
POTASSIUM SERPL-SCNC: 4.7 MMOL/L — SIGNIFICANT CHANGE UP (ref 3.5–5.3)
RBC # BLD: 3.89 M/UL — SIGNIFICANT CHANGE UP (ref 3.8–5.2)
RBC # FLD: 13.5 % — SIGNIFICANT CHANGE UP (ref 10.3–14.5)
SODIUM SERPL-SCNC: 136 MMOL/L — SIGNIFICANT CHANGE UP (ref 135–145)
WBC # BLD: 8.7 K/UL — SIGNIFICANT CHANGE UP (ref 3.8–10.5)
WBC # FLD AUTO: 8.7 K/UL — SIGNIFICANT CHANGE UP (ref 3.8–10.5)

## 2018-03-01 PROCEDURE — 99232 SBSQ HOSP IP/OBS MODERATE 35: CPT

## 2018-03-01 RX ADMIN — Medication 81 MILLIGRAM(S): at 11:24

## 2018-03-01 RX ADMIN — HYDROMORPHONE HYDROCHLORIDE 0.5 MILLIGRAM(S): 2 INJECTION INTRAMUSCULAR; INTRAVENOUS; SUBCUTANEOUS at 03:53

## 2018-03-01 RX ADMIN — AMLODIPINE BESYLATE 10 MILLIGRAM(S): 2.5 TABLET ORAL at 06:23

## 2018-03-01 RX ADMIN — Medication 2 GRAM(S): at 06:24

## 2018-03-01 RX ADMIN — OXYCODONE AND ACETAMINOPHEN 2 TABLET(S): 5; 325 TABLET ORAL at 08:48

## 2018-03-01 RX ADMIN — HEPARIN SODIUM 5000 UNIT(S): 5000 INJECTION INTRAVENOUS; SUBCUTANEOUS at 21:35

## 2018-03-01 RX ADMIN — ATORVASTATIN CALCIUM 40 MILLIGRAM(S): 80 TABLET, FILM COATED ORAL at 21:35

## 2018-03-01 RX ADMIN — Medication 1: at 21:35

## 2018-03-01 RX ADMIN — OXYCODONE AND ACETAMINOPHEN 2 TABLET(S): 5; 325 TABLET ORAL at 16:00

## 2018-03-01 RX ADMIN — SEVELAMER CARBONATE 1600 MILLIGRAM(S): 2400 POWDER, FOR SUSPENSION ORAL at 18:25

## 2018-03-01 RX ADMIN — Medication 2 GRAM(S): at 18:26

## 2018-03-01 RX ADMIN — GABAPENTIN 100 MILLIGRAM(S): 400 CAPSULE ORAL at 11:24

## 2018-03-01 RX ADMIN — HYDROMORPHONE HYDROCHLORIDE 0.5 MILLIGRAM(S): 2 INJECTION INTRAMUSCULAR; INTRAVENOUS; SUBCUTANEOUS at 03:23

## 2018-03-01 RX ADMIN — SEVELAMER CARBONATE 1600 MILLIGRAM(S): 2400 POWDER, FOR SUSPENSION ORAL at 11:33

## 2018-03-01 RX ADMIN — HEPARIN SODIUM 5000 UNIT(S): 5000 INJECTION INTRAVENOUS; SUBCUTANEOUS at 06:24

## 2018-03-01 RX ADMIN — OXYCODONE AND ACETAMINOPHEN 2 TABLET(S): 5; 325 TABLET ORAL at 09:46

## 2018-03-01 RX ADMIN — HEPARIN SODIUM 5000 UNIT(S): 5000 INJECTION INTRAVENOUS; SUBCUTANEOUS at 14:50

## 2018-03-01 RX ADMIN — Medication 200 MILLIGRAM(S): at 06:25

## 2018-03-01 RX ADMIN — Medication 25 MILLIGRAM(S): at 14:50

## 2018-03-01 RX ADMIN — SEVELAMER CARBONATE 1600 MILLIGRAM(S): 2400 POWDER, FOR SUSPENSION ORAL at 08:44

## 2018-03-01 RX ADMIN — Medication 25 MILLIGRAM(S): at 06:43

## 2018-03-01 RX ADMIN — OXYCODONE AND ACETAMINOPHEN 2 TABLET(S): 5; 325 TABLET ORAL at 21:35

## 2018-03-01 RX ADMIN — OXYCODONE AND ACETAMINOPHEN 2 TABLET(S): 5; 325 TABLET ORAL at 15:00

## 2018-03-01 RX ADMIN — PANTOPRAZOLE SODIUM 40 MILLIGRAM(S): 20 TABLET, DELAYED RELEASE ORAL at 06:24

## 2018-03-01 RX ADMIN — OXYCODONE AND ACETAMINOPHEN 2 TABLET(S): 5; 325 TABLET ORAL at 22:05

## 2018-03-01 NOTE — SWALLOW BEDSIDE ASSESSMENT ADULT - SWALLOW EVAL: DIAGNOSIS
Pt p/w suspected oropharyngeal dysphagia c/b timely and efficient mastication, bolus formation & AP transport, however nasal regurgitation on thin liquids, audible gulps & multiple swallows. While no overt s/s of aspiration, pt is at risk for aspiration.

## 2018-03-01 NOTE — PROGRESS NOTE ADULT - SUBJECTIVE AND OBJECTIVE BOX
Patient is a 53y old  Female who presents with a chief complaint of right arm and shoulder pain for 3 days (2018 15:18)    pt seen in icu [  ], reg med floor [ x  ], bed [ x ], chair at bedside [   ], a+o x3 [ x ], lethargic [  ],  nad [ x ]        Allergies    No Known Drug Allergies  pineapple (Hives)        Vitals    T(F): 97.7 (18 @ 05:05), Max: 98.7 (18 @ 10:16)  HR: 56 (18 @ 06:15) (52 - 67)  BP: 134/74 (18 @ 06:15) (112/68 - 134/74)  RR: 17 (18 @ 05:05) (17 - 18)  SpO2: 97% (18 @ 05:05) (96% - 98%)  Wt(kg): --  CAPILLARY BLOOD GLUCOSE      POCT Blood Glucose.: 162 mg/dL (2018 21:06)      Labs                          11.8   8.7   )-----------( 192      ( 01 Mar 2018 05:51 )             38.0       03-01    136  |  97  |  58<H>  ----------------------------<  100<H>  4.7   |  32<H>  |  6.24<H>    Ca    8.3<L>      01 Mar 2018 05:51  Phos  5.0     03-  Mg     2.0     03-          Radiology Results    < from: MR Head No Cont (18 @ 16:41) >  IMPRESSION:  No evidence of acute infarct.    Scattered nonspecific white matter abnormal signal changes without mass   effect, statistically likely related to small vessel ischemic changes.   Differential includes migraine, postinfectious/inflammatory causes, as   well as a demyelinating process, amongst other etiologies; clinical   correlation is recommended.    < end of copied text >      < from: MR Angio Head No Cont (18 @ 16:18) >    IMPRESSION:     Unremarkable MRA study of the brain.      < end of copied text >      < from: MR Angio Neck No Cont (18 @ 16:41) >  IMPRESSION:  Patent cervical carotid and vertebral arteries without evidence of   dissection or hemodynamically significant stenosis.      < end of copied text >            Meds    MEDICATIONS  (STANDING):  amLODIPine   Tablet 10 milliGRAM(s) Oral daily  aspirin  chewable 81 milliGRAM(s) Oral daily  atorvastatin 40 milliGRAM(s) Oral at bedtime  gabapentin 100 milliGRAM(s) Oral daily  heparin  Injectable 5000 Unit(s) SubCutaneous every 8 hours  insulin lispro (HumaLOG) corrective regimen sliding scale   SubCutaneous Before meals and at bedtime  labetalol 200 milliGRAM(s) Oral daily  omega-3-Acid Ethyl Esters 2 Gram(s) Oral two times a day  pantoprazole    Tablet 40 milliGRAM(s) Oral before breakfast  sevelamer hydrochloride 1600 milliGRAM(s) Oral three times a day with meals      MEDICATIONS  (PRN):  HYDROmorphone  Injectable 0.5 milliGRAM(s) IV Push every 6 hours PRN Severe Pain (7 - 10)  meclizine 25 milliGRAM(s) Oral every 8 hours PRN Dizziness  ondansetron Injectable 4 milliGRAM(s) IV Push every 8 hours PRN Nausea and/or Vomiting  oxyCODONE    5 mG/acetaminophen 325 mG 2 Tablet(s) Oral every 4 hours PRN Moderate Pain (4 - 6)      Physical Exam      Neuro :  no focal deficits,   Respiratory: CTA B/L  CV: RRR, S1S2, no murmurs,   Abdominal: Soft, NT, ND +BS,  Extremities: + peripheral pulses, avf with good thrill and non tender, more improvment in rom rue         ASSESSMENT      dizziness  r/o cns patho  intractible right shoulder pain 2nd to Rotatorcuff calcific tendinosis.  h/o Myocardial infarct, old  ESRD (end stage renal disease) on dialysis  Asthma occurring only with upper respiratory infection  Anemia in chronic renal disease  Claustrophobia  Uterine leiomyoma, unspecified location  AUSTIN (obstructive sleep apnea)  Gastroesophageal reflux disease without esophagitis  Vertigo  HLD (hyperlipidemia)  Essential hypertension  Diabetes mellitus, type 2  Morbid obesity  AV fistula  S/P craniotomy  S/P hernia repair  S/P  section        PLAN    neuro f/u  cont meclizine 25mg q8 prn  cont zofran prn n/v  mri, mra head and neck neg for acute patho  xray shoulder with Rotatorcuff calcific tendinosis noted   cont dilaudid prn pain  Daily Physical tx  mri r shoulder with Calcific tendinosis and Full-thickness tear of the anterior to mid insertional fibers of the supraspinatus tendon.  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  pt has severe Hyperphosphatemia  cont Renagel 1600 mg po tid with meals   phoslo d/c'd to avoid metastatic calcification from high ca/phos product   cont low phos diet  hd today as per renal  cont current meds Patient is a 53y old  Female who presents with a chief complaint of right arm and shoulder pain for 3 days (2018 15:18)    pt seen in icu [  ], reg med floor [ x  ], bed [ x ], chair at bedside [   ], a+o x3 [ x ], lethargic [  ],  nad [ x ]    pt states rue pain and dizziness is improved but now has c/o difficulty swallowing this am    Allergies    No Known Drug Allergies  pineapple (Hives)        Vitals    T(F): 97.7 (18 @ 05:05), Max: 98.7 (18 @ 10:16)  HR: 56 (18 @ 06:15) (52 - 67)  BP: 134/74 (18 @ 06:15) (112/68 - 134/74)  RR: 17 (18 @ 05:05) (17 - 18)  SpO2: 97% (18 @ 05:05) (96% - 98%)  Wt(kg): --  CAPILLARY BLOOD GLUCOSE      POCT Blood Glucose.: 162 mg/dL (2018 21:06)      Labs                          11.8   8.7   )-----------( 192      ( 01 Mar 2018 05:51 )             38.0       03-    136  |  97  |  58<H>  ----------------------------<  100<H>  4.7   |  32<H>  |  6.24<H>    Ca    8.3<L>      01 Mar 2018 05:51  Phos  5.0     03-  Mg     2.0     03-          Radiology Results    < from: MR Head No Cont (18 @ 16:41) >  IMPRESSION:  No evidence of acute infarct.    Scattered nonspecific white matter abnormal signal changes without mass   effect, statistically likely related to small vessel ischemic changes.   Differential includes migraine, postinfectious/inflammatory causes, as   well as a demyelinating process, amongst other etiologies; clinical   correlation is recommended.    < end of copied text >      < from: MR Angio Head No Cont (18 @ 16:18) >    IMPRESSION:     Unremarkable MRA study of the brain.      < end of copied text >      < from: MR Angio Neck No Cont (18 @ 16:41) >  IMPRESSION:  Patent cervical carotid and vertebral arteries without evidence of   dissection or hemodynamically significant stenosis.      < end of copied text >            Meds    MEDICATIONS  (STANDING):  amLODIPine   Tablet 10 milliGRAM(s) Oral daily  aspirin  chewable 81 milliGRAM(s) Oral daily  atorvastatin 40 milliGRAM(s) Oral at bedtime  gabapentin 100 milliGRAM(s) Oral daily  heparin  Injectable 5000 Unit(s) SubCutaneous every 8 hours  insulin lispro (HumaLOG) corrective regimen sliding scale   SubCutaneous Before meals and at bedtime  labetalol 200 milliGRAM(s) Oral daily  omega-3-Acid Ethyl Esters 2 Gram(s) Oral two times a day  pantoprazole    Tablet 40 milliGRAM(s) Oral before breakfast  sevelamer hydrochloride 1600 milliGRAM(s) Oral three times a day with meals      MEDICATIONS  (PRN):  HYDROmorphone  Injectable 0.5 milliGRAM(s) IV Push every 6 hours PRN Severe Pain (7 - 10)  meclizine 25 milliGRAM(s) Oral every 8 hours PRN Dizziness  ondansetron Injectable 4 milliGRAM(s) IV Push every 8 hours PRN Nausea and/or Vomiting  oxyCODONE    5 mG/acetaminophen 325 mG 2 Tablet(s) Oral every 4 hours PRN Moderate Pain (4 - 6)      Physical Exam      Neuro :  no focal deficits,   Respiratory: CTA B/L  CV: RRR, S1S2, no murmurs,   Abdominal: Soft, NT, ND +BS,  Extremities: + peripheral pulses, avf with good thrill and non tender, more improvment in rom rue         ASSESSMENT    dysphagia  dizziness  r/o cns patho  intractible right shoulder pain 2nd to Rotatorcuff calcific tendinosis.  h/o Myocardial infarct, old  ESRD (end stage renal disease) on dialysis  Asthma occurring only with upper respiratory infection  Anemia in chronic renal disease  Claustrophobia  Uterine leiomyoma, unspecified location  AUSTIN (obstructive sleep apnea)  Gastroesophageal reflux disease without esophagitis  Vertigo  HLD (hyperlipidemia)  Essential hypertension  Diabetes mellitus, type 2  Morbid obesity  AV fistula  S/P craniotomy  S/P hernia repair  S/P  section        PLAN    swallow eval,  neuro f/u  cont meclizine 25mg q8   cont zofran prn n/v  mri, mra head and neck neg for acute patho  xray shoulder with Rotatorcuff calcific tendinosis noted   cont dilaudid prn pain  Daily Physical tx  mri r shoulder with Calcific tendinosis and Full-thickness tear of the anterior to mid insertional fibers of the supraspinatus tendon.  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

## 2018-03-01 NOTE — PHYSICAL THERAPY INITIAL EVALUATION ADULT - MANUAL MUSCLE TESTING RESULTS, REHAB EVAL
left upper extremity grossly 5/5, right upper extremity grossly 2+/5 secondary to pain, bilateral lower extremity grossly 5/5 all throughout

## 2018-03-01 NOTE — PHYSICAL THERAPY INITIAL EVALUATION ADULT - CRITERIA FOR SKILLED THERAPEUTIC INTERVENTIONS
risk reduction/prevention/predicted duration of therapy intervention/rehab potential/therapy frequency/impairments found/functional limitations in following categories

## 2018-03-01 NOTE — PHYSICAL THERAPY INITIAL EVALUATION ADULT - ACTIVE RANGE OF MOTION EXAMINATION, REHAB EVAL
Left UE Active ROM was WFL (within functional limits)/bilateral  lower extremity Active ROM was WFL (within functional limits)/limited right upper extremity ROM 0-70 degrees due to pain

## 2018-03-01 NOTE — SWALLOW BEDSIDE ASSESSMENT ADULT - PHARYNGEAL PHASE
Multiple swallows/audible gulps, nasal regurgitation on 1/3 trials Multiple swallows/audible gulps audible gulps/Multiple swallows

## 2018-03-01 NOTE — PROGRESS NOTE ADULT - ASSESSMENT
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 A/P:  1.ESRD: secondary to dm/htn  -pt s renal status is stable  -pt will be dialysed tomorrow  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

## 2018-03-01 NOTE — PROGRESS NOTE ADULT - SUBJECTIVE AND OBJECTIVE BOX
Patient is a 53y old  Female who presents with a chief complaint of right arm and shoulder pain for 3 days (22 Feb 2018 15:18)  awake, alert, comfortable in bed in NAD. Still with some right shoulder pain. No cough but has dysphagia and pain on swallowing. MRI of Brain showed microvascular disease.    INTERVAL HPI/OVERNIGHT EVENTS:      VITAL SIGNS:  T(F): 97.7 (03-01-18 @ 05:05)  HR: 56 (03-01-18 @ 09:49)  BP: 105/50 (03-01-18 @ 09:49)  RR: 17 (03-01-18 @ 05:05)  SpO2: 99% (03-01-18 @ 09:49)  Wt(kg): --  I&O's Detail          REVIEW OF SYSTEMS:    CONSTITUTIONAL:  No fevers, chills, sweats    HEENT:  Eyes:  No diplopia or blurred vision. ENT:  No earache, sore throat or runny nose.    CARDIOVASCULAR:  No pressure, squeezing, tightness, or heaviness about the chest; no palpitations.    RESPIRATORY:  Per HPI    GASTROINTESTINAL:  No abdominal pain, nausea, vomiting or diarrhea.    GENITOURINARY:  No dysuria, frequency or urgency.    NEUROLOGIC:  No paresthesias, fasciculations, seizures or weakness.    PSYCHIATRIC:  No disorder of thought or mood.      PHYSICAL EXAM:    Constitutional: Well developed and nourished  Eyes:Perrla  ENMT: normal  Neck:supple  Respiratory: good air entry  Cardiovascular: S1 S2 regular  Gastrointestinal: Soft, Non tender  Extremities: No edema  Vascular:normal  Neurological:Awake, alert,Ox3  Musculoskeletal:Normal      MEDICATIONS  (STANDING):  amLODIPine   Tablet 10 milliGRAM(s) Oral daily  aspirin  chewable 81 milliGRAM(s) Oral daily  atorvastatin 40 milliGRAM(s) Oral at bedtime  gabapentin 100 milliGRAM(s) Oral daily  heparin  Injectable 5000 Unit(s) SubCutaneous every 8 hours  insulin lispro (HumaLOG) corrective regimen sliding scale   SubCutaneous Before meals and at bedtime  labetalol 200 milliGRAM(s) Oral daily  omega-3-Acid Ethyl Esters 2 Gram(s) Oral two times a day  pantoprazole    Tablet 40 milliGRAM(s) Oral before breakfast  sevelamer hydrochloride 1600 milliGRAM(s) Oral three times a day with meals    MEDICATIONS  (PRN):  HYDROmorphone  Injectable 0.5 milliGRAM(s) IV Push every 6 hours PRN Severe Pain (7 - 10)  meclizine 25 milliGRAM(s) Oral every 8 hours PRN Dizziness  ondansetron Injectable 4 milliGRAM(s) IV Push every 8 hours PRN Nausea and/or Vomiting  oxyCODONE    5 mG/acetaminophen 325 mG 2 Tablet(s) Oral every 4 hours PRN Moderate Pain (4 - 6)      Allergies    No Known Drug Allergies  pineapple (Hives)    Intolerances        LABS:                        11.8   8.7   )-----------( 192      ( 01 Mar 2018 05:51 )             38.0     03-01    136  |  97  |  58<H>  ----------------------------<  100<H>  4.7   |  32<H>  |  6.24<H>    Ca    8.3<L>      01 Mar 2018 05:51  Phos  5.0     03-01  Mg     2.0     03-01                CAPILLARY BLOOD GLUCOSE      POCT Blood Glucose.: 100 mg/dL (01 Mar 2018 12:06)  POCT Blood Glucose.: 93 mg/dL (01 Mar 2018 08:20)  POCT Blood Glucose.: 162 mg/dL (28 Feb 2018 21:06)  POCT Blood Glucose.: 125 mg/dL (28 Feb 2018 17:10)        RADIOLOGY & ADDITIONAL TESTS:  < from: MR Angio Neck No Cont (02.28.18 @ 16:41) >  IMPRESSION:  Patent cervical carotid and vertebral arteries without evidence of   dissection or hemodynamically significant stenosis.    < end of copied text >  < from: MR Head No Cont (02.28.18 @ 16:41) >  IMPRESSION:  No evidence of acute infarct.    Scattered nonspecific white matter abnormal signal changes without mass   effect, statistically likely related to small vessel ischemic changes.   Differential includes migraine, postinfectious/inflammatory causes, as   well as a demyelinating process, amongst other etiologies; clinical   correlation is recommended.    < end of copied text >    CXR:    Ct scan chest:    ekg;    echo:

## 2018-03-01 NOTE — SWALLOW BEDSIDE ASSESSMENT ADULT - COMMENTS
Pt seated upright on chair, AA+Ox3, able to follow directives and respond to queries regarding name, current location, and time. Swallowing evaluation conducted in [place]. Pt seated upright on chair, AA+Ox3, able to follow directives and respond to queries regarding name, current location, and time. Swallowing evaluation conducted in [place]. Pt reported c/o globus sensation in throat at baseline (pre-swallowing evaluation).

## 2018-03-01 NOTE — SWALLOW BEDSIDE ASSESSMENT ADULT - ASR SWALLOW ASPIRATION MONITOR
oral hygiene/position upright (90Y)/change of breathing pattern/cough/fever/gurgly voice/throat clearing/upper respiratory infection/pneumonia

## 2018-03-01 NOTE — PROGRESS NOTE ADULT - SUBJECTIVE AND OBJECTIVE BOX
pt seen and examined.pts current chart reviewed and case discussed with resident covering.    SUBJECTIVE:  pt feels fine and denies cp,sob,gi or gu/uremic  symptons    REVIEW OF SYSTEMS:  CONSTITUTIONAL: No weakness, fevers or chills  EYES/ENT: No visual changes;  No vertigo or throat pain   NECK: No pain or stiffness  RESPIRATORY: No cough, wheezing, hemoptysis; No shortness of breath  CARDIOVASCULAR: No chest pain or palpitations  GASTROINTESTINAL: No abdominal or epigastric pain. No nausea, vomiting, or hematemesis; No diarrhea or constipation. No melena or hematochezia.  GENITOURINARY: No dysuria, frequency , hematuria, flank pain or nocturia  NEUROLOGICAL: No numbness or weakness  SKIN: No itching, burning, rashes, or lesions   All other review of systems is negative unless indicated above    Current meds:    amLODIPine   Tablet 10 milliGRAM(s) Oral daily  aspirin  chewable 81 milliGRAM(s) Oral daily  atorvastatin 40 milliGRAM(s) Oral at bedtime  gabapentin 100 milliGRAM(s) Oral daily  heparin  Injectable 5000 Unit(s) SubCutaneous every 8 hours  HYDROmorphone  Injectable 0.5 milliGRAM(s) IV Push every 6 hours PRN  insulin lispro (HumaLOG) corrective regimen sliding scale   SubCutaneous Before meals and at bedtime  labetalol 200 milliGRAM(s) Oral daily  meclizine 25 milliGRAM(s) Oral every 8 hours PRN  omega-3-Acid Ethyl Esters 2 Gram(s) Oral two times a day  ondansetron Injectable 4 milliGRAM(s) IV Push every 8 hours PRN  oxyCODONE    5 mG/acetaminophen 325 mG 2 Tablet(s) Oral every 4 hours PRN  pantoprazole    Tablet 40 milliGRAM(s) Oral before breakfast  sevelamer hydrochloride 1600 milliGRAM(s) Oral three times a day with meals      Vital Signs    T(F): 97.7 (03-01-18 @ 14:45), Max: 98.5 (02-28-18 @ 20:34)  HR: 55 (03-01-18 @ 14:45) (52 - 66)  BP: 102/63 (03-01-18 @ 14:45) (102/63 - 134/74)  ABP: --  RR: 18 (03-01-18 @ 14:45) (17 - 18)  SpO2: 95% (03-01-18 @ 14:45) (95% - 99%)  Wt(kg): --  CVP(cm H2O): --  CO: --  PCWP: --    I and O's:    02-27 @ 07:01  -  02-28 @ 07:00  --------------------------------------------------------  IN:  Total IN: 0 mL    OUT:    Stool: 1 mL  Total OUT: 1 mL    Total NET: -1 mL        Daily     Daily     PHYSICAL EXAM:  Constitutional: well developed, well nourished  and in nad  HEENT: PERRLA,  no icteric sclera and mild pallor of conjunctiva noted  Neck: No JVD, thyromegaly or adenopathy  Respiratory: reduced air entry lower lungs with no rales, wheezing or rhonchi  Cardiovascular: S1 and S2 normally heard  Gastrointestinal: soft, nondistended, nontender and normal bowel sounds heard  Extremities: No peripheral edema or cyanosis  Neurological: A/O x 3, no focal deficits  : No flank or cva tenderness palpated.  Skin: No rashes      LABS:    CBC:                          11.8   8.7   )-----------( 192      ( 01 Mar 2018 05:51 )             38.0           BMP:    03-01    136  |  97  |  58<H>  ----------------------------<  100<H>  4.7   |  32<H>  |  6.24<H>  02-27    134<L>  |  99  |  76<H>  ----------------------------<  135<H>  5.2   |  22  |  8.11<H>    Ca    8.3<L>      01 Mar 2018 05:51  Ca    9.0      27 Feb 2018 07:32  Phos  5.0     03-01  Phos  5.6     02-27  Mg     2.0     03-01  Mg     2.2     02-27            URINE STUDIES:                        RADIOLOGY & ADDITIONAL STUDIES: pt seen and examined.    SUBJECTIVE:  pt  denies cp,sob,gi or uremic  symptons  pt is c/o dysphagia plus dizziness      Current meds:    amLODIPine   Tablet 10 milliGRAM(s) Oral daily  aspirin  chewable 81 milliGRAM(s) Oral daily  atorvastatin 40 milliGRAM(s) Oral at bedtime  gabapentin 100 milliGRAM(s) Oral daily  heparin  Injectable 5000 Unit(s) SubCutaneous every 8 hours  HYDROmorphone  Injectable 0.5 milliGRAM(s) IV Push every 6 hours PRN  insulin lispro (HumaLOG) corrective regimen sliding scale   SubCutaneous Before meals and at bedtime  labetalol 200 milliGRAM(s) Oral daily  meclizine 25 milliGRAM(s) Oral every 8 hours PRN  omega-3-Acid Ethyl Esters 2 Gram(s) Oral two times a day  ondansetron Injectable 4 milliGRAM(s) IV Push every 8 hours PRN  oxyCODONE    5 mG/acetaminophen 325 mG 2 Tablet(s) Oral every 4 hours PRN  pantoprazole    Tablet 40 milliGRAM(s) Oral before breakfast  sevelamer hydrochloride 1600 milliGRAM(s) Oral three times a day with meals      Vital Signs    T(F): 97.7 (03-01-18 @ 14:45), Max: 98.5 (02-28-18 @ 20:34)  HR: 55 (03-01-18 @ 14:45) (52 - 66)  BP: 102/63 (03-01-18 @ 14:45) (102/63 - 134/74)  ABP: --  RR: 18 (03-01-18 @ 14:45) (17 - 18)  SpO2: 95% (03-01-18 @ 14:45) (95% - 99%)  Wt(kg): --  CVP(cm H2O): --  CO: --  PCWP: --    I and O's:    02-27 @ 07:01  -  02-28 @ 07:00  --------------------------------------------------------  IN:  Total IN: 0 mL    OUT:    Stool: 1 mL  Total OUT: 1 mL    Total NET: -1 mL        Daily     Daily     PHYSICAL EXAM:  Constitutional: well developed, well nourished  and in nad  HEENT: PERRLA,  no icteric sclera and mild pallor of conjunctiva noted  Neck: No JVD, thyromegaly or adenopathy  Respiratory: reduced air entry lower lungs with no rales, wheezing or rhonchi  Cardiovascular: S1 and S2 normally heard  Gastrointestinal: soft, nondistended, nontender and normal bowel sounds heard  Extremities: No peripheral edema or cyanosis  pt has good palpable thrill over rt.upper arm AVF  Neurological: A/O x 3, no focal deficits  Psychiatric: Normal mood, normal affect  : No flank tenderness  Skin: No rashes      LABS:    CBC:                          11.8   8.7   )-----------( 192      ( 01 Mar 2018 05:51 )             38.0           BMP:    03-01    136  |  97  |  58<H>  ----------------------------<  100<H>  4.7   |  32<H>  |  6.24<H>  02-27    134<L>  |  99  |  76<H>  ----------------------------<  135<H>  5.2   |  22  |  8.11<H>    Ca    8.3<L>      01 Mar 2018 05:51  Ca    9.0      27 Feb 2018 07:32  Phos  5.0     03-01  Phos  5.6     02-27  Mg     2.0     03-01  Mg     2.2     02-27            URINE STUDIES:                        RADIOLOGY & ADDITIONAL STUDIES:  < from: MR Angio Neck No Cont (02.28.18 @ 16:41) >  EXAM:  MR ANGIO NECK                            PROCEDURE DATE:  02/28/2018          INTERPRETATION:  MRA NECK WITHOUT CONTRAST    HISTORY: dizziness.    COMPARISON: Carotid ultrasound 2/20/1718.    TECHNIQUE: Precontrast 3-D time-of-flight images through the carotid   bifurcations and 2-D time-of-flight images through the neck were   obtained. 3D/MIP reformatted images of the MRA were made.    FINDINGS:  Conventional arch anatomy. Great vessel origins are patent. Innominate   and visualized subclavian arteries are patent.    The common carotid arteries are unremarkable. Minimal irregularity at   both carotid bifurcations without resulting hemodynamically significant   stenosis. The cervical internal carotid arteries are unremarkable.    Bilateral vertebral arteries are patent from their origins, to their   intracranial segments, and demonstrate antegrade flow.    No evidence of dissection or hemodynamically significant stenosis.    MRA findings were confirmed on the 3D/MIP reformatted images.    IMPRESSION:  Patent cervical carotid and vertebral arteries without evidence of   dissection or hemodynamically significant stenosis.    < end of copied text >  < from: MR Head No Cont (02.28.18 @ 16:41) >  EXAM:  MR BRAIN                            PROCEDURE DATE:  02/28/2018          INTERPRETATION:  MRI BRAIN WITHOUT CONTRAST     HISTORY: dizziness.    COMPARISON: None.    TECHNIQUE: Multiplanar, multisequential MR imaging of the brain without   contrast was performed on a 1.5 Katerina magnet.    FINDINGS:  The ventricles and cortical sulci are within normal limits for age. There   is no evidence of acute infarct, intracranial hemorrhage, mass effect or   midline shift. The basal cisterns are patent.    Scattered foci of T2/FLAIR hyperintensity without mass effect are noted   in the periventricular and subcortical white matter, nonspecific. Two of   these foci are rounded in appearance, without mass effect.    The major intracranial flow voidsat the skull base are preserved. The   paranasal sinuses and mastoid air cells are well aerated.    IMPRESSION:  No evidence of acute infarct.    Scattered nonspecific white matter abnormal signal changes without mass   effect, statistically likely related to small vessel ischemic changes.   Differential includes migraine, postinfectious/inflammatory causes, as   well as a demyelinating process, amongst other etiologies; clinical   correlation is recommended.      < end of copied text >

## 2018-03-01 NOTE — PHYSICAL THERAPY INITIAL EVALUATION ADULT - PASSIVE RANGE OF MOTION EXAMINATION, REHAB EVAL
limited right upper extremity ROM 0-75 degrees due to pain/Left UE Passive ROM was WFL (within functional limits)/bilateral lower extremity Passive ROM was WFL (within functional limits)

## 2018-03-01 NOTE — SWALLOW BEDSIDE ASSESSMENT ADULT - SLP PERTINENT HISTORY OF CURRENT PROBLEM
Patient is a 53y old Female who presents with a chief complaint of right arm and shoulder pain. As per medical records, pt reported difficulty with swallowing.

## 2018-03-01 NOTE — SWALLOW BEDSIDE ASSESSMENT ADULT - SWALLOW EVAL: RECOMMENDED FEEDING/EATING TECHNIQUES
position upright (90 degrees)/allow for swallow between intakes/oral hygiene/small sips/bites/maintain upright posture during/after eating for 30 mins

## 2018-03-01 NOTE — SWALLOW BEDSIDE ASSESSMENT ADULT - SWALLOW EVAL: CRITERIA FOR SKILLED INTERVENTION MET
demonstrates skilled criteria for swallowing intervention/To practice safe swallowing strategies such as Chin Tuck.

## 2018-03-01 NOTE — PROGRESS NOTE ADULT - ASSESSMENT
peripheral vertigo peripheral vertigo, please give meclazine 25mg Q6-8hours standing, zofran prn for n/v  difficulty swallowing and  throat pain, to be evaluated by primary team, consider swallow eval, no stroke on MRI brain.    call back with questions

## 2018-03-01 NOTE — PROGRESS NOTE ADULT - SUBJECTIVE AND OBJECTIVE BOX
Neurology Follow up note    Name  ORI JAMESON          Subjective:  received only one dose of meclazine 25mg    Review of Systems:  Constitutional:        Eyes, Ears, Mouth, Throat:   Respiratory:                            Cardiovascular:   Gastrointestinal:                                     Genitourinary:   Musculoskeletal:                                    Dermatologic:   Neurological: as per above                                                                 Psychiatric:   Endocrine:              Hematologic/Lymphatic:     MEDICATIONS  (STANDING):  amLODIPine   Tablet 10 milliGRAM(s) Oral daily  aspirin  chewable 81 milliGRAM(s) Oral daily  atorvastatin 40 milliGRAM(s) Oral at bedtime  gabapentin 100 milliGRAM(s) Oral daily  heparin  Injectable 5000 Unit(s) SubCutaneous every 8 hours  insulin lispro (HumaLOG) corrective regimen sliding scale   SubCutaneous Before meals and at bedtime  labetalol 200 milliGRAM(s) Oral daily  omega-3-Acid Ethyl Esters 2 Gram(s) Oral two times a day  pantoprazole    Tablet 40 milliGRAM(s) Oral before breakfast  sevelamer hydrochloride 1600 milliGRAM(s) Oral three times a day with meals    MEDICATIONS  (PRN):  HYDROmorphone  Injectable 0.5 milliGRAM(s) IV Push every 6 hours PRN Severe Pain (7 - 10)  meclizine 25 milliGRAM(s) Oral every 8 hours PRN Dizziness  ondansetron Injectable 4 milliGRAM(s) IV Push every 8 hours PRN Nausea and/or Vomiting  oxyCODONE    5 mG/acetaminophen 325 mG 2 Tablet(s) Oral every 4 hours PRN Moderate Pain (4 - 6)      Allergies    No Known Drug Allergies  pineapple (Hives)    Intolerances        Objective:   Vital Signs Last 24 Hrs  T(C): 36.5 (01 Mar 2018 05:05), Max: 36.9 (28 Feb 2018 20:34)  T(F): 97.7 (01 Mar 2018 05:05), Max: 98.5 (28 Feb 2018 20:34)  HR: 56 (01 Mar 2018 09:49) (52 - 67)  BP: 105/50 (01 Mar 2018 09:49) (105/50 - 134/74)  BP(mean): --  RR: 17 (01 Mar 2018 05:05) (17 - 18)  SpO2: 99% (01 Mar 2018 09:49) (96% - 99%)    General Exam:   General appearance: No acute distress                 Cardiovascular: Pedal dorsalis pulses intact bilaterally    Neurological Exam:  Mental Status: Orientated to self, date and place.  Attention intact.  No dysarthria, aphasia or neglect.  Knowledge intact.  Registration intact.  Short and long term memory grossly intact.      Cranial Nerves: CN I - not tested.  PERRL, EOMI, VFF, no nystagmus or diplopia.  No APD.  Fundi not visualized bilaterally.  CN V1-3 intact to light touch and pinprick.  No facial asymmetry.  Hearing intact to finger rub bilaterally.  Tongue, uvula and palate midline.  Sternocleidomastoid and Trapezius intact bilaterally.    Motor:   Tone: normal.                  Strength: intact throughout  Pronator drift: none                 Dysmeria: None to finger-nose-finger or heel-shin-heel  No truncal ataxia.    Tremor: No resting, postural or action tremor.  No myoclonus.    Sensation: intact to light touch, pinprick, vibration and proprioception    Deep Tendon Reflexes: 1+ bilateral biceps, triceps, brachioradialis, knee and ankle  Toes flexor bilaterally    Gait: normal and stable.      Other:    03-01    136  |  97  |  58<H>  ----------------------------<  100<H>  4.7   |  32<H>  |  6.24<H>    Ca    8.3<L>      01 Mar 2018 05:51  Phos  5.0     03-01  Mg     2.0     03-01 03-01    136  |  97  |  58<H>  ----------------------------<  100<H>  4.7   |  32<H>  |  6.24<H>    Ca    8.3<L>      01 Mar 2018 05:51  Phos  5.0     03-01  Mg     2.0     03-01          Radiology  < from: MR Head No Cont (02.28.18 @ 16:41) >  IMPRESSION:  No evidence of acute infarct.    Scattered nonspecific white matter abnormal signal changes without mass   effect, statistically likely related to small vessel ischemic changes.   Differential includes migraine, postinfectious/inflammatory causes, as   well as a demyelinating process, amongst other etiologies; clinical   correlation is recommended.      < end of copied text >    EKG:  tele:  TTE:  EEG: Neurology Follow up note    Name  ORI JAMESON      Subjective:  patient continues to have vertigo  received only one dose of meclazine 25mg  c/o of difficulty swallowing and throat paon    Review of Systems:  Respiratory:          no sob                  Cardiovascular: no cp      MEDICATIONS  (STANDING):  amLODIPine   Tablet 10 milliGRAM(s) Oral daily  aspirin  chewable 81 milliGRAM(s) Oral daily  atorvastatin 40 milliGRAM(s) Oral at bedtime  gabapentin 100 milliGRAM(s) Oral daily  heparin  Injectable 5000 Unit(s) SubCutaneous every 8 hours  insulin lispro (HumaLOG) corrective regimen sliding scale   SubCutaneous Before meals and at bedtime  labetalol 200 milliGRAM(s) Oral daily  omega-3-Acid Ethyl Esters 2 Gram(s) Oral two times a day  pantoprazole    Tablet 40 milliGRAM(s) Oral before breakfast  sevelamer hydrochloride 1600 milliGRAM(s) Oral three times a day with meals    MEDICATIONS  (PRN):  HYDROmorphone  Injectable 0.5 milliGRAM(s) IV Push every 6 hours PRN Severe Pain (7 - 10)  meclizine 25 milliGRAM(s) Oral every 8 hours PRN Dizziness  ondansetron Injectable 4 milliGRAM(s) IV Push every 8 hours PRN Nausea and/or Vomiting  oxyCODONE    5 mG/acetaminophen 325 mG 2 Tablet(s) Oral every 4 hours PRN Moderate Pain (4 - 6)      Allergies    No Known Drug Allergies  pineapple (Hives)    Intolerances        Objective:   Vital Signs Last 24 Hrs  T(C): 36.5 (01 Mar 2018 05:05), Max: 36.9 (28 Feb 2018 20:34)  T(F): 97.7 (01 Mar 2018 05:05), Max: 98.5 (28 Feb 2018 20:34)  HR: 56 (01 Mar 2018 09:49) (52 - 67)  BP: 105/50 (01 Mar 2018 09:49) (105/50 - 134/74)  BP(mean): --  RR: 17 (01 Mar 2018 05:05) (17 - 18)  SpO2: 99% (01 Mar 2018 09:49) (96% - 99%)    General Exam:   General appearance: No acute distress                 Cardiovascular: Pedal dorsalis pulses intact bilaterally    Neurological Exam:  Mental Status: Orientated to self, date and place.  Attention intact.  No dysarthria, aphasia or neglect.      Cranial Nerves: CN I - not tested.  PERRL, EOMI, VFF, no nystagmus or diplopia.  No APD.  Fundi not visualized bilaterally.  CN V1-3 intact to light touch.  No facial asymmetry.      Motor:   Tone: normal.                  Strength: intact throughout    Sensation: intact to light touch    Other:    03-01    136  |  97  |  58<H>  ----------------------------<  100<H>  4.7   |  32<H>  |  6.24<H>    Ca    8.3<L>      01 Mar 2018 05:51  Phos  5.0     03-01  Mg     2.0     03-01 03-01    136  |  97  |  58<H>  ----------------------------<  100<H>  4.7   |  32<H>  |  6.24<H>    Ca    8.3<L>      01 Mar 2018 05:51  Phos  5.0     03-01  Mg     2.0     03-01          Radiology  < from: MR Head No Cont (02.28.18 @ 16:41) >   IMPRESSION:  No evidence of acute infarct.    Scattered nonspecific white matter abnormal signal changes without mass   effect, statistically likely related to small vessel ischemic changes.   Differential includes migraine, postinfectious/inflammatory causes, as   well as a demyelinating process, amongst other etiologies; clinical   correlation is recommended.      < end of copied text >

## 2018-03-02 LAB
GLUCOSE BLDC GLUCOMTR-MCNC: 102 MG/DL — HIGH (ref 70–99)
GLUCOSE BLDC GLUCOMTR-MCNC: 146 MG/DL — HIGH (ref 70–99)
GLUCOSE BLDC GLUCOMTR-MCNC: 81 MG/DL — SIGNIFICANT CHANGE UP (ref 70–99)
GLUCOSE BLDC GLUCOMTR-MCNC: 83 MG/DL — SIGNIFICANT CHANGE UP (ref 70–99)

## 2018-03-02 PROCEDURE — 74230 X-RAY XM SWLNG FUNCJ C+: CPT | Mod: 26

## 2018-03-02 RX ORDER — MECLIZINE HCL 12.5 MG
1 TABLET ORAL
Qty: 42 | Refills: 0
Start: 2018-03-02 | End: 2018-03-15

## 2018-03-02 RX ORDER — OMEPRAZOLE 10 MG/1
1 CAPSULE, DELAYED RELEASE ORAL
Qty: 30 | Refills: 0
Start: 2018-03-02 | End: 2018-03-31

## 2018-03-02 RX ORDER — MECLIZINE HCL 12.5 MG
25 TABLET ORAL EVERY 8 HOURS
Qty: 0 | Refills: 0 | Status: DISCONTINUED | OUTPATIENT
Start: 2018-03-02 | End: 2018-03-03

## 2018-03-02 RX ADMIN — OXYCODONE AND ACETAMINOPHEN 2 TABLET(S): 5; 325 TABLET ORAL at 04:35

## 2018-03-02 RX ADMIN — PANTOPRAZOLE SODIUM 40 MILLIGRAM(S): 20 TABLET, DELAYED RELEASE ORAL at 06:00

## 2018-03-02 RX ADMIN — AMLODIPINE BESYLATE 10 MILLIGRAM(S): 2.5 TABLET ORAL at 06:00

## 2018-03-02 RX ADMIN — HEPARIN SODIUM 5000 UNIT(S): 5000 INJECTION INTRAVENOUS; SUBCUTANEOUS at 21:43

## 2018-03-02 RX ADMIN — OXYCODONE AND ACETAMINOPHEN 2 TABLET(S): 5; 325 TABLET ORAL at 05:05

## 2018-03-02 RX ADMIN — GABAPENTIN 100 MILLIGRAM(S): 400 CAPSULE ORAL at 11:18

## 2018-03-02 RX ADMIN — Medication 2 GRAM(S): at 06:00

## 2018-03-02 RX ADMIN — HEPARIN SODIUM 5000 UNIT(S): 5000 INJECTION INTRAVENOUS; SUBCUTANEOUS at 13:07

## 2018-03-02 RX ADMIN — SEVELAMER CARBONATE 1600 MILLIGRAM(S): 2400 POWDER, FOR SUSPENSION ORAL at 12:14

## 2018-03-02 RX ADMIN — Medication 25 MILLIGRAM(S): at 21:43

## 2018-03-02 RX ADMIN — ATORVASTATIN CALCIUM 40 MILLIGRAM(S): 80 TABLET, FILM COATED ORAL at 21:43

## 2018-03-02 RX ADMIN — Medication 25 MILLIGRAM(S): at 13:07

## 2018-03-02 RX ADMIN — Medication 81 MILLIGRAM(S): at 11:18

## 2018-03-02 RX ADMIN — SEVELAMER CARBONATE 1600 MILLIGRAM(S): 2400 POWDER, FOR SUSPENSION ORAL at 08:08

## 2018-03-02 NOTE — PROGRESS NOTE ADULT - PROBLEM SELECTOR PLAN 2
- Horizontal nystagmus, orthostatic BP negative  - Neuro Dr. Aguilera; input appreciated, r/o posterior circulation infarct vs peripheral vertigo  - Meclizine 25mg TID for symptomatic control; can increased it up to 50mg TID per Dr. Aguilera  - Pt takes aspirin and high dose statin already, will continue  - MRI head/neck negative for stroke  - Carotid doppler: negative

## 2018-03-02 NOTE — SWALLOW VFSS/MBS ASSESSMENT ADULT - ORAL PHASE COMMENTS
Adequate labial seal achieved with good bolus containment. Good velopharyngeal movement and closure. Reduced oral transit across all trials, with mild oral residue post swallow across puree & nectar thick trials. Piecemeal deglutition on all trials. Reduced base of tongue retraction observed, resulting in premature spillage over base of tongue to the pharynx (puree, nectar, regular solids) & hypopharynx (thin).

## 2018-03-02 NOTE — SWALLOW VFSS/MBS ASSESSMENT ADULT - SUCCESSFUL STRATEGIES TRIALED DURING PROCEDURE
+Double swallow. Chin tuck appears to facilitate in clearance of pharyngeal residue across all trials./chin tuck

## 2018-03-02 NOTE — SWALLOW VFSS/MBS ASSESSMENT ADULT - ORAL PHASE
Reduced anterior - posterior transport/Uncontrolled bolus / spillover in [place]-pharynx/Residue in oral cavity/Uncontrolled bolus / spillover in hypopharynx

## 2018-03-02 NOTE — SWALLOW VFSS/MBS ASSESSMENT ADULT - SLP GENERAL OBSERVATIONS
Study conducted with Radiologist, Dr. Luna. Pt seated on fluoroscopy table in left lateral view. Pt was alert & cooperative, exam given in Estonian by SLP & Radiology Tech student. PO trials of Puree, cracker, nectar thick, and thin liquids administered.

## 2018-03-02 NOTE — PROGRESS NOTE ADULT - SUBJECTIVE AND OBJECTIVE BOX
Patient is a 53y old  Female who presents with a chief complaint of right arm and shoulder pain for 3 days (22 Feb 2018 15:18)  Awake, alert, comfortable in bed in NAD. Still with mild dizziness but no cough. Having trouble wallowing. For modified barium swallow today.    INTERVAL HPI/OVERNIGHT EVENTS:      VITAL SIGNS:  T(F): 98.2 (03-02-18 @ 05:05)  HR: 55 (03-02-18 @ 05:05)  BP: 119/68 (03-02-18 @ 05:05)  RR: 17 (03-02-18 @ 05:05)  SpO2: 96% (03-02-18 @ 05:05)  Wt(kg): --  I&O's Detail          REVIEW OF SYSTEMS:    CONSTITUTIONAL:  No fevers, chills, sweats    HEENT:  Eyes:  No diplopia or blurred vision. ENT:  No earache, sore throat or runny nose.    CARDIOVASCULAR:  No pressure, squeezing, tightness, or heaviness about the chest; no palpitations.    RESPIRATORY:  Per HPI    GASTROINTESTINAL:  No abdominal pain, nausea, vomiting or diarrhea.    GENITOURINARY:  No dysuria, frequency or urgency.    NEUROLOGIC:  No paresthesias, fasciculations, seizures or weakness.    PSYCHIATRIC:  No disorder of thought or mood.      PHYSICAL EXAM:    Constitutional: Well developed and nourished  Eyes:Perrla  ENMT: normal  Neck:supple  Respiratory: good air entry  Cardiovascular: S1 S2 regular  Gastrointestinal: Soft, Non tender  Extremities: No edema  Vascular:normal  Neurological:Awake, alert,Ox3  Musculoskeletal:Normal      MEDICATIONS  (STANDING):  amLODIPine   Tablet 10 milliGRAM(s) Oral daily  aspirin  chewable 81 milliGRAM(s) Oral daily  atorvastatin 40 milliGRAM(s) Oral at bedtime  gabapentin 100 milliGRAM(s) Oral daily  heparin  Injectable 5000 Unit(s) SubCutaneous every 8 hours  insulin lispro (HumaLOG) corrective regimen sliding scale   SubCutaneous Before meals and at bedtime  labetalol 200 milliGRAM(s) Oral daily  meclizine 25 milliGRAM(s) Oral every 8 hours  omega-3-Acid Ethyl Esters 2 Gram(s) Oral two times a day  pantoprazole    Tablet 40 milliGRAM(s) Oral before breakfast  sevelamer hydrochloride 1600 milliGRAM(s) Oral three times a day with meals    MEDICATIONS  (PRN):  HYDROmorphone  Injectable 0.5 milliGRAM(s) IV Push every 6 hours PRN Severe Pain (7 - 10)  ondansetron Injectable 4 milliGRAM(s) IV Push every 8 hours PRN Nausea and/or Vomiting  oxyCODONE    5 mG/acetaminophen 325 mG 2 Tablet(s) Oral every 4 hours PRN Moderate Pain (4 - 6)      Allergies    No Known Drug Allergies  pineapple (Hives)    Intolerances        LABS:                        11.8   8.7   )-----------( 192      ( 01 Mar 2018 05:51 )             38.0     03-01    136  |  97  |  58<H>  ----------------------------<  100<H>  4.7   |  32<H>  |  6.24<H>    Ca    8.3<L>      01 Mar 2018 05:51  Phos  5.0     03-01  Mg     2.0     03-01                CAPILLARY BLOOD GLUCOSE      POCT Blood Glucose.: 102 mg/dL (02 Mar 2018 12:11)  POCT Blood Glucose.: 81 mg/dL (02 Mar 2018 07:43)  POCT Blood Glucose.: 158 mg/dL (01 Mar 2018 21:05)  POCT Blood Glucose.: 92 mg/dL (01 Mar 2018 16:15)        RADIOLOGY & ADDITIONAL TESTS:    CXR:    Ct scan chest:    ekg;    echo:

## 2018-03-02 NOTE — PROGRESS NOTE ADULT - PROBLEM SELECTOR PLAN 3
-Secondary to calcific tendonitis of infraspinatus/subacromial deltoid bursitis, Dr. Meyers gave local injection for severe shoulder pain.  -Pt also takes dexamethasone 4mg q 6hrs for 7 days  -Monitor clinically -Secondary to calcific tendonitis of infraspinatus/subacromial deltoid bursitis, Dr. Meyers gave local injection for severe shoulder pain.  -Outpt f/u with Dr. Meyers  -Monitor clinically

## 2018-03-02 NOTE — SWALLOW VFSS/MBS ASSESSMENT ADULT - PHARYNGEAL PHASE COMMENTS
Delayed of swallow reflex noted , with reduced hyoid excursion seen. Sufficient epiglottic tilt observed on swallow. Mild pooling at vallecular level from oral residue (post-swallow) on all consistencies, cleared with spontaneous second swallow. Notably, barium tablet rested in valleculae upon first swallow, but cleared when given thin liquid wash and chin tuck swallow. No laryngeal penetration or aspiration seen on any trials.

## 2018-03-02 NOTE — SWALLOW VFSS/MBS ASSESSMENT ADULT - RECOMMENDED FEEDING/EATING TECHNIQUES
alternate food with liquid/maintain upright posture during/after eating for 30 mins/Chin Tuck during swallow, as needed, to narrow the pharynx & increase airway protection, when globus sensation is detected./allow for swallow between intakes/oral hygiene/tuck chin/check mouth frequently for oral residue/pocketing/position upright (90 degrees)/provide rest periods between swallows

## 2018-03-02 NOTE — SWALLOW VFSS/MBS ASSESSMENT ADULT - COMMENTS
Notably, on trial of barium tablet, the tablet rested in valleculae on initial swallow, but cleared with thin liquid wash and chin tuck. Slight esophageal narrowing seen on rapid straw sip trials of thin liquids.

## 2018-03-02 NOTE — PROGRESS NOTE ADULT - SUBJECTIVE AND OBJECTIVE BOX
CC: Complains on dizziness. Denies CP, SOB, n/v/d, fever or chills.    Vital Signs Last 24 Hrs  T(C): 36.8 (02 Mar 2018 05:05), Max: 36.8 (01 Mar 2018 20:49)  T(F): 98.2 (02 Mar 2018 05:05), Max: 98.3 (01 Mar 2018 20:49)  HR: 55 (02 Mar 2018 05:05) (55 - 67)  BP: 119/68 (02 Mar 2018 05:05) (102/63 - 128/60)  BP(mean): --  RR: 17 (02 Mar 2018 05:05) (17 - 18)  SpO2: 96% (02 Mar 2018 05:05) (95% - 100%)    PHYSICAL EXAM:  Constitutional: well developed, well nourished  and in nad  HEENT: Sclera anicteric  Neck: No JVD  Respiratory: clear  Cardiovascular: S1 and S2 normally heard  Gastrointestinal: soft, nondistended, nontender and normal bowel sounds heard  Extremities: No peripheral edema, pt has good palpable thrill over rt. upper arm AVF  Neurological: A/O x 3  Skin: Normal turgor  MEDICATIONS:  amLODIPine   Tablet 10 milliGRAM(s) Oral daily  aspirin  chewable 81 milliGRAM(s) Oral daily  atorvastatin 40 milliGRAM(s) Oral at bedtime  gabapentin 100 milliGRAM(s) Oral daily  heparin  Injectable 5000 Unit(s) SubCutaneous every 8 hours  HYDROmorphone  Injectable 0.5 milliGRAM(s) IV Push every 6 hours PRN  insulin lispro (HumaLOG) corrective regimen sliding scale   SubCutaneous Before meals and at bedtime  labetalol 200 milliGRAM(s) Oral daily  meclizine 25 milliGRAM(s) Oral every 8 hours  omega-3-Acid Ethyl Esters 2 Gram(s) Oral two times a day  ondansetron Injectable 4 milliGRAM(s) IV Push every 8 hours PRN  oxyCODONE    5 mG/acetaminophen 325 mG 2 Tablet(s) Oral every 4 hours PRN  pantoprazole    Tablet 40 milliGRAM(s) Oral before breakfast  sevelamer hydrochloride 1600 milliGRAM(s) Oral three times a day with meals    LABS:                        11.8   8.7   )-----------( 192      ( 01 Mar 2018 05:51 )             38.0     03-01    136  |  97  |  58<H>  ----------------------------<  100<H>  4.7   |  32<H>  |  6.24<H>    Ca    8.3<L>      01 Mar 2018 05:51  Phos  5.0     03-01  Mg     2.0     03-01    ASSESSMENT AND PLAN:     1.ESRD: secondary to dm/htn  -HD is scheduled for today  -Keep patient euvolemic and renal diet  -Avoid Nephrotoxic Meds/ Agents such as (NSAIDs, IV contrast, Aminoglycosides such as gentamicin, -Gadolinium contrast, Phosphate containing enemas, etc..)  -Adjust Medications according to eGFR  2.HTN: bp is acceptble  -keep sbp>120 and <120  -low salt diet  3.ANEMIA:now Hb high nl. 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

## 2018-03-02 NOTE — SWALLOW VFSS/MBS ASSESSMENT ADULT - DIAGNOSTIC IMPRESSIONS
Pt p/w mild oropharyngeal dysphagia: decreased A/P movement of bolus, premature spillage to the pharynx (puree, nectar, regular solids) & hypopharynx (thin), reduced base of tongue retraction, vallecular residue, trace residue in the pyriforms. No penetration/aspiration seen. Chin-tuck swallow appeared to aid in clearance of pharyngeal residue.

## 2018-03-02 NOTE — SWALLOW VFSS/MBS ASSESSMENT ADULT - RESIDUE IN VALLECULAE
Trace/secondary to oral residue, thick > thin. Notably, on trial of barium tablet, the tablet rested in valleculae on initial swallow, but cleared with thin liquid wash and chin tuck.

## 2018-03-02 NOTE — PROGRESS NOTE ADULT - SUBJECTIVE AND OBJECTIVE BOX
Patient is a 53y old  Female who presents with a chief complaint of right arm and shoulder pain for 3 days (2018 15:18)    pt seen in icu [  ], reg med floor [ x  ], bed [ x ], chair at bedside [   ], a+o x3 [ x ], lethargic [  ],  nad [ x ]      Allergies    No Known Drug Allergies  pineapple (Hives)        Vitals    T(F): 98.2 (18 @ 05:05), Max: 98.3 (18 @ 20:49)  HR: 55 (18 @ 05:05) (55 - 67)  BP: 119/68 (18 @ 05:05) (102/63 - 128/60)  RR: 17 (18 @ 05:05) (17 - 18)  SpO2: 96% (18 @ 05:05) (95% - 100%)  Wt(kg): --  CAPILLARY BLOOD GLUCOSE      POCT Blood Glucose.: 81 mg/dL (02 Mar 2018 07:43)      Labs                          11.8   8.7   )-----------( 192      ( 01 Mar 2018 05:51 )             38.0       03-01    136  |  97  |  58<H>  ----------------------------<  100<H>  4.7   |  32<H>  |  6.24<H>    Ca    8.3<L>      01 Mar 2018 05:51  Phos  5.0     03-  Mg     2.0     03-                  Radiology Results      Meds    MEDICATIONS  (STANDING):  amLODIPine   Tablet 10 milliGRAM(s) Oral daily  aspirin  chewable 81 milliGRAM(s) Oral daily  atorvastatin 40 milliGRAM(s) Oral at bedtime  gabapentin 100 milliGRAM(s) Oral daily  heparin  Injectable 5000 Unit(s) SubCutaneous every 8 hours  insulin lispro (HumaLOG) corrective regimen sliding scale   SubCutaneous Before meals and at bedtime  labetalol 200 milliGRAM(s) Oral daily  omega-3-Acid Ethyl Esters 2 Gram(s) Oral two times a day  pantoprazole    Tablet 40 milliGRAM(s) Oral before breakfast  sevelamer hydrochloride 1600 milliGRAM(s) Oral three times a day with meals      MEDICATIONS  (PRN):  HYDROmorphone  Injectable 0.5 milliGRAM(s) IV Push every 6 hours PRN Severe Pain (7 - 10)  meclizine 25 milliGRAM(s) Oral every 8 hours PRN Dizziness  ondansetron Injectable 4 milliGRAM(s) IV Push every 8 hours PRN Nausea and/or Vomiting  oxyCODONE    5 mG/acetaminophen 325 mG 2 Tablet(s) Oral every 4 hours PRN Moderate Pain (4 - 6)      Physical Exam      Neuro :  no focal deficits,   Respiratory: CTA B/L  CV: RRR, S1S2, no murmurs,   Abdominal: Soft, NT, ND +BS,  Extremities: + peripheral pulses, avf with good thrill and non tender, more improvment in rom rue         ASSESSMENT    dysphagia  dizziness  r/o cns patho  intractible right shoulder pain 2nd to Rotatorcuff calcific tendinosis.  h/o Myocardial infarct, old  ESRD (end stage renal disease) on dialysis  Asthma occurring only with upper respiratory infection  Anemia in chronic renal disease  Claustrophobia  Uterine leiomyoma, unspecified location  AUSTIN (obstructive sleep apnea)  Gastroesophageal reflux disease without esophagitis  Vertigo  HLD (hyperlipidemia)  Essential hypertension  Diabetes mellitus, type 2  Morbid obesity  AV fistula  S/P craniotomy  S/P hernia repair  S/P  section        PLAN    swallow eval noted  cont Clear liquids and adv as per results of mbs	  pt to have VFSS/MBS; If MBS is inconclusive, a FEES will be conducted	  neuro f/u  cont meclizine 25mg q8   cont zofran prn n/v  mri, mra head and neck neg for acute patho  xray shoulder with Rotatorcuff calcific tendinosis noted   cont dilaudid prn pain  Daily Physical tx  mri r shoulder with Calcific tendinosis and Full-thickness tear of the anterior to mid insertional fibers of the supraspinatus tendon.  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  pt has severe Hyperphosphatemia  cont Renagel 1600 mg po tid with meals   phoslo d/c'd to avoid metastatic calcification from high ca/phos product   cont low phos diet  hd today as per renal  cont current meds Patient is a 53y old  Female who presents with a chief complaint of right arm and shoulder pain for 3 days (2018 15:18)    pt seen in icu [  ], reg med floor [ x  ], bed [ x ], chair at bedside [   ], a+o x3 [ x ], lethargic [  ],  nad [ x ]      Allergies    No Known Drug Allergies  pineapple (Hives)        Vitals    T(F): 98.2 (18 @ 05:05), Max: 98.3 (18 @ 20:49)  HR: 55 (18 @ 05:05) (55 - 67)  BP: 119/68 (18 @ 05:05) (102/63 - 128/60)  RR: 17 (18 @ 05:05) (17 - 18)  SpO2: 96% (18 @ 05:05) (95% - 100%)  Wt(kg): --  CAPILLARY BLOOD GLUCOSE      POCT Blood Glucose.: 81 mg/dL (02 Mar 2018 07:43)      Labs                          11.8   8.7   )-----------( 192      ( 01 Mar 2018 05:51 )             38.0       03-01    136  |  97  |  58<H>  ----------------------------<  100<H>  4.7   |  32<H>  |  6.24<H>    Ca    8.3<L>      01 Mar 2018 05:51  Phos  5.0     03-  Mg     2.0     03-          Radiology Results      Meds    MEDICATIONS  (STANDING):  amLODIPine   Tablet 10 milliGRAM(s) Oral daily  aspirin  chewable 81 milliGRAM(s) Oral daily  atorvastatin 40 milliGRAM(s) Oral at bedtime  gabapentin 100 milliGRAM(s) Oral daily  heparin  Injectable 5000 Unit(s) SubCutaneous every 8 hours  insulin lispro (HumaLOG) corrective regimen sliding scale   SubCutaneous Before meals and at bedtime  labetalol 200 milliGRAM(s) Oral daily  omega-3-Acid Ethyl Esters 2 Gram(s) Oral two times a day  pantoprazole    Tablet 40 milliGRAM(s) Oral before breakfast  sevelamer hydrochloride 1600 milliGRAM(s) Oral three times a day with meals      MEDICATIONS  (PRN):  HYDROmorphone  Injectable 0.5 milliGRAM(s) IV Push every 6 hours PRN Severe Pain (7 - 10)  meclizine 25 milliGRAM(s) Oral every 8 hours PRN Dizziness  ondansetron Injectable 4 milliGRAM(s) IV Push every 8 hours PRN Nausea and/or Vomiting  oxyCODONE    5 mG/acetaminophen 325 mG 2 Tablet(s) Oral every 4 hours PRN Moderate Pain (4 - 6)      Physical Exam      Neuro :  no focal deficits,   Respiratory: CTA B/L  CV: RRR, S1S2, no murmurs,   Abdominal: Soft, NT, ND +BS,  Extremities: + peripheral pulses, avf with good thrill and non tender, more improvment in rom rue         ASSESSMENT    dysphagia  dizziness  r/o cns patho  intractible right shoulder pain 2nd to Rotatorcuff calcific tendinosis.  h/o Myocardial infarct, old  ESRD (end stage renal disease) on dialysis  Asthma occurring only with upper respiratory infection  Anemia in chronic renal disease  Claustrophobia  Uterine leiomyoma, unspecified location  AUSTIN (obstructive sleep apnea)  Gastroesophageal reflux disease without esophagitis  Vertigo  HLD (hyperlipidemia)  Essential hypertension  Diabetes mellitus, type 2  Morbid obesity  AV fistula  S/P craniotomy  S/P hernia repair  S/P  section        PLAN    swallow eval noted  cont Clear liquids and adv as per results of mbs	  pt to have VFSS/MBS; If MBS is inconclusive, a FEES will be conducted	  neuro f/u  cont meclizine 25mg q8   cont zofran prn n/v  mri, mra head and neck neg for acute patho  xray shoulder with Rotatorcuff calcific tendinosis noted   cont dilaudid prn pain  Daily Physical tx  mri r shoulder with Calcific tendinosis and Full-thickness tear of the anterior to mid insertional fibers of the supraspinatus tendon.  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  pt has severe Hyperphosphatemia  cont Renagel 1600 mg po tid with meals   phoslo d/c'd to avoid metastatic calcification from high ca/phos product   cont low phos diet  hd today as per renal  cont current meds Patient is a 53y old  Female who presents with a chief complaint of right arm and shoulder pain for 3 days (2018 15:18)    pt seen in icu [  ], reg med floor [ x  ], bed [ x ], chair at bedside [   ], a+o x3 [ x ], lethargic [  ],  nad [ x ]      Allergies    No Known Drug Allergies  pineapple (Hives)        Vitals    T(F): 98.2 (18 @ 05:05), Max: 98.3 (18 @ 20:49)  HR: 55 (18 @ 05:05) (55 - 67)  BP: 119/68 (18 @ 05:05) (102/63 - 128/60)  RR: 17 (18 @ 05:05) (17 - 18)  SpO2: 96% (18 @ 05:05) (95% - 100%)  Wt(kg): --  CAPILLARY BLOOD GLUCOSE      POCT Blood Glucose.: 81 mg/dL (02 Mar 2018 07:43)      Labs                          11.8   8.7   )-----------( 192      ( 01 Mar 2018 05:51 )             38.0       03-01    136  |  97  |  58<H>  ----------------------------<  100<H>  4.7   |  32<H>  |  6.24<H>    Ca    8.3<L>      01 Mar 2018 05:51  Phos  5.0     03-  Mg     2.0     03-          Radiology Results      Meds    MEDICATIONS  (STANDING):  amLODIPine   Tablet 10 milliGRAM(s) Oral daily  aspirin  chewable 81 milliGRAM(s) Oral daily  atorvastatin 40 milliGRAM(s) Oral at bedtime  gabapentin 100 milliGRAM(s) Oral daily  heparin  Injectable 5000 Unit(s) SubCutaneous every 8 hours  insulin lispro (HumaLOG) corrective regimen sliding scale   SubCutaneous Before meals and at bedtime  labetalol 200 milliGRAM(s) Oral daily  omega-3-Acid Ethyl Esters 2 Gram(s) Oral two times a day  pantoprazole    Tablet 40 milliGRAM(s) Oral before breakfast  sevelamer hydrochloride 1600 milliGRAM(s) Oral three times a day with meals      MEDICATIONS  (PRN):  HYDROmorphone  Injectable 0.5 milliGRAM(s) IV Push every 6 hours PRN Severe Pain (7 - 10)  meclizine 25 milliGRAM(s) Oral every 8 hours PRN Dizziness  ondansetron Injectable 4 milliGRAM(s) IV Push every 8 hours PRN Nausea and/or Vomiting  oxyCODONE    5 mG/acetaminophen 325 mG 2 Tablet(s) Oral every 4 hours PRN Moderate Pain (4 - 6)      Physical Exam      Neuro :  no focal deficits,   Respiratory: CTA B/L  CV: RRR, S1S2, no murmurs,   Abdominal: Soft, NT, ND +BS,  Extremities: + peripheral pulses, avf with good thrill and non tender, more improvment in rom rue         ASSESSMENT    dysphagia  dizziness  r/o cns patho  intractible right shoulder pain 2nd to Rotatorcuff calcific tendinosis.  h/o Myocardial infarct, old  ESRD (end stage renal disease) on dialysis  Asthma occurring only with upper respiratory infection  Anemia in chronic renal disease  Claustrophobia  Uterine leiomyoma, unspecified location  AUSTIN (obstructive sleep apnea)  Gastroesophageal reflux disease without esophagitis  Vertigo  HLD (hyperlipidemia)  Essential hypertension  Diabetes mellitus, type 2  Morbid obesity  AV fistula  S/P craniotomy  S/P hernia repair  S/P  section        PLAN    swallow eval noted  cont Clear liquids and adv as per results of mbs	  pt to have VFSS/MBS; If MBS is inconclusive, a FEES will be conducted	  neuro f/u  cont meclizine 25mg q8   cont zofran prn n/v  mri, mra head and neck neg for acute patho  xray shoulder with Rotatorcuff calcific tendinosis noted   cont dilaudid prn pain  Daily Physical tx  mri r shoulder with Calcific tendinosis and Full-thickness tear of the anterior to mid insertional fibers of the supraspinatus tendon.  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  pt has severe Hyperphosphatemia  cont Renagel 1600 mg po tid with meals   phoslo d/c'd to avoid metastatic calcification from high ca/phos product   cont low phos diet  hd today as per renal  cont current meds  d/c plan pending findings of modified barium swallow

## 2018-03-03 VITALS — SYSTOLIC BLOOD PRESSURE: 105 MMHG | DIASTOLIC BLOOD PRESSURE: 54 MMHG | HEART RATE: 68 BPM

## 2018-03-03 LAB — GLUCOSE BLDC GLUCOMTR-MCNC: 91 MG/DL — SIGNIFICANT CHANGE UP (ref 70–99)

## 2018-03-03 PROCEDURE — 82310 ASSAY OF CALCIUM: CPT

## 2018-03-03 PROCEDURE — 70551 MRI BRAIN STEM W/O DYE: CPT

## 2018-03-03 PROCEDURE — 92610 EVALUATE SWALLOWING FUNCTION: CPT

## 2018-03-03 PROCEDURE — 93005 ELECTROCARDIOGRAM TRACING: CPT

## 2018-03-03 PROCEDURE — 73200 CT UPPER EXTREMITY W/O DYE: CPT

## 2018-03-03 PROCEDURE — 93971 EXTREMITY STUDY: CPT

## 2018-03-03 PROCEDURE — 70544 MR ANGIOGRAPHY HEAD W/O DYE: CPT

## 2018-03-03 PROCEDURE — 82746 ASSAY OF FOLIC ACID SERUM: CPT

## 2018-03-03 PROCEDURE — 99261: CPT

## 2018-03-03 PROCEDURE — 85027 COMPLETE CBC AUTOMATED: CPT

## 2018-03-03 PROCEDURE — 93880 EXTRACRANIAL BILAT STUDY: CPT

## 2018-03-03 PROCEDURE — 82607 VITAMIN B-12: CPT

## 2018-03-03 PROCEDURE — 96374 THER/PROPH/DIAG INJ IV PUSH: CPT

## 2018-03-03 PROCEDURE — 84550 ASSAY OF BLOOD/URIC ACID: CPT

## 2018-03-03 PROCEDURE — 83970 ASSAY OF PARATHORMONE: CPT

## 2018-03-03 PROCEDURE — 87340 HEPATITIS B SURFACE AG IA: CPT

## 2018-03-03 PROCEDURE — 73030 X-RAY EXAM OF SHOULDER: CPT

## 2018-03-03 PROCEDURE — 97161 PT EVAL LOW COMPLEX 20 MIN: CPT

## 2018-03-03 PROCEDURE — 99285 EMERGENCY DEPT VISIT HI MDM: CPT | Mod: 25

## 2018-03-03 PROCEDURE — 84443 ASSAY THYROID STIM HORMONE: CPT

## 2018-03-03 PROCEDURE — 84100 ASSAY OF PHOSPHORUS: CPT

## 2018-03-03 PROCEDURE — 74230 X-RAY XM SWLNG FUNCJ C+: CPT

## 2018-03-03 PROCEDURE — 83735 ASSAY OF MAGNESIUM: CPT

## 2018-03-03 PROCEDURE — 80048 BASIC METABOLIC PNL TOTAL CA: CPT

## 2018-03-03 PROCEDURE — 73221 MRI JOINT UPR EXTREM W/O DYE: CPT

## 2018-03-03 PROCEDURE — 82962 GLUCOSE BLOOD TEST: CPT

## 2018-03-03 PROCEDURE — 92523 SPEECH SOUND LANG COMPREHEN: CPT

## 2018-03-03 PROCEDURE — 85610 PROTHROMBIN TIME: CPT

## 2018-03-03 PROCEDURE — 80053 COMPREHEN METABOLIC PANEL: CPT

## 2018-03-03 PROCEDURE — 85730 THROMBOPLASTIN TIME PARTIAL: CPT

## 2018-03-03 PROCEDURE — 70547 MR ANGIOGRAPHY NECK W/O DYE: CPT

## 2018-03-03 PROCEDURE — 80061 LIPID PANEL: CPT

## 2018-03-03 PROCEDURE — 83036 HEMOGLOBIN GLYCOSYLATED A1C: CPT

## 2018-03-03 PROCEDURE — 96372 THER/PROPH/DIAG INJ SC/IM: CPT

## 2018-03-03 PROCEDURE — 82306 VITAMIN D 25 HYDROXY: CPT

## 2018-03-03 RX ADMIN — Medication 81 MILLIGRAM(S): at 12:05

## 2018-03-03 RX ADMIN — AMLODIPINE BESYLATE 10 MILLIGRAM(S): 2.5 TABLET ORAL at 05:59

## 2018-03-03 RX ADMIN — Medication 2 GRAM(S): at 06:00

## 2018-03-03 RX ADMIN — GABAPENTIN 100 MILLIGRAM(S): 400 CAPSULE ORAL at 12:05

## 2018-03-03 RX ADMIN — SEVELAMER CARBONATE 1600 MILLIGRAM(S): 2400 POWDER, FOR SUSPENSION ORAL at 12:05

## 2018-03-03 RX ADMIN — PANTOPRAZOLE SODIUM 40 MILLIGRAM(S): 20 TABLET, DELAYED RELEASE ORAL at 06:03

## 2018-03-03 RX ADMIN — Medication 200 MILLIGRAM(S): at 05:59

## 2018-03-03 RX ADMIN — Medication 25 MILLIGRAM(S): at 05:58

## 2018-03-03 RX ADMIN — HYDROMORPHONE HYDROCHLORIDE 0.5 MILLIGRAM(S): 2 INJECTION INTRAMUSCULAR; INTRAVENOUS; SUBCUTANEOUS at 03:41

## 2018-03-03 RX ADMIN — HYDROMORPHONE HYDROCHLORIDE 0.5 MILLIGRAM(S): 2 INJECTION INTRAMUSCULAR; INTRAVENOUS; SUBCUTANEOUS at 04:00

## 2018-03-03 RX ADMIN — SEVELAMER CARBONATE 1600 MILLIGRAM(S): 2400 POWDER, FOR SUSPENSION ORAL at 09:14

## 2018-03-03 RX ADMIN — HEPARIN SODIUM 5000 UNIT(S): 5000 INJECTION INTRAVENOUS; SUBCUTANEOUS at 05:59

## 2018-03-03 NOTE — PROGRESS NOTE ADULT - SUBJECTIVE AND OBJECTIVE BOX
Patient is a 53y old  Female who presents with a chief complaint of right arm and shoulder pain for 3 days (2018 15:18)    pt seen in icu [  ], reg med floor [ x  ], bed [ x ], chair at bedside [   ], a+o x3 [ x ], lethargic [  ],  nad [ x ]      Allergies    No Known Drug Allergies  pineapple (Hives)        Vitals    T(F): 98.7 (18 @ 05:38), Max: 99.3 (18 @ 21:00)  HR: 67 (18 @ 05:38) (62 - 70)  BP: 117/59 (18 @ 05:38) (117/59 - 137/63)  RR: 18 (18 @ 05:38) (17 - 18)  SpO2: 96% (18 @ 05:38) (96% - 98%)  Wt(kg): --  CAPILLARY BLOOD GLUCOSE      POCT Blood Glucose.: 91 mg/dL (03 Mar 2018 07:51)      Labs    modified barium swallow  Pt p/w mild oropharyngeal dysphagia: decreased A/P movement of bolus, premature spillage to the pharynx (puree, nectar, regular solids) & hypopharynx (thin), reduced base of tongue retraction, vallecular residue, trace residue in the pyriforms. No penetration/aspiration seen. Chin-tuck swallow appeared to aid in clearance of pharyngeal residue.	  Regular diet with thin liquids	  allow for swallow between intakes; alternate food with liquid; check mouth frequently for oral residue/pocketing; maintain upright posture during/after eating for 30 mins; oral hygiene; position upright (90 degrees); provide rest periods between swallows; tuck chin; Chin Tuck during swallow, as needed, to narrow the pharynx & increase airway protection, when globus sensation is detected.	  no assistance needed	  yes	  oral hygiene; position upright (90Y); cough; gurgly voice; pneumonia; throat clearing	  GI; f/u with GI to r/o esophageal pathology	          Radiology Results      Meds    MEDICATIONS  (STANDING):  amLODIPine   Tablet 10 milliGRAM(s) Oral daily  aspirin  chewable 81 milliGRAM(s) Oral daily  atorvastatin 40 milliGRAM(s) Oral at bedtime  gabapentin 100 milliGRAM(s) Oral daily  heparin  Injectable 5000 Unit(s) SubCutaneous every 8 hours  insulin lispro (HumaLOG) corrective regimen sliding scale   SubCutaneous Before meals and at bedtime  labetalol 200 milliGRAM(s) Oral daily  meclizine 25 milliGRAM(s) Oral every 8 hours  omega-3-Acid Ethyl Esters 2 Gram(s) Oral two times a day  pantoprazole    Tablet 40 milliGRAM(s) Oral before breakfast  sevelamer hydrochloride 1600 milliGRAM(s) Oral three times a day with meals      MEDICATIONS  (PRN):  HYDROmorphone  Injectable 0.5 milliGRAM(s) IV Push every 6 hours PRN Severe Pain (7 - 10)  ondansetron Injectable 4 milliGRAM(s) IV Push every 8 hours PRN Nausea and/or Vomiting  oxyCODONE    5 mG/acetaminophen 325 mG 2 Tablet(s) Oral every 4 hours PRN Moderate Pain (4 - 6)      Physical Exam      Neuro :  no focal deficits,   Respiratory: CTA B/L  CV: RRR, S1S2, no murmurs,   Abdominal: Soft, NT, ND +BS,  Extremities: + peripheral pulses, avf with good thrill and non tender, cont improvment in rom rue         ASSESSMENT    dysphagia  dizziness  r/o cns patho  intractible right shoulder pain 2nd to Rotatorcuff calcific tendinosis.  h/o Myocardial infarct, old  ESRD (end stage renal disease) on dialysis  Asthma occurring only with upper respiratory infection  Anemia in chronic renal disease  Claustrophobia  Uterine leiomyoma, unspecified location  AUSTIN (obstructive sleep apnea)  Gastroesophageal reflux disease without esophagitis  Vertigo  HLD (hyperlipidemia)  Essential hypertension  Diabetes mellitus, type 2  Morbid obesity  AV fistula  S/P craniotomy  S/P hernia repair  S/P  section        PLAN    swallow eval noted  diet adv to full diet and being tolerated	  s/p VFSS/MBS results noted above	  f/u outpt with GI to r/o esophageal pathology  neuro f/u  cont meclizine 25mg q8   cont zofran prn n/v  mri, mra head and neck neg for acute patho  xray shoulder with Rotatorcuff calcific tendinosis noted   cont dilaudid prn pain  Daily Physical tx  mri r shoulder with Calcific tendinosis and Full-thickness tear of the anterior to mid insertional fibers of the supraspinatus tendon.  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  pt has severe Hyperphosphatemia  cont Renagel 1600 mg po tid with meals   phoslo d/c'd to avoid metastatic calcification from high ca/phos product   cont low phos diet  hd today as per renal  cont current meds  pt stable for d/c

## 2018-03-03 NOTE — PROGRESS NOTE ADULT - PROBLEM SELECTOR PROBLEM 4
Diabetes
ESRD (end stage renal disease)
Essential hypertension
Essential hypertension
ESRD (end stage renal disease)
HTN (hypertension)
Need for prophylactic measure

## 2018-03-03 NOTE — PROGRESS NOTE ADULT - PROBLEM SELECTOR PROBLEM 3
ESRD (end stage renal disease)
Shoulder pain, right
Diabetes
Essential hypertension
Shoulder pain, right

## 2018-03-03 NOTE — PROGRESS NOTE ADULT - PROBLEM SELECTOR PROBLEM 2
Shoulder pain, right
Vertigo
Dysphagia, unspecified type
ESRD (end stage renal disease)
Vertigo
Shoulder pain, right

## 2018-03-03 NOTE — PROGRESS NOTE ADULT - PROBLEM SELECTOR PROBLEM 5
Dizziness
Essential hypertension
Need for prophylactic measure
Need for prophylactic measure
Essential hypertension
Need for prophylactic measure

## 2018-03-03 NOTE — PROGRESS NOTE ADULT - PROBLEM SELECTOR PLAN 4
accuchecks with regular insulin coverage  HBA1C  Endo follow up
accuchecks with regular insulin coverage  HBA1C  Endo follow up
-C/W HD M/W/F  -Nephro Dr. Jane  -continue Renagel 1600 mg po tid with meals and d/c phoslo to avoid metastatic calcification from high ca/phos product
-c/w amlodpine and labetalol   -Monitor BP
-c/w amlodpine and labetalol   -Monitor BP
accuchecks with regular insulin coverage  HBA1C  Endo eval
accuchecks with regular insulin coverage  HBA1C  Endo follow up
-C/W HD M/W/F  -Nephro Dr. Jane  -continue Renagel 1600 mg po tid with meals and d/c phoslo to avoid metastatic calcification from high ca/phos product
-IMPROVE score 1 from immobility, no need for chemical DVT PPx
c/w home medications with parameters

## 2018-03-03 NOTE — PROGRESS NOTE ADULT - PROBLEM SELECTOR PLAN 1
Bronchodilators neb  inhaled steroids  Pfts as OP
- Horizontal nystagmus, orthostatic BP negative  - Neuro Dr. Aguilera; input appreciated, r/o posterior circulation infarct vs peripheral vertigo  - Meclizine 25mg TID for symptomatic control  - Pt takes aspirin and high dose statin already, will continue  - Check head MRI/MRA, neck MRA ***  - Carotid doppler: negative
- Horizontal nystagmus, orthostatic BP negative  - Neuro Dr. Aguilera; input appreciated, r/o posterior circulation infarct vs peripheral vertigo  - Meclizine 25mg TID for symptomatic control; can increased it up to 50mg TID per Dr. Aguilera  - Pt takes aspirin and high dose statin already, will continue  - Check head MRI/MRA, neck MRA ***  - Carotid doppler: negative  - DC planning once CVA is ruled out
-Seen by speech and swallow team, recommending modified barium swallow test for regurgitation.  -Clear liquid diet for now  -Neuro f/u, as this is new symptom
Bronchodilators neb  inhaled steroids  Pfts as OP
-Secondary to calcific tendonitis of infraspinatus/subacromial deltoid bursitis, Dr. Meyers gave local injection for severe shoulder pain today  -Pt also takes dexamethasone 4mg q 6hrs for 7 days  -Monitor clinically
-Seen by speech and swallow team, modified barium swallow test was done; cleared for discharge with regular food. Spoke with Chanel. r/o esophageal stricture.  -DC planning today
Likely ligament strain  On Dilaudid prn   CT shoulder negative for any acute pathology  US left arm negative for DVT  c/w DVT prophylaxis  f/u ortho consult Dr. Meyers
Likely ligament strain  Pain out of proportion  On Dilaudid prn  CT shoulder negative for any acute pathology  US left arm negative for DVT  c/w DVT prophylaxis  Plan for MRI right shoulder  f/u ortho consult Dr. Meyers
Likely ligament strain  Pain out of proportion  On Dilaudid prn  CT shoulder negative for any acute pathology  US left arm negative for DVT  c/w DVT prophylaxis  Plan for MRI right shoulder  f/u ortho consult Dr. Meyers
as above
Bronchodilators neb  inhaled steroids  Pfts as OP

## 2018-03-03 NOTE — PROGRESS NOTE ADULT - PROVIDER SPECIALTY LIST ADULT
Internal Medicine
Nephrology
Neurology
Orthopedics
Pulmonology
Vascular Surgery
Vascular Surgery
Internal Medicine
Orthopedics
Nephrology
Pulmonology
Pulmonology

## 2018-03-03 NOTE — PROGRESS NOTE ADULT - PROBLEM SELECTOR PLAN 5
antivert 12.5 mgs po daily  neuro follow up
-IMPROVE score 1 from immobility, no need for chemical DVT PPx
-IMPROVE score 1 from immobility, no need for chemical DVT PPx
-c/w amlodpine and labetalol   -Monitor BP
antivert 12.5 mgs po daily  neuro eval  MRI of Brain
antivert 12.5 mgs po daily  neuro follow up
antivert 12.5 mgs po daily  neuro follow up
antivert 12.5 mgs po daily  neuro follow up  MRI of Brain
-c/w amlodpine and labetalol   -Monitor BP
Improve score 2 on heparin sc for prophylaxis

## 2018-03-03 NOTE — PROGRESS NOTE ADULT - PROBLEM SELECTOR PLAN 6
for Barium swallow today and if neg DC ;home with GI follow up as OP Reassume diet and follow up with GI as OP

## 2018-03-03 NOTE — PROGRESS NOTE ADULT - SUBJECTIVE AND OBJECTIVE BOX
Patient is a 53y old  Female who presents with a chief complaint of right arm and shoulder pain for 3 days (22 Feb 2018 15:18)  Pt alert, awake, oriented x3, resting comfortable in bed in NAD.    INTERVAL HPI/OVERNIGHT EVENTS:      VITAL SIGNS:  T(F): 98.7 (03-03-18 @ 05:38)  HR: 67 (03-03-18 @ 05:38)  BP: 117/59 (03-03-18 @ 05:38)  RR: 18 (03-03-18 @ 05:38)  SpO2: 96% (03-03-18 @ 05:38)  Wt(kg): --  I&O's Detail          REVIEW OF SYSTEMS:    CONSTITUTIONAL:  No fevers, chills, sweats    HEENT:  Eyes:  No diplopia or blurred vision. ENT:  No earache, sore throat or runny nose.    CARDIOVASCULAR:  No pressure, squeezing, tightness, or heaviness about the chest; no palpitations.    RESPIRATORY:  Per HPI    GASTROINTESTINAL:  No abdominal pain, nausea, vomiting or diarrhea.    GENITOURINARY:  No dysuria, frequency or urgency.    NEUROLOGIC:  No paresthesias, fasciculations, seizures or weakness.    PSYCHIATRIC:  No disorder of thought or mood.      PHYSICAL EXAM:    Constitutional: Well developed and nourished  Eyes:Perrla  ENMT: normal  Neck:supple  Respiratory: good air entry  Cardiovascular: S1 S2 regular  Gastrointestinal: Soft, Non tender  Extremities: No edema  Vascular:normal  Neurological:Awake, alert,Ox3  Musculoskeletal:Normal      MEDICATIONS  (STANDING):  amLODIPine   Tablet 10 milliGRAM(s) Oral daily  aspirin  chewable 81 milliGRAM(s) Oral daily  atorvastatin 40 milliGRAM(s) Oral at bedtime  gabapentin 100 milliGRAM(s) Oral daily  heparin  Injectable 5000 Unit(s) SubCutaneous every 8 hours  insulin lispro (HumaLOG) corrective regimen sliding scale   SubCutaneous Before meals and at bedtime  labetalol 200 milliGRAM(s) Oral daily  meclizine 25 milliGRAM(s) Oral every 8 hours  omega-3-Acid Ethyl Esters 2 Gram(s) Oral two times a day  pantoprazole    Tablet 40 milliGRAM(s) Oral before breakfast  sevelamer hydrochloride 1600 milliGRAM(s) Oral three times a day with meals    MEDICATIONS  (PRN):  HYDROmorphone  Injectable 0.5 milliGRAM(s) IV Push every 6 hours PRN Severe Pain (7 - 10)  ondansetron Injectable 4 milliGRAM(s) IV Push every 8 hours PRN Nausea and/or Vomiting  oxyCODONE    5 mG/acetaminophen 325 mG 2 Tablet(s) Oral every 4 hours PRN Moderate Pain (4 - 6)      Allergies    No Known Drug Allergies  pineapple (Hives)    Intolerances        LABS:                    CAPILLARY BLOOD GLUCOSE      POCT Blood Glucose.: 91 mg/dL (03 Mar 2018 07:51)  POCT Blood Glucose.: 146 mg/dL (02 Mar 2018 21:19)  POCT Blood Glucose.: 83 mg/dL (02 Mar 2018 16:17)  POCT Blood Glucose.: 102 mg/dL (02 Mar 2018 12:11)        RADIOLOGY & ADDITIONAL TESTS:    CXR:  < from: Xray Chest 2 Views PA/Lat (10.08.15 @ 19:07) >  PROJECTION: PA and lateral views.    The lungs appear clear of infiltrates and effusions. A right-sided   double-lumen catheter is identified. The cardiac and mediastinal contours   as well as osseous structures appear unremarkable.    IMPRESSION: 1. No evidence of acute pulmonary disease.      < end of copied text >    Ct scan chest:    ekg;    echo: Patient is a 53y old  Female who presents with a chief complaint of right arm and shoulder pain for 3 days (22 Feb 2018 15:18)  Pt alert, awake, oriented x3, resting comfortable in bed in NAD. results of modified Barium swallow noted.    INTERVAL HPI/OVERNIGHT EVENTS:      VITAL SIGNS:  T(F): 98.7 (03-03-18 @ 05:38)  HR: 67 (03-03-18 @ 05:38)  BP: 117/59 (03-03-18 @ 05:38)  RR: 18 (03-03-18 @ 05:38)  SpO2: 96% (03-03-18 @ 05:38)  Wt(kg): --  I&O's Detail          REVIEW OF SYSTEMS:    CONSTITUTIONAL:  No fevers, chills, sweats    HEENT:  Eyes:  No diplopia or blurred vision. ENT:  No earache, sore throat or runny nose.    CARDIOVASCULAR:  No pressure, squeezing, tightness, or heaviness about the chest; no palpitations.    RESPIRATORY:  Per HPI    GASTROINTESTINAL:  No abdominal pain, nausea, vomiting or diarrhea.    GENITOURINARY:  No dysuria, frequency or urgency.    NEUROLOGIC:  No paresthesias, fasciculations, seizures or weakness.    PSYCHIATRIC:  No disorder of thought or mood.      PHYSICAL EXAM:    Constitutional: Well developed and nourished  Eyes:Perrla  ENMT: normal  Neck:supple  Respiratory: good air entry  Cardiovascular: S1 S2 regular  Gastrointestinal: Soft, Non tender  Extremities: No edema  Vascular:normal  Neurological:Awake, alert,Ox3  Musculoskeletal:Normal      MEDICATIONS  (STANDING):  amLODIPine   Tablet 10 milliGRAM(s) Oral daily  aspirin  chewable 81 milliGRAM(s) Oral daily  atorvastatin 40 milliGRAM(s) Oral at bedtime  gabapentin 100 milliGRAM(s) Oral daily  heparin  Injectable 5000 Unit(s) SubCutaneous every 8 hours  insulin lispro (HumaLOG) corrective regimen sliding scale   SubCutaneous Before meals and at bedtime  labetalol 200 milliGRAM(s) Oral daily  meclizine 25 milliGRAM(s) Oral every 8 hours  omega-3-Acid Ethyl Esters 2 Gram(s) Oral two times a day  pantoprazole    Tablet 40 milliGRAM(s) Oral before breakfast  sevelamer hydrochloride 1600 milliGRAM(s) Oral three times a day with meals    MEDICATIONS  (PRN):  HYDROmorphone  Injectable 0.5 milliGRAM(s) IV Push every 6 hours PRN Severe Pain (7 - 10)  ondansetron Injectable 4 milliGRAM(s) IV Push every 8 hours PRN Nausea and/or Vomiting  oxyCODONE    5 mG/acetaminophen 325 mG 2 Tablet(s) Oral every 4 hours PRN Moderate Pain (4 - 6)      Allergies    No Known Drug Allergies  pineapple (Hives)    Intolerances        LABS:                    CAPILLARY BLOOD GLUCOSE      POCT Blood Glucose.: 91 mg/dL (03 Mar 2018 07:51)  POCT Blood Glucose.: 146 mg/dL (02 Mar 2018 21:19)  POCT Blood Glucose.: 83 mg/dL (02 Mar 2018 16:17)  POCT Blood Glucose.: 102 mg/dL (02 Mar 2018 12:11)        RADIOLOGY & ADDITIONAL TESTS:    CXR:  < from: Xray Chest 2 Views PA/Lat (10.08.15 @ 19:07) >  PROJECTION: PA and lateral views.    The lungs appear clear of infiltrates and effusions. A right-sided   double-lumen catheter is identified. The cardiac and mediastinal contours   as well as osseous structures appear unremarkable.    IMPRESSION: 1. No evidence of acute pulmonary disease.      < end of copied text >    Ct scan chest:    ekg;    echo:

## 2018-09-05 PROBLEM — Z00.00 ENCOUNTER FOR PREVENTIVE HEALTH EXAMINATION: Status: ACTIVE | Noted: 2018-09-05

## 2018-10-05 ENCOUNTER — APPOINTMENT (OUTPATIENT)
Dept: ENDOCRINOLOGY | Facility: CLINIC | Age: 53
End: 2018-10-05

## 2018-10-25 ENCOUNTER — APPOINTMENT (OUTPATIENT)
Dept: ENDOCRINOLOGY | Facility: CLINIC | Age: 53
End: 2018-10-25
Payer: MEDICARE

## 2018-10-25 VITALS
OXYGEN SATURATION: 97 % | DIASTOLIC BLOOD PRESSURE: 95 MMHG | RESPIRATION RATE: 17 BRPM | SYSTOLIC BLOOD PRESSURE: 161 MMHG | TEMPERATURE: 99.1 F | WEIGHT: 191 LBS | HEART RATE: 69 BPM

## 2018-10-25 PROBLEM — Z00.00 ENCOUNTER FOR PREVENTIVE HEALTH EXAMINATION: Status: ACTIVE | Noted: 2018-10-25

## 2018-10-25 PROCEDURE — 99214 OFFICE O/P EST MOD 30 MIN: CPT

## 2018-12-10 ENCOUNTER — APPOINTMENT (OUTPATIENT)
Dept: ENDOCRINOLOGY | Facility: CLINIC | Age: 53
End: 2018-12-10

## 2019-10-16 ENCOUNTER — APPOINTMENT (OUTPATIENT)
Dept: ENDOCRINOLOGY | Facility: CLINIC | Age: 54
End: 2019-10-16
Payer: MEDICARE

## 2019-10-16 VITALS
RESPIRATION RATE: 17 BRPM | WEIGHT: 190 LBS | OXYGEN SATURATION: 99 % | DIASTOLIC BLOOD PRESSURE: 76 MMHG | TEMPERATURE: 98.4 F | BODY MASS INDEX: 34.96 KG/M2 | HEIGHT: 62 IN | SYSTOLIC BLOOD PRESSURE: 168 MMHG | HEART RATE: 70 BPM

## 2019-10-16 LAB
GLUCOSE BLDC GLUCOMTR-MCNC: 103
HBA1C MFR BLD HPLC: 401

## 2019-10-16 PROCEDURE — 83036 HEMOGLOBIN GLYCOSYLATED A1C: CPT | Mod: QW

## 2019-10-16 PROCEDURE — 82962 GLUCOSE BLOOD TEST: CPT

## 2019-10-16 PROCEDURE — 99214 OFFICE O/P EST MOD 30 MIN: CPT | Mod: 25

## 2019-10-16 RX ORDER — BLOOD-GLUCOSE METER
EACH MISCELLANEOUS TWICE DAILY
Qty: 200 | Refills: 3 | Status: ACTIVE | COMMUNITY
Start: 2019-10-16 | End: 1900-01-01

## 2019-10-16 NOTE — PHYSICAL EXAM
[Alert] : alert [No Acute Distress] : no acute distress [Normal Sclera/Conjunctiva] : normal sclera/conjunctiva [PERRL] : pupils equal, round and reactive to light [Normal Outer Ear/Nose] : the ears and nose were normal in appearance [Normal Hearing] : hearing was normal [No Neck Mass] : no neck mass was observed [Thyroid Not Enlarged] : the thyroid was not enlarged [No Respiratory Distress] : no respiratory distress [Normal Rate and Effort] : normal respiratory rhythm and effort [Normal PMI] : the apical impulse was normal [Normal Rate] : heart rate was normal  [Carotids Normal] : carotid pulses were normal with no bruits [No Stigmata of Cushings Syndrome] : no stigmata of cushings syndrome [No Rash] : no rash [No Skin Lesions] : no skin lesions [Normal Reflexes] : deep tendon reflexes were 2+ and symmetric [No Motor Deficits] : the motor exam was normal

## 2019-10-16 NOTE — HISTORY OF PRESENT ILLNESS
[FreeTextEntry1] : The patient for several months she has been having low blood glucose, she says the blood sugars drops to 40 or 60 mg/dl, and she feels nervous and anxious and increased perspiration. Not taking any medication for diabetes. Apparently the BS happen 2 to 3 hours after eating. She has not been doing SBGM regularly

## 2019-10-27 LAB
ACTH SER-ACNC: 18.5 PG/ML
ALBUMIN SERPL ELPH-MCNC: 4.3 G/DL
ALP BLD-CCNC: 68 U/L
ALT SERPL-CCNC: 7 U/L
ANION GAP SERPL CALC-SCNC: 16 MMOL/L
AST SERPL-CCNC: 9 U/L
BILIRUB DIRECT SERPL-MCNC: 0.2 MG/DL
BILIRUB INDIRECT SERPL-MCNC: 0.2 MG/DL
BILIRUB SERPL-MCNC: 0.4 MG/DL
BUN SERPL-MCNC: 25 MG/DL
C PEPTIDE SERPL-MCNC: 8.1 NG/ML
CALCIUM SERPL-MCNC: 9.4 MG/DL
CHLORIDE SERPL-SCNC: 99 MMOL/L
CHOLEST SERPL-MCNC: 138 MG/DL
CHOLEST/HDLC SERPL: 3.2 RATIO
CO2 SERPL-SCNC: 27 MMOL/L
CORTIS SERPL-MCNC: 6.8 UG/DL
CREAT SERPL-MCNC: 5.65 MG/DL
ESTIMATED AVERAGE GLUCOSE: 82 MG/DL
GLUCOSE 1H P 100 G GLC PO SERPL-MCNC: 126 MG/DL
GLUCOSE 2H P 100 G GLC PO SERPL-MCNC: 76 MG/DL
GLUCOSE 30M P CHAL SERPL-MCNC: 201 MG/DL
GLUCOSE 3H P CHAL SERPL-MCNC: 57 MG/DL
GLUCOSE 4H P CHAL SERPL-MCNC: 64 MG/DL
GLUCOSE BS SERPL-MCNC: 70 MG/DL
GLUCOSE BS SERPL-MCNC: 72 MG/DL
GLUCOSE SERPL-MCNC: 77 MG/DL
HBA1C MFR BLD HPLC: 4.5 %
HDLC SERPL-MCNC: 43 MG/DL
INSULIN SERPL-MCNC: 8.1 UU/ML
LDLC SERPL CALC-MCNC: 69 MG/DL
POTASSIUM SERPL-SCNC: 5.9 MMOL/L
PROINSULIN SERPL-MCNC: 5.7 PMOL/L
PROT SERPL-MCNC: 6.4 G/DL
SODIUM SERPL-SCNC: 142 MMOL/L
TRIGL SERPL-MCNC: 128 MG/DL

## 2019-10-29 ENCOUNTER — APPOINTMENT (OUTPATIENT)
Dept: ENDOCRINOLOGY | Facility: CLINIC | Age: 54
End: 2019-10-29
Payer: MEDICARE

## 2019-10-29 VITALS
BODY MASS INDEX: 34.96 KG/M2 | HEART RATE: 66 BPM | TEMPERATURE: 98.8 F | OXYGEN SATURATION: 99 % | RESPIRATION RATE: 17 BRPM | HEIGHT: 62 IN | WEIGHT: 190 LBS | DIASTOLIC BLOOD PRESSURE: 59 MMHG | SYSTOLIC BLOOD PRESSURE: 139 MMHG

## 2019-10-29 LAB — GLUCOSE BLDC GLUCOMTR-MCNC: 167

## 2019-10-29 PROCEDURE — 82962 GLUCOSE BLOOD TEST: CPT

## 2019-10-29 PROCEDURE — 99214 OFFICE O/P EST MOD 30 MIN: CPT | Mod: 25

## 2019-10-29 NOTE — HISTORY OF PRESENT ILLNESS
[FreeTextEntry1] : Patient feels well , she is asymptomatic. Her weight has not changed. She is exercising regularly and following the diet. Her blood glucose at home are well controlled, they are usually around 100 mg/dl. She says that 3 hours after eating specially sugars she feels her BS drops. The FBS in the laboratory was 77 mg/dl and the HbA1c was 4.5 %. The 4 hours GTT revealed BS below 60 mg/dl after 3 hours. She had a Gastric Bypass 2 years ago, she was told that the low blood glucose was related to the bypass.

## 2019-10-29 NOTE — DATA REVIEWED
[FreeTextEntry1] : Patient ha an abnormal GTT, the BS dropped below 60 mg/dl, her FBS was normal but the HbA1c was low and the c-peptide was elevated with normal insulin levels.

## 2019-10-29 NOTE — ASSESSMENT
[FreeTextEntry1] : The patient most likely has reactive hypoglycemia \par She had a gastric bypass done\par Will order an MRI of the pancreas to rule out an insulinoma\par Patient will be called next week with the authorization number

## 2019-11-06 ENCOUNTER — INPATIENT (INPATIENT)
Facility: HOSPITAL | Age: 54
LOS: 15 days | Discharge: HOME | DRG: 102 | End: 2019-11-22
Attending: INTERNAL MEDICINE | Admitting: INTERNAL MEDICINE
Payer: COMMERCIAL

## 2019-11-06 VITALS
RESPIRATION RATE: 16 BRPM | SYSTOLIC BLOOD PRESSURE: 179 MMHG | OXYGEN SATURATION: 96 % | TEMPERATURE: 98 F | HEART RATE: 72 BPM | WEIGHT: 190.92 LBS | DIASTOLIC BLOOD PRESSURE: 86 MMHG

## 2019-11-06 DIAGNOSIS — Z98.89 OTHER SPECIFIED POSTPROCEDURAL STATES: Chronic | ICD-10-CM

## 2019-11-06 DIAGNOSIS — R42 DIZZINESS AND GIDDINESS: ICD-10-CM

## 2019-11-06 DIAGNOSIS — I10 ESSENTIAL (PRIMARY) HYPERTENSION: ICD-10-CM

## 2019-11-06 DIAGNOSIS — I77.0 ARTERIOVENOUS FISTULA, ACQUIRED: Chronic | ICD-10-CM

## 2019-11-06 DIAGNOSIS — N18.6 END STAGE RENAL DISEASE: ICD-10-CM

## 2019-11-06 DIAGNOSIS — N18.9 CHRONIC KIDNEY DISEASE, UNSPECIFIED: Chronic | ICD-10-CM

## 2019-11-06 DIAGNOSIS — Z29.9 ENCOUNTER FOR PROPHYLACTIC MEASURES, UNSPECIFIED: ICD-10-CM

## 2019-11-06 DIAGNOSIS — E11.9 TYPE 2 DIABETES MELLITUS WITHOUT COMPLICATIONS: ICD-10-CM

## 2019-11-06 LAB
ALBUMIN SERPL ELPH-MCNC: 3.2 G/DL — LOW (ref 3.5–5)
ALBUMIN SERPL ELPH-MCNC: 3.4 G/DL — LOW (ref 3.5–5)
ALP SERPL-CCNC: 73 U/L — SIGNIFICANT CHANGE UP (ref 40–120)
ALP SERPL-CCNC: 78 U/L — SIGNIFICANT CHANGE UP (ref 40–120)
ALT FLD-CCNC: 16 U/L DA — SIGNIFICANT CHANGE UP (ref 10–60)
ALT FLD-CCNC: 17 U/L DA — SIGNIFICANT CHANGE UP (ref 10–60)
ANION GAP SERPL CALC-SCNC: 7 MMOL/L — SIGNIFICANT CHANGE UP (ref 5–17)
ANION GAP SERPL CALC-SCNC: 8 MMOL/L — SIGNIFICANT CHANGE UP (ref 5–17)
APPEARANCE UR: CLEAR — SIGNIFICANT CHANGE UP
APTT BLD: 33.2 SEC — SIGNIFICANT CHANGE UP (ref 27.5–36.3)
AST SERPL-CCNC: 10 U/L — SIGNIFICANT CHANGE UP (ref 10–40)
AST SERPL-CCNC: 10 U/L — SIGNIFICANT CHANGE UP (ref 10–40)
BILIRUB SERPL-MCNC: 0.4 MG/DL — SIGNIFICANT CHANGE UP (ref 0.2–1.2)
BILIRUB SERPL-MCNC: 0.5 MG/DL — SIGNIFICANT CHANGE UP (ref 0.2–1.2)
BILIRUB UR-MCNC: NEGATIVE — SIGNIFICANT CHANGE UP
BUN SERPL-MCNC: 39 MG/DL — HIGH (ref 7–18)
BUN SERPL-MCNC: 9 MG/DL — SIGNIFICANT CHANGE UP (ref 7–18)
CALCIUM SERPL-MCNC: 8.4 MG/DL — SIGNIFICANT CHANGE UP (ref 8.4–10.5)
CALCIUM SERPL-MCNC: 8.8 MG/DL — SIGNIFICANT CHANGE UP (ref 8.4–10.5)
CHLORIDE SERPL-SCNC: 102 MMOL/L — SIGNIFICANT CHANGE UP (ref 96–108)
CHLORIDE SERPL-SCNC: 105 MMOL/L — SIGNIFICANT CHANGE UP (ref 96–108)
CK MB BLD-MCNC: 2.2 % — SIGNIFICANT CHANGE UP (ref 0–3.5)
CK MB CFR SERPL CALC: 1.7 NG/ML — SIGNIFICANT CHANGE UP (ref 0–3.6)
CK SERPL-CCNC: 79 U/L — SIGNIFICANT CHANGE UP (ref 21–215)
CO2 SERPL-SCNC: 27 MMOL/L — SIGNIFICANT CHANGE UP (ref 22–31)
CO2 SERPL-SCNC: 30 MMOL/L — SIGNIFICANT CHANGE UP (ref 22–31)
COLOR SPEC: YELLOW — SIGNIFICANT CHANGE UP
CREAT SERPL-MCNC: 2.5 MG/DL — HIGH (ref 0.5–1.3)
CREAT SERPL-MCNC: 7.94 MG/DL — HIGH (ref 0.5–1.3)
DIFF PNL FLD: NEGATIVE — SIGNIFICANT CHANGE UP
GLUCOSE BLDC GLUCOMTR-MCNC: 147 MG/DL — HIGH (ref 70–99)
GLUCOSE BLDC GLUCOMTR-MCNC: 75 MG/DL — SIGNIFICANT CHANGE UP (ref 70–99)
GLUCOSE BLDC GLUCOMTR-MCNC: 85 MG/DL — SIGNIFICANT CHANGE UP (ref 70–99)
GLUCOSE SERPL-MCNC: 74 MG/DL — SIGNIFICANT CHANGE UP (ref 70–99)
GLUCOSE SERPL-MCNC: 80 MG/DL — SIGNIFICANT CHANGE UP (ref 70–99)
GLUCOSE UR QL: 50 MG/DL
HCG SERPL-ACNC: 3 MIU/ML — SIGNIFICANT CHANGE UP
HCT VFR BLD CALC: 37.7 % — SIGNIFICANT CHANGE UP (ref 34.5–45)
HGB BLD-MCNC: 11.7 G/DL — SIGNIFICANT CHANGE UP (ref 11.5–15.5)
INR BLD: 0.96 RATIO — SIGNIFICANT CHANGE UP (ref 0.88–1.16)
KETONES UR-MCNC: NEGATIVE — SIGNIFICANT CHANGE UP
LEUKOCYTE ESTERASE UR-ACNC: NEGATIVE — SIGNIFICANT CHANGE UP
MAGNESIUM SERPL-MCNC: 2 MG/DL — SIGNIFICANT CHANGE UP (ref 1.6–2.6)
MCHC RBC-ENTMCNC: 30.6 PG — SIGNIFICANT CHANGE UP (ref 27–34)
MCHC RBC-ENTMCNC: 31 GM/DL — LOW (ref 32–36)
MCV RBC AUTO: 98.7 FL — SIGNIFICANT CHANGE UP (ref 80–100)
NITRITE UR-MCNC: NEGATIVE — SIGNIFICANT CHANGE UP
NRBC # BLD: 0 /100 WBCS — SIGNIFICANT CHANGE UP (ref 0–0)
PH UR: 9 — HIGH (ref 5–8)
PHOSPHATE SERPL-MCNC: 2.1 MG/DL — LOW (ref 2.5–4.5)
PLATELET # BLD AUTO: 202 K/UL — SIGNIFICANT CHANGE UP (ref 150–400)
POTASSIUM SERPL-MCNC: 3.3 MMOL/L — LOW (ref 3.5–5.3)
POTASSIUM SERPL-MCNC: 5.9 MMOL/L — HIGH (ref 3.5–5.3)
POTASSIUM SERPL-SCNC: 3.3 MMOL/L — LOW (ref 3.5–5.3)
POTASSIUM SERPL-SCNC: 5.9 MMOL/L — HIGH (ref 3.5–5.3)
PROT SERPL-MCNC: 6.6 G/DL — SIGNIFICANT CHANGE UP (ref 6–8.3)
PROT SERPL-MCNC: 6.7 G/DL — SIGNIFICANT CHANGE UP (ref 6–8.3)
PROT UR-MCNC: 100
PROTHROM AB SERPL-ACNC: 10.6 SEC — SIGNIFICANT CHANGE UP (ref 10–12.9)
RBC # BLD: 3.82 M/UL — SIGNIFICANT CHANGE UP (ref 3.8–5.2)
RBC # FLD: 14.1 % — SIGNIFICANT CHANGE UP (ref 10.3–14.5)
SODIUM SERPL-SCNC: 139 MMOL/L — SIGNIFICANT CHANGE UP (ref 135–145)
SODIUM SERPL-SCNC: 140 MMOL/L — SIGNIFICANT CHANGE UP (ref 135–145)
SP GR SPEC: 1.01 — SIGNIFICANT CHANGE UP (ref 1.01–1.02)
TROPONIN I SERPL-MCNC: <0.015 NG/ML — SIGNIFICANT CHANGE UP (ref 0–0.04)
TROPONIN I SERPL-MCNC: <0.015 NG/ML — SIGNIFICANT CHANGE UP (ref 0–0.04)
UROBILINOGEN FLD QL: NEGATIVE — SIGNIFICANT CHANGE UP
WBC # BLD: 5.84 K/UL — SIGNIFICANT CHANGE UP (ref 3.8–10.5)
WBC # FLD AUTO: 5.84 K/UL — SIGNIFICANT CHANGE UP (ref 3.8–10.5)

## 2019-11-06 PROCEDURE — 71045 X-RAY EXAM CHEST 1 VIEW: CPT | Mod: 26

## 2019-11-06 PROCEDURE — 70450 CT HEAD/BRAIN W/O DYE: CPT | Mod: 26

## 2019-11-06 PROCEDURE — 99285 EMERGENCY DEPT VISIT HI MDM: CPT

## 2019-11-06 RX ORDER — MECLIZINE HCL 12.5 MG
0 TABLET ORAL
Qty: 90 | Refills: 0 | DISCHARGE

## 2019-11-06 RX ORDER — SEVELAMER CARBONATE 2400 MG/1
800 POWDER, FOR SUSPENSION ORAL
Refills: 0 | Status: DISCONTINUED | OUTPATIENT
Start: 2019-11-06 | End: 2019-11-22

## 2019-11-06 RX ORDER — SUCROFERRIC OXYHYDROXIDE 500 MG/1
0 TABLET, CHEWABLE ORAL
Qty: 270 | Refills: 0 | DISCHARGE

## 2019-11-06 RX ORDER — LISINOPRIL 2.5 MG/1
0 TABLET ORAL
Qty: 90 | Refills: 0 | DISCHARGE

## 2019-11-06 RX ORDER — INSULIN LISPRO 100/ML
VIAL (ML) SUBCUTANEOUS
Refills: 0 | Status: DISCONTINUED | OUTPATIENT
Start: 2019-11-06 | End: 2019-11-22

## 2019-11-06 RX ORDER — LABETALOL HCL 100 MG
200 TABLET ORAL DAILY
Refills: 0 | Status: DISCONTINUED | OUTPATIENT
Start: 2019-11-06 | End: 2019-11-22

## 2019-11-06 RX ORDER — MECLIZINE HCL 12.5 MG
25 TABLET ORAL THREE TIMES A DAY
Refills: 0 | Status: DISCONTINUED | OUTPATIENT
Start: 2019-11-06 | End: 2019-11-14

## 2019-11-06 RX ORDER — AMLODIPINE BESYLATE 2.5 MG/1
10 TABLET ORAL DAILY
Refills: 0 | Status: DISCONTINUED | OUTPATIENT
Start: 2019-11-06 | End: 2019-11-22

## 2019-11-06 RX ORDER — LISINOPRIL 2.5 MG/1
40 TABLET ORAL DAILY
Refills: 0 | Status: DISCONTINUED | OUTPATIENT
Start: 2019-11-06 | End: 2019-11-22

## 2019-11-06 RX ORDER — ATORVASTATIN CALCIUM 80 MG/1
40 TABLET, FILM COATED ORAL AT BEDTIME
Refills: 0 | Status: DISCONTINUED | OUTPATIENT
Start: 2019-11-06 | End: 2019-11-22

## 2019-11-06 RX ORDER — LABETALOL HCL 100 MG
0 TABLET ORAL
Qty: 60 | Refills: 0 | DISCHARGE

## 2019-11-06 RX ORDER — HYDRALAZINE HCL 50 MG
25 TABLET ORAL
Refills: 0 | Status: DISCONTINUED | OUTPATIENT
Start: 2019-11-06 | End: 2019-11-22

## 2019-11-06 RX ORDER — MECLIZINE HCL 12.5 MG
25 TABLET ORAL ONCE
Refills: 0 | Status: COMPLETED | OUTPATIENT
Start: 2019-11-06 | End: 2019-11-06

## 2019-11-06 RX ORDER — GABAPENTIN 400 MG/1
0 CAPSULE ORAL
Qty: 30 | Refills: 0 | DISCHARGE

## 2019-11-06 RX ORDER — ROSUVASTATIN CALCIUM 5 MG/1
0 TABLET ORAL
Qty: 90 | Refills: 0 | DISCHARGE

## 2019-11-06 RX ORDER — AMLODIPINE BESYLATE 2.5 MG/1
0 TABLET ORAL
Qty: 90 | Refills: 0 | DISCHARGE

## 2019-11-06 RX ORDER — HEPARIN SODIUM 5000 [USP'U]/ML
5000 INJECTION INTRAVENOUS; SUBCUTANEOUS EVERY 12 HOURS
Refills: 0 | Status: DISCONTINUED | OUTPATIENT
Start: 2019-11-06 | End: 2019-11-22

## 2019-11-06 RX ORDER — GABAPENTIN 400 MG/1
100 CAPSULE ORAL DAILY
Refills: 0 | Status: DISCONTINUED | OUTPATIENT
Start: 2019-11-06 | End: 2019-11-12

## 2019-11-06 RX ORDER — MUPIROCIN 20 MG/G
1 OINTMENT TOPICAL
Refills: 0 | Status: COMPLETED | OUTPATIENT
Start: 2019-11-06 | End: 2019-11-11

## 2019-11-06 RX ORDER — ASPIRIN/CALCIUM CARB/MAGNESIUM 324 MG
81 TABLET ORAL DAILY
Refills: 0 | Status: DISCONTINUED | OUTPATIENT
Start: 2019-11-06 | End: 2019-11-22

## 2019-11-06 RX ORDER — ONDANSETRON 8 MG/1
4 TABLET, FILM COATED ORAL EVERY 8 HOURS
Refills: 0 | Status: DISCONTINUED | OUTPATIENT
Start: 2019-11-06 | End: 2019-11-22

## 2019-11-06 RX ORDER — CHLORHEXIDINE GLUCONATE 213 G/1000ML
1 SOLUTION TOPICAL
Refills: 0 | Status: DISCONTINUED | OUTPATIENT
Start: 2019-11-06 | End: 2019-11-22

## 2019-11-06 RX ADMIN — Medication 25 MILLIGRAM(S): at 18:55

## 2019-11-06 RX ADMIN — HEPARIN SODIUM 5000 UNIT(S): 5000 INJECTION INTRAVENOUS; SUBCUTANEOUS at 18:55

## 2019-11-06 RX ADMIN — Medication 25 MILLIGRAM(S): at 07:43

## 2019-11-06 RX ADMIN — ATORVASTATIN CALCIUM 40 MILLIGRAM(S): 80 TABLET, FILM COATED ORAL at 21:42

## 2019-11-06 RX ADMIN — Medication 25 MILLIGRAM(S): at 21:44

## 2019-11-06 RX ADMIN — MUPIROCIN 1 APPLICATION(S): 20 OINTMENT TOPICAL at 21:45

## 2019-11-06 NOTE — ED PROVIDER NOTE - PROGRESS NOTE DETAILS
Patient signed out to me by Dr. Mcpherson. Plan is to admit to hospitalist once CT is back. Patient is resting comfortably, NAD. Endorsed to Dr Mckeon Patient's PMD is Abdiaziz Steel. Will change admission to Dr. Lee. Dr. Mike sherwood.

## 2019-11-06 NOTE — ED ADULT NURSE NOTE - OBJECTIVE STATEMENT
brought in by ems c/o dizziness during the dialysis session x 35 min ago  prior to arrival. bld.drawn and sent  to lab.denies  chest pain.fistula on rt.upper arm intact .no bleeding noted

## 2019-11-06 NOTE — H&P ADULT - NSICDXPASTSURGICALHX_GEN_ALL_CORE_FT
PAST SURGICAL HISTORY:  AV fistula 3/18/2016    CRF (chronic renal failure) s/p right antecubital AV formation- 2015, s/p left antecubital formation AV - - not working, s/p right chest permacath 07/15/15    S/P  section ,     S/P craniotomy - s/p fall from 3 floors, no hx of seizure after surgery    S/P hernia repair incisional-

## 2019-11-06 NOTE — CONSULT NOTE ADULT - ASSESSMENT
55 yo F w/ PMH ESRD on HD MWF (via AVF; [facility]; Dr. Beard), HTN, DM, Hx vertigo, gastric bypass (2017) who presented from HD unit today for evaluation of dizziness and hypoglycemia. Nephrology is consulted for ESRD management.    ESRD on HD MWF  - will have HD today  - consent obtained and placed in chart  - renal diet    - continue HD on MWF schedule    Hyperkalemia  - low potassium diet  - HD today  - repeat BMP jessie AM  - can continue lisinopril    Anemia  - Hgb at goal  - no indication for LYDIA at this time    HTN  - BP elevated this AM and at home  - ? cause or result of dizziness/discomfort  - will monitor on HD  - continue home meds (amlodipine, labetalol, lisinopril)    MBD  - continue sevelamer TID w/ meals  - check phos

## 2019-11-06 NOTE — ED ADULT NURSE NOTE - ED STAT RN HANDOFF DETAILS
pt.remained stable.denies  pain. not  in distress pt.remained stable.denies  pain. not  in distress    [id number]  pT MOVED TO hOLDING area, stable, report given to CHARITY Yip

## 2019-11-06 NOTE — ED PROVIDER NOTE - CLINICAL SUMMARY MEDICAL DECISION MAKING FREE TEXT BOX
55yo F with ESRD on HD (M/W/F), HTN, hx DM no longer on meds now with episodes of hypoglycemia presents with dizziness. Exam show bilateral upper and lower extremity dysmetria, unable to stand 2/2 vertigo. Will obtain EKG, labs, CXR, CT head.    EKG nonischemic, H/H unremarkable, Cr 7, K 5.9  Discussed above with Dr. Beckham from nephrology who will schedule her HD today, currently does not recommend any acute meds to lower K level.  patient signed out to Dr. Hidalgo at 730am while awaiting CThead prior to admission.

## 2019-11-06 NOTE — PATIENT PROFILE ADULT - HOME ACCESSIBILITY CONCERNS
I have reviewed and confirmed nurses' notes for patient's medications, allergies, medical history, and surgical history. none

## 2019-11-06 NOTE — H&P ADULT - ASSESSMENT
UA -ve 54 year old female from home with PMHx ESRD on HD via right AVF (M/W/F), HTN, hx DM no longer on meds now with episodes of hypoglycemia , Anemia, Asthma, Claustrophobia, GERD, HLD, MI, AUSTIN,  Uterine Leiomyoma and PSHx of AV Fistula, R Antecubital AVF, , Craniotomy, and Hernia Repair gastric Bypass (2017) presents with dizziness since Monday night. Reports onset of dizziness described as spinning sensation 2 days ago. Reports she feels as if she were drunk and when she walks feels like shes walking on air. Reports she went to her HD session this morning and told staff of her symptoms who then sent her to ED, patient reports she did not receive her HD today, last received 2 days ago. Reports remote hx vertigo for which she used to take medication but symptoms has not occurred for many years.pt denies any Denies headache, focal weakness , numbness N/V, abdominal pain, hypotension, urinary symptoms, hematuria, melena, hematochezia. Denies smoking, alcohol, illicit drug use. allergic to pineapple    In ED :   NIHSS score on admission : 2  Ct head is negative for any acute event or change (did not receive TPA)  EKG: NSR  , T1 -ve , f/u T2  UA -ve   CXR : Mild cardiomegaly. No acute infiltrate.    LMP : 5 years ago     Pt is admitted for management of vertigo

## 2019-11-06 NOTE — H&P ADULT - PROBLEM SELECTOR PLAN 2
- ESRD Maintenance HD (M/W/F ) via right arm AVF  - last dialysis Monday 11/6 did not complete  - went for HD today inpatient  - Cr. 7.94 on admission  - K 5.9  - EKG NSR , no peaked T waves   - no crackles , pedal edema or fluid overload  - Avoid Nephrotoxic Meds/ Agents such as (NSAIDs, IV contrast, Aminoglycosides such as gentamicin, Gadolinium contrast, Phosphate containing enemas, etc..)  - Adjust Medications according to eGFR  - Renal diet and fluid restrictions 1000 ml/day when not NPO   - f/u intact PTH , 1-25 hydroxy Vit D , 25 hydroxy Vit D  - f/u CM (pt will need reinstatement of dialysis upon discharge)  ** Nephrology consulted Dr. Charles - ESRD Maintenance HD (M/W/F ) via right arm AVF  - last dialysis Monday 11/6 did not start  - went for HD today inpatient  - Cr. 7.94 on admission  - K 5.9  - EKG NSR , no peaked T waves   - no crackles , pedal edema or fluid overload  - Avoid Nephrotoxic Meds/ Agents such as (NSAIDs, IV contrast, Aminoglycosides such as gentamicin, Gadolinium contrast, Phosphate containing enemas, etc..)  - Adjust Medications according to eGFR  - Renal diet and fluid restrictions 1000 ml/day when not NPO   - c/w sevelamer TID w/ meals  - f/u intact PTH , 1-25 hydroxy Vit D , 25 hydroxy Vit D  - f/u CM (pt will need reinstatement of dialysis upon discharge)  ** Nephrology consulted Dr. Charles

## 2019-11-06 NOTE — ED ADULT TRIAGE NOTE - CHIEF COMPLAINT QUOTE
as per ems, pt felt dizzy during the dialysis session 35 min ago , pt states "I feel dizzy, but no pain"

## 2019-11-06 NOTE — CONSULT NOTE ADULT - SUBJECTIVE AND OBJECTIVE BOX
[facility]   MD Maia Gonzalez D.O. Fatima Sheikh, D.O. Ruoru Wong, PA    From 7 AM - 5 PM:  OFFICE: [phone]  Dr. Beckham cell: [phone]  Dr. Charles cell: [phone]  Dr. Gamino cell: [phone]  BRANDEN Carrasco cell: [phone]    From 5 PM - 7 AM: Answering Service: [phone]      -- INITIAL RENAL CONSULT NOTE  --------------------------------------------------------------------------------  HPI: Ms. Hurtado is a 53 yo F w/ PMH ESRD on HD MWF (via AVF; [facility]; Dr. Beard), HTN, DM, Hx vertigo, gastric bypass (2017) who presented from HD unit today for evaluation of dizziness and hypoglycemia. Reports she has had dizziness since Monday night. Had her usual HD session Monday w/o event and dizziness started later that night. Reports double vision as well. States this is different from her usual vertigo symptoms. Denies any N/V, abdominal pain, hypotension, urinary symptoms, hematuria, melena, hematochezia. States she went to the dialysis unit today, did not have HD 2/2 dizziness. Blood sugar was initially 72 but when rechecked later was 43. Pt sent to ED w/o HD session today.         PAST HISTORY  --------------------------------------------------------------------------------  PAST MEDICAL & SURGICAL HISTORY:  Myocardial infarct, old:   ESRD (end stage renal disease) on dialysis: since 6/15/2015 (M/W/F)  Asthma occurring only with upper respiratory infection: never hospitalized or intubated, last use of inhalator last year with winter  Anemia in chronic renal disease  Claustrophobia: when CPAP mask was put on her  Uterine leiomyoma, unspecified location  AUSTIN (obstructive sleep apnea)  Gastroesophageal reflux disease without esophagitis  Vertigo  HLD (hyperlipidemia)  Essential hypertension  Chronic kidney disease, unspecified stage  Diabetes mellitus, type 2  Morbid obesity  AV fistula: 3/18/2016  S/P craniotomy: - s/p fall from 3 floors, no hx of seizure after surgery  S/P hernia repair: incisional-  S/P  section: ,   CRF (chronic renal failure): s/p right antecubital AV formation- 2015, s/p left antecubital formation AV - - not working, s/p right chest permacath 07/15/15    FAMILY HISTORY:  Family history of chronic renal failure  Family history of hypertension  Family history of stroke  Diabetes mellitus, type 2    PAST SOCIAL HISTORY:    ALLERGIES & MEDICATIONS  --------------------------------------------------------------------------------  Allergies    No Known Drug Allergies  pineapple (Hives)    Intolerances      Standing Inpatient Medications    PRN Inpatient Medications      REVIEW OF SYSTEMS  --------------------------------------------------------------------------------  Gen: No fevers/chills  Skin: No rashes  Head/Eyes/Ears: Normal hearing,  + double vision + dizziness.  Respiratory: No dyspnea, cough  CV: No chest pain  GI: No abdominal pain, diarrhea, constipation, nausea, vomiting  : No dysuria, hematuria  MSK: No  edema  Heme: No easy bruising or bleeding  Psych: No significant depression    All other systems were reviewed and are negative, except as noted.    VITALS/PHYSICAL EXAM  --------------------------------------------------------------------------------  T(C): 36.9 (19 13:38), Max: 36.9 (19 13:38)  HR: 69 (19:38) (66 - 82)  BP: 187/97 (19 @ 13:38) (172/78 - 187/97)  RR: 18 (19:38) (16 - 20)  SpO2: 98% (19 13:38) (96% - 100%)  Wt(kg): --    Weight (kg): 90.9 (19 13:38)      Physical Exam:  	Gen: NAD  	HEENT: MMM  	Pulm: CTA B/L  	CV: S1S2  	Abd: Soft, +BS   	Ext: No LE edema B/L  	Neuro: Awake  	Skin: Warm and dry  	Vascular access: AVF w/ good thrill and bruit    LABS/STUDIES  --------------------------------------------------------------------------------              11.7   5.84  >-----------<  202      [19 @ 06:38]              37.7     140  |  105  |  39  ----------------------------<  74      [19 06:38]  5.9   |  27  |  7.94        Ca     8.8     [19 06:38]    TPro  6.7  /  Alb  3.4  /  TBili  0.4  /  DBili  x   /  AST  10  /  ALT  17  /  AlkPhos  78  [19 @ 06:38]    PT/INR: PT 10.6 , INR 0.96       [19 @ 06:38]  PTT: 33.2       [19 @ 06:38]    Troponin <0.015      [19 @ 06:38]    Creatinine Trend:  SCr 7.94 [:38]    Urinalysis - [19 @ 09:05]      Color Yellow / Appearance Clear / SG 1.015 / pH 9.0      Gluc 50 / Ketone Negative  / Bili Negative / Urobili Negative       Blood Negative / Protein 100 / Leuk Est Negative / Nitrite Negative      RBC 0-2 / WBC 0-2 / Hyaline  / Gran  / Sq Epi  / Non Sq Epi  / Bacteria Trace      HbA1c 4.6      [18 @ 09:46]    HBsAg Nonreact      [18 @ 17:01]

## 2019-11-06 NOTE — ED ADULT NURSE NOTE - CHIEF COMPLAINT
Pt provided with discharge instructions, prescriptions x1, instructions for follow up appointment, s/s of when to seek emergency care.  Pt verbalizes understanding.  Pt discharged in good condition.  Pt instructed not to operate vehicle or drink on ETOH on narcotic medication.      The patient is a 54y Female complaining of dizziness.

## 2019-11-06 NOTE — H&P ADULT - NSHPPHYSICALEXAM_GEN_ALL_CORE
Vital Signs Last 24 Hrs  T(C): 36.9 (06 Nov 2019 13:38), Max: 36.9 (06 Nov 2019 13:38)  T(F): 98.4 (06 Nov 2019 13:38), Max: 98.4 (06 Nov 2019 13:38)  HR: 69 (06 Nov 2019 13:38) (66 - 82)  BP: 187/97 (06 Nov 2019 13:38) (172/78 - 187/97)  BP(mean): --  RR: 18 (06 Nov 2019 13:38) (16 - 20)  SpO2: 98% (06 Nov 2019 13:38) (96% - 100%)  PHYSICAL EXAM:   · CONSTITUTIONAL: Well appearing, well nourished, awake, alert, oriented to person, place, time/situation and in no apparent distress.  · ENMT: Airway patent, Nasal mucosa clear. Mouth with normal mucosa. Throat has no vesicles, no oropharyngeal exudates and uvula is midline.  · EYES: Bilateral Horizontal Nystagmus to lateral gaze . Clear bilaterally, pupils equal, round and reactive to light.  · CARDIAC: Normal rate, regular rhythm.  Heart sounds S1, S2.  No murmurs, rubs or gallops.  · RESPIRATORY: Breath sounds clear and equal bilaterally.  · GASTROINTESTINAL: Abdomen soft, non-tender, no guarding.  · MUSCULOSKELETAL: Spine appears normal, range of motion is not limited, no muscle or joint tenderness  · NIH 7. Limb Ataxia: (2) Present in two limbs  · NIH Stroke Scale: Total: 2  · NIHSS Results: 1-4 (minor stroke)  · NEURO SIGN: Brudzinski's sign negative, Kernig's sign negative  · NEURO SPEECH: clear  · NEURO SENSATION: present and normal in 4 extremities  · Left Finger Nose Finger: HYPERMETRIA  · Right Finger Nose Finger: HYPERMETRIA  · NEURO MOTOR: normal  · NEURO POSTURING: normal  · SKIN: Skin normal color for race, warm, dry and intact. No evidence of rash.  · PSYCHIATRIC: Alert and oriented to person, place, time/situation. normal mood and affect. no apparent risk to self or others.  · HEME LYMPH: No adenopathy or splenomegaly. No cervical or inguinal lymphadenopathy. Vital Signs Last 24 Hrs  T(C): 36.9 (06 Nov 2019 13:38), Max: 36.9 (06 Nov 2019 13:38)  T(F): 98.4 (06 Nov 2019 13:38), Max: 98.4 (06 Nov 2019 13:38)  HR: 69 (06 Nov 2019 13:38) (66 - 82)  BP: 187/97 (06 Nov 2019 13:38) (172/78 - 187/97)  BP(mean): --  RR: 18 (06 Nov 2019 13:38) (16 - 20)  SpO2: 98% (06 Nov 2019 13:38) (96% - 100%)  PHYSICAL EXAM:   · CONSTITUTIONAL: Well appearing, well nourished, awake, alert, oriented to person, place, time/situation and in no apparent distress.  · ENMT: Airway patent, Nasal mucosa clear. Mouth with normal mucosa. Throat has no vesicles, no oropharyngeal exudates and uvula is midline.  · EYES: Bilateral Horizontal Nystagmus to lateral gaze . Clear bilaterally, pupils equal, round and reactive to light.  · CARDIAC: Normal rate, regular rhythm.  Heart sounds S1, S2.  No murmurs, rubs or gallops.  · RESPIRATORY: Breath sounds clear and equal bilaterally.  · GASTROINTESTINAL: Abdomen soft, non-tender, no guarding.  · MUSCULOSKELETAL: Spine appears normal, range of motion is not limited, no muscle or joint tenderness  · NIH 7. Limb Ataxia: (2) Present in two limbs  · NIH Stroke Scale: Total: 2  · NIHSS Results: 1-4 (minor stroke)  · NEURO SIGN: Brudzinski's sign negative, Kernig's sign negative  · NEURO SPEECH: clear  · NEURO SENSATION: present and normal in 4 extremities  · Left Finger Nose Finger: HYPERMETRIA  · Right Finger Nose Finger: HYPERMETRIA  · NEURO MOTOR: normal  · NEURO POSTURING: normal  · SKIN: Rt AVF w/ good thrill and bruit midline abdominal CS scar . Skin normal color for race, warm, dry and intact. No evidence of rash.  · PSYCHIATRIC: Alert and oriented to person, place, time/situation. normal mood and affect. no apparent risk to self or others.  · HEME LYMPH: No adenopathy or splenomegaly. No cervical or inguinal lymphadenopathy.

## 2019-11-06 NOTE — H&P ADULT - HISTORY OF PRESENT ILLNESS
54 year old female from home with PMHx ESRD on HD via right AVF (M/W/F), HTN, hx DM no longer on meds now with episodes of hypoglycemia , Anemia, Asthma, Claustrophobia, GERD, HLD, MI, [place],  Uterine Leiomyoma and PSHx of AV Fistula, R Antecubital AVF, , Craniotomy, and Hernia Repair gastric Bypass (2017) presents with dizziness since Monday night. Reports onset of dizziness described as spinning sensation 2 days ago. Reports she feels as if she were drunk and when she walks feels like shes walking on air. Reports she went to her HD session this morning and told staff of her symptoms who then sent her to ED, patient reports she did not receive her HD today, last received 2 days ago. Reports remote hx vertigo for which she used to take medication but symptoms has not occurred for many years.pt denies any Denies headache, focal weakness , numbness N/V, abdominal pain, hypotension, urinary symptoms, hematuria, melena, hematochezia. Denies smoking, alcohol, illicit drug use. allergic to pineapple    In ED :   NIHSS score on admission : 2  Ct head is negative for any acute event or change (did not receive TPA)  EKG: NSR  , T1 -ve , f/u T2  UA -ve   CXR : Mild cardiomegaly. No acute infiltrate.    LMP : 5 years ago

## 2019-11-06 NOTE — PATIENT PROFILE ADULT - NSPROMUTANXFEARADDRESSFT_GEN_A_NUR
Provide support. Allow expression of feelings. Communicate about diagnosis and care. Provide assistance for ADLs.

## 2019-11-06 NOTE — H&P ADULT - PROBLEM SELECTOR PLAN 4
- Patient takes Lantus at home  - will hold home medications  - will start sliding scale  - Monitor blood sugars AC/HS and adjust as needed  - goal -180.   - f/u HgA1c  - diabetic diet when not NPO   - c/w NPO and FSQ6 w/ HSS.

## 2019-11-06 NOTE — H&P ADULT - PROBLEM SELECTOR PLAN 3
- Patient takes Amlodipine , lisinopril and Labetalol at home   - c/w Amlodipine , lisinopril and Labetalolwith parameters  - Monitor BP closely and adjust medications if clinically indicated.  - Dash diet when not NPO.

## 2019-11-06 NOTE — ED PROVIDER NOTE - OBJECTIVE STATEMENT
53yo F with ESRD on HD (M/W/F), HTN, hx DM no longer on meds now with episodes of hypoglycemia presents with dizziness. Reports onset of dizziness described as spinning sensation 2 days ago. Reports she feels as if she were drunk and when she walks feels like shes walking on air. Denies headache, visual changes, focal weakness or numbness. Reports she went to her HD session this morning and told staff of her symptoms who then sent her to ED, patient reports she did not receive her HD today, last received 2 days ago. Reports remote hx vertigo for which she used to take medication but symptoms has not occurred for many years. Reports hx gastric bypass for weight loss, and recent diagnosis of hypoglycemic episodes.

## 2019-11-06 NOTE — ED ADULT NURSE NOTE - NSIMPLEMENTINTERV_GEN_ALL_ED
Implemented All Fall Risk Interventions:  [place] to call system. Call bell, personal items and telephone within reach. Instruct patient to call for assistance. Room bathroom lighting operational. Non-slip footwear when patient is off stretcher. Physically safe environment: no spills, clutter or unnecessary equipment. Stretcher in lowest position, wheels locked, appropriate side rails in place. Provide visual cue, wrist band, yellow gown, etc. Monitor gait and stability. Monitor for mental status changes and reorient to person, place, and time. Review medications for side effects contributing to fall risk. Reinforce activity limits and safety measures with patient and family.

## 2019-11-06 NOTE — H&P ADULT - PROBLEM SELECTOR PLAN 5
IMPROVE VTE Individual Risk Assessment    RISK                                                                Points    [  ] Previous VTE                                                  3    [  ] Thrombophilia                                               2    [  ] Lower limb paralysis                                      2        (unable to hold up >15 seconds)    [  ] Current Cancer                                              2         (within 6 months)  [X] Immobilization > 24 hrs                                1  [  ] ICU/CCU stay > 24 hours                              1  [X] Age > 60                                                      1    IMPROVE VTE Score _____1____  c/w Heparin SC 5000 q12 (renally dosed)

## 2019-11-06 NOTE — H&P ADULT - ATTENDING COMMENTS
55yo F with h/o  ESRD on HD (M/W/F), HTN, migraine, DM no longer on meds now with episodes of hypoglycemia presents with dizziness. Reports onset of dizziness described as spinning sensation 2 days ago. Reports she feels as if she were drunk and when she walks feels like shes walking on air. Denies headache, visual changes, focal weakness or numbness. Reports she went to her HD session this morning and told staff of her symptoms who then sent her to ED, patient reports she did not receive her HD today, last received 2 days ago. Reports remote hx vertigo for which she used to take medication but symptoms has not occurred for many years. Reports hx gastric bypass for weight loss, and recent diagnosis of hypoglycemic episodes.    pt seen in bed, a+o x3, nad, vitals stable except elevated sbp on admission, physical exam reveals no focal motor deficit, horizontal nystagmus to lateral gaze, lungs cta, regular s1s2, abd soft nd, nt, bs+. labs and diagnostic test result reviewed.    assessment   --- vertigo, r/o acs, r/o arythmia, r/o cns patho, h/o  ESRD on HD (M/W/F), HTN, migraine, DM no longer on meds      plan  --  adm to tele, aspirin, statin, meclizine prn, cont preadmit home meds, gi and dvt profilaxis  cbc, bmp, mg, phos, lipid, tsh, ce q8 x3, hgba1c,      hd as per renal    orthostatic bp q8  echo  carotid duplex    cardio cons  neuro cons  renal cons.

## 2019-11-06 NOTE — H&P ADULT - NSICDXFAMILYHX_GEN_ALL_CORE_FT
FAMILY HISTORY:  Diabetes mellitus, type 2  Family history of chronic renal failure  Family history of hypertension  Family history of stroke

## 2019-11-06 NOTE — H&P ADULT - NSICDXPASTMEDICALHX_GEN_ALL_CORE_FT
PAST MEDICAL HISTORY:  Anemia in chronic renal disease     Asthma occurring only with upper respiratory infection never hospitalized or intubated, last use of inhalator last year with winter    Chronic kidney disease, unspecified stage     Claustrophobia when CPAP mask was put on her    Diabetes mellitus, type 2     ESRD (end stage renal disease) on dialysis since 6/15/2015 (M/W/F)    Essential hypertension     Gastroesophageal reflux disease without esophagitis     HLD (hyperlipidemia)     Morbid obesity     Myocardial infarct, old 2011    AUSTIN (obstructive sleep apnea)     Uterine leiomyoma, unspecified location     Vertigo

## 2019-11-06 NOTE — H&P ADULT - PROBLEM SELECTOR PLAN 1
- p/w vertigo associated with nausea , no Headache , eye pain , blurred vision, photophobia, diplopia   - PE : Bilateral Horizontal Nystagmus to lateral gaze with Limb Ataxia  - NIHSS score on admission : 2  - Ct head is negative for any acute event or change (did not receive TPA)  - EKG: NSR  , T1 -ve , f/u T2  - NPO except meds (f/u official speech and swallow evaluation) advance after S/S  - 24 hr Telemetry monitoring to r/o cardiac arrythmia,  Frequent neurochecks q4  - permissive Hypertension , Fall precautions/Aspiration precautions  - STAT CT Head IF the patient has sudden change in mental status or neurological exam  - c/w Meclizine and zofran for symptomatic treatment.  - c/w Aspirin 81mg (Ecotrin), and Statins (Lipitor) 40mg HS  - f/u Orthostatics    - f/u carotid doppler (might need CTA head and neck)   - f/u Echocardiogram with bubble study  - f/u HbA1C and fasting lipid profile , TSH , Vitamin B12 , Folate & Vitamin D  - f/u PT evaluation to assess need for rehab  - possible need for MRI brain   ** MRI filled in chart **   *** Neurology consulted    *** Cardio consulted Dr. Stanford. - p/w vertigo associated with nausea , no Headache , eye pain , blurred vision, photophobia, diplopia   - PE : Bilateral Horizontal Nystagmus to lateral gaze with Limb Ataxia  - NIHSS score on admission : 2  - Ct head is negative for any acute event or change (did not receive TPA)  - EKG: NSR  , T1 -ve , f/u T2  - NPO except meds (f/u official speech and swallow evaluation) advance after S/S  - 24 hr Telemetry monitoring to r/o cardiac arrythmia,  Frequent neurochecks q4  - permissive Hypertension , Fall precautions/Aspiration precautions  - STAT CT Head IF the patient has sudden change in mental status or neurological exam  - c/w Meclizine and zofran for symptomatic treatment.  - c/w Aspirin 81mg (Ecotrin), and Statins (Lipitor) 40mg HS  - f/u Orthostatics    - f/u carotid doppler (might need CTA head and neck)   - f/u Echocardiogram with bubble study  - f/u HbA1C and fasting lipid profile , TSH , Vitamin B12 , Folate & Vitamin D  - f/u PT evaluation to assess need for rehab  - possible need for MRI brain   ** MRI filled in chart **   *** Neurology consulted Dr. Daniel  *** Cardio consulted Dr. Stanford.

## 2019-11-07 DIAGNOSIS — J45.909 UNSPECIFIED ASTHMA, UNCOMPLICATED: ICD-10-CM

## 2019-11-07 DIAGNOSIS — E66.01 MORBID (SEVERE) OBESITY DUE TO EXCESS CALORIES: ICD-10-CM

## 2019-11-07 DIAGNOSIS — G47.33 OBSTRUCTIVE SLEEP APNEA (ADULT) (PEDIATRIC): ICD-10-CM

## 2019-11-07 LAB
24R-OH-CALCIDIOL SERPL-MCNC: 24.4 NG/ML — LOW (ref 30–80)
ALBUMIN SERPL ELPH-MCNC: 3.1 G/DL — LOW (ref 3.5–5)
ALP SERPL-CCNC: 71 U/L — SIGNIFICANT CHANGE UP (ref 40–120)
ALT FLD-CCNC: 16 U/L DA — SIGNIFICANT CHANGE UP (ref 10–60)
ANION GAP SERPL CALC-SCNC: 7 MMOL/L — SIGNIFICANT CHANGE UP (ref 5–17)
AST SERPL-CCNC: 11 U/L — SIGNIFICANT CHANGE UP (ref 10–40)
BASOPHILS # BLD AUTO: 0.04 K/UL — SIGNIFICANT CHANGE UP (ref 0–0.2)
BASOPHILS NFR BLD AUTO: 0.7 % — SIGNIFICANT CHANGE UP (ref 0–2)
BILIRUB SERPL-MCNC: 0.5 MG/DL — SIGNIFICANT CHANGE UP (ref 0.2–1.2)
BUN SERPL-MCNC: 20 MG/DL — HIGH (ref 7–18)
CALCIUM SERPL-MCNC: 8.9 MG/DL — SIGNIFICANT CHANGE UP (ref 8.4–10.5)
CALCIUM SERPL-MCNC: 8.9 MG/DL — SIGNIFICANT CHANGE UP (ref 8.4–10.5)
CHLORIDE SERPL-SCNC: 100 MMOL/L — SIGNIFICANT CHANGE UP (ref 96–108)
CHOLEST SERPL-MCNC: 140 MG/DL — SIGNIFICANT CHANGE UP (ref 10–199)
CO2 SERPL-SCNC: 30 MMOL/L — SIGNIFICANT CHANGE UP (ref 22–31)
CREAT SERPL-MCNC: 4.96 MG/DL — HIGH (ref 0.5–1.3)
CULTURE RESULTS: SIGNIFICANT CHANGE UP
EOSINOPHIL # BLD AUTO: 0.15 K/UL — SIGNIFICANT CHANGE UP (ref 0–0.5)
EOSINOPHIL NFR BLD AUTO: 2.7 % — SIGNIFICANT CHANGE UP (ref 0–6)
FOLATE SERPL-MCNC: 6.2 NG/ML — SIGNIFICANT CHANGE UP
GLUCOSE BLDC GLUCOMTR-MCNC: 108 MG/DL — HIGH (ref 70–99)
GLUCOSE BLDC GLUCOMTR-MCNC: 118 MG/DL — HIGH (ref 70–99)
GLUCOSE BLDC GLUCOMTR-MCNC: 145 MG/DL — HIGH (ref 70–99)
GLUCOSE BLDC GLUCOMTR-MCNC: 57 MG/DL — LOW (ref 70–99)
GLUCOSE BLDC GLUCOMTR-MCNC: 57 MG/DL — LOW (ref 70–99)
GLUCOSE BLDC GLUCOMTR-MCNC: 69 MG/DL — LOW (ref 70–99)
GLUCOSE BLDC GLUCOMTR-MCNC: 76 MG/DL — SIGNIFICANT CHANGE UP (ref 70–99)
GLUCOSE SERPL-MCNC: 74 MG/DL — SIGNIFICANT CHANGE UP (ref 70–99)
HBA1C BLD-MCNC: 4.6 % — SIGNIFICANT CHANGE UP (ref 4–5.6)
HCT VFR BLD CALC: 39 % — SIGNIFICANT CHANGE UP (ref 34.5–45)
HCV AB S/CO SERPL IA: 0.11 S/CO — SIGNIFICANT CHANGE UP (ref 0–0.99)
HCV AB SERPL-IMP: SIGNIFICANT CHANGE UP
HDLC SERPL-MCNC: 41 MG/DL — LOW
HGB BLD-MCNC: 12.3 G/DL — SIGNIFICANT CHANGE UP (ref 11.5–15.5)
IMM GRANULOCYTES NFR BLD AUTO: 0.4 % — SIGNIFICANT CHANGE UP (ref 0–1.5)
LIPID PNL WITH DIRECT LDL SERPL: 75 MG/DL — SIGNIFICANT CHANGE UP
LYMPHOCYTES # BLD AUTO: 1.35 K/UL — SIGNIFICANT CHANGE UP (ref 1–3.3)
LYMPHOCYTES # BLD AUTO: 24.7 % — SIGNIFICANT CHANGE UP (ref 13–44)
MAGNESIUM SERPL-MCNC: 2.3 MG/DL — SIGNIFICANT CHANGE UP (ref 1.6–2.6)
MCHC RBC-ENTMCNC: 30.6 PG — SIGNIFICANT CHANGE UP (ref 27–34)
MCHC RBC-ENTMCNC: 31.5 GM/DL — LOW (ref 32–36)
MCV RBC AUTO: 97 FL — SIGNIFICANT CHANGE UP (ref 80–100)
MONOCYTES # BLD AUTO: 0.37 K/UL — SIGNIFICANT CHANGE UP (ref 0–0.9)
MONOCYTES NFR BLD AUTO: 6.8 % — SIGNIFICANT CHANGE UP (ref 2–14)
NEUTROPHILS # BLD AUTO: 3.54 K/UL — SIGNIFICANT CHANGE UP (ref 1.8–7.4)
NEUTROPHILS NFR BLD AUTO: 64.7 % — SIGNIFICANT CHANGE UP (ref 43–77)
NRBC # BLD: 0 /100 WBCS — SIGNIFICANT CHANGE UP (ref 0–0)
PHOSPHATE SERPL-MCNC: 5.6 MG/DL — HIGH (ref 2.5–4.5)
PLATELET # BLD AUTO: 206 K/UL — SIGNIFICANT CHANGE UP (ref 150–400)
POTASSIUM SERPL-MCNC: 5.5 MMOL/L — HIGH (ref 3.5–5.3)
POTASSIUM SERPL-SCNC: 5.5 MMOL/L — HIGH (ref 3.5–5.3)
PROT SERPL-MCNC: 6.4 G/DL — SIGNIFICANT CHANGE UP (ref 6–8.3)
PTH-INTACT FLD-MCNC: 173 PG/ML — HIGH (ref 15–65)
RBC # BLD: 4.02 M/UL — SIGNIFICANT CHANGE UP (ref 3.8–5.2)
RBC # FLD: 14.2 % — SIGNIFICANT CHANGE UP (ref 10.3–14.5)
SODIUM SERPL-SCNC: 137 MMOL/L — SIGNIFICANT CHANGE UP (ref 135–145)
SPECIMEN SOURCE: SIGNIFICANT CHANGE UP
TOTAL CHOLESTEROL/HDL RATIO MEASUREMENT: 3.4 RATIO — SIGNIFICANT CHANGE UP (ref 3.3–7.1)
TRIGL SERPL-MCNC: 118 MG/DL — SIGNIFICANT CHANGE UP (ref 10–149)
TSH SERPL-MCNC: 0.73 UU/ML — SIGNIFICANT CHANGE UP (ref 0.34–4.82)
VIT B12 SERPL-MCNC: 277 PG/ML — SIGNIFICANT CHANGE UP (ref 232–1245)
VIT D25+D1,25 OH+D1,25 PNL SERPL-MCNC: 46 PG/ML — SIGNIFICANT CHANGE UP (ref 19.9–79.3)
WBC # BLD: 5.47 K/UL — SIGNIFICANT CHANGE UP (ref 3.8–10.5)
WBC # FLD AUTO: 5.47 K/UL — SIGNIFICANT CHANGE UP (ref 3.8–10.5)

## 2019-11-07 PROCEDURE — 99232 SBSQ HOSP IP/OBS MODERATE 35: CPT

## 2019-11-07 PROCEDURE — 93880 EXTRACRANIAL BILAT STUDY: CPT | Mod: 26

## 2019-11-07 RX ORDER — SODIUM ZIRCONIUM CYCLOSILICATE 10 G/10G
10 POWDER, FOR SUSPENSION ORAL ONCE
Refills: 0 | Status: COMPLETED | OUTPATIENT
Start: 2019-11-07 | End: 2019-11-07

## 2019-11-07 RX ORDER — SODIUM ZIRCONIUM CYCLOSILICATE 10 G/10G
5 POWDER, FOR SUSPENSION ORAL DAILY
Refills: 0 | Status: COMPLETED | OUTPATIENT
Start: 2019-11-07 | End: 2019-11-10

## 2019-11-07 RX ADMIN — HEPARIN SODIUM 5000 UNIT(S): 5000 INJECTION INTRAVENOUS; SUBCUTANEOUS at 06:18

## 2019-11-07 RX ADMIN — MUPIROCIN 1 APPLICATION(S): 20 OINTMENT TOPICAL at 18:29

## 2019-11-07 RX ADMIN — AMLODIPINE BESYLATE 10 MILLIGRAM(S): 2.5 TABLET ORAL at 06:17

## 2019-11-07 RX ADMIN — SODIUM ZIRCONIUM CYCLOSILICATE 10 GRAM(S): 10 POWDER, FOR SUSPENSION ORAL at 18:29

## 2019-11-07 RX ADMIN — SODIUM ZIRCONIUM CYCLOSILICATE 5 GRAM(S): 10 POWDER, FOR SUSPENSION ORAL at 18:29

## 2019-11-07 RX ADMIN — Medication 25 MILLIGRAM(S): at 14:19

## 2019-11-07 RX ADMIN — GABAPENTIN 100 MILLIGRAM(S): 400 CAPSULE ORAL at 12:03

## 2019-11-07 RX ADMIN — Medication 81 MILLIGRAM(S): at 12:03

## 2019-11-07 RX ADMIN — ATORVASTATIN CALCIUM 40 MILLIGRAM(S): 80 TABLET, FILM COATED ORAL at 21:41

## 2019-11-07 RX ADMIN — Medication 25 MILLIGRAM(S): at 21:41

## 2019-11-07 RX ADMIN — SEVELAMER CARBONATE 800 MILLIGRAM(S): 2400 POWDER, FOR SUSPENSION ORAL at 18:29

## 2019-11-07 RX ADMIN — Medication 25 MILLIGRAM(S): at 06:18

## 2019-11-07 RX ADMIN — SEVELAMER CARBONATE 800 MILLIGRAM(S): 2400 POWDER, FOR SUSPENSION ORAL at 09:03

## 2019-11-07 RX ADMIN — LISINOPRIL 40 MILLIGRAM(S): 2.5 TABLET ORAL at 06:17

## 2019-11-07 RX ADMIN — CHLORHEXIDINE GLUCONATE 1 APPLICATION(S): 213 SOLUTION TOPICAL at 06:47

## 2019-11-07 RX ADMIN — Medication 200 MILLIGRAM(S): at 06:17

## 2019-11-07 RX ADMIN — SEVELAMER CARBONATE 800 MILLIGRAM(S): 2400 POWDER, FOR SUSPENSION ORAL at 12:04

## 2019-11-07 RX ADMIN — MUPIROCIN 1 APPLICATION(S): 20 OINTMENT TOPICAL at 06:47

## 2019-11-07 RX ADMIN — Medication 25 MILLIGRAM(S): at 18:29

## 2019-11-07 RX ADMIN — HEPARIN SODIUM 5000 UNIT(S): 5000 INJECTION INTRAVENOUS; SUBCUTANEOUS at 18:30

## 2019-11-07 NOTE — PROGRESS NOTE ADULT - PROBLEM SELECTOR PLAN 1
- p/w vertigo associated with nausea , no Headache , eye pain , blurred vision, photophobia, diplopia   - PE : Bilateral Horizontal Nystagmus to lateral gaze with Limb Ataxia  - NIHSS score on admission : 2  - Ct head is negative for any acute event or change (did not receive TPA)  - EKG: NSR  , trops negative  - NPO except meds   - 24 hr Telemetry monitoring to r/o cardiac arrythmia,  Frequent neurochecks q4  - permissive Hypertension , Fall precautions/Aspiration precautions  - STAT CT Head IF the patient has sudden change in mental status or neurological exam  - c/w Meclizine and zofran for symptomatic treatment.  - c/w Aspirin 81mg (Ecotrin), and Statins (Lipitor) 40mg HS  - f/u Orthostatics    - f/u Echocardiogram with bubble study  - PT suggested home with home PT  *** Neurology consulted Dr. Daniel  *** Cardio consulted Dr. Stanford.

## 2019-11-07 NOTE — CONSULT NOTE ADULT - ASSESSMENT
54 year old female from home with PMHx ESRD on HD via right AVF (M/W/F), HTN, hx DM no longer on meds now with episodes of hypoglycemia , Anemia, Asthma, Claustrophobia, GERD, HLD, MI, [place],  Uterine Leiomyoma and PSHx of AV Fistula, R Antecubital AVF, , Craniotomy, and Hernia Repair gastric Bypass (2017) presents with dizziness and abnormal EKG.  1.Tele monitoring.  2.Neurology eval.  3.Orthostatic BP q shift.  4.Echocardiogram.  5.HTN-cont bp medication.  6.DM-Diet.  7.ESRD-HD as per renal.  8.Lipid d/o-statin.  9.GI and DVT prophylaxis.

## 2019-11-07 NOTE — CONSULT NOTE ADULT - SUBJECTIVE AND OBJECTIVE BOX
History of Present Illness: [from Admission H&P]  Reason for Admission: Vertigo	  History of Present Illness: 	  54 year old female from home with PMHx ESRD on HD via right AVF (M/W/F), HTN, hx DM no longer on meds now with episodes of hypoglycemia , Anemia, Asthma, Claustrophobia, GERD, HLD, MI, [place],  Uterine Leiomyoma and PSHx of AV Fistula, R Antecubital AVF, , Craniotomy, and Hernia Repair gastric Bypass (2017) presents with dizziness since Monday night. Reports onset of dizziness described as spinning sensation 2 days ago. Reports she feels as if she were drunk and when she walks feels like shes walking on air. Reports she went to her HD session this morning and told staff of her symptoms who then sent her to ED, patient reports she did not receive her HD today, last received 2 days ago. Reports remote hx vertigo for which she used to take medication but symptoms has not occurred for many years.pt denies any Denies headache, focal weakness , numbness N/V, abdominal pain, hypotension, urinary symptoms, hematuria, melena, hematochezia. Denies smoking, alcohol, illicit drug use. allergic to pineapple    In ED :   NIHSS score on admission : 2  Ct head is negative for any acute event or change (did not receive TPA)  EKG: NSR  , T1 -ve , f/u T2  UA -ve   CXR : Mild cardiomegaly. No acute infiltrate.    LMP : 5 years ago      Review of Systems:  Other Review of Systems: All other review of systems negative, except as noted in HPI	      Allergies and Intolerances:        Allergies:  	No Known Drug Allergies:   	pineapple: Food, Hives, pineapple    Home Medications:   * Patient Currently Takes Medications as of 02-Mar-2018 14:56 documented in Structured Notes  · 	omeprazole 40 mg oral delayed release capsule: 1 cap(s) orally once a day  · 	meclizine 25 mg oral tablet: 1 tab(s) orally 3 times a day , as needed for dizziness   · 	sevelamer hydrochloride 800 mg oral tablet: 2 tab(s) orally 3 times a day (with meals)  · 	acetaminophen-oxyCODONE 325 mg-5 mg oral tablet: 1 tab(s) orally every 6 hours prn pain MDD:4  · 	Calcium 600+D 600 mg-200 intl units oral tablet: 1 tab(s) orally once a day  · 	labetalol 200 mg oral tablet: 1 tab(s) orally once a day  · 	Crestor 10 mg oral tablet: 1 tab(s) orally once a day (at bedtime)  · 	omega-3 polyunsaturated fatty acids 1000 mg oral capsule: 1 cap(s) orally 2 times a day  · 	amLODIPine 10 mg oral tablet: 1 tab(s) orally once a day  · 	gabapentin 100 mg oral capsule: 1 cap(s) orally once a day  · 	aspirin 81 mg oral tablet: 1 tab(s) orally once a day  · 	lisinopril 40 mg oral tablet: 1 tab(s) orally once a day    Patient History:    Past Medical, Past Surgical, and Family History:  PAST MEDICAL HISTORY:  Anemia in chronic renal disease     Asthma occurring only with upper respiratory infection never hospitalized or intubated, last use of inhalator last year with winter    Chronic kidney disease, unspecified stage     Claustrophobia when CPAP mask was put on her    Diabetes mellitus, type 2     ESRD (end stage renal disease) on dialysis since 6/15/2015 (M/W/F)    Essential hypertension     Gastroesophageal reflux disease without esophagitis     HLD (hyperlipidemia)     Morbid obesity     Myocardial infarct, old     [place] (obstructive sleep apnea)     Uterine leiomyoma, unspecified location     Vertigo.     PAST SURGICAL HISTORY:  AV fistula 3/18/2016    CRF (chronic renal failure) s/p right antecubital AV formation- 2015, s/p left antecubital formation AV - - not working, s/p right chest permacath 07/15/15    S/P  section ,     S/P craniotomy - s/p fall from 3 floors, no hx of seizure after surgery    S/P hernia repair incisional-.     [End of quoted text.]      FAMILY HISTORY:  Diabetes mellitus, type 2  Family history of chronic renal failure  Family history of hypertension  Family history of stroke.      ADDITIONAL HISTORY    Pt interviewed by me in [place]ese.  She avers she has been having dizzines for many (?10) years and that there has been no change in severity or frequency.        EXAMINATION    Obese, possibly morbidly so .Alert.  Normal comprehension, expression, prosody, articulation in [place]ese.  PERRL; bilat cataracts; EOMI (needs to concentrate and to be given a prominent target to test EOMs).  Generalized weakness with no grossly evident lateralized orfocosl motor deficits.  Normal facial movements.  No UE drift.  Andres symmetric and WNL.      Proprioception is markedly impaired in fingers and lost in toes (does not detect even rapid movement of large toes).      The upper trapezii, paravervical musces, and occipital groove are all tender topalpation; non-radiating pain; no vertigo provoked on palpation.      IMPRESSION    Cervicogenic vertigo.      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        RECOMMENDATIONS    ESR, CRP, SPEP, B12 and folate levels, TSH, DICKSON, RF.    Refer for  out-patient electrodiagostic studies.

## 2019-11-07 NOTE — PROGRESS NOTE ADULT - SUBJECTIVE AND OBJECTIVE BOX
Patient is a 54y old  Female who presents with a chief complaint of Vertigo (2019 09:24)    pt seen in icu [  ], reg med floor [   ], bed [  ], chair at bedside [   ], a+o x3 [  ], lethargic [  ],  nad [  ]    khan [  ], ngt [  ], peg [  ], et tube [  ], cent line [  ], picc line [  ]        Allergies    No Known Drug Allergies  pineapple (Hives)        Vitals    T(F): 98.3 (19 @ 08:22), Max: 98.6 (19 @ 04:55)  HR: 65 (19 @ 08:22) (65 - 82)  BP: 159/65 (19 @ 08:22) (159/65 - 187/97)  RR: 17 (19 @ 08:22) (17 - 18)  SpO2: 96% (19 @ 08:22) (96% - 100%)  Wt(kg): --  CAPILLARY BLOOD GLUCOSE      POCT Blood Glucose.: 76 mg/dL (2019 08:09)      Labs                          12.3   5.47  )-----------( 206      ( 2019 06:31 )             39.0           137  |  100  |  20<H>  ----------------------------<  74  5.5<H>   |  30  |  4.96<H>    Ca    8.9      2019 06:31  Phos  5.6       Mg     2.3         TPro  6.4  /  Alb  3.1<L>  /  TBili  0.5  /  DBili  x   /  AST  11  /  ALT  16  /  AlkPhos  71  11-07      CARDIAC MARKERS ( 2019 17:36 )  <0.015 ng/mL / x     / 79 U/L / x     / 1.7 ng/mL  CARDIAC MARKERS ( 2019 06:38 )  <0.015 ng/mL / x     / x     / x     / x                Radiology Results      Meds    MEDICATIONS  (STANDING):  amLODIPine   Tablet 10 milliGRAM(s) Oral daily  aspirin  chewable 81 milliGRAM(s) Oral daily  atorvastatin 40 milliGRAM(s) Oral at bedtime  chlorhexidine 4% Liquid 1 Application(s) Topical <User Schedule>  gabapentin 100 milliGRAM(s) Oral daily  heparin  Injectable 5000 Unit(s) SubCutaneous every 12 hours  hydrALAZINE 25 milliGRAM(s) Oral two times a day  insulin lispro (HumaLOG) corrective regimen sliding scale   SubCutaneous Before meals and at bedtime  labetalol 200 milliGRAM(s) Oral daily  lisinopril 40 milliGRAM(s) Oral daily  meclizine 25 milliGRAM(s) Oral three times a day  mupirocin 2% Ointment 1 Application(s) Both Nostrils two times a day  sevelamer carbonate 800 milliGRAM(s) Oral three times a day with meals      MEDICATIONS  (PRN):  ondansetron Injectable 4 milliGRAM(s) IV Push every 8 hours PRN Nausea and/or Vomiting      Physical Exam    Neuro :  no focal deficits  Respiratory: CTA B/L  CV: RRR, S1S2, no murmurs,   Abdominal: Soft, NT, ND +BS,  Extremities: No edema, + peripheral pulses    ASSESSMENT    vertigo,   headache poss 2nd vertiginous migraine  r/o r/o cns patho  r/o acs,   r/o arythmia,   h/o  ESRD on HD (M/W/F),   HTN,   migraine,   DM no longer on meds  Myocardial infarct, old  ESRD (end stage renal disease) on dialysis  Asthma occurring only with upper respiratory infection  Anemia in chronic renal disease  Claustrophobia  Uterine leiomyoma, unspecified location  [place] (obstructive sleep apnea)  Gastroesophageal reflux disease without esophagitis  Vertigo  HLD (hyperlipidemia)  Morbid obesity  AV fistula  S/P craniotomy  S/P hernia repair  S/P  section        PLAN      cont tele,   cont aspirin, statin,   cont meclizine prn,  ct head neg noted  neuro cons   f/u carotid duplex  ce q8 x 2 neg noted above  f/u echo  f/u orthostatic bp q8  hgba1c,  renal f/u   s/p hd yesterday  cont hd as per renal   pulm cons  pft outpt  cont current meds

## 2019-11-07 NOTE — PROGRESS NOTE ADULT - SUBJECTIVE AND OBJECTIVE BOX
PGY 1 Note discussed with supervising resident and primary attending    Patient is a 54y old  Female who presents with a chief complaint of Vertigo (2019 11:50)      INTERVAL HPI/OVERNIGHT EVENTS: Patient seen and examined at the bedside.     MEDICATIONS  (STANDING):  amLODIPine   Tablet 10 milliGRAM(s) Oral daily  aspirin  chewable 81 milliGRAM(s) Oral daily  atorvastatin 40 milliGRAM(s) Oral at bedtime  chlorhexidine 4% Liquid 1 Application(s) Topical <User Schedule>  gabapentin 100 milliGRAM(s) Oral daily  heparin  Injectable 5000 Unit(s) SubCutaneous every 12 hours  hydrALAZINE 25 milliGRAM(s) Oral two times a day  insulin lispro (HumaLOG) corrective regimen sliding scale   SubCutaneous Before meals and at bedtime  labetalol 200 milliGRAM(s) Oral daily  lisinopril 40 milliGRAM(s) Oral daily  meclizine 25 milliGRAM(s) Oral three times a day  mupirocin 2% Ointment 1 Application(s) Both Nostrils two times a day  sevelamer carbonate 800 milliGRAM(s) Oral three times a day with meals    MEDICATIONS  (PRN):  ondansetron Injectable 4 milliGRAM(s) IV Push every 8 hours PRN Nausea and/or Vomiting      __________________________________________________  REVIEW OF SYSTEMS:    CONSTITUTIONAL: No fever,   EYES: no acute visual disturbances  NECK: No pain or stiffness  RESPIRATORY: No cough; No shortness of breath  CARDIOVASCULAR: No chest pain, no palpitations  GASTROINTESTINAL: No pain. No nausea or vomiting; No diarrhea   NEUROLOGICAL: No headache or numbness, no tremors  MUSCULOSKELETAL: No joint pain, no muscle pain  GENITOURINARY: no dysuria, no frequency, no hesitancy  PSYCHIATRY: no depression , no anxiety  ALL OTHER  ROS negative        Vital Signs Last 24 Hrs  T(C): 36.8 (2019 08:22), Max: 37 (2019 04:55)  T(F): 98.3 (2019 08:22), Max: 98.6 (2019 04:55)  HR: 65 (2019 08:22) (65 - 82)  BP: 159/65 (2019 08:22) (159/65 - 187/97)  BP(mean): --  RR: 17 (2019 08:22) (17 - 18)  SpO2: 96% (2019 08:22) (96% - 100%)    ________________________________________________  PHYSICAL EXAM:  GENERAL: NAD  HEENT: Normocephalic;  conjunctivae and sclerae clear; moist mucous membranes;   NECK : supple  CHEST/LUNG: Clear to auscultation bilaterally with good air entry   HEART: S1 S2  regular; no murmurs, gallops or rubs  ABDOMEN: Soft, Nontender, Nondistended; Bowel sounds present  EXTREMITIES: no cyanosis; no edema; no calf tenderness  SKIN: warm and dry; no rash  NERVOUS SYSTEM:  Awake and alert; Oriented  to place, person and time ; no new deficits    _________________________________________________  LABS:                        12.3   5.47  )-----------( 206      ( 2019 06:31 )             39.0         137  |  100  |  20<H>  ----------------------------<  74  5.5<H>   |  30  |  4.96<H>    Ca    8.9      2019 06:31  Phos  5.6       Mg     2.3         TPro  6.4  /  Alb  3.1<L>  /  TBili  0.5  /  DBili  x   /  AST  11  /  ALT  16  /  AlkPhos  71      PT/INR - ( 2019 06:38 )   PT: 10.6 sec;   INR: 0.96 ratio         PTT - ( 2019 06:38 )  PTT:33.2 sec  Urinalysis Basic - ( 2019 09:05 )    Color: Yellow / Appearance: Clear / S.015 / pH: x  Gluc: x / Ketone: Negative  / Bili: Negative / Urobili: Negative   Blood: x / Protein: 100 / Nitrite: Negative   Leuk Esterase: Negative / RBC: 0-2 /HPF / WBC 0-2 /HPF   Sq Epi: x / Non Sq Epi: x / Bacteria: Trace /HPF      CAPILLARY BLOOD GLUCOSE      POCT Blood Glucose.: 76 mg/dL (2019 08:09)  POCT Blood Glucose.: 147 mg/dL (2019 21:08)  POCT Blood Glucose.: 75 mg/dL (2019 17:12)  POCT Blood Glucose.: 85 mg/dL (2019 12:37)        RADIOLOGY & ADDITIONAL TESTS:    Imaging Personally Reviewed:  YES/NO    Consultant(s) Notes Reviewed:   YES/ No    Care Discussed with Consultants :     Plan of care was discussed with patient and /or primary care giver; all questions and concerns were addressed and care was aligned with patient's wishes. PGY 1 Note discussed with supervising resident and primary attending    Patient is a 54y old  Female who presents with a chief complaint of Vertigo (2019 11:50)      INTERVAL HPI/OVERNIGHT EVENTS: Patient seen and examined at the bedside. Pt complaining of still feeling dizzy. She also c/o right sided facial pain. Has difficulty walking with dizziness.    MEDICATIONS  (STANDING):  amLODIPine   Tablet 10 milliGRAM(s) Oral daily  aspirin  chewable 81 milliGRAM(s) Oral daily  atorvastatin 40 milliGRAM(s) Oral at bedtime  chlorhexidine 4% Liquid 1 Application(s) Topical <User Schedule>  gabapentin 100 milliGRAM(s) Oral daily  heparin  Injectable 5000 Unit(s) SubCutaneous every 12 hours  hydrALAZINE 25 milliGRAM(s) Oral two times a day  insulin lispro (HumaLOG) corrective regimen sliding scale   SubCutaneous Before meals and at bedtime  labetalol 200 milliGRAM(s) Oral daily  lisinopril 40 milliGRAM(s) Oral daily  meclizine 25 milliGRAM(s) Oral three times a day  mupirocin 2% Ointment 1 Application(s) Both Nostrils two times a day  sevelamer carbonate 800 milliGRAM(s) Oral three times a day with meals    MEDICATIONS  (PRN):  ondansetron Injectable 4 milliGRAM(s) IV Push every 8 hours PRN Nausea and/or Vomiting      __________________________________________________  REVIEW OF SYSTEMS:    CONSTITUTIONAL: No fever,   EYES: no acute visual disturbances  NECK: No pain or stiffness  RESPIRATORY: No cough; No shortness of breath  CARDIOVASCULAR: No chest pain, no palpitations  GASTROINTESTINAL: No pain. No nausea or vomiting; No diarrhea   NEUROLOGICAL: dizziness, right sided facial pain  MUSCULOSKELETAL: No joint pain, no muscle pain  GENITOURINARY: no dysuria, no frequency, no hesitancy  PSYCHIATRY: no depression , no anxiety  ALL OTHER  ROS negative        Vital Signs Last 24 Hrs  T(C): 36.8 (2019 08:22), Max: 37 (2019 04:55)  T(F): 98.3 (2019 08:22), Max: 98.6 (2019 04:55)  HR: 65 (2019 08:22) (65 - 82)  BP: 159/65 (2019 08:22) (159/65 - 187/97)  BP(mean): --  RR: 17 (2019 08:22) (17 - 18)  SpO2: 96% (2019 08:22) (96% - 100%)    ________________________________________________  PHYSICAL EXAM:  GENERAL: NAD  HEENT: Normocephalic;  conjunctivae and sclerae clear; moist mucous membranes;   NECK : supple  CHEST/LUNG: Clear to auscultation bilaterally with good air entry   HEART: S1 S2  regular; no murmurs, gallops or rubs  ABDOMEN: Soft, Nontender, Nondistended; Bowel sounds present  EXTREMITIES: no cyanosis; no edema; no calf tenderness  SKIN: warm and dry; no rash  NERVOUS SYSTEM:  Awake and alert; Oriented  to place, person and time ; unable to follow finger laterally to right    _________________________________________________  LABS:                        12.3   5.47  )-----------( 206      ( 2019 06:31 )             39.0     11    137  |  100  |  20<H>  ----------------------------<  74  5.5<H>   |  30  |  4.96<H>    Ca    8.9      2019 06:31  Phos  5.6     11  Mg     2.3         TPro  6.4  /  Alb  3.1<L>  /  TBili  0.5  /  DBili  x   /  AST  11  /  ALT  16  /  AlkPhos  71  1107    PT/INR - ( 2019 06:38 )   PT: 10.6 sec;   INR: 0.96 ratio         PTT - ( 2019 06:38 )  PTT:33.2 sec  Urinalysis Basic - ( 2019 09:05 )    Color: Yellow / Appearance: Clear / S.015 / pH: x  Gluc: x / Ketone: Negative  / Bili: Negative / Urobili: Negative   Blood: x / Protein: 100 / Nitrite: Negative   Leuk Esterase: Negative / RBC: 0-2 /HPF / WBC 0-2 /HPF   Sq Epi: x / Non Sq Epi: x / Bacteria: Trace /HPF      CAPILLARY BLOOD GLUCOSE      POCT Blood Glucose.: 76 mg/dL (2019 08:09)  POCT Blood Glucose.: 147 mg/dL (2019 21:08)  POCT Blood Glucose.: 75 mg/dL (2019 17:12)  POCT Blood Glucose.: 85 mg/dL (2019 12:37)        RADIOLOGY & ADDITIONAL TESTS:    < from: US Duplex Carotid Arteries Complete, Bilateral (19 @ 17:32) >    EXAM:  US DPLX CAROTIDS COMPL BI                            PROCEDURE DATE:  2019          INTERPRETATION:  CLINICAL INFORMATION: Vertigo. Dizziness.    TECHNIQUE: Carotid duplex examination using grayscale B mode, color flow,   and spectral Doppler.  COMPARISON: 2018.    Findings:    RIGHT --    Common carotid artery: No significant plaque formation. Peak systolic   velocity 84 cm/sec.    Internal carotid artery: No significant plaque formation. Peak systolic   velocity 70 cm/sec. End-diastolic velocity 21 cm/sec.    ICA/CCA ratio: 0.8    External carotid artery: Peak systolic velocity 109 cm/sec.    Vertebral artery: Antegrade flow.    ====================================================================    LEFT --    Common carotid artery: No significant plaque formation. Peak systolic   velocity 92 cm/sec.    Internal carotid artery: No significant plaque formation. Peak systolic   velocity 71 cm/sec. End-diastolic velocity 22 cm/sec.    ICA/CCA ratio: 0.8    External carotid artery: Peak systolic velocity 87 cm/sec.    Vertebral artery: Antegrade flow.        IMPRESSION:    1.  Right carotid artery: No evidence of hemodynamically significant   stenosis.   2.  Left carotid artery: No evidence of hemodynamicallysignificant   stenosis.  3.  Vertebral arteries: Antegrade flow.      Imaging Personally Reviewed:  YES/NO    Consultant(s) Notes Reviewed:   YES/ No    Care Discussed with Consultants :     Plan of care was discussed with patient and /or primary care giver; all questions and concerns were addressed and care was aligned with patient's wishes.

## 2019-11-07 NOTE — PROGRESS NOTE ADULT - PROBLEM SELECTOR PLAN 2
- ESRD Maintenance HD (M/W/F ) via right arm AVF  - last dialysis Monday 11/6 did not start  - went for HD yesterday  -HD tomorrow  -elevated potassium  -started on Lokelma  - Avoid Nephrotoxic Meds/ Agents such as (NSAIDs, IV contrast, Aminoglycosides such as gentamicin, Gadolinium contrast, Phosphate containing enemas, etc..)  - Adjust Medications according to eGFR  - Renal diet and fluid restrictions 1000 ml/day  - c/w sevelamer TID w/ meals  - f/u CM (pt will need reinstatement of dialysis upon discharge)  ** Nephrology consulted Dr. Charles

## 2019-11-07 NOTE — CONSULT NOTE ADULT - SUBJECTIVE AND OBJECTIVE BOX
CHIEF COMPLAINT:Patient is a 54y old  Female who presents with a chief complaint of Vertigo .      HPI:  54 year old female from home with PMHx ESRD on HD via right AVF (M/W/F), HTN, hx DM no longer on meds now with episodes of hypoglycemia , Anemia, Asthma, Claustrophobia, GERD, HLD, MI, [place],  Uterine Leiomyoma and PSHx of AV Fistula, R Antecubital AVF, , Craniotomy, and Hernia Repair gastric Bypass (2017) presents with dizziness since Monday night. Reports onset of dizziness described as spinning sensation 2 days ago. Reports she feels as if she were drunk and when she walks feels like shes walking on air. Reports she went to her HD session this morning and told staff of her symptoms who then sent her to ED, patient reports she did not receive her HD today, last received 2 days ago. Reports remote hx vertigo for which she used to take medication but symptoms has not occurred for many years.pt denies any Denies headache, focal weakness , numbness N/V, abdominal pain, hypotension, urinary symptoms, hematuria, melena, hematochezia. Denies smoking, alcohol, illicit drug use. allergic to pineapple      PAST MEDICAL & SURGICAL HISTORY:  Myocardial infarct, old:   ESRD (end stage renal disease) on dialysis: since 6/15/2015 (M/W/F)  Asthma occurring only with upper respiratory infection: never hospitalized or intubated, last use of inhalator last year with winter  Anemia in chronic renal disease  Claustrophobia: when CPAP mask was put on her  Uterine leiomyoma, unspecified location  [place] (obstructive sleep apnea)  Gastroesophageal reflux disease without esophagitis  Vertigo  HLD (hyperlipidemia)  Essential hypertension  Chronic kidney disease, unspecified stage  Diabetes mellitus, type 2  Morbid obesity  AV fistula: 3/18/2016  S/P craniotomy: - s/p fall from 3 floors, no hx of seizure after surgery  S/P hernia repair: incisional-  S/P  section: ,   CRF (chronic renal failure): s/p right antecubital AV formation- 2015, s/p left antecubital formation AV - - not working, s/p right chest permacath 07/15/15      MEDICATIONS  (STANDING):  amLODIPine   Tablet 10 milliGRAM(s) Oral daily  aspirin  chewable 81 milliGRAM(s) Oral daily  atorvastatin 40 milliGRAM(s) Oral at bedtime  chlorhexidine 4% Liquid 1 Application(s) Topical <User Schedule>  gabapentin 100 milliGRAM(s) Oral daily  heparin  Injectable 5000 Unit(s) SubCutaneous every 12 hours  hydrALAZINE 25 milliGRAM(s) Oral two times a day  insulin lispro (HumaLOG) corrective regimen sliding scale   SubCutaneous Before meals and at bedtime  labetalol 200 milliGRAM(s) Oral daily  lisinopril 40 milliGRAM(s) Oral daily  meclizine 25 milliGRAM(s) Oral three times a day  mupirocin 2% Ointment 1 Application(s) Both Nostrils two times a day  sevelamer carbonate 800 milliGRAM(s) Oral three times a day with meals    MEDICATIONS  (PRN):  ondansetron Injectable 4 milliGRAM(s) IV Push every 8 hours PRN Nausea and/or Vomiting      FAMILY HISTORY:  Family history of chronic renal failure  Family history of hypertension  Family history of stroke  Diabetes mellitus, type 2      SOCIAL HISTORY:    [x ] Non-smoker    [x ] Alcohol-denies    Allergies    No Known Drug Allergies  pineapple (Hives)    Intolerances    	    REVIEW OF SYSTEMS:  CONSTITUTIONAL: No fever, weight loss, or fatigue  EYES: No eye pain, visual disturbances, or discharge  ENT:  No difficulty hearing, tinnitus, vertigo; No sinus or throat pain  NECK: No pain or stiffness  RESPIRATORY: No cough, wheezing, chills or hemoptysis; No Shortness of Breath  CARDIOVASCULAR: No chest pain, palpitations, passing out, + dizziness  GASTROINTESTINAL: No abdominal or epigastric pain. No nausea, vomiting, or hematemesis; No diarrhea or constipation. No melena or hematochezia.  GENITOURINARY: No dysuria, frequency, hematuria, or incontinence  NEUROLOGICAL: No headaches, memory loss, loss of strength, numbness, or tremors  SKIN: No itching, burning, rashes, or lesions   LYMPH Nodes: No enlarged glands  ENDOCRINE: No heat or cold intolerance; No hair loss  MUSCULOSKELETAL: No joint pain or swelling; No muscle, back, or extremity pain  PSYCHIATRIC: No depression, anxiety, mood swings, or difficulty sleeping  HEME/LYMPH: No easy bruising, or bleeding gums  ALLERGY AND IMMUNOLOGIC: No hives or eczema	      PHYSICAL EXAM:  T(C): 36.8 (19 @ 08:22), Max: 37 (19 @ 04:55)  HR: 65 (19 @ 08:22) (65 - 74)  BP: 159/65 (19 @ 08:22) (159/65 - 187/97)  RR: 17 (19 @ 08:22) (17 - 18)  SpO2: 96% (19 @ 08:22) (96% - 100%)    Appearance: Normal	  HEENT:   Normal oral mucosa, PERRL, EOMI	  Lymphatic: No lymphadenopathy  Cardiovascular: Normal S1 S2, No JVD, No murmurs, No edema  Respiratory: Lungs clear to auscultation	  Psychiatry: A & O x 3, Mood & affect appropriate  Gastrointestinal:  Soft, Non-tender, + BS	  Skin: No rashes, No ecchymoses, No cyanosis	  Neurologic: Non-focal  Extremities: Normal range of motion, No clubbing, cyanosis or edema  Vascular: Peripheral pulses palpable 2+ bilaterally     	    ECG:  Normal sinus rhythm  Low voltage QRS  Cannot rule out Anterior infarct , age undetermined  	    	  LABS:	 	      CARDIAC MARKERS ( 2019 17:36 )  <0.015 ng/mL / x     / 79 U/L / x     / 1.7 ng/mL  CARDIAC MARKERS ( 2019 06:38 )  <0.015 ng/mL / x     / x     / x     / x                                  12.3   5.47  )-----------( 206      ( 2019 06:31 )             39.0         137  |  100  |  20<H>  ----------------------------<  74  5.5<H>   |  30  |  4.96<H>    Ca    8.9      2019 06:31  Phos  5.6       Mg     2.3         TPro  6.4  /  Alb  3.1<L>  /  TBili  0.5  /  DBili  x   /  AST  11  /  ALT  16  /  AlkPhos  71      Lipid Profile: Cholesterol 140  LDL 75  HDL 41      HgA1c: Hemoglobin A1C, Whole Blood: 4.6 % ( @ 10:26)    TSH: Thyroid Stimulating Hormone, Serum: 0.73 uU/mL ( @ 06:31)        EXAM:  CT BRAIN                            PROCEDURE DATE:  2019          INTERPRETATION:  CLINICAL INFORMATION: Vertigo.    COMPARISON: MR dated 2018.    PROCEDURE:    Axial sections of the brain were obtained from base to vertex, without   the intravenous administration of contrast material. Sagittal and coronal   reformats were then generated from the axial images.    FINDINGS:    There is no intracranial hemorrhage, mass or shift of the midline   structures. There are involutional changes.    The brain stem is unremarkable.  No cerebellopontine angle lesion is   appreciated.     The fourth, third and lateral ventricles are normal position.  No   subdural or epidural collections are present.     No acute fracture or destructive lesion is identified. Left frontal ileana   hole is noted.    The sinuses and mastoids are clear.    IMPRESSION:    No acute intracranial hemorrhage.

## 2019-11-07 NOTE — PROGRESS NOTE ADULT - PROBLEM SELECTOR PLAN 3
- Patient takes Amlodipine , lisinopril and Labetalol at home   - c/w Amlodipine , lisinopril and Labetalolwith parameters  - Monitor BP closely and adjust medications if clinically indicated.  - Dash diet

## 2019-11-07 NOTE — CONSULT NOTE ADULT - SUBJECTIVE AND OBJECTIVE BOX
PULMONARY CONSULT NOTE      ORI JAMESON  MRN-[id number]    Patient is a 54y old  Female who presents with a chief complaint of Vertigo (2019 10:13)    History of Present Illness:  Reason for Admission: Vertigo	  History of Present Illness: 	  54 year old female from home with PMHx ESRD on HD via right AVF (M/W/F), HTN, hx DM no longer on meds now with episodes of hypoglycemia , Anemia, Asthma, Claustrophobia, GERD, HLD, MI, [place],  Uterine Leiomyoma and PSHx of AV Fistula, R Antecubital AVF, , Craniotomy, and Hernia Repair gastric Bypass (2017) presents with dizziness since Monday night. Reports onset of dizziness described as spinning sensation 2 days ago. Reports she feels as if she were drunk and when she walks feels like shes walking on air. Reports she went to her HD session this morning and told staff of her symptoms who then sent her to ED, patient reports she did not receive her HD today, last received 2 days ago. Reports remote hx vertigo for which she used to take medication but symptoms has not occurred for many years.pt denies any Denies headache, focal weakness , numbness N/V, abdominal pain, hypotension, urinary symptoms, hematuria, melena, hematochezia. Denies smoking, alcohol, illicit drug use. allergic to pineapple        HISTORY OF PRESENT ILLNESS:As above. Awake, alert, comfortable in bed in NAD. Evaluated because of AUST[place] and occasional Asthma symptoms. Not on C-pap machine. Titration study not completed because patient could not tolerate mask    MEDICATIONS  (STANDING):  amLODIPine   Tablet 10 milliGRAM(s) Oral daily  aspirin  chewable 81 milliGRAM(s) Oral daily  atorvastatin 40 milliGRAM(s) Oral at bedtime  chlorhexidine 4% Liquid 1 Application(s) Topical <User Schedule>  gabapentin 100 milliGRAM(s) Oral daily  heparin  Injectable 5000 Unit(s) SubCutaneous every 12 hours  hydrALAZINE 25 milliGRAM(s) Oral two times a day  insulin lispro (HumaLOG) corrective regimen sliding scale   SubCutaneous Before meals and at bedtime  labetalol 200 milliGRAM(s) Oral daily  lisinopril 40 milliGRAM(s) Oral daily  meclizine 25 milliGRAM(s) Oral three times a day  mupirocin 2% Ointment 1 Application(s) Both Nostrils two times a day  sevelamer carbonate 800 milliGRAM(s) Oral three times a day with meals      MEDICATIONS  (PRN):  ondansetron Injectable 4 milliGRAM(s) IV Push every 8 hours PRN Nausea and/or Vomiting      Allergies    No Known Drug Allergies  pineapple (Hives)    Intolerances        PAST MEDICAL & SURGICAL HISTORY:  Myocardial infarct, old:   ESRD (end stage renal disease) on dialysis: since 6/15/2015 (M/W/F)  Asthma occurring only with upper respiratory infection: never hospitalized or intubated, last use of inhalator last year with winter  Anemia in chronic renal disease  Claustrophobia: when CPAP mask was put on her  Uterine leiomyoma, unspecified location  [place] (obstructive sleep apnea)  Gastroesophageal reflux disease without esophagitis  Vertigo  HLD (hyperlipidemia)  Essential hypertension  Chronic kidney disease, unspecified stage  Diabetes mellitus, type 2  Morbid obesity  AV fistula: 3/18/2016  S/P craniotomy: - s/p fall from 3 floors, no hx of seizure after surgery  S/P hernia repair: incisional-  S/P  section: ,   CRF (chronic renal failure): s/p right antecubital AV formation- 2015, s/p left antecubital formation AV - - not working, s/p right chest permacath 07/15/15      FAMILY HISTORY:  Family history of chronic renal failure  Family history of hypertension  Family history of stroke  Diabetes mellitus, type 2      SOCIAL HISTORY  Smoking History:     REVIEW OF SYSTEMS:    CONSTITUTIONAL:  No fevers, chills, sweats    HEENT:  Eyes:  No diplopia or blurred vision. ENT:  No earache, sore throat or runny nose.    CARDIOVASCULAR:  No pressure, squeezing, tightness, or heaviness about the chest; no palpitations.    RESPIRATORY:  Per HPI    GASTROINTESTINAL:  No abdominal pain, nausea, vomiting or diarrhea.    GENITOURINARY:  No dysuria, frequency or urgency.    NEUROLOGIC:  No paresthesias, fasciculations, seizures or weakness.    PSYCHIATRIC:  No disorder of thought or mood.    Vital Signs Last 24 Hrs  T(C): 36.8 (2019 08:22), Max: 37 (2019 04:55)  T(F): 98.3 (2019 08:22), Max: 98.6 (2019 04:55)  HR: 65 (2019 08:22) (65 - 74)  BP: 159/65 (2019 08:22) (159/65 - 187/97)  BP(mean): --  RR: 17 (2019 08:22) (17 - 18)  SpO2: 96% (2019 08:22) (96% - 100%)  I&O's Detail      PHYSICAL EXAMINATION:    GENERAL: The patient is a well-developed, well-nourished _____in no apparent distress.     HEENT: Head is normocephalic and atraumatic. Extraocular muscles are intact. Mucous membranes are moist.     NECK: Supple.     LUNGS: Clear to auscultation without wheezing, rales, or rhonchi. Respirations unlabored    HEART: Regular rate and rhythm without murmur.    ABDOMEN: Soft, nontender, and nondistended.  No hepatosplenomegaly is noted.    EXTREMITIES: Without any cyanosis, clubbing, rash, lesions or edema.    NEUROLOGIC: Grossly intact.      LABS:                        12.3   5.47  )-----------( 206      ( 2019 06:31 )             39.0     11    137  |  100  |  20<H>  ----------------------------<  74  5.5<H>   |  30  |  4.96<H>    Ca    8.9      2019 06:31  Phos  5.6       Mg     2.3         TPro  6.4  /  Alb  3.1<L>  /  TBili  0.5  /  DBili  x   /  AST  11  /  ALT  16  /  AlkPhos  71  11-07    PT/INR - ( 2019 06:38 )   PT: 10.6 sec;   INR: 0.96 ratio         PTT - ( 2019 06:38 )  PTT:33.2 sec  Urinalysis Basic - ( 2019 09:05 )    Color: Yellow / Appearance: Clear / S.015 / pH: x  Gluc: x / Ketone: Negative  / Bili: Negative / Urobili: Negative   Blood: x / Protein: 100 / Nitrite: Negative   Leuk Esterase: Negative / RBC: 0-2 /HPF / WBC 0-2 /HPF   Sq Epi: x / Non Sq Epi: x / Bacteria: Trace /HPF        CARDIAC MARKERS ( 2019 17:36 )  <0.015 ng/mL / x     / 79 U/L / x     / 1.7 ng/mL  CARDIAC MARKERS ( 2019 06:38 )  <0.015 ng/mL / x     / x     / x     / x                    MICROBIOLOGY:    RADIOLOGY & ADDITIONAL STUDIES:  < from: CT Head No Cont (19 @ 07:42) >  IMPRESSION:    No acute intracranial hemorrhage.    < end of copied text >    CXR:  < from: Xray Chest 1 View-PORTABLE IMMEDIATE (19 @ 07:13) >  IMPRESSION:    Mild cardiomegaly. No acute infiltrate.    < end of copied text >    Ct scan chest:    ekg;    echo:

## 2019-11-07 NOTE — PROGRESS NOTE ADULT - PROBLEM SELECTOR PLAN 4
- Patient takes Lantus at home  - will hold home medications  - will start sliding scale  - Monitor blood sugars AC/HS and adjust as needed  - goal -180.   - HgA1c 4.6

## 2019-11-07 NOTE — PROGRESS NOTE ADULT - SUBJECTIVE AND OBJECTIVE BOX
[facility]   MD Maia Gonzalez D.O. Fatima Sheikh, D.O. Ruoru Wong, PA    From 7 AM - 5 PM:  OFFICE: [phone]  Dr. Beckham cell: [phone]  Dr. Charles cell: [phone]  Dr. Gamino cell: [phone]  BRANDEN Carrasco cell: [phone]    From 5 PM - 7 AM: Answering Service: [phone]        RENAL FOLLOW UP NOTE  --------------------------------------------------------------------------------  HPI: Pt seen and examined. Still has dizziness today. No other symptoms. Had HD yesterday w/o events. Reports she may have had some high potassium foods yesterday but does not remember exactly what she had.         PAST HISTORY  --------------------------------------------------------------------------------  No significant changes to PMH, PSH, FHx, SHx, unless otherwise noted    ALLERGIES & MEDICATIONS  --------------------------------------------------------------------------------  Allergies    No Known Drug Allergies  pineapple (Hives)    Intolerances      Standing Inpatient Medications  amLODIPine   Tablet 10 milliGRAM(s) Oral daily  aspirin  chewable 81 milliGRAM(s) Oral daily  atorvastatin 40 milliGRAM(s) Oral at bedtime  chlorhexidine 4% Liquid 1 Application(s) Topical <User Schedule>  gabapentin 100 milliGRAM(s) Oral daily  heparin  Injectable 5000 Unit(s) SubCutaneous every 12 hours  hydrALAZINE 25 milliGRAM(s) Oral two times a day  insulin lispro (HumaLOG) corrective regimen sliding scale   SubCutaneous Before meals and at bedtime  labetalol 200 milliGRAM(s) Oral daily  lisinopril 40 milliGRAM(s) Oral daily  meclizine 25 milliGRAM(s) Oral three times a day  mupirocin 2% Ointment 1 Application(s) Both Nostrils two times a day  sevelamer carbonate 800 milliGRAM(s) Oral three times a day with meals  sodium zirconium cyclosilicate 10 Gram(s) Oral once    PRN Inpatient Medications  ondansetron Injectable 4 milliGRAM(s) IV Push every 8 hours PRN      REVIEW OF SYSTEMS  --------------------------------------------------------------------------------  General: no fever  CVS: no chest pain  RESP: no sob, no cough  ABD: no abdominal pain  : no dysuria,  MSK: no edema     VITALS/PHYSICAL EXAM  --------------------------------------------------------------------------------  T(C): 36.3 (11-07-19 @ 12:14), Max: 37 (11-07-19 @ 04:55)  HR: 66 (11-07-19 @ 12:17) (65 - 74)  BP: 146/66 (11-07-19 @ 12:17) (146/66 - 185/71)  RR: 18 (11-07-19 @ 12:14) (17 - 18)  SpO2: 99% (11-07-19 @ 12:17) (96% - 100%)  Wt(kg): --    Weight (kg): 90.9 (11-06-19 @ 13:38)      Physical Exam:  	Gen: NAD  	HEENT: MMM  	Pulm: CTA B/L  	CV: S1S2  	Abd: Soft, +BS  	Ext: No LE edema B/L              Neuro: Awake   	Skin: Warm and Dry   	Vascular access: RAVF w/ good thrill and bruit    LABS/STUDIES  --------------------------------------------------------------------------------              12.3   5.47  >-----------<  206      [11-07-19 @ 06:31]              39.0     137  |  100  |  20  ----------------------------<  74      [11-07-19 @ 06:31]  5.5   |  30  |  4.96        Ca     8.9     [11-07-19 @ 06:31]      Mg     2.3     [11-07-19 @ 06:31]      Phos  5.6     [11-07-19 @ 06:31]    TPro  6.4  /  Alb  3.1  /  TBili  0.5  /  DBili  x   /  AST  11  /  ALT  16  /  AlkPhos  71  [11-07-19 @ 06:31]    PT/INR: PT 10.6 , INR 0.96       [11-06-19 @ 06:38]  PTT: 33.2       [11-06-19 @ 06:38]    Troponin <0.015      [11-06-19 @ 17:36]  CK 79      [11-06-19 @ 17:36]    Creatinine Trend:  SCr 4.96 [11-07 @ 06:31]  SCr 2.50 [11-06 @ 17:36]  SCr 7.94 [11-06 @ 06:38]    Urinalysis - [11-06-19 @ 09:05]      Color Yellow / Appearance Clear / SG 1.015 / pH 9.0      Gluc 50 / Ketone Negative  / Bili Negative / Urobili Negative       Blood Negative / Protein 100 / Leuk Est Negative / Nitrite Negative      RBC 0-2 / WBC 0-2 / Hyaline  / Gran  / Sq Epi  / Non Sq Epi  / Bacteria Trace      PTH -- (Ca 8.9)      [11-07-19 @ 10:25]   173  Vitamin D (25OH) 24.4      [11-07-19 @ 10:25]  HbA1c 4.6      [11-07-19 @ 10:26]  TSH 0.73      [11-07-19 @ 06:31]  Lipid: chol 140, , HDL 41, LDL 75      [11-07-19 @ 06:31]    HCV 0.11, Nonreact      [11-07-19 @ 10:29]

## 2019-11-08 DIAGNOSIS — F41.9 ANXIETY DISORDER, UNSPECIFIED: ICD-10-CM

## 2019-11-08 LAB
ALBUMIN SERPL ELPH-MCNC: 3 G/DL — LOW (ref 3.5–5)
ALP SERPL-CCNC: 66 U/L — SIGNIFICANT CHANGE UP (ref 40–120)
ALT FLD-CCNC: 14 U/L DA — SIGNIFICANT CHANGE UP (ref 10–60)
ANION GAP SERPL CALC-SCNC: 11 MMOL/L — SIGNIFICANT CHANGE UP (ref 5–17)
AST SERPL-CCNC: 7 U/L — LOW (ref 10–40)
BILIRUB SERPL-MCNC: 0.5 MG/DL — SIGNIFICANT CHANGE UP (ref 0.2–1.2)
BUN SERPL-MCNC: 40 MG/DL — HIGH (ref 7–18)
CALCIUM SERPL-MCNC: 8.9 MG/DL — SIGNIFICANT CHANGE UP (ref 8.4–10.5)
CHLORIDE SERPL-SCNC: 101 MMOL/L — SIGNIFICANT CHANGE UP (ref 96–108)
CO2 SERPL-SCNC: 27 MMOL/L — SIGNIFICANT CHANGE UP (ref 22–31)
CREAT SERPL-MCNC: 7.13 MG/DL — HIGH (ref 0.5–1.3)
GLUCOSE BLDC GLUCOMTR-MCNC: 118 MG/DL — HIGH (ref 70–99)
GLUCOSE BLDC GLUCOMTR-MCNC: 119 MG/DL — HIGH (ref 70–99)
GLUCOSE BLDC GLUCOMTR-MCNC: 124 MG/DL — HIGH (ref 70–99)
GLUCOSE BLDC GLUCOMTR-MCNC: 75 MG/DL — SIGNIFICANT CHANGE UP (ref 70–99)
GLUCOSE SERPL-MCNC: 77 MG/DL — SIGNIFICANT CHANGE UP (ref 70–99)
HAV IGM SER-ACNC: SIGNIFICANT CHANGE UP
HBV CORE IGM SER-ACNC: SIGNIFICANT CHANGE UP
HBV SURFACE AG SER-ACNC: SIGNIFICANT CHANGE UP
HCT VFR BLD CALC: 36.2 % — SIGNIFICANT CHANGE UP (ref 34.5–45)
HCV AB S/CO SERPL IA: 0.09 S/CO — SIGNIFICANT CHANGE UP (ref 0–0.99)
HCV AB SERPL-IMP: SIGNIFICANT CHANGE UP
HGB BLD-MCNC: 11.4 G/DL — LOW (ref 11.5–15.5)
MAGNESIUM SERPL-MCNC: 2.3 MG/DL — SIGNIFICANT CHANGE UP (ref 1.6–2.6)
MCHC RBC-ENTMCNC: 30.6 PG — SIGNIFICANT CHANGE UP (ref 27–34)
MCHC RBC-ENTMCNC: 31.5 GM/DL — LOW (ref 32–36)
MCV RBC AUTO: 97.1 FL — SIGNIFICANT CHANGE UP (ref 80–100)
NRBC # BLD: 0 /100 WBCS — SIGNIFICANT CHANGE UP (ref 0–0)
PHOSPHATE SERPL-MCNC: 7.1 MG/DL — HIGH (ref 2.5–4.5)
PLATELET # BLD AUTO: 185 K/UL — SIGNIFICANT CHANGE UP (ref 150–400)
POTASSIUM SERPL-MCNC: 5.6 MMOL/L — HIGH (ref 3.5–5.3)
POTASSIUM SERPL-SCNC: 5.6 MMOL/L — HIGH (ref 3.5–5.3)
PROT SERPL-MCNC: 6.1 G/DL — SIGNIFICANT CHANGE UP (ref 6–8.3)
RBC # BLD: 3.73 M/UL — LOW (ref 3.8–5.2)
RBC # FLD: 14.1 % — SIGNIFICANT CHANGE UP (ref 10.3–14.5)
SODIUM SERPL-SCNC: 139 MMOL/L — SIGNIFICANT CHANGE UP (ref 135–145)
WBC # BLD: 4.98 K/UL — SIGNIFICANT CHANGE UP (ref 3.8–10.5)
WBC # FLD AUTO: 4.98 K/UL — SIGNIFICANT CHANGE UP (ref 3.8–10.5)

## 2019-11-08 PROCEDURE — 93306 TTE W/DOPPLER COMPLETE: CPT | Mod: 26

## 2019-11-08 PROCEDURE — 99232 SBSQ HOSP IP/OBS MODERATE 35: CPT

## 2019-11-08 PROCEDURE — 93010 ELECTROCARDIOGRAM REPORT: CPT

## 2019-11-08 RX ORDER — FOLIC ACID 0.8 MG
1 TABLET ORAL DAILY
Refills: 0 | Status: DISCONTINUED | OUTPATIENT
Start: 2019-11-08 | End: 2019-11-22

## 2019-11-08 RX ORDER — MULTIVIT-MIN/FERROUS GLUCONATE 9 MG/15 ML
1 LIQUID (ML) ORAL DAILY
Refills: 0 | Status: DISCONTINUED | OUTPATIENT
Start: 2019-11-08 | End: 2019-11-22

## 2019-11-08 RX ORDER — PREGABALIN 225 MG/1
1000 CAPSULE ORAL DAILY
Refills: 0 | Status: COMPLETED | OUTPATIENT
Start: 2019-11-08 | End: 2019-11-14

## 2019-11-08 RX ADMIN — ATORVASTATIN CALCIUM 40 MILLIGRAM(S): 80 TABLET, FILM COATED ORAL at 21:00

## 2019-11-08 RX ADMIN — MUPIROCIN 1 APPLICATION(S): 20 OINTMENT TOPICAL at 17:13

## 2019-11-08 RX ADMIN — Medication 25 MILLIGRAM(S): at 21:00

## 2019-11-08 RX ADMIN — Medication 25 MILLIGRAM(S): at 13:28

## 2019-11-08 RX ADMIN — SODIUM ZIRCONIUM CYCLOSILICATE 5 GRAM(S): 10 POWDER, FOR SUSPENSION ORAL at 13:30

## 2019-11-08 RX ADMIN — Medication 200 MILLIGRAM(S): at 06:52

## 2019-11-08 RX ADMIN — AMLODIPINE BESYLATE 10 MILLIGRAM(S): 2.5 TABLET ORAL at 06:52

## 2019-11-08 RX ADMIN — SEVELAMER CARBONATE 800 MILLIGRAM(S): 2400 POWDER, FOR SUSPENSION ORAL at 13:31

## 2019-11-08 RX ADMIN — MUPIROCIN 1 APPLICATION(S): 20 OINTMENT TOPICAL at 06:52

## 2019-11-08 RX ADMIN — SEVELAMER CARBONATE 800 MILLIGRAM(S): 2400 POWDER, FOR SUSPENSION ORAL at 17:13

## 2019-11-08 RX ADMIN — Medication 25 MILLIGRAM(S): at 17:13

## 2019-11-08 RX ADMIN — HEPARIN SODIUM 5000 UNIT(S): 5000 INJECTION INTRAVENOUS; SUBCUTANEOUS at 17:13

## 2019-11-08 RX ADMIN — GABAPENTIN 100 MILLIGRAM(S): 400 CAPSULE ORAL at 13:28

## 2019-11-08 RX ADMIN — PREGABALIN 1000 MICROGRAM(S): 225 CAPSULE ORAL at 13:29

## 2019-11-08 RX ADMIN — LISINOPRIL 40 MILLIGRAM(S): 2.5 TABLET ORAL at 06:52

## 2019-11-08 RX ADMIN — Medication 81 MILLIGRAM(S): at 13:29

## 2019-11-08 RX ADMIN — Medication 25 MILLIGRAM(S): at 06:52

## 2019-11-08 RX ADMIN — CHLORHEXIDINE GLUCONATE 1 APPLICATION(S): 213 SOLUTION TOPICAL at 06:53

## 2019-11-08 RX ADMIN — Medication 0.5 MILLIGRAM(S): at 17:17

## 2019-11-08 RX ADMIN — HEPARIN SODIUM 5000 UNIT(S): 5000 INJECTION INTRAVENOUS; SUBCUTANEOUS at 06:52

## 2019-11-08 NOTE — PROGRESS NOTE ADULT - PROBLEM SELECTOR PLAN 1
- p/w vertigo associated with nausea , no Headache , eye pain , blurred vision, photophobia, diplopia   - PE : Bilateral Horizontal Nystagmus to lateral gaze with Limb Ataxia  - NIHSS score on admission : 2  - Ct head is negative for any acute event or change (did not receive TPA)  - EKG: NSR  , trops negative  -  Telemetry monitoring to r/o cardiac arrythmia,  - c/w Meclizine and zofran for symptomatic treatment.  - c/w Aspirin 81mg (Ecotrin), and Statins (Lipitor) 40mg HS  - Echocardiogram with bubble study today  - orthostatics negative  - PT suggested home with home PT  *** Neurology consulted Dr. Daniel: pt has poor proprioception which might be likely from B12/folate deficiency   *** Cardio consulted Dr. Stanford.  -1000mcg cyanocobalamin im today  - started onfolic acid 2mg PO daily and MVI once daily.  -f/u homocysteine, methylmalonic acid levels  -f/u ESR, CRP, DICKSON, SPEP  -If MMA high would check intrinsic factor ab and parietal cell ab and start course of im cyanocobalamin 1000mcg im weekly x 4 doses, then monthly, and continue folate and MVI.  Then repeat MMA and Hcy in two to three weeks to check for response to Tx.  If MMA clearly normal but Hcy elevated, continue folate and MVI supplementation.

## 2019-11-08 NOTE — DIETITIAN INITIAL EVALUATION ADULT. - NS FNS WEIGHT USED FOR CALC
Ht=5' 5.5"   NJZ=835.2 lb     Admission ud=987 lb      BMI=32.8     Current do=540.9 lb 11/7/19; 201.7 lb 11/8/19    a bit fluctuated, may due to fluid shift from HD and/or scale variance/ideal

## 2019-11-08 NOTE — DIETITIAN INITIAL EVALUATION ADULT. - REASON INDICATOR FOR ASSESSMENT
Nutrition consult requested for Bariatric surgery Nutrition consult requested for Bariatric surgery.

## 2019-11-08 NOTE — DIETITIAN INITIAL EVALUATION ADULT. - PERTINENT MEDS FT
MEDICATIONS  (STANDING):  amLODIPine   Tablet 10 milliGRAM(s) Oral daily  aspirin  chewable 81 milliGRAM(s) Oral daily  atorvastatin 40 milliGRAM(s) Oral at bedtime  chlorhexidine 4% Liquid 1 Application(s) Topical <User Schedule>  cyanocobalamin 1000 MICROGram(s) Oral daily  gabapentin 100 milliGRAM(s) Oral daily  heparin  Injectable 5000 Unit(s) SubCutaneous every 12 hours  hydrALAZINE 25 milliGRAM(s) Oral two times a day  insulin lispro (HumaLOG) corrective regimen sliding scale   SubCutaneous Before meals and at bedtime  labetalol 200 milliGRAM(s) Oral daily  lisinopril 40 milliGRAM(s) Oral daily  meclizine 25 milliGRAM(s) Oral three times a day  mupirocin 2% Ointment 1 Application(s) Both Nostrils two times a day  sevelamer carbonate 800 milliGRAM(s) Oral three times a day with meals  sodium zirconium cyclosilicate 5 Gram(s) Oral daily

## 2019-11-08 NOTE — DIETITIAN INITIAL EVALUATION ADULT. - PERTINENT LABORATORY DATA
11-08 Na139 mmol/L Glu 77 mg/dL K+ 5.6 mmol/L<H> Cr  7.13 mg/dL<H> BUN 40 mg/dL<H>   11-08 Phos 7.1 mg/dL<H>   11-08 Alb 3.0 g/dL<L>     11-07 [id number] 4.6 %   11-07 Chol 140 mg/dL LDL 75 mg/dL HDL 41 mg/dL<L> Trig 118 mg/dL

## 2019-11-08 NOTE — PROGRESS NOTE ADULT - SUBJECTIVE AND OBJECTIVE BOX
CHIEF COMPLAINT:Patient is a 54y old  Female who presents with a chief complaint of Vertigo.Pt still dizzy.    	  REVIEW OF SYSTEMS:  CONSTITUTIONAL: No fever, weight loss, or fatigue  EYES: No eye pain, visual disturbances, or discharge  ENT:  No difficulty hearing, tinnitus, vertigo; No sinus or throat pain  NECK: No pain or stiffness  RESPIRATORY: No cough, wheezing, chills or hemoptysis; No Shortness of Breath  CARDIOVASCULAR: No chest pain, palpitations, passing out,+ dizziness  GASTROINTESTINAL: No abdominal or epigastric pain. No nausea, vomiting, or hematemesis; No diarrhea or constipation. No melena or hematochezia.  GENITOURINARY: No dysuria, frequency, hematuria, or incontinence  NEUROLOGICAL: No headaches, memory loss, loss of strength, numbness, or tremors  SKIN: No itching, burning, rashes, or lesions   LYMPH Nodes: No enlarged glands  ENDOCRINE: No heat or cold intolerance; No hair loss  MUSCULOSKELETAL: No joint pain or swelling; No muscle, back, or extremity pain  PSYCHIATRIC: No depression, anxiety, mood swings, or difficulty sleeping  HEME/LYMPH: No easy bruising, or bleeding gums  ALLERGY AND IMMUNOLOGIC: No hives or eczema	      PHYSICAL EXAM:  T(C): 37.1 (11-08-19 @ 08:30), Max: 37.1 (11-08-19 @ 08:30)  HR: 66 (11-08-19 @ 08:30) (63 - 73)  BP: 144/66 (11-08-19 @ 08:30) (144/66 - 175/70)  RR: 18 (11-08-19 @ 08:30) (18 - 20)  SpO2: 95% (11-08-19 @ 08:30) (95% - 99%)      Appearance: Normal	  HEENT:   Normal oral mucosa, PERRL, EOMI	  Lymphatic: No lymphadenopathy  Cardiovascular: Normal S1 S2, No JVD, No murmurs, No edema  Respiratory: Lungs clear to auscultation	  Psychiatry: A & O x 3, Mood & affect appropriate  Gastrointestinal:  Soft, Non-tender, + BS	  Skin: No rashes, No ecchymoses, No cyanosis	  Neurologic: Non-focal  Extremities: Normal range of motion, No clubbing, cyanosis or edema  Vascular: Peripheral pulses palpable 2+ bilaterally    MEDICATIONS  (STANDING):  amLODIPine   Tablet 10 milliGRAM(s) Oral daily  aspirin  chewable 81 milliGRAM(s) Oral daily  atorvastatin 40 milliGRAM(s) Oral at bedtime  chlorhexidine 4% Liquid 1 Application(s) Topical <User Schedule>  cyanocobalamin 1000 MICROGram(s) Oral daily  gabapentin 100 milliGRAM(s) Oral daily  heparin  Injectable 5000 Unit(s) SubCutaneous every 12 hours  hydrALAZINE 25 milliGRAM(s) Oral two times a day  insulin lispro (HumaLOG) corrective regimen sliding scale   SubCutaneous Before meals and at bedtime  labetalol 200 milliGRAM(s) Oral daily  lisinopril 40 milliGRAM(s) Oral daily  meclizine 25 milliGRAM(s) Oral three times a day  mupirocin 2% Ointment 1 Application(s) Both Nostrils two times a day  sevelamer carbonate 800 milliGRAM(s) Oral three times a day with meals  sodium zirconium cyclosilicate 5 Gram(s) Oral daily      	  LABS:	 	      CARDIAC MARKERS ( 06 Nov 2019 17:36 )  <0.015 ng/mL / x     / 79 U/L / x     / 1.7 ng/mL                       11.4   4.98  )-----------( 185      ( 08 Nov 2019 06:18 )             36.2     11-08    139  |  101  |  40<H>  ----------------------------<  77  5.6<H>   |  27  |  7.13<H>    Ca    8.9      08 Nov 2019 06:18  Phos  7.1     11-08  Mg     2.3     11-08    TPro  6.1  /  Alb  3.0<L>  /  TBili  0.5  /  DBili  x   /  AST  7<L>  /  ALT  14  /  AlkPhos  66  11-08      Lipid Profile: Cholesterol 140  LDL 75  HDL 41      HgA1c: Hemoglobin A1C, Whole Blood: 4.6 % (11-07 @ 10:26)    TSH: Thyroid Stimulating Hormone, Serum: 0.73 uU/mL (11-07 @ 06:31)

## 2019-11-08 NOTE — PROGRESS NOTE ADULT - SUBJECTIVE AND OBJECTIVE BOX
PGY 1 Note discussed with supervising resident and primary attending    Patient is a 54y old  Female who presents with a chief complaint of Vertigo (08 Nov 2019 13:41)      INTERVAL HPI/OVERNIGHT EVENTS: Patient seen and examined at the bedside. Pt still complaining of dizziness. Pt also complained of chest pain, 4/10. EKG was obtained which did not show any significant changes.     MEDICATIONS  (STANDING):  amLODIPine   Tablet 10 milliGRAM(s) Oral daily  aspirin  chewable 81 milliGRAM(s) Oral daily  atorvastatin 40 milliGRAM(s) Oral at bedtime  chlorhexidine 4% Liquid 1 Application(s) Topical <User Schedule>  cyanocobalamin 1000 MICROGram(s) Oral daily  folic acid 1 milliGRAM(s) Oral daily  gabapentin 100 milliGRAM(s) Oral daily  heparin  Injectable 5000 Unit(s) SubCutaneous every 12 hours  hydrALAZINE 25 milliGRAM(s) Oral two times a day  insulin lispro (HumaLOG) corrective regimen sliding scale   SubCutaneous Before meals and at bedtime  labetalol 200 milliGRAM(s) Oral daily  lisinopril 40 milliGRAM(s) Oral daily  LORazepam     Tablet 0.5 milliGRAM(s) Oral two times a day  meclizine 25 milliGRAM(s) Oral three times a day  multivitamin/minerals 1 Tablet(s) Oral daily  mupirocin 2% Ointment 1 Application(s) Both Nostrils two times a day  sevelamer carbonate 800 milliGRAM(s) Oral three times a day with meals  sodium zirconium cyclosilicate 5 Gram(s) Oral daily    MEDICATIONS  (PRN):  ondansetron Injectable 4 milliGRAM(s) IV Push every 8 hours PRN Nausea and/or Vomiting      __________________________________________________  REVIEW OF SYSTEMS:    CONSTITUTIONAL: No fever,   EYES: no acute visual disturbances  NECK: No pain or stiffness  RESPIRATORY: No cough; No shortness of breath  CARDIOVASCULAR: No chest pain, no palpitations  GASTROINTESTINAL: No pain. No nausea or vomiting; No diarrhea   NEUROLOGICAL: dizziness, right sided facial pain  MUSCULOSKELETAL: No joint pain, no muscle pain  GENITOURINARY: no dysuria, no frequency, no hesitancy  PSYCHIATRY: no depression , no anxiety  ALL OTHER  ROS negative          Vital Signs Last 24 Hrs  T(C): 36.8 (08 Nov 2019 15:30), Max: 37.1 (08 Nov 2019 08:30)  T(F): 98.3 (08 Nov 2019 15:30), Max: 98.7 (08 Nov 2019 08:30)  HR: 72 (08 Nov 2019 15:30) (63 - 72)  BP: 149/62 (08 Nov 2019 15:30) (123/61 - 175/70)  BP(mean): --  RR: 18 (08 Nov 2019 15:30) (18 - 20)  SpO2: 100% (08 Nov 2019 15:30) (95% - 100%)    ________________________________________________  PHYSICAL EXAM:  GENERAL: NAD  HEENT: Normocephalic;  conjunctivae and sclerae clear; moist mucous membranes;   NECK : supple  CHEST/LUNG: Clear to auscultation bilaterally with good air entry   HEART: S1 S2  regular; no murmurs, gallops or rubs  ABDOMEN: Soft, Nontender, Nondistended; Bowel sounds present  EXTREMITIES: no cyanosis; no edema; no calf tenderness  SKIN: warm and dry; no rash  NERVOUS SYSTEM:  Awake and alert; Oriented  to place, person and time ; unable to follow finger laterally to right    _________________________________________________  LABS:                        11.4   4.98  )-----------( 185      ( 08 Nov 2019 06:18 )             36.2     11-08    139  |  101  |  40<H>  ----------------------------<  77  5.6<H>   |  27  |  7.13<H>    Ca    8.9      08 Nov 2019 06:18  Phos  7.1     11-08  Mg     2.3     11-08    TPro  6.1  /  Alb  3.0<L>  /  TBili  0.5  /  DBili  x   /  AST  7<L>  /  ALT  14  /  AlkPhos  66  11-08        CAPILLARY BLOOD GLUCOSE      POCT Blood Glucose.: 124 mg/dL (08 Nov 2019 16:26)  POCT Blood Glucose.: 118 mg/dL (08 Nov 2019 10:48)  POCT Blood Glucose.: 75 mg/dL (08 Nov 2019 07:43)  POCT Blood Glucose.: 145 mg/dL (07 Nov 2019 21:33)  POCT Blood Glucose.: 69 mg/dL (07 Nov 2019 21:00)        RADIOLOGY & ADDITIONAL TESTS:    Imaging Personally Reviewed:  YES/NO    Consultant(s) Notes Reviewed:   YES/ No    Care Discussed with Consultants :     Plan of care was discussed with patient and /or primary care giver; all questions and concerns were addressed and care was aligned with patient's wishes.

## 2019-11-08 NOTE — PROGRESS NOTE ADULT - SUBJECTIVE AND OBJECTIVE BOX
This is in follow-up to my intial Consult Response of klaudia.  I note serum B12 level of 277, folate level 6.2, and TSH 0.73.    Although the B12 level is within the lab's normal range, values this low are often associated with functional deficiency.  It is also possible to be folate deficient with folate level in low normal range (serum folate is not a good indicator of total body stores).          RECOMMENDATIONS       Please order methylmalonic acid (MMA) and homocysteine (Hcy) levels.      Please also request as recommended ESR, CRP, SPEP, DICKSON.      After MMA and Hcy levels draw, while waiting for results, would presumptively administer 1000mcg cyanocobalamin im, and start folic acid 2mg PO daily and MVI once daily.      If MMA high would check intrinsic factor ab and parietal cell ab and start course of im cyanocobalamin 1000mcg im weekly x 4 doses, then monthly, and continue folate and MVI.  Then repeat MMA and Hcy in two to three weeks to check for response to Tx.    If MMA clearly normal but Hcy elevated, continue folate and MVI supplementation.  Then repeat Hcy in two to three weeks.

## 2019-11-08 NOTE — PROGRESS NOTE ADULT - SUBJECTIVE AND OBJECTIVE BOX
Patient is a 54y old  Female who presents with a chief complaint of Vertigo (2019 16:00)     pt seen in tele [x  ], reg med floor [   ], bed [ x ], chair at bedside [   ], a+o x3 [x  ], lethargic [  ],  nad [  x]          Allergies    No Known Drug Allergies  pineapple (Hives)        Vitals    T(F): 97.8 (19 @ 07:29), Max: 98.4 (19 @ 16:36)  HR: 63 (19 @ 07:29) (63 - 73)  BP: 175/70 (19 @ 07:29) (146/66 - 175/70)  RR: 18 (19 @ 07:29) (18 - 20)  SpO2: 98% (19 @ 07:29) (96% - 99%)  Wt(kg): --  CAPILLARY BLOOD GLUCOSE      POCT Blood Glucose.: 75 mg/dL (2019 07:43)      Labs                          11.4   4.98  )-----------( 185      ( 2019 06:18 )             36.2           139  |  101  |  40<H>  ----------------------------<  77  5.6<H>   |  27  |  7.13<H>    Ca    8.9      2019 06:18  Phos  7.1       Mg     2.3         TPro  6.1  /  Alb  3.0<L>  /  TBili  0.5  /  DBili  x   /  AST  7<L>  /  ALT  14  /  AlkPhos  66        CARDIAC MARKERS ( 2019 17:36 )  <0.015 ng/mL / x     / 79 U/L / x     / 1.7 ng/mL        .Urine   @ 15:32   <10,000 CFU/mL Normal Urogenital Jinny  --  --          Radiology Results    < from: US Duplex Carotid Arteries Complete, Bilateral (19 @ 17:32) >  IMPRESSION:    1.  Right carotid artery: No evidence of hemodynamically significant   stenosis.   2.  Left carotid artery: No evidence of hemodynamicallysignificant   stenosis.  3.  Vertebral arteries: Antegrade flow.      < end of copied text >          Meds    MEDICATIONS  (STANDING):  amLODIPine   Tablet 10 milliGRAM(s) Oral daily  aspirin  chewable 81 milliGRAM(s) Oral daily  atorvastatin 40 milliGRAM(s) Oral at bedtime  chlorhexidine 4% Liquid 1 Application(s) Topical <User Schedule>  cyanocobalamin 1000 MICROGram(s) Oral daily  gabapentin 100 milliGRAM(s) Oral daily  heparin  Injectable 5000 Unit(s) SubCutaneous every 12 hours  hydrALAZINE 25 milliGRAM(s) Oral two times a day  insulin lispro (HumaLOG) corrective regimen sliding scale   SubCutaneous Before meals and at bedtime  labetalol 200 milliGRAM(s) Oral daily  lisinopril 40 milliGRAM(s) Oral daily  meclizine 25 milliGRAM(s) Oral three times a day  mupirocin 2% Ointment 1 Application(s) Both Nostrils two times a day  sevelamer carbonate 800 milliGRAM(s) Oral three times a day with meals  sodium zirconium cyclosilicate 5 Gram(s) Oral daily      MEDICATIONS  (PRN):  ondansetron Injectable 4 milliGRAM(s) IV Push every 8 hours PRN Nausea and/or Vomiting      Physical Exam    Neuro :  no focal deficits  Respiratory: CTA B/L  CV: RRR, S1S2, no murmurs,   Abdominal: Soft, NT, ND +BS,  Extremities: No edema, + peripheral pulses      ASSESSMENT    vertigo,   headache poss 2nd vertiginous migraine  r/o r/o cns patho  r/o acs,   r/o arythmia,   h/o  ESRD on HD (M/W/F),   HTN,   migraine,   DM no longer on meds  Myocardial infarct, old  ESRD (end stage renal disease) on dialysis  Asthma occurring only with upper respiratory infection  Anemia in chronic renal disease  Claustrophobia  Uterine leiomyoma, unspecified location  AUSTIN (obstructive sleep apnea)  Gastroesophageal reflux disease without esophagitis  Vertigo  HLD (hyperlipidemia)  Morbid obesity  AV fistula  S/P craniotomy  S/P hernia repair  S/P  section        PLAN      cont tele,   cont aspirin, statin,   cont meclizine prn,  ct head neg noted  neuro cons noted   Cervicogenic vertigo.    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    ESR, CRP, SPEP, B12 and folate levels, TSH, DICKSON, RF.  Refer for  out-patient electrodiagostic studies.  carotid duplex noted : no significant stenosis   ce q8 x 2 neg noted above  f/u echo  f/u orthostatic bp q8  hgba1c,  renal f/u   s/p hd yesterday  cont hd as per renal   pulm f/u  pft outpt  PT recs home with home PT   cont current meds

## 2019-11-08 NOTE — PROGRESS NOTE ADULT - SUBJECTIVE AND OBJECTIVE BOX
Patient is a 54y old  Female who presents with a chief complaint of Vertigo (08 Nov 2019 11:44)  Awake, alert, laying in bed having HD. Feeling anxious.    INTERVAL HPI/OVERNIGHT EVENTS:      VITAL SIGNS:  T(F): 98.7 (11-08-19 @ 08:30)  HR: 66 (11-08-19 @ 08:30)  BP: 144/66 (11-08-19 @ 08:30)  RR: 18 (11-08-19 @ 08:30)  SpO2: 95% (11-08-19 @ 08:30)  Wt(kg): --  I&O's Detail          REVIEW OF SYSTEMS:    CONSTITUTIONAL:  No fevers, chills, sweats    HEENT:  Eyes:  No diplopia or blurred vision. ENT:  No earache, sore throat or runny nose.    CARDIOVASCULAR:  No pressure, squeezing, tightness, or heaviness about the chest; no palpitations.    RESPIRATORY:  Per HPI    GASTROINTESTINAL:  No abdominal pain, nausea, vomiting or diarrhea.    GENITOURINARY:  No dysuria, frequency or urgency.    NEUROLOGIC:  No paresthesias, fasciculations, seizures or weakness.    PSYCHIATRIC:  No disorder of thought or mood.      PHYSICAL EXAM:    Constitutional: Well developed and nourished  Eyes:Perrla  ENMT: normal  Neck:supple  Respiratory: good air entry  Cardiovascular: S1 S2 regular  Gastrointestinal: Soft, Non tender  Extremities: No edema  Vascular:normal  Neurological:Awake, alert,Ox3  Musculoskeletal:Normal      MEDICATIONS  (STANDING):  amLODIPine   Tablet 10 milliGRAM(s) Oral daily  aspirin  chewable 81 milliGRAM(s) Oral daily  atorvastatin 40 milliGRAM(s) Oral at bedtime  chlorhexidine 4% Liquid 1 Application(s) Topical <User Schedule>  cyanocobalamin 1000 MICROGram(s) Oral daily  gabapentin 100 milliGRAM(s) Oral daily  heparin  Injectable 5000 Unit(s) SubCutaneous every 12 hours  hydrALAZINE 25 milliGRAM(s) Oral two times a day  insulin lispro (HumaLOG) corrective regimen sliding scale   SubCutaneous Before meals and at bedtime  labetalol 200 milliGRAM(s) Oral daily  lisinopril 40 milliGRAM(s) Oral daily  meclizine 25 milliGRAM(s) Oral three times a day  mupirocin 2% Ointment 1 Application(s) Both Nostrils two times a day  sevelamer carbonate 800 milliGRAM(s) Oral three times a day with meals  sodium zirconium cyclosilicate 5 Gram(s) Oral daily    MEDICATIONS  (PRN):  ondansetron Injectable 4 milliGRAM(s) IV Push every 8 hours PRN Nausea and/or Vomiting      Allergies    No Known Drug Allergies  pineapple (Hives)    Intolerances        LABS:                        11.4   4.98  )-----------( 185      ( 08 Nov 2019 06:18 )             36.2     11-08    139  |  101  |  40<H>  ----------------------------<  77  5.6<H>   |  27  |  7.13<H>    Ca    8.9      08 Nov 2019 06:18  Phos  7.1     11-08  Mg     2.3     11-08    TPro  6.1  /  Alb  3.0<L>  /  TBili  0.5  /  DBili  x   /  AST  7<L>  /  ALT  14  /  AlkPhos  66  11-08          CARDIAC MARKERS ( 06 Nov 2019 17:36 )  <0.015 ng/mL / x     / 79 U/L / x     / 1.7 ng/mL      CAPILLARY BLOOD GLUCOSE      POCT Blood Glucose.: 118 mg/dL (08 Nov 2019 10:48)  POCT Blood Glucose.: 75 mg/dL (08 Nov 2019 07:43)  POCT Blood Glucose.: 145 mg/dL (07 Nov 2019 21:33)  POCT Blood Glucose.: 69 mg/dL (07 Nov 2019 21:00)  POCT Blood Glucose.: 108 mg/dL (07 Nov 2019 18:36)  POCT Blood Glucose.: 118 mg/dL (07 Nov 2019 14:28)        RADIOLOGY & ADDITIONAL TESTS:    CXR:  < from: Xray Chest 1 View-PORTABLE IMMEDIATE (11.06.19 @ 07:13) >  IMPRESSION:    Mild cardiomegaly. No acute infiltrate.    < end of copied text >    Ct scan chest:    ekg;    echo:

## 2019-11-08 NOTE — PROGRESS NOTE ADULT - SUBJECTIVE AND OBJECTIVE BOX
[facility]   MD Maia Gonzalez D.O. Fatima Sheikh, D.O. Ruoru Wong, PA    From 7 AM - 5 PM:  OFFICE: [phone]  Dr. Beckham cell: [phone]  Dr. Charles cell: [phone]  Dr. Gamino cell: [phone]  BRANDEN Carrasco cell: [phone]    From 5 PM - 7 AM: Answering Service: [phone]        RENAL FOLLOW UP NOTE  --------------------------------------------------------------------------------  HPI: Pt seen and examined after dialysis. States that she still has dizziness. Now also has mid-sternal pressure-like chest pain which started during dialysis. Denies any radiation to jaw, back or neck. Denies any associated N/V, abdominal pain, SOB, cough.         PAST HISTORY  --------------------------------------------------------------------------------  No significant changes to PMH, PSH, FHx, SHx, unless otherwise noted    ALLERGIES & MEDICATIONS  --------------------------------------------------------------------------------  Allergies    No Known Drug Allergies  pineapple (Hives)    Intolerances      Standing Inpatient Medications  amLODIPine   Tablet 10 milliGRAM(s) Oral daily  aspirin  chewable 81 milliGRAM(s) Oral daily  atorvastatin 40 milliGRAM(s) Oral at bedtime  chlorhexidine 4% Liquid 1 Application(s) Topical <User Schedule>  cyanocobalamin 1000 MICROGram(s) Oral daily  gabapentin 100 milliGRAM(s) Oral daily  heparin  Injectable 5000 Unit(s) SubCutaneous every 12 hours  hydrALAZINE 25 milliGRAM(s) Oral two times a day  insulin lispro (HumaLOG) corrective regimen sliding scale   SubCutaneous Before meals and at bedtime  labetalol 200 milliGRAM(s) Oral daily  lisinopril 40 milliGRAM(s) Oral daily  LORazepam     Tablet 0.5 milliGRAM(s) Oral two times a day  meclizine 25 milliGRAM(s) Oral three times a day  mupirocin 2% Ointment 1 Application(s) Both Nostrils two times a day  sevelamer carbonate 800 milliGRAM(s) Oral three times a day with meals  sodium zirconium cyclosilicate 5 Gram(s) Oral daily    PRN Inpatient Medications  ondansetron Injectable 4 milliGRAM(s) IV Push every 8 hours PRN      REVIEW OF SYSTEMS  --------------------------------------------------------------------------------  General: no fever. + dizziness  CVS: + chest pain  RESP: no sob, no cough  ABD: no abdominal pain  : no dysuria,  MSK: no edema     VITALS/PHYSICAL EXAM  --------------------------------------------------------------------------------  T(C): 36.7 (11-08-19 @ 12:30), Max: 37.1 (11-08-19 @ 08:30)  HR: 68 (11-08-19 @ 12:30) (63 - 73)  BP: 123/61 (11-08-19 @ 12:30) (123/61 - 175/70)  RR: 18 (11-08-19 @ 12:30) (18 - 20)  SpO2: 100% (11-08-19 @ 12:30) (95% - 100%)  Wt(kg): --        Physical Exam:  	Gen: NAD  	HEENT: MMM  	Pulm: CTA B/L  	CV: S1S2  	Abd: Soft, +BS  	Ext: No LE edema B/L              Neuro: Awake   	Skin: Warm and Dry   	Vascular access: RAVG w/ good thrill and bruit    LABS/STUDIES  --------------------------------------------------------------------------------              11.4   4.98  >-----------<  185      [11-08-19 @ 06:18]              36.2     139  |  101  |  40  ----------------------------<  77      [11-08-19 @ 06:18]  5.6   |  27  |  7.13        Ca     8.9     [11-08-19 @ 06:18]      Mg     2.3     [11-08-19 @ 06:18]      Phos  7.1     [11-08-19 @ 06:18]    TPro  6.1  /  Alb  3.0  /  TBili  0.5  /  DBili  x   /  AST  7   /  ALT  14  /  AlkPhos  66  [11-08-19 @ 06:18]        Troponin <0.015      [11-06-19 @ 17:36]  CK 79      [11-06-19 @ 17:36]    Creatinine Trend:  SCr 7.13 [11-08 @ 06:18]  SCr 4.96 [11-07 @ 06:31]  SCr 2.50 [11-06 @ 17:36]  SCr 7.94 [11-06 @ 06:38]    Urinalysis - [11-06-19 @ 09:05]      Color Yellow / Appearance Clear / SG 1.015 / pH 9.0      Gluc 50 / Ketone Negative  / Bili Negative / Urobili Negative       Blood Negative / Protein 100 / Leuk Est Negative / Nitrite Negative      RBC 0-2 / WBC 0-2 / Hyaline  / Gran  / Sq Epi  / Non Sq Epi  / Bacteria Trace      PTH -- (Ca 8.9)      [11-07-19 @ 10:25]   173  Vitamin D (25OH) 24.4      [11-07-19 @ 10:25]  HbA1c 4.6      [11-07-19 @ 10:26]  TSH 0.73      [11-07-19 @ 06:31]  Lipid: chol 140, , HDL 41, LDL 75      [11-07-19 @ 06:31]    HCV 0.11, Nonreact      [11-07-19 @ 10:29]

## 2019-11-08 NOTE — PROGRESS NOTE ADULT - PROBLEM SELECTOR PLAN 2
- ESRD Maintenance HD (M/W/F ) via right arm AVF  - last dialysis Monday 11/6 did not start  - went for HD today  -HD tomorrow  -elevated potassium  -started on Lokelma  - Avoid Nephrotoxic Meds/ Agents such as (NSAIDs, IV contrast, Aminoglycosides such as gentamicin, Gadolinium contrast, Phosphate containing enemas, etc..)  - Adjust Medications according to eGFR  - Renal diet and fluid restrictions 1000 ml/day  - c/w sevelamer TID w/ meals  - f/u CM (pt will need reinstatement of dialysis upon discharge)  ** Nephrology consulted Dr. Charles

## 2019-11-08 NOTE — DIETITIAN INITIAL EVALUATION ADULT. - OTHER INFO
Pt alert, oriented, speaks English lives home with family PTA; h/o Bariatric Gastric Bypass surgery in 2017, ESRD on HD x 3y, followed by dietitian at out-pt dialysis center ;  consult noted. Pt alert, oriented, speaks Irish lives home with family PTA, was at HD Tx in the morning, pt visited after back from HD, complaining of not feeling well, tired, dizziness, RN aware, unable to interview at present; h/o Bariatric Gastric Bypass surgery in 2017, ESRD on HD x 3y, followed by dietitian at out-pt dialysis center ;  consult noted; lunch tray not touched het; Discussed with RN Pt alert, oriented, speaks Montenegrin, lives home with family PTA, was at HD Tx in the morning, pt visited after back from HD, complaining of not feeling well, tired, dizziness, RN aware, unable to interview at present; h/o Bariatric Gastric Bypass surgery in 2017, ESRD on HD x 3y, followed by dietitian at out-pt dialysis center ;  consult noted; lunch tray not touched het; Discussed with RN

## 2019-11-09 LAB
ALBUMIN SERPL ELPH-MCNC: 3 G/DL — LOW (ref 3.5–5)
ALP SERPL-CCNC: 66 U/L — SIGNIFICANT CHANGE UP (ref 40–120)
ALT FLD-CCNC: 15 U/L DA — SIGNIFICANT CHANGE UP (ref 10–60)
ANION GAP SERPL CALC-SCNC: 9 MMOL/L — SIGNIFICANT CHANGE UP (ref 5–17)
AST SERPL-CCNC: 9 U/L — LOW (ref 10–40)
BILIRUB SERPL-MCNC: 0.5 MG/DL — SIGNIFICANT CHANGE UP (ref 0.2–1.2)
BUN SERPL-MCNC: 28 MG/DL — HIGH (ref 7–18)
CALCIUM SERPL-MCNC: 8.8 MG/DL — SIGNIFICANT CHANGE UP (ref 8.4–10.5)
CHLORIDE SERPL-SCNC: 107 MMOL/L — SIGNIFICANT CHANGE UP (ref 96–108)
CO2 SERPL-SCNC: 25 MMOL/L — SIGNIFICANT CHANGE UP (ref 22–31)
CREAT SERPL-MCNC: 5.65 MG/DL — HIGH (ref 0.5–1.3)
CRP SERPL-MCNC: 0.1 MG/DL — SIGNIFICANT CHANGE UP (ref 0–0.4)
ERYTHROCYTE [SEDIMENTATION RATE] IN BLOOD: 15 MM/HR — SIGNIFICANT CHANGE UP (ref 0–20)
GLUCOSE BLDC GLUCOMTR-MCNC: 103 MG/DL — HIGH (ref 70–99)
GLUCOSE BLDC GLUCOMTR-MCNC: 108 MG/DL — HIGH (ref 70–99)
GLUCOSE BLDC GLUCOMTR-MCNC: 123 MG/DL — HIGH (ref 70–99)
GLUCOSE BLDC GLUCOMTR-MCNC: 69 MG/DL — LOW (ref 70–99)
GLUCOSE BLDC GLUCOMTR-MCNC: 78 MG/DL — SIGNIFICANT CHANGE UP (ref 70–99)
GLUCOSE BLDC GLUCOMTR-MCNC: 78 MG/DL — SIGNIFICANT CHANGE UP (ref 70–99)
GLUCOSE BLDC GLUCOMTR-MCNC: 98 MG/DL — SIGNIFICANT CHANGE UP (ref 70–99)
GLUCOSE SERPL-MCNC: 75 MG/DL — SIGNIFICANT CHANGE UP (ref 70–99)
HCT VFR BLD CALC: 36.2 % — SIGNIFICANT CHANGE UP (ref 34.5–45)
HCYS SERPL-MCNC: 30.4 UMOL/L — HIGH
HGB BLD-MCNC: 11.3 G/DL — LOW (ref 11.5–15.5)
MAGNESIUM SERPL-MCNC: 2.2 MG/DL — SIGNIFICANT CHANGE UP (ref 1.6–2.6)
MCHC RBC-ENTMCNC: 30.4 PG — SIGNIFICANT CHANGE UP (ref 27–34)
MCHC RBC-ENTMCNC: 31.2 GM/DL — LOW (ref 32–36)
MCV RBC AUTO: 97.3 FL — SIGNIFICANT CHANGE UP (ref 80–100)
NRBC # BLD: 0 /100 WBCS — SIGNIFICANT CHANGE UP (ref 0–0)
PHOSPHATE SERPL-MCNC: 5.8 MG/DL — HIGH (ref 2.5–4.5)
PLATELET # BLD AUTO: 173 K/UL — SIGNIFICANT CHANGE UP (ref 150–400)
POTASSIUM SERPL-MCNC: 5 MMOL/L — SIGNIFICANT CHANGE UP (ref 3.5–5.3)
POTASSIUM SERPL-SCNC: 5 MMOL/L — SIGNIFICANT CHANGE UP (ref 3.5–5.3)
PROT SERPL-MCNC: 5.7 G/DL — LOW (ref 6–8.3)
PROT SERPL-MCNC: 5.7 G/DL — LOW (ref 6–8.3)
PROT SERPL-MCNC: 6.3 G/DL — SIGNIFICANT CHANGE UP (ref 6–8.3)
RBC # BLD: 3.72 M/UL — LOW (ref 3.8–5.2)
RBC # FLD: 13.9 % — SIGNIFICANT CHANGE UP (ref 10.3–14.5)
SODIUM SERPL-SCNC: 141 MMOL/L — SIGNIFICANT CHANGE UP (ref 135–145)
WBC # BLD: 5.26 K/UL — SIGNIFICANT CHANGE UP (ref 3.8–10.5)
WBC # FLD AUTO: 5.26 K/UL — SIGNIFICANT CHANGE UP (ref 3.8–10.5)

## 2019-11-09 PROCEDURE — 99233 SBSQ HOSP IP/OBS HIGH 50: CPT

## 2019-11-09 RX ORDER — MAGNESIUM OXIDE 400 MG ORAL TABLET 241.3 MG
400 TABLET ORAL
Refills: 0 | Status: COMPLETED | OUTPATIENT
Start: 2019-11-09 | End: 2019-11-10

## 2019-11-09 RX ORDER — METOCLOPRAMIDE HCL 10 MG
10 TABLET ORAL EVERY 6 HOURS
Refills: 0 | Status: COMPLETED | OUTPATIENT
Start: 2019-11-09 | End: 2019-11-09

## 2019-11-09 RX ORDER — TOPIRAMATE 25 MG
50 TABLET ORAL AT BEDTIME
Refills: 0 | Status: DISCONTINUED | OUTPATIENT
Start: 2019-11-09 | End: 2019-11-12

## 2019-11-09 RX ORDER — VALPROIC ACID (AS SODIUM SALT) 250 MG/5ML
500 SOLUTION, ORAL ORAL EVERY 6 HOURS
Refills: 0 | Status: COMPLETED | OUTPATIENT
Start: 2019-11-09 | End: 2019-11-09

## 2019-11-09 RX ORDER — ACETAMINOPHEN 500 MG
650 TABLET ORAL EVERY 6 HOURS
Refills: 0 | Status: DISCONTINUED | OUTPATIENT
Start: 2019-11-09 | End: 2019-11-12

## 2019-11-09 RX ORDER — ACETAMINOPHEN 500 MG
650 TABLET ORAL ONCE
Refills: 0 | Status: COMPLETED | OUTPATIENT
Start: 2019-11-09 | End: 2019-11-09

## 2019-11-09 RX ADMIN — Medication 0.5 MILLIGRAM(S): at 21:25

## 2019-11-09 RX ADMIN — Medication 27.5 MILLIGRAM(S): at 18:56

## 2019-11-09 RX ADMIN — Medication 0.5 MILLIGRAM(S): at 05:58

## 2019-11-09 RX ADMIN — Medication 10 MILLIGRAM(S): at 17:29

## 2019-11-09 RX ADMIN — Medication 50 MILLIGRAM(S): at 21:24

## 2019-11-09 RX ADMIN — Medication 1 MILLIGRAM(S): at 12:06

## 2019-11-09 RX ADMIN — Medication 25 MILLIGRAM(S): at 05:55

## 2019-11-09 RX ADMIN — HEPARIN SODIUM 5000 UNIT(S): 5000 INJECTION INTRAVENOUS; SUBCUTANEOUS at 05:55

## 2019-11-09 RX ADMIN — ATORVASTATIN CALCIUM 40 MILLIGRAM(S): 80 TABLET, FILM COATED ORAL at 21:24

## 2019-11-09 RX ADMIN — CHLORHEXIDINE GLUCONATE 1 APPLICATION(S): 213 SOLUTION TOPICAL at 05:55

## 2019-11-09 RX ADMIN — Medication 10 MILLIGRAM(S): at 15:12

## 2019-11-09 RX ADMIN — MUPIROCIN 1 APPLICATION(S): 20 OINTMENT TOPICAL at 17:37

## 2019-11-09 RX ADMIN — Medication 10 MILLIGRAM(S): at 23:52

## 2019-11-09 RX ADMIN — MUPIROCIN 1 APPLICATION(S): 20 OINTMENT TOPICAL at 05:47

## 2019-11-09 RX ADMIN — SEVELAMER CARBONATE 800 MILLIGRAM(S): 2400 POWDER, FOR SUSPENSION ORAL at 12:01

## 2019-11-09 RX ADMIN — SEVELAMER CARBONATE 800 MILLIGRAM(S): 2400 POWDER, FOR SUSPENSION ORAL at 11:04

## 2019-11-09 RX ADMIN — PREGABALIN 1000 MICROGRAM(S): 225 CAPSULE ORAL at 12:01

## 2019-11-09 RX ADMIN — Medication 25 MILLIGRAM(S): at 17:29

## 2019-11-09 RX ADMIN — GABAPENTIN 100 MILLIGRAM(S): 400 CAPSULE ORAL at 12:00

## 2019-11-09 RX ADMIN — Medication 1 TABLET(S): at 12:06

## 2019-11-09 RX ADMIN — Medication 27.5 MILLIGRAM(S): at 15:11

## 2019-11-09 RX ADMIN — Medication 650 MILLIGRAM(S): at 05:30

## 2019-11-09 RX ADMIN — Medication 25 MILLIGRAM(S): at 21:24

## 2019-11-09 RX ADMIN — Medication 27.5 MILLIGRAM(S): at 23:52

## 2019-11-09 RX ADMIN — SEVELAMER CARBONATE 800 MILLIGRAM(S): 2400 POWDER, FOR SUSPENSION ORAL at 17:29

## 2019-11-09 RX ADMIN — MAGNESIUM OXIDE 400 MG ORAL TABLET 400 MILLIGRAM(S): 241.3 TABLET ORAL at 17:29

## 2019-11-09 RX ADMIN — Medication 25 MILLIGRAM(S): at 15:12

## 2019-11-09 RX ADMIN — Medication 81 MILLIGRAM(S): at 12:01

## 2019-11-09 RX ADMIN — AMLODIPINE BESYLATE 10 MILLIGRAM(S): 2.5 TABLET ORAL at 05:55

## 2019-11-09 RX ADMIN — SODIUM ZIRCONIUM CYCLOSILICATE 5 GRAM(S): 10 POWDER, FOR SUSPENSION ORAL at 12:01

## 2019-11-09 RX ADMIN — HEPARIN SODIUM 5000 UNIT(S): 5000 INJECTION INTRAVENOUS; SUBCUTANEOUS at 17:30

## 2019-11-09 RX ADMIN — LISINOPRIL 40 MILLIGRAM(S): 2.5 TABLET ORAL at 05:55

## 2019-11-09 RX ADMIN — Medication 200 MILLIGRAM(S): at 05:55

## 2019-11-09 RX ADMIN — Medication 650 MILLIGRAM(S): at 03:38

## 2019-11-09 RX ADMIN — Medication 0.5 MILLIGRAM(S): at 17:29

## 2019-11-09 NOTE — PROGRESS NOTE ADULT - SUBJECTIVE AND OBJECTIVE BOX
Patient is a 54y old  Female who presents with a chief complaint of Vertigo (2019 19:01)     pt seen in tele [x  ], reg med floor [   ], bed [ x ], chair at bedside [   ], a+o x3 [x  ], lethargic [  ],  nad [  x]          Allergies    No Known Drug Allergies  pineapple (Hives)        Vitals    T(F): 98.2 (19 @ 05:25), Max: 98.7 (19 @ 08:30)  HR: 66 (19 @ 05:25) (63 - 84)  BP: 161/84 (19 @ 05:25) (123/61 - 175/70)  RR: 18 (19 @ 05:25) (18 - 18)  SpO2: 66% (19 @ 05:25) (66% - 100%)  Wt(kg): --  CAPILLARY BLOOD GLUCOSE      POCT Blood Glucose.: 119 mg/dL (2019 20:52)      Labs                          11.3   5.26  )-----------( 173      ( 2019 06:06 )             36.2           141  |  107  |  28<H>  ----------------------------<  75  5.0   |  25  |  5.65<H>    Ca    8.8      2019 06:06  Phos  5.8       Mg     2.2         TPro  6.3  /  Alb  3.0<L>  /  TBili  0.5  /  DBili  x   /  AST  9<L>  /  ALT  15  /  AlkPhos  66              .Urine   @ 15:32   <10,000 CFU/mL Normal Urogenital Jinny  --  --          Radiology Results      Meds    MEDICATIONS  (STANDING):  amLODIPine   Tablet 10 milliGRAM(s) Oral daily  aspirin  chewable 81 milliGRAM(s) Oral daily  atorvastatin 40 milliGRAM(s) Oral at bedtime  chlorhexidine 4% Liquid 1 Application(s) Topical <User Schedule>  cyanocobalamin 1000 MICROGram(s) Oral daily  folic acid 1 milliGRAM(s) Oral daily  gabapentin 100 milliGRAM(s) Oral daily  heparin  Injectable 5000 Unit(s) SubCutaneous every 12 hours  hydrALAZINE 25 milliGRAM(s) Oral two times a day  insulin lispro (HumaLOG) corrective regimen sliding scale   SubCutaneous Before meals and at bedtime  labetalol 200 milliGRAM(s) Oral daily  lisinopril 40 milliGRAM(s) Oral daily  LORazepam     Tablet 0.5 milliGRAM(s) Oral two times a day  meclizine 25 milliGRAM(s) Oral three times a day  multivitamin/minerals 1 Tablet(s) Oral daily  mupirocin 2% Ointment 1 Application(s) Both Nostrils two times a day  sevelamer carbonate 800 milliGRAM(s) Oral three times a day with meals  sodium zirconium cyclosilicate 5 Gram(s) Oral daily      MEDICATIONS  (PRN):  ondansetron Injectable 4 milliGRAM(s) IV Push every 8 hours PRN Nausea and/or Vomiting      Physical Exam    Neuro :  no focal deficits  Respiratory: CTA B/L  CV: RRR, S1S2, no murmurs,   Abdominal: Soft, NT, ND +BS,  Extremities: No edema, + peripheral pulses      ASSESSMENT    vertigo,   headache poss 2nd vertiginous migraine  r/o r/o cns patho  r/o acs,   r/o arythmia,   h/o  ESRD on HD (M/W/F),   HTN,   migraine,   DM no longer on meds  Myocardial infarct, old  ESRD (end stage renal disease) on dialysis  Asthma occurring only with upper respiratory infection  Anemia in chronic renal disease  Claustrophobia  Uterine leiomyoma, unspecified location  AUSTIN (obstructive sleep apnea)  Gastroesophageal reflux disease without esophagitis  Vertigo  HLD (hyperlipidemia)  Morbid obesity  AV fistula  S/P craniotomy  S/P hernia repair  S/P  section        PLAN      cont tele,   cont aspirin, statin,   cont meclizine prn,  ct head neg noted  neuro f/u   Cervicogenic vertigo.    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    f/u MMA, Hcy, ESR, CRP, SPEP, B12 and folate levels, TSH, DICKSON, RF.  After MMA and Hcy levels draw, while waiting for results, would presumptively administer 1000mcg cyanocobalamin im, and start folic acid 2mg PO daily and MVI once daily.    If MMA high would check intrinsic factor ab and parietal cell ab and start course of im cyanocobalamin 1000mcg im weekly x 4 doses, then monthly, and continue folate and MVI.  Then repeat MMA and Hcy in two to three weeks to check for response to Tx.  If MMA clearly normal but Hcy elevated, continue folate and MVI supplementation.  Then repeat Hcy in two to three weeks.    Refer for out-patient electrodiagostic studies.  carotid duplex noted : no significant stenosis   ce q8 x 2 neg noted above  f/u echo  f/u orthostatic bp q8  hgba1c,  renal f/u   s/p hd yesterday  cont hd as per renal   pulm f/u  pft outpt  PT recs home with home PT   cont current meds

## 2019-11-09 NOTE — PROGRESS NOTE ADULT - SUBJECTIVE AND OBJECTIVE BOX
ORI JAMESON  54y  Patient is a 54y old  Female who presents with a chief complaint of Vertigo (2019 13:22)    HPI:  This is a patient with ESRD on HD. Last treatment was yesterday. No complaints at this time.      HEALTH ISSUES - PROBLEM Dx:  Anxiety: Anxiety  Morbid obesity: Morbid obesity  Asthma occurring only with upper respiratory infection: Asthma occurring only with upper respiratory infection  AUSTNI (obstructive sleep apnea): AUSTIN (obstructive sleep apnea)  Prophylactic measure: Prophylactic measure  Diabetes mellitus, type 2: Diabetes mellitus, type 2  Essential hypertension: Essential hypertension  ESRD (end stage renal disease) on dialysis: ESRD (end stage renal disease) on dialysis  Vertigo: Vertigo        MEDICATIONS  (STANDING):  amLODIPine   Tablet 10 milliGRAM(s) Oral daily  aspirin  chewable 81 milliGRAM(s) Oral daily  atorvastatin 40 milliGRAM(s) Oral at bedtime  chlorhexidine 4% Liquid 1 Application(s) Topical <User Schedule>  cyanocobalamin 1000 MICROGram(s) Oral daily  folic acid 1 milliGRAM(s) Oral daily  gabapentin 100 milliGRAM(s) Oral daily  heparin  Injectable 5000 Unit(s) SubCutaneous every 12 hours  hydrALAZINE 25 milliGRAM(s) Oral two times a day  insulin lispro (HumaLOG) corrective regimen sliding scale   SubCutaneous Before meals and at bedtime  labetalol 200 milliGRAM(s) Oral daily  lisinopril 40 milliGRAM(s) Oral daily  LORazepam     Tablet 0.5 milliGRAM(s) Oral two times a day  magnesium oxide 400 milliGRAM(s) Oral three times a day with meals  meclizine 25 milliGRAM(s) Oral three times a day  metoclopramide   Syrup 10 milliGRAM(s) Oral every 6 hours  multivitamin/minerals 1 Tablet(s) Oral daily  mupirocin 2% Ointment 1 Application(s) Both Nostrils two times a day  sevelamer carbonate 800 milliGRAM(s) Oral three times a day with meals  sodium zirconium cyclosilicate 5 Gram(s) Oral daily  topiramate 50 milliGRAM(s) Oral at bedtime  valproate sodium IVPB 500 milliGRAM(s) IV Intermittent every 6 hours    MEDICATIONS  (PRN):  acetaminophen   Tablet .. 650 milliGRAM(s) Oral every 6 hours PRN Mild Pain (1 - 3), Moderate Pain (4 - 6)  ondansetron Injectable 4 milliGRAM(s) IV Push every 8 hours PRN Nausea and/or Vomiting    Vital Signs Last 24 Hrs  T(C): 36.9 (2019 11:13), Max: 37.1 (2019 19:45)  T(F): 98.4 (:13), Max: 98.7 (2019 19:45)  HR: 71 (:) (66 - 84)  BP: 139/68 (:) (129/74 - 161/84)  BP(mean): --  RR: 18 (:13) (18 - 18)  SpO2: 71% (:) (66% - 100%)  Daily     Daily Weight in k.1 (2019 07:57)    PHYSICAL EXAM:  Constitutional: She appears comfortable and not distressed. Not diaphoretic.    Neck:  The thyroid is normal. Trachea is midline.     Respiratory: The lungs are clear to auscultation. No dullness and expansion is normal.    Cardiovascular: S1 and S2 are normal. No mummurs, rubs or gallops are present.    Gastrointestinal: The abdomen is soft. No tenderness is present. No masses are present. Bowel sounds are normal.    Genitourinary: The bladder is not distended. No CVA tenderness is present.    Extremities: No edema is noted. No deformities are present.    Neurological: Cognition is normal. Tone, power and sensation are normal. Gait is steady.    Skin: No leasions are seen  or palpated.    Lymph Nodes: No lymphadenopathy is present.    Psychiatric: Mood is appropriate. No hallucinations or flight of ideas are noted.                              11.3   5.26  )-----------( 173      ( 2019 06:06 )             36.2         141  |  107  |  28<H>  ----------------------------<  75  5.0   |  25  |  5.65<H>    Ca    8.8      2019 06:06  Phos  5.8       Mg     2.2         TPro  5.7<L>  /  Alb  x   /  TBili  x   /  DBili  x   /  AST  x   /  ALT  x   /  AlkPhos  x

## 2019-11-09 NOTE — PROGRESS NOTE ADULT - SUBJECTIVE AND OBJECTIVE BOX
Patient is a 54y old  Female who presents with a chief complaint of Vertigo (09 Nov 2019 08:41)    Awake, alert, laying in bed in NAD. S/p HD yesterday.      INTERVAL HPI/OVERNIGHT EVENTS:      VITAL SIGNS:  T(F): 98.5 (11-09-19 @ 07:57)  HR: 66 (11-09-19 @ 07:57)  BP: 136/63 (11-09-19 @ 07:57)  RR: 18 (11-09-19 @ 07:57)  SpO2: 66% (11-09-19 @ 07:57)  Wt(kg): --  I&O's Detail    09 Nov 2019 07:01  -  09 Nov 2019 10:36  --------------------------------------------------------  IN:    Oral Fluid: 177 mL  Total IN: 177 mL    OUT:  Total OUT: 0 mL    Total NET: 177 mL              REVIEW OF SYSTEMS:    CONSTITUTIONAL:  No fevers, chills, sweats    HEENT:  Eyes:  No diplopia or blurred vision. ENT:  No earache, sore throat or runny nose.    CARDIOVASCULAR:  No pressure, squeezing, tightness, or heaviness about the chest; no palpitations.    RESPIRATORY:  Per HPI    GASTROINTESTINAL:  No abdominal pain, nausea, vomiting or diarrhea.    GENITOURINARY:  No dysuria, frequency or urgency.    NEUROLOGIC:  No paresthesias, fasciculations, seizures or weakness.    PSYCHIATRIC:  No disorder of thought or mood.      PHYSICAL EXAM:    Constitutional: Well developed and nourished  Eyes:Perrla  ENMT: normal  Neck:supple  Respiratory: good air entry  Cardiovascular: S1 S2 regular  Gastrointestinal: Soft, Non tender  Extremities: No edema  Vascular:normal  Neurological:Awake, alert,Ox3  Musculoskeletal:Normal      MEDICATIONS  (STANDING):  amLODIPine   Tablet 10 milliGRAM(s) Oral daily  aspirin  chewable 81 milliGRAM(s) Oral daily  atorvastatin 40 milliGRAM(s) Oral at bedtime  chlorhexidine 4% Liquid 1 Application(s) Topical <User Schedule>  cyanocobalamin 1000 MICROGram(s) Oral daily  folic acid 1 milliGRAM(s) Oral daily  gabapentin 100 milliGRAM(s) Oral daily  heparin  Injectable 5000 Unit(s) SubCutaneous every 12 hours  hydrALAZINE 25 milliGRAM(s) Oral two times a day  insulin lispro (HumaLOG) corrective regimen sliding scale   SubCutaneous Before meals and at bedtime  labetalol 200 milliGRAM(s) Oral daily  lisinopril 40 milliGRAM(s) Oral daily  LORazepam     Tablet 0.5 milliGRAM(s) Oral two times a day  meclizine 25 milliGRAM(s) Oral three times a day  multivitamin/minerals 1 Tablet(s) Oral daily  mupirocin 2% Ointment 1 Application(s) Both Nostrils two times a day  sevelamer carbonate 800 milliGRAM(s) Oral three times a day with meals  sodium zirconium cyclosilicate 5 Gram(s) Oral daily    MEDICATIONS  (PRN):  ondansetron Injectable 4 milliGRAM(s) IV Push every 8 hours PRN Nausea and/or Vomiting      Allergies    No Known Drug Allergies  pineapple (Hives)    Intolerances        LABS:                        11.3   5.26  )-----------( 173      ( 09 Nov 2019 06:06 )             36.2     11-09    141  |  107  |  28<H>  ----------------------------<  75  5.0   |  25  |  5.65<H>    Ca    8.8      09 Nov 2019 06:06  Phos  5.8     11-09  Mg     2.2     11-09    TPro  6.3  /  Alb  3.0<L>  /  TBili  0.5  /  DBili  x   /  AST  9<L>  /  ALT  15  /  AlkPhos  66  11-09              CAPILLARY BLOOD GLUCOSE      POCT Blood Glucose.: 78 mg/dL (09 Nov 2019 08:03)  POCT Blood Glucose.: 78 mg/dL (09 Nov 2019 07:40)  POCT Blood Glucose.: 119 mg/dL (08 Nov 2019 20:52)  POCT Blood Glucose.: 124 mg/dL (08 Nov 2019 16:26)  POCT Blood Glucose.: 118 mg/dL (08 Nov 2019 10:48)        RADIOLOGY & ADDITIONAL TESTS:    CXR:  < from: Xray Chest 1 View-PORTABLE IMMEDIATE (11.06.19 @ 07:13) >  IMPRESSION:    Mild cardiomegaly. No acute infiltrate.    < end of copied text >    Ct scan chest:    ekg;    echo: Patient is a 54y old  Female who presents with a chief complaint of Vertigo (09 Nov 2019 08:41)    Awake, alert, laying in bed in NAD. S/p HD yesterday. Complaining of pain in the back of her head. Willing to try nasal pillow for her AUSTIN. Could not tolerate C-pap mask.      INTERVAL HPI/OVERNIGHT EVENTS:      VITAL SIGNS:  T(F): 98.5 (11-09-19 @ 07:57)  HR: 66 (11-09-19 @ 07:57)  BP: 136/63 (11-09-19 @ 07:57)  RR: 18 (11-09-19 @ 07:57)  SpO2: 66% (11-09-19 @ 07:57)  Wt(kg): --  I&O's Detail    09 Nov 2019 07:01  -  09 Nov 2019 10:36  --------------------------------------------------------  IN:    Oral Fluid: 177 mL  Total IN: 177 mL    OUT:  Total OUT: 0 mL    Total NET: 177 mL              REVIEW OF SYSTEMS:    CONSTITUTIONAL:  No fevers, chills, sweats    HEENT:  Eyes:  No diplopia or blurred vision. ENT:  No earache, sore throat or runny nose.    CARDIOVASCULAR:  No pressure, squeezing, tightness, or heaviness about the chest; no palpitations.    RESPIRATORY:  Per HPI    GASTROINTESTINAL:  No abdominal pain, nausea, vomiting or diarrhea.    GENITOURINARY:  No dysuria, frequency or urgency.    NEUROLOGIC:  No paresthesias, fasciculations, seizures or weakness.    PSYCHIATRIC:  No disorder of thought or mood.      PHYSICAL EXAM:    Constitutional: Well developed and nourished  Eyes:Perrla  ENMT: normal  Neck:supple  Respiratory: good air entry  Cardiovascular: S1 S2 regular  Gastrointestinal: Soft, Non tender  Extremities: No edema  Vascular:normal  Neurological:Awake, alert,Ox3  Musculoskeletal:Normal      MEDICATIONS  (STANDING):  amLODIPine   Tablet 10 milliGRAM(s) Oral daily  aspirin  chewable 81 milliGRAM(s) Oral daily  atorvastatin 40 milliGRAM(s) Oral at bedtime  chlorhexidine 4% Liquid 1 Application(s) Topical <User Schedule>  cyanocobalamin 1000 MICROGram(s) Oral daily  folic acid 1 milliGRAM(s) Oral daily  gabapentin 100 milliGRAM(s) Oral daily  heparin  Injectable 5000 Unit(s) SubCutaneous every 12 hours  hydrALAZINE 25 milliGRAM(s) Oral two times a day  insulin lispro (HumaLOG) corrective regimen sliding scale   SubCutaneous Before meals and at bedtime  labetalol 200 milliGRAM(s) Oral daily  lisinopril 40 milliGRAM(s) Oral daily  LORazepam     Tablet 0.5 milliGRAM(s) Oral two times a day  meclizine 25 milliGRAM(s) Oral three times a day  multivitamin/minerals 1 Tablet(s) Oral daily  mupirocin 2% Ointment 1 Application(s) Both Nostrils two times a day  sevelamer carbonate 800 milliGRAM(s) Oral three times a day with meals  sodium zirconium cyclosilicate 5 Gram(s) Oral daily    MEDICATIONS  (PRN):  ondansetron Injectable 4 milliGRAM(s) IV Push every 8 hours PRN Nausea and/or Vomiting      Allergies    No Known Drug Allergies  pineapple (Hives)    Intolerances        LABS:                        11.3   5.26  )-----------( 173      ( 09 Nov 2019 06:06 )             36.2     11-09    141  |  107  |  28<H>  ----------------------------<  75  5.0   |  25  |  5.65<H>    Ca    8.8      09 Nov 2019 06:06  Phos  5.8     11-09  Mg     2.2     11-09    TPro  6.3  /  Alb  3.0<L>  /  TBili  0.5  /  DBili  x   /  AST  9<L>  /  ALT  15  /  AlkPhos  66  11-09              CAPILLARY BLOOD GLUCOSE      POCT Blood Glucose.: 78 mg/dL (09 Nov 2019 08:03)  POCT Blood Glucose.: 78 mg/dL (09 Nov 2019 07:40)  POCT Blood Glucose.: 119 mg/dL (08 Nov 2019 20:52)  POCT Blood Glucose.: 124 mg/dL (08 Nov 2019 16:26)  POCT Blood Glucose.: 118 mg/dL (08 Nov 2019 10:48)        RADIOLOGY & ADDITIONAL TESTS:    CXR:  < from: Xray Chest 1 View-PORTABLE IMMEDIATE (11.06.19 @ 07:13) >  IMPRESSION:    Mild cardiomegaly. No acute infiltrate.    < end of copied text >    Ct scan chest:    ekg;    echo:

## 2019-11-09 NOTE — PROGRESS NOTE ADULT - SUBJECTIVE AND OBJECTIVE BOX
INTERVAL HPI/OVERNIGHT EVENTS: Moises ID# [id number] [place]  used: she reports almost daily headachesfor the past two months associated with nausea and weekly use of 10 tablets of Tylenol for treatment of headache.  Although improved continues to report dizziness and headache 4/10 intensity    HEALTH ISSUES - PROBLEM Dx:  Anxiety: Anxiety  Morbid obesity: Morbid obesity  Asthma occurring only with upper respiratory infection: Asthma occurring only with upper respiratory infection  AUSTIN (obstructive sleep apnea): AUSTIN (obstructive sleep apnea)  Prophylactic measure: Prophylactic measure  Diabetes mellitus, type 2: Diabetes mellitus, type 2  Essential hypertension: Essential hypertension  ESRD (end stage renal disease) on dialysis: ESRD (end stage renal disease) on dialysis  Vertigo: Vertigo          MEDICATIONS  (STANDING):  amLODIPine   Tablet 10 milliGRAM(s) Oral daily  aspirin  chewable 81 milliGRAM(s) Oral daily  atorvastatin 40 milliGRAM(s) Oral at bedtime  chlorhexidine 4% Liquid 1 Application(s) Topical <User Schedule>  cyanocobalamin 1000 MICROGram(s) Oral daily  folic acid 1 milliGRAM(s) Oral daily  gabapentin 100 milliGRAM(s) Oral daily  heparin  Injectable 5000 Unit(s) SubCutaneous every 12 hours  hydrALAZINE 25 milliGRAM(s) Oral two times a day  insulin lispro (HumaLOG) corrective regimen sliding scale   SubCutaneous Before meals and at bedtime  labetalol 200 milliGRAM(s) Oral daily  lisinopril 40 milliGRAM(s) Oral daily  LORazepam     Tablet 0.5 milliGRAM(s) Oral two times a day  magnesium oxide 400 milliGRAM(s) Oral three times a day with meals  meclizine 25 milliGRAM(s) Oral three times a day  metoclopramide   Syrup 10 milliGRAM(s) Oral every 6 hours  multivitamin/minerals 1 Tablet(s) Oral daily  mupirocin 2% Ointment 1 Application(s) Both Nostrils two times a day  sevelamer carbonate 800 milliGRAM(s) Oral three times a day with meals  sodium zirconium cyclosilicate 5 Gram(s) Oral daily  topiramate 50 milliGRAM(s) Oral at bedtime  valproate sodium IVPB 500 milliGRAM(s) IV Intermittent every 6 hours    MEDICATIONS  (PRN):  acetaminophen   Tablet .. 650 milliGRAM(s) Oral every 6 hours PRN Mild Pain (1 - 3), Moderate Pain (4 - 6)  ondansetron Injectable 4 milliGRAM(s) IV Push every 8 hours PRN Nausea and/or Vomiting      Allergies    No Known Drug Allergies  pineapple (Hives)    Intolerances    REVIEW OF SYSTEMS:    CONSTITUTIONAL: No weakness, fatigue, malaise, fevers or chills, no weight change, appetite change  EYES: Blurred vision; No double vision,  Admits vertigo, denies eye pain  Ears: no otalgia, no otorhea, no hearing loss, tinnitus  Nose: no epistaxis, rhinorrhea, post-discharge, sinus pressure  Throat: no throat pain, no oral lesions, tooth pain   NECK: No pain or stiffness  RESPIRATORY: No cough (productive or dry), wheezing, hemoptysis; No shortness of breath, orthnopnea, PND, LARIOS, snoring,  CARDIOVASCULAR: No chest pain or palpitations, no leg edema, no claudication    GASTROINTESTINAL: No abdominal or epigastric pain. No nausea, vomiting, or hematemesis; No diarrhea or constipation. No melena or hematochezia.  GENITOURINARY: No dysuria, frequency, urgency or hematuria, no pelvic pain, urinary incontinence, urgency  Muscloskeletal: no joints or muscle pain, no swelling in joints or muscles  NEUROLOGICAL: No numbness or weakness, headache, memory loss, seizures,. Reports dizziness, balance problem  SKIN: No pruritis, rashes, lesions or new moles  Psych: No anxiety, sadness, insomnia, suicide thoughts  Endocrine: No Heat or Cold intolerance, polydipsia, polyphagia  Heme/Lymph: no LN enlargement, no easy bruising or bleeding         Vital Signs Last 24 Hrs  T(C): 36.9 (09 Nov 2019 11:13), Max: 37.1 (08 Nov 2019 19:45)  T(F): 98.4 (09 Nov 2019 11:13), Max: 98.7 (08 Nov 2019 19:45)  HR: 71 (09 Nov 2019 11:13) (66 - 84)  BP: 139/68 (09 Nov 2019 11:13) (129/74 - 161/84)  BP(mean): --  RR: 18 (09 Nov 2019 11:13) (18 - 18)  SpO2: 71% (09 Nov 2019 11:13) (66% - 100%)    PHYSICAL EXAM:    Alert, awake, oriented in time, place, and person with normal immediate and 5 minute recall, fluent speech with normal naming, repetition and comprehension, Pupils are equal and reactive to light and accomodation. Visual fields are full by confrontation testing. Funduscopy reveals normal discs and retina. Extraocular movements are normal without nystagmus. [place]-Hallpike negative; HIT normal. Facial sensations, jaw strength, facial movements, hearing, palate, neck, shoulder and tongue are normal and symmetrical. Sensation for touch, pin, temperature and vibration, position sense are normal and symmetrical in the extremities and /or the trunk. Tone is normal in the extremities. Strength is 5/5. Muscle spindle reflexes (DTR) are normal. Plantar responses are flexor. Finger-to-nose and heel-to-shin are normal. Romberg sign is normal; Heel-walking and toe-walking Tandem gait unable to perform because of unsteadiness.    LABS:                        11.3   5.26  )-----------( 173      ( 09 Nov 2019 06:06 )             36.2     11-09    141  |  107  |  28<H>  ----------------------------<  75  5.0   |  25  |  5.65<H>    Ca    8.8      09 Nov 2019 06:06  Phos  5.8     11-09  Mg     2.2     11-09    TPro  5.7<L>  /  Alb  x   /  TBili  x   /  DBili  x   /  AST  x   /  ALT  x   /  AlkPhos  x   11-09          RADIOLOGY & ADDITIONAL TESTS:  < from: CT Head No Cont (11.06.19 @ 07:42) >      PROCEDURE DATE:  11/06/2019          INTERPRETATION:  CLINICAL INFORMATION: Vertigo.    COMPARISON: MR dated 2/28/2018.    PROCEDURE:    Axial sections of the brain were obtained from base to vertex, without   the intravenous administration of contrast material. Sagittal and coronal   reformats were then generated from the axial images.    FINDINGS:    There is no intracranial hemorrhage, mass or shift of the midline   structures. There are involutional changes.    The brain stem is unremarkable.  No cerebellopontine angle lesion is   appreciated.     The fourth, third and lateral ventricles are normal position.  No   subdural or epidural collections are present.     No acute fracture or destructive lesion is identified. Left frontal ileana   hole is noted.    The sinuses and mastoids are clear.    IMPRESSION:    No acute intracranial hemorrhage.                MYLA JOSEPH M.D., ATTENDING RADIOLOGIST  This document has been electronically signed. Nov 6 2019  7:58AM        < end of copied text >

## 2019-11-09 NOTE — PROGRESS NOTE ADULT - SUBJECTIVE AND OBJECTIVE BOX
CHIEF COMPLAINT:Patient is a 54y old  Female who presents with a chief complaint of Vertigo.Pt appears comfortable.    	  REVIEW OF SYSTEMS:  CONSTITUTIONAL: No fever, weight loss, or fatigue  EYES: No eye pain, visual disturbances, or discharge  ENT:  No difficulty hearing, tinnitus, vertigo; No sinus or throat pain  NECK: No pain or stiffness  RESPIRATORY: No cough, wheezing, chills or hemoptysis; No Shortness of Breath  CARDIOVASCULAR: No chest pain, palpitations, passing out, dizziness, or leg swelling  GASTROINTESTINAL: No abdominal or epigastric pain. No nausea, vomiting, or hematemesis; No diarrhea or constipation. No melena or hematochezia.  GENITOURINARY: No dysuria, frequency, hematuria, or incontinence  NEUROLOGICAL: No headaches, memory loss, loss of strength, numbness, or tremors  SKIN: No itching, burning, rashes, or lesions   LYMPH Nodes: No enlarged glands  ENDOCRINE: No heat or cold intolerance; No hair loss  MUSCULOSKELETAL: No joint pain or swelling; No muscle, back, or extremity pain  PSYCHIATRIC: No depression, anxiety, mood swings, or difficulty sleeping  HEME/LYMPH: No easy bruising, or bleeding gums  ALLERGY AND IMMUNOLOGIC: No hives or eczema	      PHYSICAL EXAM:  T(C): 36.9 (11-09-19 @ 07:57), Max: 37.1 (11-08-19 @ 19:45)  HR: 66 (11-09-19 @ 07:57) (66 - 84)  BP: 136/63 (11-09-19 @ 07:57) (123/61 - 161/84)  RR: 18 (11-09-19 @ 07:57) (18 - 18)  SpO2: 66% (11-09-19 @ 07:57) (66% - 100%)      Appearance: Normal	  HEENT:   Normal oral mucosa, PERRL, EOMI	  Lymphatic: No lymphadenopathy  Cardiovascular: Normal S1 S2, No JVD, No murmurs, No edema  Respiratory: Lungs clear to auscultation	  Psychiatry: A & O x 3, Mood & affect appropriate  Gastrointestinal:  Soft, Non-tender, + BS	  Skin: No rashes, No ecchymoses, No cyanosis	  Neurologic: Non-focal  Extremities: Normal range of motion, No clubbing, cyanosis or edema  Vascular: Peripheral pulses palpable 2+ bilaterally    MEDICATIONS  (STANDING):  amLODIPine   Tablet 10 milliGRAM(s) Oral daily  aspirin  chewable 81 milliGRAM(s) Oral daily  atorvastatin 40 milliGRAM(s) Oral at bedtime  chlorhexidine 4% Liquid 1 Application(s) Topical <User Schedule>  cyanocobalamin 1000 MICROGram(s) Oral daily  folic acid 1 milliGRAM(s) Oral daily  gabapentin 100 milliGRAM(s) Oral daily  heparin  Injectable 5000 Unit(s) SubCutaneous every 12 hours  hydrALAZINE 25 milliGRAM(s) Oral two times a day  insulin lispro (HumaLOG) corrective regimen sliding scale   SubCutaneous Before meals and at bedtime  labetalol 200 milliGRAM(s) Oral daily  lisinopril 40 milliGRAM(s) Oral daily  LORazepam     Tablet 0.5 milliGRAM(s) Oral two times a day  meclizine 25 milliGRAM(s) Oral three times a day  multivitamin/minerals 1 Tablet(s) Oral daily  mupirocin 2% Ointment 1 Application(s) Both Nostrils two times a day  sevelamer carbonate 800 milliGRAM(s) Oral three times a day with meals  sodium zirconium cyclosilicate 5 Gram(s) Oral daily      	  LABS:	 	                       11.3   5.26  )-----------( 173      ( 09 Nov 2019 06:06 )             36.2     11-09    141  |  107  |  28<H>  ----------------------------<  75  5.0   |  25  |  5.65<H>    Ca    8.8      09 Nov 2019 06:06  Phos  5.8     11-09  Mg     2.2     11-09    TPro  6.3  /  Alb  3.0<L>  /  TBili  0.5  /  DBili  x   /  AST  9<L>  /  ALT  15  /  AlkPhos  66  11-09    proBNP:   Lipid Profile: Cholesterol 140  LDL 75  HDL 41      HgA1c: Hemoglobin A1C, Whole Blood: 4.6 % (11-07 @ 10:26)    TSH: Thyroid Stimulating Hormone, Serum: 0.73 uU/mL (11-07 @ 06:31)

## 2019-11-10 LAB
ALBUMIN SERPL ELPH-MCNC: 3 G/DL — LOW (ref 3.5–5)
ALP SERPL-CCNC: 65 U/L — SIGNIFICANT CHANGE UP (ref 40–120)
ALT FLD-CCNC: 15 U/L DA — SIGNIFICANT CHANGE UP (ref 10–60)
ANION GAP SERPL CALC-SCNC: 10 MMOL/L — SIGNIFICANT CHANGE UP (ref 5–17)
AST SERPL-CCNC: 10 U/L — SIGNIFICANT CHANGE UP (ref 10–40)
BILIRUB SERPL-MCNC: 0.5 MG/DL — SIGNIFICANT CHANGE UP (ref 0.2–1.2)
BUN SERPL-MCNC: 45 MG/DL — HIGH (ref 7–18)
CALCIUM SERPL-MCNC: 8.9 MG/DL — SIGNIFICANT CHANGE UP (ref 8.4–10.5)
CHLORIDE SERPL-SCNC: 105 MMOL/L — SIGNIFICANT CHANGE UP (ref 96–108)
CO2 SERPL-SCNC: 24 MMOL/L — SIGNIFICANT CHANGE UP (ref 22–31)
CREAT SERPL-MCNC: 7.52 MG/DL — HIGH (ref 0.5–1.3)
GLUCOSE BLDC GLUCOMTR-MCNC: 69 MG/DL — LOW (ref 70–99)
GLUCOSE BLDC GLUCOMTR-MCNC: 71 MG/DL — SIGNIFICANT CHANGE UP (ref 70–99)
GLUCOSE BLDC GLUCOMTR-MCNC: 76 MG/DL — SIGNIFICANT CHANGE UP (ref 70–99)
GLUCOSE BLDC GLUCOMTR-MCNC: 89 MG/DL — SIGNIFICANT CHANGE UP (ref 70–99)
GLUCOSE SERPL-MCNC: 73 MG/DL — SIGNIFICANT CHANGE UP (ref 70–99)
HCT VFR BLD CALC: 34.5 % — SIGNIFICANT CHANGE UP (ref 34.5–45)
HGB BLD-MCNC: 11 G/DL — LOW (ref 11.5–15.5)
MAGNESIUM SERPL-MCNC: 2.5 MG/DL — SIGNIFICANT CHANGE UP (ref 1.6–2.6)
MCHC RBC-ENTMCNC: 31.1 PG — SIGNIFICANT CHANGE UP (ref 27–34)
MCHC RBC-ENTMCNC: 31.9 GM/DL — LOW (ref 32–36)
MCV RBC AUTO: 97.5 FL — SIGNIFICANT CHANGE UP (ref 80–100)
NRBC # BLD: 0 /100 WBCS — SIGNIFICANT CHANGE UP (ref 0–0)
PHOSPHATE SERPL-MCNC: 6.1 MG/DL — HIGH (ref 2.5–4.5)
PLATELET # BLD AUTO: 169 K/UL — SIGNIFICANT CHANGE UP (ref 150–400)
POTASSIUM SERPL-MCNC: 5.9 MMOL/L — HIGH (ref 3.5–5.3)
POTASSIUM SERPL-SCNC: 5.9 MMOL/L — HIGH (ref 3.5–5.3)
PROT SERPL-MCNC: 6.2 G/DL — SIGNIFICANT CHANGE UP (ref 6–8.3)
RBC # BLD: 3.54 M/UL — LOW (ref 3.8–5.2)
RBC # FLD: 14 % — SIGNIFICANT CHANGE UP (ref 10.3–14.5)
SODIUM SERPL-SCNC: 139 MMOL/L — SIGNIFICANT CHANGE UP (ref 135–145)
WBC # BLD: 4.81 K/UL — SIGNIFICANT CHANGE UP (ref 3.8–10.5)
WBC # FLD AUTO: 4.81 K/UL — SIGNIFICANT CHANGE UP (ref 3.8–10.5)

## 2019-11-10 PROCEDURE — 99231 SBSQ HOSP IP/OBS SF/LOW 25: CPT

## 2019-11-10 RX ORDER — SODIUM POLYSTYRENE SULFONATE 4.1 MEQ/G
15 POWDER, FOR SUSPENSION ORAL ONCE
Refills: 0 | Status: COMPLETED | OUTPATIENT
Start: 2019-11-10 | End: 2019-11-10

## 2019-11-10 RX ADMIN — MAGNESIUM OXIDE 400 MG ORAL TABLET 400 MILLIGRAM(S): 241.3 TABLET ORAL at 12:56

## 2019-11-10 RX ADMIN — Medication 25 MILLIGRAM(S): at 21:46

## 2019-11-10 RX ADMIN — GABAPENTIN 100 MILLIGRAM(S): 400 CAPSULE ORAL at 12:56

## 2019-11-10 RX ADMIN — SEVELAMER CARBONATE 800 MILLIGRAM(S): 2400 POWDER, FOR SUSPENSION ORAL at 09:40

## 2019-11-10 RX ADMIN — Medication 1 TABLET(S): at 13:02

## 2019-11-10 RX ADMIN — PREGABALIN 1000 MICROGRAM(S): 225 CAPSULE ORAL at 12:56

## 2019-11-10 RX ADMIN — SODIUM POLYSTYRENE SULFONATE 15 GRAM(S): 4.1 POWDER, FOR SUSPENSION ORAL at 12:57

## 2019-11-10 RX ADMIN — Medication 25 MILLIGRAM(S): at 06:29

## 2019-11-10 RX ADMIN — AMLODIPINE BESYLATE 10 MILLIGRAM(S): 2.5 TABLET ORAL at 06:29

## 2019-11-10 RX ADMIN — MUPIROCIN 1 APPLICATION(S): 20 OINTMENT TOPICAL at 17:35

## 2019-11-10 RX ADMIN — Medication 25 MILLIGRAM(S): at 13:02

## 2019-11-10 RX ADMIN — LISINOPRIL 40 MILLIGRAM(S): 2.5 TABLET ORAL at 06:30

## 2019-11-10 RX ADMIN — Medication 1 MILLIGRAM(S): at 12:56

## 2019-11-10 RX ADMIN — ATORVASTATIN CALCIUM 40 MILLIGRAM(S): 80 TABLET, FILM COATED ORAL at 21:46

## 2019-11-10 RX ADMIN — Medication 0.5 MILLIGRAM(S): at 06:34

## 2019-11-10 RX ADMIN — Medication 25 MILLIGRAM(S): at 17:35

## 2019-11-10 RX ADMIN — SEVELAMER CARBONATE 800 MILLIGRAM(S): 2400 POWDER, FOR SUSPENSION ORAL at 17:35

## 2019-11-10 RX ADMIN — HEPARIN SODIUM 5000 UNIT(S): 5000 INJECTION INTRAVENOUS; SUBCUTANEOUS at 17:35

## 2019-11-10 RX ADMIN — MAGNESIUM OXIDE 400 MG ORAL TABLET 400 MILLIGRAM(S): 241.3 TABLET ORAL at 17:35

## 2019-11-10 RX ADMIN — MAGNESIUM OXIDE 400 MG ORAL TABLET 400 MILLIGRAM(S): 241.3 TABLET ORAL at 09:40

## 2019-11-10 RX ADMIN — Medication 0.5 MILLIGRAM(S): at 17:35

## 2019-11-10 RX ADMIN — SEVELAMER CARBONATE 800 MILLIGRAM(S): 2400 POWDER, FOR SUSPENSION ORAL at 12:56

## 2019-11-10 RX ADMIN — Medication 25 MILLIGRAM(S): at 22:43

## 2019-11-10 RX ADMIN — SODIUM ZIRCONIUM CYCLOSILICATE 5 GRAM(S): 10 POWDER, FOR SUSPENSION ORAL at 12:56

## 2019-11-10 RX ADMIN — CHLORHEXIDINE GLUCONATE 1 APPLICATION(S): 213 SOLUTION TOPICAL at 06:30

## 2019-11-10 RX ADMIN — Medication 200 MILLIGRAM(S): at 06:29

## 2019-11-10 RX ADMIN — MUPIROCIN 1 APPLICATION(S): 20 OINTMENT TOPICAL at 06:30

## 2019-11-10 RX ADMIN — HEPARIN SODIUM 5000 UNIT(S): 5000 INJECTION INTRAVENOUS; SUBCUTANEOUS at 06:30

## 2019-11-10 RX ADMIN — Medication 81 MILLIGRAM(S): at 12:56

## 2019-11-10 RX ADMIN — Medication 50 MILLIGRAM(S): at 21:46

## 2019-11-10 RX ADMIN — Medication 25 MILLIGRAM(S): at 06:30

## 2019-11-10 NOTE — PROGRESS NOTE ADULT - SUBJECTIVE AND OBJECTIVE BOX
CHIEF COMPLAINT:Patient is a 54y old  Female who presents with a chief complaint of Vertigo .Pt feeling better.    	  REVIEW OF SYSTEMS:  CONSTITUTIONAL: No fever, weight loss, or fatigue  EYES: No eye pain, visual disturbances, or discharge  ENT:  No difficulty hearing, tinnitus, vertigo; No sinus or throat pain  NECK: No pain or stiffness  RESPIRATORY: No cough, wheezing, chills or hemoptysis; No Shortness of Breath  CARDIOVASCULAR: No chest pain, palpitations, passing out, dizziness, or leg swelling  GASTROINTESTINAL: No abdominal or epigastric pain. No nausea, vomiting, or hematemesis; No diarrhea or constipation. No melena or hematochezia.  GENITOURINARY: No dysuria, frequency, hematuria, or incontinence  NEUROLOGICAL: No headaches, memory loss, loss of strength, numbness, or tremors  SKIN: No itching, burning, rashes, or lesions   LYMPH Nodes: No enlarged glands  ENDOCRINE: No heat or cold intolerance; No hair loss  MUSCULOSKELETAL: No joint pain or swelling; No muscle, back, or extremity pain  PSYCHIATRIC: No depression, anxiety, mood swings, or difficulty sleeping  HEME/LYMPH: No easy bruising, or bleeding gums  ALLERGY AND IMMUNOLOGIC: No hives or eczema	      PHYSICAL EXAM:  T(C): 36.6 (11-10-19 @ 07:15), Max: 36.9 (11-09-19 @ 11:13)  HR: 60 (11-10-19 @ 07:15) (60 - 78)  BP: 118/70 (11-10-19 @ 07:15) (118/70 - 156/78)  RR: 18 (11-10-19 @ 07:15) (18 - 20)  SpO2: 97% (11-10-19 @ 07:15) (95% - 98%)  Wt(kg): --  I&O's Summary    09 Nov 2019 07:01  -  10 Nov 2019 07:00  --------------------------------------------------------  IN: 531 mL / OUT: 0 mL / NET: 531 mL        Appearance: Normal	  HEENT:   Normal oral mucosa, PERRL, EOMI	  Lymphatic: No lymphadenopathy  Cardiovascular: Normal S1 S2, No JVD, No murmurs, No edema  Respiratory: Lungs clear to auscultation	  Psychiatry: A & O x 3, Mood & affect appropriate  Gastrointestinal:  Soft, Non-tender, + BS	  Skin: No rashes, No ecchymoses, No cyanosis	  Neurologic: Non-focal  Extremities: Normal range of motion, No clubbing, cyanosis or edema  Vascular: Peripheral pulses palpable 2+ bilaterally    MEDICATIONS  (STANDING):  amLODIPine   Tablet 10 milliGRAM(s) Oral daily  aspirin  chewable 81 milliGRAM(s) Oral daily  atorvastatin 40 milliGRAM(s) Oral at bedtime  chlorhexidine 4% Liquid 1 Application(s) Topical <User Schedule>  cyanocobalamin 1000 MICROGram(s) Oral daily  folic acid 1 milliGRAM(s) Oral daily  gabapentin 100 milliGRAM(s) Oral daily  heparin  Injectable 5000 Unit(s) SubCutaneous every 12 hours  hydrALAZINE 25 milliGRAM(s) Oral two times a day  insulin lispro (HumaLOG) corrective regimen sliding scale   SubCutaneous Before meals and at bedtime  labetalol 200 milliGRAM(s) Oral daily  lisinopril 40 milliGRAM(s) Oral daily  LORazepam     Tablet 0.5 milliGRAM(s) Oral two times a day  magnesium oxide 400 milliGRAM(s) Oral three times a day with meals  meclizine 25 milliGRAM(s) Oral three times a day  multivitamin/minerals 1 Tablet(s) Oral daily  mupirocin 2% Ointment 1 Application(s) Both Nostrils two times a day  sevelamer carbonate 800 milliGRAM(s) Oral three times a day with meals  sodium polystyrene sulfonate Suspension 15 Gram(s) Oral once  sodium zirconium cyclosilicate 5 Gram(s) Oral daily  topiramate 50 milliGRAM(s) Oral at bedtime      	  LABS:	 	                       11.0   4.81  )-----------( 169      ( 10 Nov 2019 06:50 )             34.5     11-10    139  |  105  |  45<H>  ----------------------------<  73  5.9<H>   |  24  |  7.52<H>    Ca    8.9      10 Nov 2019 06:50  Phos  6.1     11-10  Mg     2.5     11-10    TPro  6.2  /  Alb  3.0<L>  /  TBili  0.5  /  DBili  x   /  AST  10  /  ALT  15  /  AlkPhos  65  11-10      Lipid Profile: Cholesterol 140  LDL 75  HDL 41      HgA1c: Hemoglobin A1C, Whole Blood: 4.6 % (11-07 @ 10:26)    TSH: Thyroid Stimulating Hormone, Serum: 0.73 uU/mL (11-07 @ 06:31)

## 2019-11-10 NOTE — PROGRESS NOTE ADULT - PROBLEM SELECTOR PLAN 2
- ESRD Maintenance HD (M/W/F ) via right arm AVF  - last dialysis Monday 11/6 did not start  - HD tomorrow  -elevated potassium  -started on Lokelma  - Avoid Nephrotoxic Meds/ Agents such as (NSAIDs, IV contrast, Aminoglycosides such as gentamicin, Gadolinium contrast, Phosphate containing enemas, etc..)  - Adjust Medications according to eGFR  - Renal diet and fluid restrictions 1000 ml/day  - c/w sevelamer TID w/ meals  - f/u CM (pt will need reinstatement of dialysis upon discharge)  ** Nephrology consulted Dr. Charles

## 2019-11-10 NOTE — PROGRESS NOTE ADULT - SUBJECTIVE AND OBJECTIVE BOX
Patient is a 54y old  Female who presents with a chief complaint of Vertigo (10 Nov 2019 10:42)     pt seen in tele [x  ], reg med floor [   ], bed [ x ], chair at bedside [   ], a+o x3 [x  ], lethargic [  ],  nad [  x]        Allergies    No Known Drug Allergies  pineapple (Hives)        Vitals    T(F): 98.4 (11-10-19 @ 11:21), Max: 98.4 (11-10-19 @ 11:21)  HR: 71 (11-10-19 @ 11:21) (60 - 78)  BP: 138/56 (11-10-19 @ 11:21) (118/70 - 156/78)  RR: 18 (11-10-19 @ 11:21) (18 - 20)  SpO2: 96% (11-10-19 @ 11:21) (95% - 98%)  Wt(kg): --  CAPILLARY BLOOD GLUCOSE      POCT Blood Glucose.: 89 mg/dL (10 Nov 2019 11:45)      Labs                          11.0   4.81  )-----------( 169      ( 10 Nov 2019 06:50 )             34.5       11-10    139  |  105  |  45<H>  ----------------------------<  73  5.9<H>   |  24  |  7.52<H>    Ca    8.9      10 Nov 2019 06:50  Phos  6.1     11-10  Mg     2.5     11-10    TPro  6.2  /  Alb  3.0<L>  /  TBili  0.5  /  DBili  x   /  AST  10  /  ALT  15  /  AlkPhos  65  11-10            .Urine   @ 15:32   <10,000 CFU/mL Normal Urogenital Jinny  --  --          Radiology Results      Meds    MEDICATIONS  (STANDING):  amLODIPine   Tablet 10 milliGRAM(s) Oral daily  aspirin  chewable 81 milliGRAM(s) Oral daily  atorvastatin 40 milliGRAM(s) Oral at bedtime  chlorhexidine 4% Liquid 1 Application(s) Topical <User Schedule>  cyanocobalamin 1000 MICROGram(s) Oral daily  folic acid 1 milliGRAM(s) Oral daily  gabapentin 100 milliGRAM(s) Oral daily  heparin  Injectable 5000 Unit(s) SubCutaneous every 12 hours  hydrALAZINE 25 milliGRAM(s) Oral two times a day  insulin lispro (HumaLOG) corrective regimen sliding scale   SubCutaneous Before meals and at bedtime  labetalol 200 milliGRAM(s) Oral daily  lisinopril 40 milliGRAM(s) Oral daily  LORazepam     Tablet 0.5 milliGRAM(s) Oral two times a day  magnesium oxide 400 milliGRAM(s) Oral three times a day with meals  meclizine 25 milliGRAM(s) Oral three times a day  multivitamin/minerals 1 Tablet(s) Oral daily  mupirocin 2% Ointment 1 Application(s) Both Nostrils two times a day  sevelamer carbonate 800 milliGRAM(s) Oral three times a day with meals  topiramate 50 milliGRAM(s) Oral at bedtime      MEDICATIONS  (PRN):  acetaminophen   Tablet .. 650 milliGRAM(s) Oral every 6 hours PRN Mild Pain (1 - 3), Moderate Pain (4 - 6)  ondansetron Injectable 4 milliGRAM(s) IV Push every 8 hours PRN Nausea and/or Vomiting      Physical Exam    Neuro :  no focal deficits  Respiratory: CTA B/L  CV: RRR, S1S2, no murmurs,   Abdominal: Soft, NT, ND +BS,  Extremities: No edema, + peripheral pulses      ASSESSMENT    vertigo,   headache poss 2nd vertiginous migraine  r/o r/o cns patho  r/o acs,   r/o arythmia,   h/o  ESRD on HD (M/W/F),   HTN,   migraine,   DM no longer on meds  Myocardial infarct, old  ESRD (end stage renal disease) on dialysis  Asthma occurring only with upper respiratory infection  Anemia in chronic renal disease  Claustrophobia  Uterine leiomyoma, unspecified location  AUSTIN (obstructive sleep apnea)  Gastroesophageal reflux disease without esophagitis  Vertigo  HLD (hyperlipidemia)  Morbid obesity  AV fistula  S/P craniotomy  S/P hernia repair  S/P  section        PLAN      cont tele,   cont aspirin, statin,   cont meclizine prn,  ct head neg noted  neuro f/u   Cervicogenic vertigo.    Likely vestibular migraine and analgesic overuse headache.   s/p trial acute migraine treatment- intravenous valproate 500 mg Q6H x 3 doses with metoclopramide 10 mg IV q6h x 3 doses and magnesium po 400 mg TID .  carotid duplex noted : no significant stenosis   ce q8 x 2 neg noted above  f/u echo  f/u orthostatic bp q8  hgba1c,  renal f/u   s/p hd yesterday  cont hd as per renal   pulm f/u  pft outpt  PT recs home with home PT   cont current meds Patient is a 54y old  Female who presents with a chief complaint of Vertigo (10 Nov 2019 10:42)     pt seen in tele [x  ], reg med floor [   ], bed [ x ], chair at bedside [   ], a+o x3 [x  ], lethargic [  ],  nad [  x]    Headaches much improved, still with slight dizziness     Allergies    No Known Drug Allergies  pineapple (Hives)        Vitals    T(F): 98.4 (11-10-19 @ 11:21), Max: 98.4 (11-10-19 @ 11:21)  HR: 71 (11-10-19 @ 11:21) (60 - 78)  BP: 138/56 (11-10-19 @ 11:21) (118/70 - 156/78)  RR: 18 (11-10-19 @ 11:21) (18 - 20)  SpO2: 96% (11-10-19 @ 11:21) (95% - 98%)  Wt(kg): --  CAPILLARY BLOOD GLUCOSE      POCT Blood Glucose.: 89 mg/dL (10 Nov 2019 11:45)      Labs                          11.0   4.81  )-----------( 169      ( 10 Nov 2019 06:50 )             34.5       11-10    139  |  105  |  45<H>  ----------------------------<  73  5.9<H>   |  24  |  7.52<H>    Ca    8.9      10 Nov 2019 06:50  Phos  6.1     11-10  Mg     2.5     11-10    TPro  6.2  /  Alb  3.0<L>  /  TBili  0.5  /  DBili  x   /  AST  10  /  ALT  15  /  AlkPhos  65  11-10            .Urine  06 @ 15:32   <10,000 CFU/mL Normal Urogenital Jinny  --  --          Radiology Results      Meds    MEDICATIONS  (STANDING):  amLODIPine   Tablet 10 milliGRAM(s) Oral daily  aspirin  chewable 81 milliGRAM(s) Oral daily  atorvastatin 40 milliGRAM(s) Oral at bedtime  chlorhexidine 4% Liquid 1 Application(s) Topical <User Schedule>  cyanocobalamin 1000 MICROGram(s) Oral daily  folic acid 1 milliGRAM(s) Oral daily  gabapentin 100 milliGRAM(s) Oral daily  heparin  Injectable 5000 Unit(s) SubCutaneous every 12 hours  hydrALAZINE 25 milliGRAM(s) Oral two times a day  insulin lispro (HumaLOG) corrective regimen sliding scale   SubCutaneous Before meals and at bedtime  labetalol 200 milliGRAM(s) Oral daily  lisinopril 40 milliGRAM(s) Oral daily  LORazepam     Tablet 0.5 milliGRAM(s) Oral two times a day  magnesium oxide 400 milliGRAM(s) Oral three times a day with meals  meclizine 25 milliGRAM(s) Oral three times a day  multivitamin/minerals 1 Tablet(s) Oral daily  mupirocin 2% Ointment 1 Application(s) Both Nostrils two times a day  sevelamer carbonate 800 milliGRAM(s) Oral three times a day with meals  topiramate 50 milliGRAM(s) Oral at bedtime      MEDICATIONS  (PRN):  acetaminophen   Tablet .. 650 milliGRAM(s) Oral every 6 hours PRN Mild Pain (1 - 3), Moderate Pain (4 - 6)  ondansetron Injectable 4 milliGRAM(s) IV Push every 8 hours PRN Nausea and/or Vomiting      Physical Exam    Neuro :  no focal deficits  Respiratory: CTA B/L  CV: RRR, S1S2, no murmurs,   Abdominal: Soft, NT, ND +BS,  Extremities: No edema, + peripheral pulses      ASSESSMENT    vertigo,   headache poss 2nd vertiginous migraine  r/o r/o cns patho  r/o acs,   r/o arythmia,   h/o  ESRD on HD (M/W/F),   HTN,   migraine,   DM no longer on meds  Myocardial infarct, old  ESRD (end stage renal disease) on dialysis  Asthma occurring only with upper respiratory infection  Anemia in chronic renal disease  Claustrophobia  Uterine leiomyoma, unspecified location  AUSTIN (obstructive sleep apnea)  Gastroesophageal reflux disease without esophagitis  Vertigo  HLD (hyperlipidemia)  Morbid obesity  AV fistula  S/P craniotomy  S/P hernia repair  S/P  section        PLAN      cont tele,   cont aspirin, statin,   cont meclizine prn,  ct head neg noted  neuro f/u   Cervicogenic vertigo.    Likely vestibular migraine and analgesic overuse headache.   s/p trial acute migraine treatment- intravenous valproate 500 mg Q6H x 3 doses with metoclopramide 10 mg IV q6h x 3 doses and magnesium po 400 mg TID .  carotid duplex noted : no significant stenosis   ce q8 x 2 neg noted above  f/u echo  f/u orthostatic bp q8  hgba1c,  renal f/u   s/p hd yesterday  cont hd as per renal   pulm f/u  pft outpt  PT recs home with home PT   cont current meds

## 2019-11-10 NOTE — PROGRESS NOTE ADULT - SUBJECTIVE AND OBJECTIVE BOX
ORI JAMESON  54y  Patient is a 54y old  Female who presents with a chief complaint of Vertigo (10 Nov 2019 12:57)    HPI:  ESRD, on HD, last Hd was Friday. No new complaints.    HEALTH ISSUES - PROBLEM Dx:  Anxiety: Anxiety  Morbid obesity: Morbid obesity  Asthma occurring only with upper respiratory infection: Asthma occurring only with upper respiratory infection  ASUTIN (obstructive sleep apnea): AUSTIN (obstructive sleep apnea)  Prophylactic measure: Prophylactic measure  Diabetes mellitus, type 2: Diabetes mellitus, type 2  Essential hypertension: Essential hypertension  ESRD (end stage renal disease) on dialysis: ESRD (end stage renal disease) on dialysis  Vertigo: Vertigo        MEDICATIONS  (STANDING):  amLODIPine   Tablet 10 milliGRAM(s) Oral daily  aspirin  chewable 81 milliGRAM(s) Oral daily  atorvastatin 40 milliGRAM(s) Oral at bedtime  chlorhexidine 4% Liquid 1 Application(s) Topical <User Schedule>  cyanocobalamin 1000 MICROGram(s) Oral daily  folic acid 1 milliGRAM(s) Oral daily  gabapentin 100 milliGRAM(s) Oral daily  heparin  Injectable 5000 Unit(s) SubCutaneous every 12 hours  hydrALAZINE 25 milliGRAM(s) Oral two times a day  insulin lispro (HumaLOG) corrective regimen sliding scale   SubCutaneous Before meals and at bedtime  labetalol 200 milliGRAM(s) Oral daily  lisinopril 40 milliGRAM(s) Oral daily  LORazepam     Tablet 0.5 milliGRAM(s) Oral two times a day  magnesium oxide 400 milliGRAM(s) Oral three times a day with meals  meclizine 25 milliGRAM(s) Oral three times a day  multivitamin/minerals 1 Tablet(s) Oral daily  mupirocin 2% Ointment 1 Application(s) Both Nostrils two times a day  promethazine Suppository 25 milliGRAM(s) Rectal at bedtime  sevelamer carbonate 800 milliGRAM(s) Oral three times a day with meals  topiramate 50 milliGRAM(s) Oral at bedtime    MEDICATIONS  (PRN):  acetaminophen   Tablet .. 650 milliGRAM(s) Oral every 6 hours PRN Mild Pain (1 - 3), Moderate Pain (4 - 6)  ondansetron Injectable 4 milliGRAM(s) IV Push every 8 hours PRN Nausea and/or Vomiting    Vital Signs Last 24 Hrs  T(C): 36.9 (10 Nov 2019 11:21), Max: 36.9 (10 Nov 2019 11:21)  T(F): 98.4 (10 Nov 2019 11:), Max: 98.4 (10 Nov 2019 11:)  HR: 71 (10 Nov 2019 11:) (60 - 78)  BP: 138/56 (10 Nov 2019 11:) (118/70 - 156/78)  BP(mean): --  RR: 18 (10 Nov 2019 11:) (18 - 20)  SpO2: 96% (10 Nov 2019 11:) (95% - 98%)  Daily     Daily Weight in k.1 (10 Nov 2019 04:43)    PHYSICAL EXAM:  Constitutional:  She appears comfortable and not distressed. Not diaphoretic.    Neck:  The thyroid is normal. Trachea is midline.     Respiratory: The lungs are clear to auscultation. No dullness and expansion is normal.    Cardiovascular: S1 and S2 are normal. No mummurs, rubs or gallops are present.    Gastrointestinal: The abdomen is soft. No tenderness is present. No masses are present. Bowel sounds are normal.    Genitourinary: The bladder is not distended. No CVA tenderness is present.    Extremities: No edema is noted. No deformities are present.    Neurological: Cognition is normal. Tone, power and sensation are normal.     Lymph Nodes: No lymphadenopathy is present.                            11.0   4.81  )-----------( 169      ( 10 Nov 2019 06:50 )             34.5     11-10    139  |  105  |  45<H>  ----------------------------<  73  5.9<H>   |  24  |  7.52<H>    Ca    8.9      10 Nov 2019 06:50  Phos  6.1     11-10  Mg     2.5     11-10    TPro  6.2  /  Alb  3.0<L>  /  TBili  0.5  /  DBili  x   /  AST  10  /  ALT  15  /  AlkPhos  65  11-10

## 2019-11-10 NOTE — PROGRESS NOTE ADULT - SUBJECTIVE AND OBJECTIVE BOX
PGY 1 Note discussed with supervising resident and primary attending    Patient is a 54y old  Female who presents with a chief complaint of Vertigo (09 Nov 2019 15:13)      INTERVAL HPI/OVERNIGHT EVENTS: Patient seen and examined at the bedside.     MEDICATIONS  (STANDING):  amLODIPine   Tablet 10 milliGRAM(s) Oral daily  aspirin  chewable 81 milliGRAM(s) Oral daily  atorvastatin 40 milliGRAM(s) Oral at bedtime  chlorhexidine 4% Liquid 1 Application(s) Topical <User Schedule>  cyanocobalamin 1000 MICROGram(s) Oral daily  folic acid 1 milliGRAM(s) Oral daily  gabapentin 100 milliGRAM(s) Oral daily  heparin  Injectable 5000 Unit(s) SubCutaneous every 12 hours  hydrALAZINE 25 milliGRAM(s) Oral two times a day  insulin lispro (HumaLOG) corrective regimen sliding scale   SubCutaneous Before meals and at bedtime  labetalol 200 milliGRAM(s) Oral daily  lisinopril 40 milliGRAM(s) Oral daily  LORazepam     Tablet 0.5 milliGRAM(s) Oral two times a day  magnesium oxide 400 milliGRAM(s) Oral three times a day with meals  meclizine 25 milliGRAM(s) Oral three times a day  multivitamin/minerals 1 Tablet(s) Oral daily  mupirocin 2% Ointment 1 Application(s) Both Nostrils two times a day  sevelamer carbonate 800 milliGRAM(s) Oral three times a day with meals  sodium zirconium cyclosilicate 5 Gram(s) Oral daily  topiramate 50 milliGRAM(s) Oral at bedtime    MEDICATIONS  (PRN):  acetaminophen   Tablet .. 650 milliGRAM(s) Oral every 6 hours PRN Mild Pain (1 - 3), Moderate Pain (4 - 6)  ondansetron Injectable 4 milliGRAM(s) IV Push every 8 hours PRN Nausea and/or Vomiting      __________________________________________________  REVIEW OF SYSTEMS:    CONSTITUTIONAL: No fever,   EYES: no acute visual disturbances  NECK: No pain or stiffness  RESPIRATORY: No cough; No shortness of breath  CARDIOVASCULAR: No chest pain, no palpitations  GASTROINTESTINAL: No pain. No nausea or vomiting; No diarrhea   NEUROLOGICAL: No headache or numbness, no tremors  MUSCULOSKELETAL: No joint pain, no muscle pain  GENITOURINARY: no dysuria, no frequency, no hesitancy  PSYCHIATRY: no depression , no anxiety  ALL OTHER  ROS negative        Vital Signs Last 24 Hrs  T(C): 36.6 (10 Nov 2019 07:15), Max: 36.9 (09 Nov 2019 11:13)  T(F): 97.8 (10 Nov 2019 07:15), Max: 98.4 (09 Nov 2019 11:13)  HR: 60 (10 Nov 2019 07:15) (60 - 78)  BP: 118/70 (10 Nov 2019 07:15) (118/70 - 156/78)  BP(mean): --  RR: 18 (10 Nov 2019 07:15) (18 - 20)  SpO2: 97% (10 Nov 2019 07:15) (95% - 98%)    ________________________________________________  PHYSICAL EXAM:  GENERAL: NAD  HEENT: Normocephalic;  conjunctivae and sclerae clear; moist mucous membranes;   NECK : supple  CHEST/LUNG: Clear to auscultation bilaterally with good air entry   HEART: S1 S2  regular; no murmurs, gallops or rubs  ABDOMEN: Soft, Nontender, Nondistended; Bowel sounds present  EXTREMITIES: no cyanosis; no edema; no calf tenderness  SKIN: warm and dry; no rash  NERVOUS SYSTEM:  Awake and alert; Oriented  to place, person and time ; no new deficits    _________________________________________________  LABS:                        11.0   4.81  )-----------( 169      ( 10 Nov 2019 06:50 )             34.5     11-10    139  |  105  |  45<H>  ----------------------------<  73  5.9<H>   |  24  |  7.52<H>    Ca    8.9      10 Nov 2019 06:50  Phos  6.1     11-10  Mg     2.5     11-10    TPro  6.2  /  Alb  3.0<L>  /  TBili  0.5  /  DBili  x   /  AST  10  /  ALT  15  /  AlkPhos  65  11-10        CAPILLARY BLOOD GLUCOSE      POCT Blood Glucose.: 76 mg/dL (10 Nov 2019 08:03)  POCT Blood Glucose.: 123 mg/dL (09 Nov 2019 21:17)  POCT Blood Glucose.: 98 mg/dL (09 Nov 2019 19:40)  POCT Blood Glucose.: 103 mg/dL (09 Nov 2019 16:52)  POCT Blood Glucose.: 108 mg/dL (09 Nov 2019 15:10)  POCT Blood Glucose.: 69 mg/dL (09 Nov 2019 11:32)        RADIOLOGY & ADDITIONAL TESTS:    Imaging Personally Reviewed:  YES/NO    Consultant(s) Notes Reviewed:   YES/ No    Care Discussed with Consultants :     Plan of care was discussed with patient and /or primary care giver; all questions and concerns were addressed and care was aligned with patient's wishes. PGY 1 Note discussed with supervising resident and primary attending    Patient is a 54y old  Female who presents with a chief complaint of Vertigo (09 Nov 2019 15:13)      INTERVAL HPI/OVERNIGHT EVENTS: Patient seen and examined at the bedside.     MEDICATIONS  (STANDING):  amLODIPine   Tablet 10 milliGRAM(s) Oral daily  aspirin  chewable 81 milliGRAM(s) Oral daily  atorvastatin 40 milliGRAM(s) Oral at bedtime  chlorhexidine 4% Liquid 1 Application(s) Topical <User Schedule>  cyanocobalamin 1000 MICROGram(s) Oral daily  folic acid 1 milliGRAM(s) Oral daily  gabapentin 100 milliGRAM(s) Oral daily  heparin  Injectable 5000 Unit(s) SubCutaneous every 12 hours  hydrALAZINE 25 milliGRAM(s) Oral two times a day  insulin lispro (HumaLOG) corrective regimen sliding scale   SubCutaneous Before meals and at bedtime  labetalol 200 milliGRAM(s) Oral daily  lisinopril 40 milliGRAM(s) Oral daily  LORazepam     Tablet 0.5 milliGRAM(s) Oral two times a day  magnesium oxide 400 milliGRAM(s) Oral three times a day with meals  meclizine 25 milliGRAM(s) Oral three times a day  multivitamin/minerals 1 Tablet(s) Oral daily  mupirocin 2% Ointment 1 Application(s) Both Nostrils two times a day  sevelamer carbonate 800 milliGRAM(s) Oral three times a day with meals  sodium zirconium cyclosilicate 5 Gram(s) Oral daily  topiramate 50 milliGRAM(s) Oral at bedtime    MEDICATIONS  (PRN):  acetaminophen   Tablet .. 650 milliGRAM(s) Oral every 6 hours PRN Mild Pain (1 - 3), Moderate Pain (4 - 6)  ondansetron Injectable 4 milliGRAM(s) IV Push every 8 hours PRN Nausea and/or Vomiting      __________________________________________________  REVIEW OF SYSTEMS:    CONSTITUTIONAL: No fever,   EYES: no acute visual disturbances  NECK: No pain or stiffness  RESPIRATORY: No cough; No shortness of breath  CARDIOVASCULAR: No chest pain, no palpitations  GASTROINTESTINAL: No pain. No nausea or vomiting; No diarrhea   NEUROLOGICAL: No headache or numbness, no tremors  MUSCULOSKELETAL: No joint pain, no muscle pain  GENITOURINARY: no dysuria, no frequency, no hesitancy  PSYCHIATRY: no depression , no anxiety  ALL OTHER  ROS negative        Vital Signs Last 24 Hrs  T(C): 36.6 (10 Nov 2019 07:15), Max: 36.9 (09 Nov 2019 11:13)  T(F): 97.8 (10 Nov 2019 07:15), Max: 98.4 (09 Nov 2019 11:13)  HR: 60 (10 Nov 2019 07:15) (60 - 78)  BP: 118/70 (10 Nov 2019 07:15) (118/70 - 156/78)  BP(mean): --  RR: 18 (10 Nov 2019 07:15) (18 - 20)  SpO2: 97% (10 Nov 2019 07:15) (95% - 98%)    ________________________________________________  PHYSICAL EXAM:  GENERAL: NAD, dizzy   HEENT: Normocephalic;  conjunctivae and sclerae clear; moist mucous membranes;   NECK : supple  CHEST/LUNG: Clear to auscultation bilaterally with good air entry   HEART: S1 S2  regular; no murmurs, gallops or rubs  ABDOMEN: Soft, Nontender, Nondistended; Bowel sounds present  EXTREMITIES: no cyanosis; no edema; no calf tenderness  SKIN: warm and dry; no rash  NERVOUS SYSTEM:  Awake and alert; Oriented  to place, person and time ; unable to follow finger laterally to right, nystagmus      _________________________________________________  LABS:                        11.0   4.81  )-----------( 169      ( 10 Nov 2019 06:50 )             34.5     11-10    139  |  105  |  45<H>  ----------------------------<  73  5.9<H>   |  24  |  7.52<H>    Ca    8.9      10 Nov 2019 06:50  Phos  6.1     11-10  Mg     2.5     11-10    TPro  6.2  /  Alb  3.0<L>  /  TBili  0.5  /  DBili  x   /  AST  10  /  ALT  15  /  AlkPhos  65  11-10        CAPILLARY BLOOD GLUCOSE      POCT Blood Glucose.: 76 mg/dL (10 Nov 2019 08:03)  POCT Blood Glucose.: 123 mg/dL (09 Nov 2019 21:17)  POCT Blood Glucose.: 98 mg/dL (09 Nov 2019 19:40)  POCT Blood Glucose.: 103 mg/dL (09 Nov 2019 16:52)  POCT Blood Glucose.: 108 mg/dL (09 Nov 2019 15:10)  POCT Blood Glucose.: 69 mg/dL (09 Nov 2019 11:32)        RADIOLOGY & ADDITIONAL TESTS:    Imaging Personally Reviewed:  YES/NO    Consultant(s) Notes Reviewed:   YES/ No    Care Discussed with Consultants :     Plan of care was discussed with patient and /or primary care giver; all questions and concerns were addressed and care was aligned with patient's wishes.

## 2019-11-10 NOTE — PROGRESS NOTE ADULT - SUBJECTIVE AND OBJECTIVE BOX
Patient is a 54y old  Female who presents with a chief complaint of Vertigo (10 Nov 2019 09:29)    Awake, alert, laying in bed in NAD. S/p HD yesterday. Complaining of pain in the back of her head. Willing to try nasal pillow for her AUSTIN. Could not tolerate C-pap mask.    INTERVAL HPI/OVERNIGHT EVENTS:      VITAL SIGNS:  T(F): 97.8 (11-10-19 @ 07:15)  HR: 60 (11-10-19 @ 07:15)  BP: 118/70 (11-10-19 @ 07:15)  RR: 18 (11-10-19 @ 07:15)  SpO2: 97% (11-10-19 @ 07:15)  Wt(kg): --  I&O's Detail    09 Nov 2019 07:01  -  10 Nov 2019 07:00  --------------------------------------------------------  IN:    Oral Fluid: 531 mL  Total IN: 531 mL    OUT:  Total OUT: 0 mL    Total NET: 531 mL              REVIEW OF SYSTEMS:    CONSTITUTIONAL:  No fevers, chills, sweats    HEENT:  Eyes:  No diplopia or blurred vision. ENT:  No earache, sore throat or runny nose.    CARDIOVASCULAR:  No pressure, squeezing, tightness, or heaviness about the chest; no palpitations.    RESPIRATORY:  Per HPI    GASTROINTESTINAL:  No abdominal pain, nausea, vomiting or diarrhea.    GENITOURINARY:  No dysuria, frequency or urgency.    NEUROLOGIC:  No paresthesias, fasciculations, seizures or weakness.    PSYCHIATRIC:  No disorder of thought or mood.      PHYSICAL EXAM:    Constitutional: Well developed and nourished  Eyes:Perrla  ENMT: normal  Neck:supple  Respiratory: good air entry  Cardiovascular: S1 S2 regular  Gastrointestinal: Soft, Non tender  Extremities: No edema  Vascular:normal  Neurological:Awake, alert,Ox3  Musculoskeletal:Normal      MEDICATIONS  (STANDING):  amLODIPine   Tablet 10 milliGRAM(s) Oral daily  aspirin  chewable 81 milliGRAM(s) Oral daily  atorvastatin 40 milliGRAM(s) Oral at bedtime  chlorhexidine 4% Liquid 1 Application(s) Topical <User Schedule>  cyanocobalamin 1000 MICROGram(s) Oral daily  folic acid 1 milliGRAM(s) Oral daily  gabapentin 100 milliGRAM(s) Oral daily  heparin  Injectable 5000 Unit(s) SubCutaneous every 12 hours  hydrALAZINE 25 milliGRAM(s) Oral two times a day  insulin lispro (HumaLOG) corrective regimen sliding scale   SubCutaneous Before meals and at bedtime  labetalol 200 milliGRAM(s) Oral daily  lisinopril 40 milliGRAM(s) Oral daily  LORazepam     Tablet 0.5 milliGRAM(s) Oral two times a day  magnesium oxide 400 milliGRAM(s) Oral three times a day with meals  meclizine 25 milliGRAM(s) Oral three times a day  multivitamin/minerals 1 Tablet(s) Oral daily  mupirocin 2% Ointment 1 Application(s) Both Nostrils two times a day  sevelamer carbonate 800 milliGRAM(s) Oral three times a day with meals  sodium polystyrene sulfonate Suspension 15 Gram(s) Oral once  sodium zirconium cyclosilicate 5 Gram(s) Oral daily  topiramate 50 milliGRAM(s) Oral at bedtime    MEDICATIONS  (PRN):  acetaminophen   Tablet .. 650 milliGRAM(s) Oral every 6 hours PRN Mild Pain (1 - 3), Moderate Pain (4 - 6)  ondansetron Injectable 4 milliGRAM(s) IV Push every 8 hours PRN Nausea and/or Vomiting      Allergies    No Known Drug Allergies  pineapple (Hives)    Intolerances        LABS:                        11.0   4.81  )-----------( 169      ( 10 Nov 2019 06:50 )             34.5     11-10    139  |  105  |  45<H>  ----------------------------<  73  5.9<H>   |  24  |  7.52<H>    Ca    8.9      10 Nov 2019 06:50  Phos  6.1     11-10  Mg     2.5     11-10    TPro  6.2  /  Alb  3.0<L>  /  TBili  0.5  /  DBili  x   /  AST  10  /  ALT  15  /  AlkPhos  65  11-10              CAPILLARY BLOOD GLUCOSE      POCT Blood Glucose.: 76 mg/dL (10 Nov 2019 08:03)  POCT Blood Glucose.: 123 mg/dL (09 Nov 2019 21:17)  POCT Blood Glucose.: 98 mg/dL (09 Nov 2019 19:40)  POCT Blood Glucose.: 103 mg/dL (09 Nov 2019 16:52)  POCT Blood Glucose.: 108 mg/dL (09 Nov 2019 15:10)  POCT Blood Glucose.: 69 mg/dL (09 Nov 2019 11:32)        RADIOLOGY & ADDITIONAL TESTS:    CXR:  < from: Xray Chest 1 View-PORTABLE IMMEDIATE (11.06.19 @ 07:13) >  IMPRESSION:    Mild cardiomegaly. No acute infiltrate.    < end of copied text >    Ct scan chest:    ekg;    echo: Patient is a 54y old  Female who presents with a chief complaint of Vertigo (10 Nov 2019 09:29)    Patient is comfortable, laying in bed in NAD. S/p HD. Complaining of pain in the back of her head. Willing to try nasal pillow for her [place]. Could not tolerate C-pap mask.    INTERVAL HPI/OVERNIGHT EVENTS:      VITAL SIGNS:  T(F): 97.8 (11-10-19 @ 07:15)  HR: 60 (11-10-19 @ 07:15)  BP: 118/70 (11-10-19 @ 07:15)  RR: 18 (11-10-19 @ 07:15)  SpO2: 97% (11-10-19 @ 07:15)  Wt(kg): --  I&O's Detail    09 Nov 2019 07:01  -  10 Nov 2019 07:00  --------------------------------------------------------  IN:    Oral Fluid: 531 mL  Total IN: 531 mL    OUT:  Total OUT: 0 mL    Total NET: 531 mL              REVIEW OF SYSTEMS:    CONSTITUTIONAL:  No fevers, chills, sweats    HEENT:  Eyes:  No diplopia or blurred vision. ENT:  No earache, sore throat or runny nose.    CARDIOVASCULAR:  No pressure, squeezing, tightness, or heaviness about the chest; no palpitations.    RESPIRATORY:  Per HPI    GASTROINTESTINAL:  No abdominal pain, nausea, vomiting or diarrhea.    GENITOURINARY:  No dysuria, frequency or urgency.    NEUROLOGIC:  No paresthesias, fasciculations, seizures or weakness.    PSYCHIATRIC:  No disorder of thought or mood.      PHYSICAL EXAM:    Constitutional: Well developed and nourished  Eyes:Perrla  ENMT: normal  Neck:supple  Respiratory: good air entry  Cardiovascular: S1 S2 regular  Gastrointestinal: Soft, Non tender  Extremities: No edema  Vascular:normal  Neurological:Asleep  Musculoskeletal:Normal      MEDICATIONS  (STANDING):  amLODIPine   Tablet 10 milliGRAM(s) Oral daily  aspirin  chewable 81 milliGRAM(s) Oral daily  atorvastatin 40 milliGRAM(s) Oral at bedtime  chlorhexidine 4% Liquid 1 Application(s) Topical <User Schedule>  cyanocobalamin 1000 MICROGram(s) Oral daily  folic acid 1 milliGRAM(s) Oral daily  gabapentin 100 milliGRAM(s) Oral daily  heparin  Injectable 5000 Unit(s) SubCutaneous every 12 hours  hydrALAZINE 25 milliGRAM(s) Oral two times a day  insulin lispro (HumaLOG) corrective regimen sliding scale   SubCutaneous Before meals and at bedtime  labetalol 200 milliGRAM(s) Oral daily  lisinopril 40 milliGRAM(s) Oral daily  LORazepam     Tablet 0.5 milliGRAM(s) Oral two times a day  magnesium oxide 400 milliGRAM(s) Oral three times a day with meals  meclizine 25 milliGRAM(s) Oral three times a day  multivitamin/minerals 1 Tablet(s) Oral daily  mupirocin 2% Ointment 1 Application(s) Both Nostrils two times a day  sevelamer carbonate 800 milliGRAM(s) Oral three times a day with meals  sodium polystyrene sulfonate Suspension 15 Gram(s) Oral once  sodium zirconium cyclosilicate 5 Gram(s) Oral daily  topiramate 50 milliGRAM(s) Oral at bedtime    MEDICATIONS  (PRN):  acetaminophen   Tablet .. 650 milliGRAM(s) Oral every 6 hours PRN Mild Pain (1 - 3), Moderate Pain (4 - 6)  ondansetron Injectable 4 milliGRAM(s) IV Push every 8 hours PRN Nausea and/or Vomiting      Allergies    No Known Drug Allergies  pineapple (Hives)    Intolerances        LABS:                        11.0   4.81  )-----------( 169      ( 10 Nov 2019 06:50 )             34.5     11-10    139  |  105  |  45<H>  ----------------------------<  73  5.9<H>   |  24  |  7.52<H>    Ca    8.9      10 Nov 2019 06:50  Phos  6.1     11-10  Mg     2.5     11-10    TPro  6.2  /  Alb  3.0<L>  /  TBili  0.5  /  DBili  x   /  AST  10  /  ALT  15  /  AlkPhos  65  11-10              CAPILLARY BLOOD GLUCOSE      POCT Blood Glucose.: 76 mg/dL (10 Nov 2019 08:03)  POCT Blood Glucose.: 123 mg/dL (09 Nov 2019 21:17)  POCT Blood Glucose.: 98 mg/dL (09 Nov 2019 19:40)  POCT Blood Glucose.: 103 mg/dL (09 Nov 2019 16:52)  POCT Blood Glucose.: 108 mg/dL (09 Nov 2019 15:10)  POCT Blood Glucose.: 69 mg/dL (09 Nov 2019 11:32)        RADIOLOGY & ADDITIONAL TESTS:    CXR:  < from: Xray Chest 1 View-PORTABLE IMMEDIATE (11.06.19 @ 07:13) >  IMPRESSION:    Mild cardiomegaly. No acute infiltrate.    < end of copied text >    Ct scan chest:    ekg;    echo:

## 2019-11-10 NOTE — PROGRESS NOTE ADULT - SUBJECTIVE AND OBJECTIVE BOX
INTERVAL HPI/OVERNIGHT EVENTS:  Reports improved vertigo and complete relief from headache  HEALTH ISSUES - PROBLEM Dx:  Anxiety: Anxiety  Morbid obesity: Morbid obesity  Asthma occurring only with upper respiratory infection: Asthma occurring only with upper respiratory infection  AUSTIN (obstructive sleep apnea): AUSTIN (obstructive sleep apnea)  Prophylactic measure: Prophylactic measure  Diabetes mellitus, type 2: Diabetes mellitus, type 2  Essential hypertension: Essential hypertension  ESRD (end stage renal disease) on dialysis: ESRD (end stage renal disease) on dialysis  Vertigo: Vertigo          MEDICATIONS  (STANDING):  amLODIPine   Tablet 10 milliGRAM(s) Oral daily  aspirin  chewable 81 milliGRAM(s) Oral daily  atorvastatin 40 milliGRAM(s) Oral at bedtime  chlorhexidine 4% Liquid 1 Application(s) Topical <User Schedule>  cyanocobalamin 1000 MICROGram(s) Oral daily  folic acid 1 milliGRAM(s) Oral daily  gabapentin 100 milliGRAM(s) Oral daily  heparin  Injectable 5000 Unit(s) SubCutaneous every 12 hours  hydrALAZINE 25 milliGRAM(s) Oral two times a day  insulin lispro (HumaLOG) corrective regimen sliding scale   SubCutaneous Before meals and at bedtime  labetalol 200 milliGRAM(s) Oral daily  lisinopril 40 milliGRAM(s) Oral daily  LORazepam     Tablet 0.5 milliGRAM(s) Oral two times a day  magnesium oxide 400 milliGRAM(s) Oral three times a day with meals  meclizine 25 milliGRAM(s) Oral three times a day  multivitamin/minerals 1 Tablet(s) Oral daily  mupirocin 2% Ointment 1 Application(s) Both Nostrils two times a day  promethazine Suppository 25 milliGRAM(s) Rectal at bedtime  sevelamer carbonate 800 milliGRAM(s) Oral three times a day with meals  topiramate 50 milliGRAM(s) Oral at bedtime    MEDICATIONS  (PRN):  acetaminophen   Tablet .. 650 milliGRAM(s) Oral every 6 hours PRN Mild Pain (1 - 3), Moderate Pain (4 - 6)  ondansetron Injectable 4 milliGRAM(s) IV Push every 8 hours PRN Nausea and/or Vomiting      Allergies    No Known Drug Allergies  pineapple (Hives)    Intolerances        REVIEW OF SYSTEMS    CONSTITUTIONAL: No fever, weight loss, or fatigue  EYES: No eye pain, visual disturbances, or discharge  ENT:  No difficulty hearing, tinnitus, vertigo; No sinus or throat pain  NECK: No pain or stiffness  RESPIRATORY: No cough, wheezing, chills or hemoptysis; No Shortness of Breath  CARDIOVASCULAR: No chest pain, palpitations, passing out, dizziness, or leg swelling  GASTROINTESTINAL: No abdominal or epigastric pain. No nausea, vomiting, or hematemesis; No diarrhea or constipation. No melena or hematochezia.  GENITOURINARY: No dysuria, frequency, hematuria, or incontinence  NEUROLOGICAL: No headaches, memory loss, loss of strength, numbness, or tremors  SKIN: No itching, burning, rashes, or lesions   LYMPH Nodes: No enlarged glands  ENDOCRINE: No heat or cold intolerance; No hair loss  MUSCULOSKELETAL: No joint pain or swelling; No muscle, back, or extremity pain  PSYCHIATRIC: No depression, anxiety, mood swings, or difficulty sleeping  HEME/LYMPH: No easy bruising, or bleeding gums  ALLERGY AND IMMUNOLOGIC: No hives or eczema	      Vital Signs Last 24 Hrs  T(C): 36.9 (10 Nov 2019 11:21), Max: 36.9 (10 Nov 2019 11:21)  T(F): 98.4 (10 Nov 2019 11:21), Max: 98.4 (10 Nov 2019 11:21)  HR: 71 (10 Nov 2019 11:21) (60 - 78)  BP: 138/56 (10 Nov 2019 11:21) (118/70 - 153/73)  BP(mean): --  RR: 18 (10 Nov 2019 11:21) (18 - 20)  SpO2: 96% (10 Nov 2019 11:21) (96% - 98%)    PHYSICAL EXAM:      Alert, awake, oriented in time, place, and person with normal immediate and 5 minute recall, fluent speech with normal naming, repetition and comprehension, Pupils are equal and reactive to light and accomodation. Visual fields are full by confrontation testing. Funduscopy reveals normal discs and retina. Extraocular movements are normal without nystagmus. Facial sensations, jaw strength, facial movements, hearing, palate, neck, shoulder and tongue are normal and symmetrical. Sensation for touch, pin, temperature and vibration, position sense are normal and symmetrical in the extremities and /or the trunk. Tone is normal in the extremities. Strength is 5/5. Muscle spindle reflexes (DTR) are normal. Plantar responses are flexor. Finger-to-nose and heel-to-shin are normal. Romberg sign is normal; Tandem unable      LABS:                        11.0   4.81  )-----------( 169      ( 10 Nov 2019 06:50 )             34.5     11-10    139  |  105  |  45<H>  ----------------------------<  73  5.9<H>   |  24  |  7.52<H>    Ca    8.9      10 Nov 2019 06:50  Phos  6.1     11-10  Mg     2.5     11-10    TPro  6.2  /  Alb  3.0<L>  /  TBili  0.5  /  DBili  x   /  AST  10  /  ALT  15  /  AlkPhos  65  11-10          RADIOLOGY & ADDITIONAL TESTS:

## 2019-11-10 NOTE — PROGRESS NOTE ADULT - PROBLEM SELECTOR PLAN 1
- p/w vertigo associated with nausea , no Headache , eye pain , blurred vision, photophobia, diplopia   - PE : Bilateral Horizontal Nystagmus to lateral gaze with Limb Ataxia  - Ct head is negative for any acute event or change (did not receive TPA)  - EKG: NSR  , trops negative  -  Telemetry monitoring to r/o cardiac arrythmia,  - c/w Meclizine and zofran for symptomatic treatment.  - c/w Aspirin 81mg (Ecotrin), and Statins (Lipitor) 40mg HS  - Echocardiogram with bubble study today  - orthostatics negative  - PT suggested home with home PT  *** Neurology consulted   - Likely vestibular migraine and analgesic overuse headache. s/p trial of acute migraine treatment- intravenous valproate 500 mg Q6H x 3 doses with metoclopramide 10 mg IV q6h x 3 doses and magnesium po 400 mg TID . Will follow up for results/effects of treatment trial   *** Cardio consulted Dr. Stanford.  -s/p 1000mcg cyanocobalamin im   - started onfolic acid 2mg PO daily and MVI once daily.  -f/u homocysteine, methylmalonic acid levels  -f/u ESR, CRP, DICKSON, SPEP  -If MMA high would check intrinsic factor ab and parietal cell ab and start course of im cyanocobalamin 1000mcg im weekly x 4 doses, then monthly, and continue folate and MVI.  Then repeat MMA and Hcy in two to three weeks to check for response to Tx.  If MMA clearly normal but Hcy elevated, continue folate and MVI supplementation.

## 2019-11-11 ENCOUNTER — TRANSCRIPTION ENCOUNTER (OUTPATIENT)
Age: 54
End: 2019-11-11

## 2019-11-11 LAB
% ALBUMIN: 60.9 % — SIGNIFICANT CHANGE UP
% ALPHA 1: 4.3 % — SIGNIFICANT CHANGE UP
% ALPHA 2: 9 % — SIGNIFICANT CHANGE UP
% BETA: 11.7 % — SIGNIFICANT CHANGE UP
% GAMMA: 14.1 % — SIGNIFICANT CHANGE UP
ALBUMIN SERPL ELPH-MCNC: 3 G/DL — LOW (ref 3.5–5)
ALBUMIN SERPL ELPH-MCNC: 3.5 G/DL — LOW (ref 3.6–5.5)
ALBUMIN/GLOB SERPL ELPH: 1.6 RATIO — SIGNIFICANT CHANGE UP
ALP SERPL-CCNC: 61 U/L — SIGNIFICANT CHANGE UP (ref 40–120)
ALPHA1 GLOB SERPL ELPH-MCNC: 0.2 G/DL — SIGNIFICANT CHANGE UP (ref 0.1–0.4)
ALPHA2 GLOB SERPL ELPH-MCNC: 0.5 G/DL — SIGNIFICANT CHANGE UP (ref 0.5–1)
ALT FLD-CCNC: 16 U/L DA — SIGNIFICANT CHANGE UP (ref 10–60)
ANION GAP SERPL CALC-SCNC: 10 MMOL/L — SIGNIFICANT CHANGE UP (ref 5–17)
AST SERPL-CCNC: 10 U/L — SIGNIFICANT CHANGE UP (ref 10–40)
B-GLOBULIN SERPL ELPH-MCNC: 0.7 G/DL — SIGNIFICANT CHANGE UP (ref 0.5–1)
BILIRUB SERPL-MCNC: 0.5 MG/DL — SIGNIFICANT CHANGE UP (ref 0.2–1.2)
BUN SERPL-MCNC: 67 MG/DL — HIGH (ref 7–18)
CALCIUM SERPL-MCNC: 8.9 MG/DL — SIGNIFICANT CHANGE UP (ref 8.4–10.5)
CHLORIDE SERPL-SCNC: 107 MMOL/L — SIGNIFICANT CHANGE UP (ref 96–108)
CO2 SERPL-SCNC: 23 MMOL/L — SIGNIFICANT CHANGE UP (ref 22–31)
CREAT SERPL-MCNC: 9.5 MG/DL — HIGH (ref 0.5–1.3)
GAMMA GLOBULIN: 0.8 G/DL — SIGNIFICANT CHANGE UP (ref 0.6–1.6)
GLUCOSE BLDC GLUCOMTR-MCNC: 71 MG/DL — SIGNIFICANT CHANGE UP (ref 70–99)
GLUCOSE BLDC GLUCOMTR-MCNC: 75 MG/DL — SIGNIFICANT CHANGE UP (ref 70–99)
GLUCOSE BLDC GLUCOMTR-MCNC: 93 MG/DL — SIGNIFICANT CHANGE UP (ref 70–99)
GLUCOSE SERPL-MCNC: 63 MG/DL — LOW (ref 70–99)
HCT VFR BLD CALC: 33.7 % — LOW (ref 34.5–45)
HGB BLD-MCNC: 10.5 G/DL — LOW (ref 11.5–15.5)
MAGNESIUM SERPL-MCNC: 2.7 MG/DL — HIGH (ref 1.6–2.6)
MCHC RBC-ENTMCNC: 30.4 PG — SIGNIFICANT CHANGE UP (ref 27–34)
MCHC RBC-ENTMCNC: 31.2 GM/DL — LOW (ref 32–36)
MCV RBC AUTO: 97.7 FL — SIGNIFICANT CHANGE UP (ref 80–100)
NRBC # BLD: 0 /100 WBCS — SIGNIFICANT CHANGE UP (ref 0–0)
PHOSPHATE SERPL-MCNC: 7.2 MG/DL — HIGH (ref 2.5–4.5)
PLATELET # BLD AUTO: 159 K/UL — SIGNIFICANT CHANGE UP (ref 150–400)
POTASSIUM SERPL-MCNC: 5.5 MMOL/L — HIGH (ref 3.5–5.3)
POTASSIUM SERPL-SCNC: 5.5 MMOL/L — HIGH (ref 3.5–5.3)
PROT PATTERN SERPL ELPH-IMP: SIGNIFICANT CHANGE UP
PROT SERPL-MCNC: 6 G/DL — SIGNIFICANT CHANGE UP (ref 6–8.3)
RBC # BLD: 3.45 M/UL — LOW (ref 3.8–5.2)
RBC # FLD: 13.9 % — SIGNIFICANT CHANGE UP (ref 10.3–14.5)
SODIUM SERPL-SCNC: 140 MMOL/L — SIGNIFICANT CHANGE UP (ref 135–145)
WBC # BLD: 5.53 K/UL — SIGNIFICANT CHANGE UP (ref 3.8–10.5)
WBC # FLD AUTO: 5.53 K/UL — SIGNIFICANT CHANGE UP (ref 3.8–10.5)

## 2019-11-11 RX ADMIN — Medication 650 MILLIGRAM(S): at 19:36

## 2019-11-11 RX ADMIN — HEPARIN SODIUM 5000 UNIT(S): 5000 INJECTION INTRAVENOUS; SUBCUTANEOUS at 17:45

## 2019-11-11 RX ADMIN — HEPARIN SODIUM 5000 UNIT(S): 5000 INJECTION INTRAVENOUS; SUBCUTANEOUS at 05:28

## 2019-11-11 RX ADMIN — MUPIROCIN 1 APPLICATION(S): 20 OINTMENT TOPICAL at 05:28

## 2019-11-11 RX ADMIN — Medication 1 MILLIGRAM(S): at 15:05

## 2019-11-11 RX ADMIN — Medication 650 MILLIGRAM(S): at 20:06

## 2019-11-11 RX ADMIN — CHLORHEXIDINE GLUCONATE 1 APPLICATION(S): 213 SOLUTION TOPICAL at 05:28

## 2019-11-11 RX ADMIN — ATORVASTATIN CALCIUM 40 MILLIGRAM(S): 80 TABLET, FILM COATED ORAL at 22:03

## 2019-11-11 RX ADMIN — Medication 0.5 MILLIGRAM(S): at 05:30

## 2019-11-11 RX ADMIN — Medication 25 MILLIGRAM(S): at 05:28

## 2019-11-11 RX ADMIN — Medication 50 MILLIGRAM(S): at 22:03

## 2019-11-11 RX ADMIN — Medication 25 MILLIGRAM(S): at 22:03

## 2019-11-11 RX ADMIN — Medication 200 MILLIGRAM(S): at 05:28

## 2019-11-11 RX ADMIN — PREGABALIN 1000 MICROGRAM(S): 225 CAPSULE ORAL at 15:04

## 2019-11-11 RX ADMIN — Medication 0.5 MILLIGRAM(S): at 17:47

## 2019-11-11 RX ADMIN — Medication 1 TABLET(S): at 15:04

## 2019-11-11 RX ADMIN — GABAPENTIN 100 MILLIGRAM(S): 400 CAPSULE ORAL at 15:03

## 2019-11-11 RX ADMIN — Medication 25 MILLIGRAM(S): at 17:46

## 2019-11-11 RX ADMIN — SEVELAMER CARBONATE 800 MILLIGRAM(S): 2400 POWDER, FOR SUSPENSION ORAL at 17:45

## 2019-11-11 RX ADMIN — LISINOPRIL 40 MILLIGRAM(S): 2.5 TABLET ORAL at 05:28

## 2019-11-11 RX ADMIN — AMLODIPINE BESYLATE 10 MILLIGRAM(S): 2.5 TABLET ORAL at 05:28

## 2019-11-11 RX ADMIN — Medication 81 MILLIGRAM(S): at 15:04

## 2019-11-11 RX ADMIN — Medication 25 MILLIGRAM(S): at 15:05

## 2019-11-11 NOTE — PROGRESS NOTE ADULT - SUBJECTIVE AND OBJECTIVE BOX
PGY 1 Note discussed with supervising resident and primary attending    Patient is a 54y old  Female who presents with a chief complaint of Vertigo (11 Nov 2019 11:24)      INTERVAL HPI/OVERNIGHT EVENTS: Patient seen and examined at the bedside. Pt had HD today. Pt is still dizzy.    MEDICATIONS  (STANDING):  amLODIPine   Tablet 10 milliGRAM(s) Oral daily  aspirin  chewable 81 milliGRAM(s) Oral daily  atorvastatin 40 milliGRAM(s) Oral at bedtime  chlorhexidine 4% Liquid 1 Application(s) Topical <User Schedule>  cyanocobalamin 1000 MICROGram(s) Oral daily  folic acid 1 milliGRAM(s) Oral daily  gabapentin 100 milliGRAM(s) Oral daily  heparin  Injectable 5000 Unit(s) SubCutaneous every 12 hours  hydrALAZINE 25 milliGRAM(s) Oral two times a day  insulin lispro (HumaLOG) corrective regimen sliding scale   SubCutaneous Before meals and at bedtime  labetalol 200 milliGRAM(s) Oral daily  lisinopril 40 milliGRAM(s) Oral daily  LORazepam     Tablet 0.5 milliGRAM(s) Oral two times a day  meclizine 25 milliGRAM(s) Oral three times a day  multivitamin/minerals 1 Tablet(s) Oral daily  promethazine Suppository 25 milliGRAM(s) Rectal at bedtime  sevelamer carbonate 800 milliGRAM(s) Oral three times a day with meals  topiramate 50 milliGRAM(s) Oral at bedtime    MEDICATIONS  (PRN):  acetaminophen   Tablet .. 650 milliGRAM(s) Oral every 6 hours PRN Mild Pain (1 - 3), Moderate Pain (4 - 6)  ondansetron Injectable 4 milliGRAM(s) IV Push every 8 hours PRN Nausea and/or Vomiting      __________________________________________________  REVIEW OF SYSTEMS:    CONSTITUTIONAL: No fever,   EYES: no acute visual disturbances  NECK: No pain or stiffness  RESPIRATORY: No cough; No shortness of breath  CARDIOVASCULAR: No chest pain, no palpitations  GASTROINTESTINAL: No pain. No nausea or vomiting; No diarrhea   NEUROLOGICAL: dizziness  MUSCULOSKELETAL: No joint pain, no muscle pain  GENITOURINARY: no dysuria, no frequency, no hesitancy  PSYCHIATRY: no depression , no anxiety  ALL OTHER  ROS negative        Vital Signs Last 24 Hrs  T(C): 37.2 (11 Nov 2019 13:24), Max: 37.2 (11 Nov 2019 13:24)  T(F): 99 (11 Nov 2019 13:24), Max: 99 (11 Nov 2019 13:24)  HR: 69 (11 Nov 2019 13:24) (68 - 75)  BP: 151/68 (11 Nov 2019 13:24) (116/56 - 155/64)  BP(mean): --  RR: 16 (11 Nov 2019 13:24) (16 - 18)  SpO2: 98% (11 Nov 2019 13:24) (95% - 98%)    ________________________________________________  PHYSICAL EXAM:  GENERAL: NAD, dizzy   HEENT: Normocephalic;  conjunctivae and sclerae clear; moist mucous membranes;   NECK : supple  CHEST/LUNG: Clear to auscultation bilaterally with good air entry   HEART: S1 S2  regular; no murmurs, gallops or rubs  ABDOMEN: Soft, Nontender, Nondistended; Bowel sounds present  EXTREMITIES: no cyanosis; no edema; no calf tenderness  SKIN: warm and dry; no rash  NERVOUS SYSTEM:  Awake and alert; Oriented  to place, person and time ; unable to follow finger laterally to right, nystagmus        _________________________________________________  LABS:                        10.5   5.53  )-----------( 159      ( 11 Nov 2019 06:25 )             33.7     11-11    140  |  107  |  67<H>  ----------------------------<  63<L>  5.5<H>   |  23  |  9.50<H>    Ca    8.9      11 Nov 2019 06:25  Phos  7.2     11-11  Mg     2.7     11-11    TPro  6.0  /  Alb  3.0<L>  /  TBili  0.5  /  DBili  x   /  AST  10  /  ALT  16  /  AlkPhos  61  11-11        CAPILLARY BLOOD GLUCOSE      POCT Blood Glucose.: 75 mg/dL (11 Nov 2019 07:50)  POCT Blood Glucose.: 69 mg/dL (10 Nov 2019 22:27)  POCT Blood Glucose.: 71 mg/dL (10 Nov 2019 16:30)        RADIOLOGY & ADDITIONAL TESTS:    Imaging Personally Reviewed:  YES/NO    Consultant(s) Notes Reviewed:   YES/ No    Care Discussed with Consultants :     Plan of care was discussed with patient and /or primary care giver; all questions and concerns were addressed and care was aligned with patient's wishes.

## 2019-11-11 NOTE — DISCHARGE NOTE PROVIDER - CARE PROVIDERS DIRECT ADDRESSES
,DirectAddress_Unknown ,[email or web address],DirectAddress_Unknown ,[email or web address],DirectAddress_Unknown,DirectAddress_Unknown

## 2019-11-11 NOTE — PROGRESS NOTE ADULT - SUBJECTIVE AND OBJECTIVE BOX
Patient is a 54y old  Female who presents with a chief complaint of Vertigo (11 Nov 2019 10:23)  Awake, alert, comfortable in bed in NAD. Having HD at this time.    INTERVAL HPI/OVERNIGHT EVENTS:      VITAL SIGNS:  T(F): 98.6 (11-11-19 @ 08:35)  HR: 68 (11-11-19 @ 08:35)  BP: 116/71 (11-11-19 @ 08:35)  RR: 16 (11-11-19 @ 08:35)  SpO2: 96% (11-11-19 @ 07:49)  Wt(kg): --  I&O's Detail          REVIEW OF SYSTEMS:    CONSTITUTIONAL:  No fevers, chills, sweats    HEENT:  Eyes:  No diplopia or blurred vision. ENT:  No earache, sore throat or runny nose.    CARDIOVASCULAR:  No pressure, squeezing, tightness, or heaviness about the chest; no palpitations.    RESPIRATORY:  Per HPI    GASTROINTESTINAL:  No abdominal pain, nausea, vomiting or diarrhea.    GENITOURINARY:  No dysuria, frequency or urgency.    NEUROLOGIC:  No paresthesias, fasciculations, seizures or weakness.    PSYCHIATRIC:  No disorder of thought or mood.      PHYSICAL EXAM:    Constitutional: Well developed and nourished  Eyes:Perrla  ENMT: normal  Neck:supple  Respiratory: good air entry  Cardiovascular: S1 S2 regular  Gastrointestinal: Soft, Non tender  Extremities: No edema  Vascular:normal  Neurological:Awake, alert,Ox3  Musculoskeletal:Normal      MEDICATIONS  (STANDING):  amLODIPine   Tablet 10 milliGRAM(s) Oral daily  aspirin  chewable 81 milliGRAM(s) Oral daily  atorvastatin 40 milliGRAM(s) Oral at bedtime  chlorhexidine 4% Liquid 1 Application(s) Topical <User Schedule>  cyanocobalamin 1000 MICROGram(s) Oral daily  folic acid 1 milliGRAM(s) Oral daily  gabapentin 100 milliGRAM(s) Oral daily  heparin  Injectable 5000 Unit(s) SubCutaneous every 12 hours  hydrALAZINE 25 milliGRAM(s) Oral two times a day  insulin lispro (HumaLOG) corrective regimen sliding scale   SubCutaneous Before meals and at bedtime  labetalol 200 milliGRAM(s) Oral daily  lisinopril 40 milliGRAM(s) Oral daily  LORazepam     Tablet 0.5 milliGRAM(s) Oral two times a day  meclizine 25 milliGRAM(s) Oral three times a day  multivitamin/minerals 1 Tablet(s) Oral daily  promethazine Suppository 25 milliGRAM(s) Rectal at bedtime  sevelamer carbonate 800 milliGRAM(s) Oral three times a day with meals  topiramate 50 milliGRAM(s) Oral at bedtime    MEDICATIONS  (PRN):  acetaminophen   Tablet .. 650 milliGRAM(s) Oral every 6 hours PRN Mild Pain (1 - 3), Moderate Pain (4 - 6)  ondansetron Injectable 4 milliGRAM(s) IV Push every 8 hours PRN Nausea and/or Vomiting      Allergies    No Known Drug Allergies  pineapple (Hives)    Intolerances        LABS:                        10.5   5.53  )-----------( 159      ( 11 Nov 2019 06:25 )             33.7     11-11    140  |  107  |  67<H>  ----------------------------<  63<L>  5.5<H>   |  23  |  9.50<H>    Ca    8.9      11 Nov 2019 06:25  Phos  7.2     11-11  Mg     2.7     11-11    TPro  6.0  /  Alb  3.0<L>  /  TBili  0.5  /  DBili  x   /  AST  10  /  ALT  16  /  AlkPhos  61  11-11              CAPILLARY BLOOD GLUCOSE      POCT Blood Glucose.: 75 mg/dL (11 Nov 2019 07:50)  POCT Blood Glucose.: 69 mg/dL (10 Nov 2019 22:27)  POCT Blood Glucose.: 71 mg/dL (10 Nov 2019 16:30)  POCT Blood Glucose.: 89 mg/dL (10 Nov 2019 11:45)        RADIOLOGY & ADDITIONAL TESTS:    CXR:  < from: Xray Chest 1 View-PORTABLE IMMEDIATE (11.06.19 @ 07:13) >  IMPRESSION:    Mild cardiomegaly. No acute infiltrate.    < end of copied text >    Ct scan chest:    ekg;    echo:< from: Transthoracic Echocardiogram (11.08.19 @ 16:31) >  1. Mild posterior mitral annular calcification. No mitral  regurgitation is noted.  2. Probably trileaflet aortic valve is not well seen. No  aortic stenosis. No aortic valve regurgitation seen.  3. Normal aortic root.  4. Normal left atrium.  5. Normal left ventricular internal dimensions and wall  thicknesses.  6. Normal Left Ventricular Systolic Function,  (EF = 65% by  biplane)  7. Grade I diastolic dysfunction (Impaired relaxation).  8. Normal right atrium.  9. Normal right ventricular size and function (RV tissue  Doppler 13 cm/s).  10. RV systolic pressure is normal at  29 mm Hg.  11. Normal tricuspid valve. Trace tricuspid regurgitation.  12. Pulmonic valve not well seen. Trace pulmonic  insufficiency is noted.  13. Agitated saline injection was negative for intracardiac  shunt.  14. Trivial pericardial effusion is seen.    < end of copied text >

## 2019-11-11 NOTE — PROGRESS NOTE ADULT - PROBLEM SELECTOR PLAN 1
- p/w vertigo associated with nausea , no Headache , eye pain , blurred vision, photophobia, diplopia   - PE : Bilateral Horizontal Nystagmus to lateral gaze with Limb Ataxia  - Ct head is negative for any acute event or change (did not receive TPA)  - EKG: NSR  , trops negative  -  Telemetry monitoring to r/o cardiac arrythmia,  - c/w Meclizine and zofran for symptomatic treatment.  - c/w Aspirin 81mg (Ecotrin), and Statins (Lipitor) 40mg HS  - orthostatics negative  - PT suggested home with home PT  *** Neurology consulted   - Likely vestibular migraine and analgesic overuse headache. s/p trial of acute migraine treatment- intravenous valproate 500 mg Q6H x 3 doses with metoclopramide 10 mg IV q6h x 3 doses and magnesium po 400 mg TID . Will follow up for results/effects of treatment trial   *** Cardio consulted Dr. Stanford.  - c/w folic acid 2mg PO daily and MVI once daily.  -f/u homocysteine, methylmalonic acid levels  -f/u ESR, CRP, DICKSON, SPEP  -If MMA high would check intrinsic factor ab and parietal cell ab and start course of im cyanocobalamin 1000mcg im weekly x 4 doses, then monthly, and continue folate and MVI.  Then repeat MMA and Hcy in two to three weeks to check for response to Tx.  If MMA clearly normal but Hcy elevated, continue folate and MVI supplementation. - p/w vertigo associated with nausea , no Headache , eye pain , blurred vision, photophobia, diplopia   - PE : Bilateral Horizontal Nystagmus to lateral gaze with Limb Ataxia  - Ct head is negative for any acute event or change   - EKG: NSR  , trops negative  - off tele  - c/w Meclizine and zofran for symptomatic treatment.  - c/w Aspirin 81mg (Ecotrin), and Statins (Lipitor) 40mg HS  - orthostatics negative  - PT suggested home with home PT  - Likely vestibular migraine and analgesic overuse headache. s/p trial of acute migraine treatment- intravenous valproate 500 mg Q6H x 3 doses with metoclopramide 10 mg IV q6h x 3 doses and magnesium po 400 mg TID   -s/p one dose of promethazine suppository yesterday  - c/w folic acid 2mg PO daily and MVI once daily.  -c/w topiramate : dose increased to 50mg  - elevated homocysteine leves  - f/u methylmalonic acid levels  -Cardio Dr Stanford  -Neuro on board

## 2019-11-11 NOTE — DISCHARGE NOTE PROVIDER - NSDCMRMEDTOKEN_GEN_ALL_CORE_FT
amLODIPine 10 mg oral tablet: 1 tab(s) orally once a day  aspirin 81 mg oral tablet: 1 tab(s) orally once a day  Calcium 600+D 600 mg-200 intl units oral tablet: 1 tab(s) orally once a day  Crestor 10 mg oral tablet: 1 tab(s) orally once a day (at bedtime)  gabapentin 100 mg oral capsule: 1 cap(s) orally once a day  HYDRALAZINE  TAB 25MG:   labetalol 200 mg oral tablet: 1 tab(s) orally once a day  LANTUS SOLOS /ML:   lisinopril 40 mg oral tablet: 1 tab(s) orally once a day  meclizine 25 mg oral tablet: 1 tab(s) orally 3 times a day , as needed for dizziness   omega-3 polyunsaturated fatty acids 1000 mg oral capsule: 1 cap(s) orally 2 times a day  VELPHORO     CHW 500MG: amLODIPine 10 mg oral tablet: 1 tab(s) orally once a day  aspirin 81 mg oral tablet: 1 tab(s) orally once a day  atorvastatin 40 mg oral tablet: 1 tab(s) orally once a day (at bedtime)  Calcium 600+D 600 mg-200 intl units oral tablet: 1 tab(s) orally once a day  Crestor 10 mg oral tablet: 1 tab(s) orally once a day (at bedtime)  folic acid 1 mg oral tablet: 1 tab(s) orally once a day  gabapentin 100 mg oral capsule: 1 cap(s) orally once a day  HYDRALAZINE  TAB 25MG:   labetalol 200 mg oral tablet: 1 tab(s) orally once a day  LANTUS SOLOS /ML:   lisinopril 40 mg oral tablet: 1 tab(s) orally once a day  meclizine 25 mg oral tablet: 1 tab(s) orally 3 times a day , as needed for dizziness   Multiple Vitamins with Minerals oral tablet: 1 tab(s) orally once a day  omega-3 polyunsaturated fatty acids 1000 mg oral capsule: 1 cap(s) orally 2 times a day  senna oral tablet: 2 tab(s) orally once a day (at bedtime)  sevelamer carbonate 800 mg oral tablet: 1 tab(s) orally 3 times a day (with meals)  VELPHORO     CHW 500MG: amLODIPine 10 mg oral tablet: 1 tab(s) orally once a day  aspirin 81 mg oral tablet: 1 tab(s) orally once a day  atorvastatin 40 mg oral tablet: 1 tab(s) orally once a day (at bedtime)  Calcium 600+D 600 mg-200 intl units oral tablet: 1 tab(s) orally once a day  folic acid 1 mg oral tablet: 1 tab(s) orally once a day  gabapentin 300 mg oral capsule: 1 cap(s) orally once a day (at bedtime)  HYDRALAZINE  TAB 25MG:   labetalol 200 mg oral tablet: 1 tab(s) orally once a day    LANTUS SOLOS /ML:   lisinopril 40 mg oral tablet: 1 tab(s) orally once a day  meclizine 25 mg oral tablet: 1 tab(s) orally 3 times a day , as needed for dizziness   Multiple Vitamins with Minerals oral tablet: 1 tab(s) orally once a day  omega-3 polyunsaturated fatty acids 1000 mg oral capsule: 1 cap(s) orally 2 times a day  senna oral tablet: 2 tab(s) orally once a day (at bedtime)  sevelamer carbonate 800 mg oral tablet: 1 tab(s) orally 3 times a day (with meals)  VELPHORO     CHW 500MG: amLODIPine 10 mg oral tablet: 1 tab(s) orally once a day  aspirin 81 mg oral tablet: 1 tab(s) orally once a day  atorvastatin 40 mg oral tablet: 1 tab(s) orally once a day (at bedtime)  Calcium 600+D 600 mg-200 intl units oral tablet: 1 tab(s) orally once a day  folic acid 1 mg oral tablet: 1 tab(s) orally once a day  gabapentin 300 mg oral capsule: 1 cap(s) orally once a day (at bedtime)  hydrALAZINE 25 mg oral tablet: 1 tab(s) orally 2 times a day  labetalol 200 mg oral tablet: 1 tab(s) orally once a day    lisinopril 40 mg oral tablet: 1 tab(s) orally once a day  Multiple Vitamins with Minerals oral tablet: 1 tab(s) orally once a day  omega-3 polyunsaturated fatty acids 1000 mg oral capsule: 1 cap(s) orally 2 times a day  senna oral tablet: 2 tab(s) orally once a day (at bedtime)  sevelamer carbonate 800 mg oral tablet: 1 tab(s) orally 3 times a day (with meals)  topiramate 100 mg oral tablet: 1 tab(s) orally once a day (at bedtime)  topiramate 50 mg oral tablet: 1 tab(s) orally once a day in the morning.   On dialysis days, take this dose after dialysis.   VELPHORO     CHW 500MG:

## 2019-11-11 NOTE — DISCHARGE NOTE PROVIDER - HOSPITAL COURSE
54 year old female from home with PMHx ESRD on HD via right AVF (M/W/F), HTN, hx DM no longer on meds now with episodes of hypoglycemia , Anemia, Asthma, Claustrophobia, GERD, HLD, MI, [place],  Uterine Leiomyoma and PSHx of AV Fistula, R Antecubital AVF, , Craniotomy, and Hernia Repair gastric Bypass (2017) presents with dizziness since Monday night. Reports onset of dizziness described as spinning sensation 2 days ago. Reports she feels as if she were drunk and when she walks feels like shes walking on air. Reports she went to her HD session this morning and told staff of her symptoms who then sent her to ED, patient reports she did not receive her HD today, last received 2 days ago. Reports remote hx vertigo for which she used to take medication but symptoms has not occurred for many years.pt denies any Denies headache, focal weakness , numbness N/V, abdominal pain, hypotension, urinary symptoms, hematuria, melena, hematochezia. Denies smoking, alcohol, illicit drug use. allergic to pineapple        In ED :     NIHSS score on admission : 2    Ct head is negative for any acute event or change (did not receive TPA)    EKG: NSR  , T1 -ve , f/u T2    UA -ve     CXR : Mild cardiomegaly. No acute infiltrate.        PE : Bilateral Horizontal Nystagmus to lateral gaze with Limb Ataxia    - Ct head is negative for any acute event or change     - EKG: NSR  , trops negative    - off tele    - c/w Meclizine and zofran for symptomatic treatment.    - c/w Aspirin 81mg (Ecotrin), and Statins (Lipitor) 40mg HS    - orthostatics negative    - PT suggested home with home PT    - Likely vestibular migraine and analgesic overuse headache. s/p trial of acute migraine treatment- intravenous valproate 500 mg Q6H x 3 doses with metoclopramide 10 mg IV q6h x 3 doses and magnesium po 400 mg TID     -s/p one dose of promethazine suppository yesterday    - c/w folic acid 2mg PO daily and MVI once daily.    -c/w topiramate : dose increased to 50mg    - elevated homocysteine leves    - f/u methylmalonic acid levels    -Cardio Dr Stanford    -Neuro on board. 54 year old female from home with PMHx ESRD on HD via right AVF (M/W/F), HTN, hx DM no longer on meds now with episodes of hypoglycemia , Anemia, Asthma, Claustrophobia, GERD, HLD, MI, [place],  Uterine Leiomyoma and PSHx of AV Fistula, R Antecubital AVF, , Craniotomy, and Hernia Repair gastric Bypass (2017) presents with dizziness since Monday night. Reports onset of dizziness described as spinning sensation 2 days ago. Reports she feels as if she were drunk and when she walks feels like shes walking on air. Reports she went to her HD session this morning and told staff of her symptoms who then sent her to ED, patient reports she did not receive her HD today, last received 2 days ago. Reports remote hx vertigo for which she used to take medication but symptoms has not occurred for many years.pt denies any Denies headache, focal weakness , numbness N/V, abdominal pain, hypotension, urinary symptoms, hematuria, melena, hematochezia. Denies smoking, alcohol, illicit drug use. allergic to pineapple        In ED :     NIHSS score on admission : 2    Ct head is negative for any acute event or change (did not receive TPA)    EKG: NSR  , T1 -ve , f/u T2    UA -ve     CXR : Mild cardiomegaly. No acute infiltrate.        PE : Bilateral Horizontal Nystagmus to lateral gaze with Limb Ataxia    - Ct head is negative for any acute event or change     - EKG: NSR  , trops negative    - off tele    - c/w Meclizine and zofran for symptomatic treatment.    - c/w Aspirin 81mg (Ecotrin), and Statins (Lipitor) 40mg HS    - orthostatics negative    - PT suggested home with home PT    - Likely vestibular migraine and analgesic overuse headache. s/p trial of acute migraine treatment- intravenous valproate 500 mg Q6H x 3 doses with metoclopramide 10 mg IV q6h x 3 doses and magnesium po 400 mg TID     -s/p one dose of promethazine suppository yesterday    - c/w folic acid 2mg PO daily and MVI once daily.    -c/w topiramate : dose increased to 50mg    - elevated homocysteine leves    - f/u methylmalonic acid levels        Cardio, Dr Stanford, recommends cont bp medication, meclizine for vertigo        Neuro, Dr. Aguilera, recommends decrease Topamax to25mg bid and hydrate the patient sufficiently as per renal recs.        Nephrology, states to continue HD MWF, low K diet, and sevelamer TID w/ meals        Dr. Hurley, pulmonary, recommends continue bronchodilators and outpatient workup for [place] 54 year old female from home with PMHx ESRD on HD via right AVF (M/W/F), HTN, hx DM no longer on meds now with episodes of hypoglycemia , Anemia, Asthma, Claustrophobia, GERD, HLD, MI, [place],  Uterine Leiomyoma and PSHx of AV Fistula, R Antecubital AVF, , Craniotomy, and Hernia Repair gastric Bypass (2017) presents with dizziness since Monday night. Reports onset of dizziness described as spinning sensation 2 days ago. Reports she feels as if she were drunk and when she walks feels like shes walking on air. Reports she went to her HD session this morning and told staff of her symptoms who then sent her to ED, patient reports she did not receive her HD today, last received 2 days ago. Reports remote hx vertigo for which she used to take medication but symptoms has not occurred for many years.pt denies any Denies headache, focal weakness , numbness N/V, abdominal pain, hypotension, urinary symptoms, hematuria, melena, hematochezia. Denies smoking, alcohol, illicit drug use. allergic to pineapple        In ED :     NIHSS score on admission : 2    Ct head is negative for any acute event or change (did not receive TPA)    EKG: NSR  , T1 -ve , f/u T2    UA -ve     CXR : Mild cardiomegaly. No acute infiltrate.        PE : Bilateral Horizontal Nystagmus to lateral gaze with Limb Ataxia    - Ct head is negative for any acute event or change     - EKG: NSR  , trops negative    - off tele    - c/w Meclizine and zofran for symptomatic treatment.    - c/w Aspirin 81mg (Ecotrin), and Statins (Lipitor) 40mg HS    - orthostatics negative    - PT suggested home with home PT    - Likely vestibular migraine and analgesic overuse headache. s/p trial of acute migraine treatment- intravenous valproate 500 mg Q6H x 3 doses with metoclopramide 10 mg IV q6h x 3 doses and magnesium po 400 mg TID     -s/p one dose of promethazine suppository yesterday    - c/w folic acid 2mg PO daily and MVI once daily.    -c/w topiramate : dose increased to 50mg    - elevated homocysteine leves    - f/u methylmalonic acid levels            Nephrology, states to continue HD MWF, low K diet, and sevelamer TID w/ meals        Dr. Hurley, pulmonary, recommends continue bronchodilators and outpatient workup for [place] 54 year old female from home with PMHx ESRD on HD via right AVF (M/W/F), HTN, hx DM no longer on meds now with episodes of hypoglycemia , Anemia, Asthma, Claustrophobia, GERD, HLD, MI, [place],  Uterine Leiomyoma and PSHx of AV Fistula, R Antecubital AVF, , Craniotomy, and Hernia Repair gastric Bypass (2017) , remote vertigo who presented to ED from HD with chief complaint of dizziness. Pt did not receive dialysis session.          In ED : NIHSS score on admission : . Pt was observed with  Bilateral Horizontal Nystagmus to lateral gaze with Limb Ataxia. CT head is negative for any acute event or change (did not receive TPA) .EKG: NSR  ,  Trop negative x 2. UA negative.  CXR : Mild cardiomegaly. No acute infiltrate. Neurology following. Continue with Meclizine and zofran for symptomatic treatment. Continue with  Aspirin 81mg (Ecotrin), and Statins (Lipitor) 40mg HS. Orthostatics negative.  Likely vestibular migraine and analgesic overuse headache. s/p trial of acute migraine treatment- intravenous valproate 500 mg Q6H x 3 doses with metoclopramide 10 mg IV q6h x 3 doses and magnesium po 400 mg TID ,  folic acid 2mg PO daily and MVI once daily.,  topiramate : dose increased to 50mg in the morning, 100mg at 6pm.         Pt continued HD MWF,  low K diet, and sevelamer TID with  meals.        Dr. Hurley, pulmonary, recommends continue bronchodilators and outpatient workup for [place]        Pt is cleared by attending Dr. Lee for discharge to Acute Rehab . CM following. 54 year old female from home with PMHx ESRD on HD via right AVF (M/W/F), HTN, hx DM no longer on meds now with episodes of hypoglycemia , Anemia, Asthma, Claustrophobia, GERD, HLD, MI, [place],  Uterine Leiomyoma and PSHx of AV Fistula, R Antecubital AVF, , Craniotomy, and Hernia Repair gastric Bypass (2017) , remote vertigo who presented to ED from HD with chief complaint of dizziness. Pt did not receive dialysis session.      In ED : NIHSS score on admission : . Pt was observed with  Bilateral Horizontal Nystagmus to lateral gaze with Limb Ataxia. CT head is negative for any acute event or change (did not receive TPA) .EKG: NSR  ,  Trop negative x 2. UA negative.  CXR : Mild cardiomegaly. No acute infiltrate. Neurology following. Continue with Meclizine and zofran for symptomatic treatment. Continue with  Aspirin 81mg (Ecotrin), and Statins (Lipitor) 40mg HS. Orthostatics negative.  Likely vestibular migraine and analgesic overuse headache. s/p trial of acute migraine treatment- intravenous valproate 500 mg Q6H x 3 doses with metoclopramide 10 mg IV q6h x 3 doses and magnesium po 400 mg TID ,  folic acid 2mg PO daily and MVI once daily.,  topiramate : dose increased to 50mg in the morning, 100mg at 6pm.  followed by neurology, MRI of head was done and it was negative. carotid US was unremarkable     Pt continued HD MWF,  low K diet, and sevelamer TID with  meals.    Dr. Hurley, pulmonary, recommends continue bronchodilators and outpatient workup for [place]    vestibular rehab was recommended but pt refused. medically stable for discharge with outpt follow up

## 2019-11-11 NOTE — DISCHARGE NOTE PROVIDER - PROVIDER TOKENS
PROVIDER:[TOKEN:[[id number]]],PROVIDER:[TOKEN:[[id number]]] PROVIDER:[TOKEN:[[id number]]],PROVIDER:[TOKEN:[[id number]]],FREE:[LAST:[jon],FIRST:[Kamille],PHONE:[phone]],FAX:[(   )    -],FOLLOWUP:[1 week]]

## 2019-11-11 NOTE — DISCHARGE NOTE PROVIDER - CARE PROVIDER_API CALL
Henry Reed)  Clinical Neurophysiology; Neurology  [address] [facility], [address] [place] [address]  Phone: [phone]  Fax: [phone]  Follow Up Time:     Maia Charles (DO)  Internal Medicine  [address] [address] [place] [address]  Phone: [phone]  Fax: [phone]  Follow Up Time: Henry Reed)  Clinical Neurophysiology; Neurology  [address] [facility], [address]nd [place] [address]  Phone: [phone]  Fax: [phone]  Follow Up Time:     Maia Charles (DO)  Internal Medicine  [address] [place] [address]  Phone: [phone]  Fax: [phone]  Follow Up Time:     Kamille webb  Phone: [phone]  Fax: (   )    -  Follow Up Time: 1 week

## 2019-11-11 NOTE — DISCHARGE NOTE PROVIDER - NSDCCPCAREPLAN_GEN_ALL_CORE_FT
PRINCIPAL DISCHARGE DIAGNOSIS  Diagnosis: Vertigo  Assessment and Plan of Treatment:       SECONDARY DISCHARGE DIAGNOSES  Diagnosis: ESRD (end stage renal disease) on dialysis  Assessment and Plan of Treatment: Continue your dialysis as intructed by your Nephrologist. Dialysis will be reinstated upon your discharge.  Please mantain a diet adequate for you condition  Continue dialysis on : ___ ___ and ___    Diagnosis: Essential hypertension  Assessment and Plan of Treatment: Continue with blood pressure medication. Maintain a healthy diet that consist of low sugar, low fat, low sodium diet. Exercise frequently if possible.  Follow up with primary care physician in one week after discharge. PRINCIPAL DISCHARGE DIAGNOSIS  Diagnosis: Vertigo  Assessment and Plan of Treatment: Diagnostic work-up negative but dizziness remains  PT recommends acute rehab for her vestibular disorder      SECONDARY DISCHARGE DIAGNOSES  Diagnosis: Essential hypertension  Assessment and Plan of Treatment: Continue with blood pressure medication. Maintain a healthy diet that consist of low sugar, low fat, low sodium diet. Exercise frequently if possible.  Follow up with primary care physician in one week after discharge.    Diagnosis: ESRD (end stage renal disease) on dialysis  Assessment and Plan of Treatment: Continue your dialysis as intructed by your Nephrologist. Dialysis will be reinstated upon your discharge.  Please mantain a diet adequate for you condition  Continue dialysis on : ___ ___ and ___ PRINCIPAL DISCHARGE DIAGNOSIS  Diagnosis: Vertigo  Assessment and Plan of Treatment: Diagnostic work-up negative but dizziness remains  PT recommends acute rehab for her vestibular disorder      SECONDARY DISCHARGE DIAGNOSES  Diagnosis: Asthma occurring only with upper respiratory infection  Assessment and Plan of Treatment: Asthma occurring only with upper respiratory infection    Diagnosis: AUSTIN (obstructive sleep apnea)  Assessment and Plan of Treatment: AUSTIN (obstructive sleep apnea)    Diagnosis: Essential hypertension  Assessment and Plan of Treatment: Continue with blood pressure medication. Maintain a healthy diet that consist of low sugar, low fat, low sodium diet. Exercise frequently if possible.  Follow up with primary care physician in one week after discharge.    Diagnosis: ESRD (end stage renal disease) on dialysis  Assessment and Plan of Treatment: Continue your dialysis as intructed by your Nephrologist. Dialysis will be reinstated upon your discharge.  Please mantain a diet adequate for you condition  Continue dialysis on : ___ ___ and ___    Diagnosis: Anxiety  Assessment and Plan of Treatment: Anxiety    Diagnosis: Diabetes mellitus, type 2  Assessment and Plan of Treatment: Diabetes mellitus, type 2 PRINCIPAL DISCHARGE DIAGNOSIS  Diagnosis: Vertigo  Assessment and Plan of Treatment: Diagnostic work-up negative but dizziness remains  PT recommends acute rehab for her vestibular disorder      SECONDARY DISCHARGE DIAGNOSES  Diagnosis: Asthma occurring only with upper respiratory infection  Assessment and Plan of Treatment: Asthma occurring only with upper respiratory infection    Diagnosis: AUSTIN (obstructive sleep apnea)  Assessment and Plan of Treatment: AUSTIN (obstructive sleep apnea)    Diagnosis: Essential hypertension  Assessment and Plan of Treatment: Continue with blood pressure medication. Maintain a healthy diet that consist of low sugar, low fat, low sodium diet. Exercise frequently if possible.  Follow up with primary care physician in one week after discharge.    Diagnosis: ESRD (end stage renal disease) on dialysis  Assessment and Plan of Treatment: Continue your dialysis as intructed by your Nephrologist. Dialysis will be reinstated upon your discharge.  Please mantain a diet adequate for you condition  Continue dialysis on : ___ ___ and ___    Diagnosis: Anxiety  Assessment and Plan of Treatment: Anxiety    Diagnosis: Diabetes mellitus, type 2  Assessment and Plan of Treatment: A1c 4.6  no longer on medications  monitor FS  avoid low blood sugar PRINCIPAL DISCHARGE DIAGNOSIS  Diagnosis: Vertigo  Assessment and Plan of Treatment: Diagnostic work-up negative but dizziness remains  PT recommends acute rehab for her vestibular disorder      SECONDARY DISCHARGE DIAGNOSES  Diagnosis: AUSTIN (obstructive sleep apnea)  Assessment and Plan of Treatment:   Continue management per medical staff at facility.   Follow-up with your primary care physician within 1 week after discharge from rehab. Call for appointment.Follow-up with your primary care physician and Pulmonologist within 1 week after discharge from rehab. Call for appointment.      Diagnosis: Asthma occurring only with upper respiratory infection  Assessment and Plan of Treatment: Continue management per medical staff at facility.   Follow-up with your primary care physician within 1 week after discharge from rehab. Call for appointment.  Take medicines as directed by your caregiver.  Control your home environment in the following ways to help prevent asthma attacks:  Wash hands frequently.  Talk to your caregiver about an action plan for managing asthma attacks. This includes the use of a peak flow meter which measures the severity of the attack and medicines that can help stop the attack. An action plan can help minimize or stop the attack without having to seek medical care.  Always have a plan prepared for seeking medical attention. This should include contacting your caregiver and in the case of a severe attack, calling your local emergency services (_____________________).  SEEK MEDICAL CARE IF:  You have wheezing, shortness of breath, or a cough even if taking medicine to prevent attacks.   You have thickening of sputum.   Your sputum changes from clear or white to yellow, green, gray, or bloody.   You have any problems that may be related to the medicines you are taking (such as a rash, itching, swelling, or trouble breathing).  You are using a reliever medicine more than 2–3 times per week.  Your peak flow is still at 50–79% of personal best after following your action plan for 1 hour.  SEEK IMMEDIATE MEDICAL CARE IF:  You are short of breath even at rest.  You get short of breath when doing very little physical activity.  You have difficulty eating, drinking, or talking due to asthma symptoms.  You have chest pain or you feel that your heart is beating fast.   You have a bluish color to your lips or fingernails.  You are lightheaded, dizzy, or faint.  You have a fever or persistent symptoms for more than 2–3 days.   You have a fever and symptoms suddenly get worse.  You seem to be getting worse and are unresponsive to treatment during an asthma attack.   Your peak flow is less than 50% of personal best.      Diagnosis: Anxiety  Assessment and Plan of Treatment: Continue with supportive measures from your family/friends, caregivers.  Continue management per medical staff at facility.   Follow-up with your primary care physician within 1 week after discharge from rehab. Call for appointment.      Diagnosis: Diabetes mellitus, type 2  Assessment and Plan of Treatment: A1c 4.6  no longer on medications  monitor FS  avoid low blood sugar    Diagnosis: Essential hypertension  Assessment and Plan of Treatment: Continue with blood pressure medication. Maintain a healthy diet that consist of low sugar, low fat, low sodium diet. Exercise frequently if possible.  Follow up with primary care physician in one week after discharge.    Diagnosis: ESRD (end stage renal disease) on dialysis  Assessment and Plan of Treatment: Continue with your dialysis treatments. Follow with your nephrologist (kidney physician). Continue your medications as prescribed. PRINCIPAL DISCHARGE DIAGNOSIS  Diagnosis: Vertigo  Assessment and Plan of Treatment: -you were admitted for complaints of dizziness, initial CT head was neagtive. you were seen by neurologist and MRI of head was done which was negative. your symptoms were likely due to vestibular headaches   PT recommends acute rehab for her vestibular disorder but you refused   continue current medications   follow up with pcp and neurologist outpt      SECONDARY DISCHARGE DIAGNOSES  Diagnosis: AUSTIN (obstructive sleep apnea)  Assessment and Plan of Treatment:   Continue management per medical staff at facility.   Follow-up with your primary care physician within 1 week after discharge from rehab. Call for appointment.Follow-up with your primary care physician and Pulmonologist within 1 week after discharge from rehab. Call for appointment.      Diagnosis: Asthma occurring only with upper respiratory infection  Assessment and Plan of Treatment: Continue management per medical staff at facility.   Follow-up with your primary care physician within 1 week after discharge from rehab. Call for appointment.  Take medicines as directed by your caregiver.  Control your home environment in the following ways to help prevent asthma attacks:  Wash hands frequently.  Talk to your caregiver about an action plan for managing asthma attacks. This includes the use of a peak flow meter which measures the severity of the attack and medicines that can help stop the attack. An action plan can help minimize or stop the attack without having to seek medical care.  Always have a plan prepared for seeking medical attention. This should include contacting your caregiver and in the case of a severe attack,  SEEK MEDICAL CARE IF:  You have wheezing, shortness of breath, or a cough even if taking medicine to prevent attacks.   You have thickening of sputum.   Your sputum changes from clear or white to yellow, green, gray, or bloody.   You have any problems that may be related to the medicines you are taking (such as a rash, itching, swelling, or trouble breathing).  You are using a reliever medicine more than 2–3 times per week.  Your peak flow is still at 50–79% of personal best after following your action plan for 1 hour.  SEEK IMMEDIATE MEDICAL CARE IF:  You are short of breath even at rest.  You get short of breath when doing very little physical activity.  You have difficulty eating, drinking, or talking due to asthma symptoms.  You have chest pain or you feel that your heart is beating fast.   You have a bluish color to your lips or fingernails.  You are lightheaded, dizzy, or faint.  You have a fever or persistent symptoms for more than 2–3 days.   You have a fever and symptoms suddenly get worse.  You seem to be getting worse and are unresponsive to treatment during an asthma attack.   Your peak flow is less than 50% of personal best.      Diagnosis: Anxiety  Assessment and Plan of Treatment: Continue with supportive measures from your family/friends, caregivers.  Continue management per medical staff at facility.   Follow-up with your primary care physician within 1 week after discharge from rehab. Call for appointment.      Diagnosis: Diabetes mellitus, type 2  Assessment and Plan of Treatment: A1c 4.6  no longer on medications  monitor FS  avoid low blood sugar    Diagnosis: Essential hypertension  Assessment and Plan of Treatment: Continue with blood pressure medication. Maintain a healthy diet that consist of low sugar, low fat, low sodium diet. Exercise frequently if possible.  Follow up with primary care physician in one week after discharge.    Diagnosis: ESRD (end stage renal disease) on dialysis  Assessment and Plan of Treatment: Continue with your dialysis treatments. Follow with your nephrologist (kidney physician). Continue your medications as prescribed. PRINCIPAL DISCHARGE DIAGNOSIS  Diagnosis: Vertigo  Assessment and Plan of Treatment: -you were admitted for complaints of dizziness, initial CT head was neagtive. you were seen by neurologist and MRI of head was done which was negative. your symptoms were likely due to vestibular headaches   PT recommends acute rehab for her vestibular disorder but you refused   continue current medications   follow up with pcp and neurologist outpt      SECONDARY DISCHARGE DIAGNOSES  Diagnosis: AUSTIN (obstructive sleep apnea)  Assessment and Plan of Treatment: Continue management per medical staff at facility.   Follow-up with your primary care physician within 1 week after discharge from rehab. Call for appointment.Follow-up with your primary care physician and Pulmonologist within 1 week Call for appointment.      Diagnosis: Asthma occurring only with upper respiratory infection  Assessment and Plan of Treatment: Continue management per medical staff at facility.   Follow-up with your primary care physician within 1 week after discharge from rehab. Call for appointment.  Take medicines as directed by your caregiver.  Control your home environment in the following ways to help prevent asthma attacks:  Wash hands frequently.  Talk to your caregiver about an action plan for managing asthma attacks. This includes the use of a peak flow meter which measures the severity of the attack and medicines that can help stop the attack. An action plan can help minimize or stop the attack without having to seek medical care.  Always have a plan prepared for seeking medical attention. This should include contacting your caregiver and in the case of a severe attack,  SEEK MEDICAL CARE IF:  You have wheezing, shortness of breath, or a cough even if taking medicine to prevent attacks.   You have thickening of sputum.   Your sputum changes from clear or white to yellow, green, gray, or bloody.   You have any problems that may be related to the medicines you are taking (such as a rash, itching, swelling, or trouble breathing).  You are using a reliever medicine more than 2–3 times per week.  Your peak flow is still at 50–79% of personal best after following your action plan for 1 hour.  SEEK IMMEDIATE MEDICAL CARE IF:  You are short of breath even at rest.  You get short of breath when doing very little physical activity.  You have difficulty eating, drinking, or talking due to asthma symptoms.  You have chest pain or you feel that your heart is beating fast.   You have a bluish color to your lips or fingernails.  You are lightheaded, dizzy, or faint.  You have a fever or persistent symptoms for more than 2–3 days.   You have a fever and symptoms suddenly get worse.  You seem to be getting worse and are unresponsive to treatment during an asthma attack.   Your peak flow is less than 50% of personal best.      Diagnosis: Anxiety  Assessment and Plan of Treatment: Continue with supportive measures from your family/friends, caregivers.  Continue management per medical staff at facility.   Follow-up with your primary care physician within 1 week. Call for appointment.      Diagnosis: Diabetes mellitus, type 2  Assessment and Plan of Treatment: A1c 4.6  no longer on medications  monitor FS  avoid low blood sugar    Diagnosis: Essential hypertension  Assessment and Plan of Treatment: Continue with blood pressure medication. Maintain a healthy diet that consist of low sugar, low fat, low sodium diet. Exercise frequently if possible.  Follow up with primary care physician in one week after discharge.    Diagnosis: ESRD (end stage renal disease) on dialysis  Assessment and Plan of Treatment: Continue with your dialysis treatments. Follow with your nephrologist (kidney physician). Continue your medications as prescribed. PRINCIPAL DISCHARGE DIAGNOSIS  Diagnosis: Vertigo  Assessment and Plan of Treatment: -you were admitted for complaints of dizziness, initial CT head was neagtive. you were seen by neurologist and MRI of head was done which was negative. your symptoms were likely due to vestibular headaches   PT recommends acute rehab for her vestibular disorder but you refused   continue current medications   follow up with pcp and neurologist outpt      SECONDARY DISCHARGE DIAGNOSES  Diagnosis: AUSTIN (obstructive sleep apnea)  Assessment and Plan of Treatment: Continue management per medical staff at facility.   Follow-up with your primary care physician within 1 week . Call for appointment.Follow-up with your primary care physician and Pulmonologist within 1 week Call for appointment.      Diagnosis: Asthma occurring only with upper respiratory infection  Assessment and Plan of Treatment: Continue management per medical staff at facility.   Follow-up with your primary care physician within 1 week . Call for appointment.  Take medicines as directed by your caregiver.  Control your home environment in the following ways to help prevent asthma attacks:  Wash hands frequently.  Talk to your caregiver about an action plan for managing asthma attacks. This includes the use of a peak flow meter which measures the severity of the attack and medicines that can help stop the attack. An action plan can help minimize or stop the attack without having to seek medical care.  Always have a plan prepared for seeking medical attention. This should include contacting your caregiver and in the case of a severe attack,  SEEK MEDICAL CARE IF:  You have wheezing, shortness of breath, or a cough even if taking medicine to prevent attacks.   You have thickening of sputum.   Your sputum changes from clear or white to yellow, green, gray, or bloody.   You have any problems that may be related to the medicines you are taking (such as a rash, itching, swelling, or trouble breathing).  You are using a reliever medicine more than 2–3 times per week.  Your peak flow is still at 50–79% of personal best after following your action plan for 1 hour.  SEEK IMMEDIATE MEDICAL CARE IF:  You are short of breath even at rest.  You get short of breath when doing very little physical activity.  You have difficulty eating, drinking, or talking due to asthma symptoms.  You have chest pain or you feel that your heart is beating fast.   You have a bluish color to your lips or fingernails.  You are lightheaded, dizzy, or faint.  You have a fever or persistent symptoms for more than 2–3 days.   You have a fever and symptoms suddenly get worse.  You seem to be getting worse and are unresponsive to treatment during an asthma attack.   Your peak flow is less than 50% of personal best.      Diagnosis: Anxiety  Assessment and Plan of Treatment: Continue with supportive measures from your family/friends, caregivers.  Continue management per medical staff at facility.   Follow-up with your primary care physician within 1 week. Call for appointment.      Diagnosis: Diabetes mellitus, type 2  Assessment and Plan of Treatment: A1c 4.6  no longer on medications  monitor FS  avoid low blood sugar    Diagnosis: Essential hypertension  Assessment and Plan of Treatment: Continue with blood pressure medication. Maintain a healthy diet that consist of low sugar, low fat, low sodium diet. Exercise frequently if possible.  Follow up with primary care physician in one week after discharge.    Diagnosis: ESRD (end stage renal disease) on dialysis  Assessment and Plan of Treatment: Continue with your dialysis treatments. Follow with your nephrologist (kidney physician). Continue your medications as prescribed.

## 2019-11-11 NOTE — DISCHARGE NOTE PROVIDER - NSDCFUADDAPPT_GEN_ALL_CORE_FT
Continue management per medical staff at facility.   Follow-up with your primary care physician within 1 week after discharge from rehab. Call for appointment. Follow-up with your primary care physician within 1 week . Call for appointment.

## 2019-11-11 NOTE — PROGRESS NOTE ADULT - SUBJECTIVE AND OBJECTIVE BOX
Patient is a 54y old  Female who presents with a chief complaint of Vertigo (2019 07:50)    pt seen in icu [  ], reg med floor [   ], bed [  ], chair at bedside [   ], a+o x3 [  ], lethargic [  ],  nad [  ]    khan [  ], ngt [  ], peg [  ], et tube [  ], cent line [  ], picc line [  ]        Allergies    No Known Drug Allergies  pineapple (Hives)        Vitals    T(F): 98.6 (19 @ 08:35), Max: 98.7 (11-10-19 @ 23:37)  HR: 68 (19 @ 08:35) (68 - 75)  BP: 116/71 (19 @ 08:35) (116/56 - 155/64)  RR: 16 (19 @ 08:35) (16 - 18)  SpO2: 96% (19 @ 07:49) (95% - 98%)  Wt(kg): --  CAPILLARY BLOOD GLUCOSE      POCT Blood Glucose.: 75 mg/dL (2019 07:50)      Labs                          10.5   5.53  )-----------( 159      ( 2019 06:25 )             33.7           140  |  107  |  67<H>  ----------------------------<  63<L>  5.5<H>   |  23  |  9.50<H>    Ca    8.9      2019 06:25  Phos  7.2       Mg     2.7         TPro  6.0  /  Alb  3.0<L>  /  TBili  0.5  /  DBili  x   /  AST  10  /  ALT  16  /  AlkPhos  61              .Urine   @ 15:32   <10,000 CFU/mL Normal Urogenital Jinny  --  --          Radiology Results      Meds    MEDICATIONS  (STANDING):  amLODIPine   Tablet 10 milliGRAM(s) Oral daily  aspirin  chewable 81 milliGRAM(s) Oral daily  atorvastatin 40 milliGRAM(s) Oral at bedtime  chlorhexidine 4% Liquid 1 Application(s) Topical <User Schedule>  cyanocobalamin 1000 MICROGram(s) Oral daily  folic acid 1 milliGRAM(s) Oral daily  gabapentin 100 milliGRAM(s) Oral daily  heparin  Injectable 5000 Unit(s) SubCutaneous every 12 hours  hydrALAZINE 25 milliGRAM(s) Oral two times a day  insulin lispro (HumaLOG) corrective regimen sliding scale   SubCutaneous Before meals and at bedtime  labetalol 200 milliGRAM(s) Oral daily  lisinopril 40 milliGRAM(s) Oral daily  LORazepam     Tablet 0.5 milliGRAM(s) Oral two times a day  meclizine 25 milliGRAM(s) Oral three times a day  multivitamin/minerals 1 Tablet(s) Oral daily  promethazine Suppository 25 milliGRAM(s) Rectal at bedtime  sevelamer carbonate 800 milliGRAM(s) Oral three times a day with meals  topiramate 50 milliGRAM(s) Oral at bedtime      MEDICATIONS  (PRN):  acetaminophen   Tablet .. 650 milliGRAM(s) Oral every 6 hours PRN Mild Pain (1 - 3), Moderate Pain (4 - 6)  ondansetron Injectable 4 milliGRAM(s) IV Push every 8 hours PRN Nausea and/or Vomiting      Physical Exam    Neuro :  no focal deficits  Respiratory: CTA B/L  CV: RRR, S1S2, no murmurs,   Abdominal: Soft, NT, ND +BS,  Extremities: No edema, + peripheral pulses    ASSESSMENT    Dizziness and giddiness  Myocardial infarct, old  ESRD (end stage renal disease) on dialysis  Asthma occurring only with upper respiratory infection  Anemia in chronic renal disease  Claustrophobia  Uterine leiomyoma, unspecified location  AUSTIN (obstructive sleep apnea)  Gastroesophageal reflux disease without esophagitis  Vertigo  HLD (hyperlipidemia)  Essential hypertension  Chronic kidney disease, unspecified stage  Diabetes mellitus, type 2  Morbid obesity  AV fistula  S/P craniotomy  S/P hernia repair  S/P  section  CRF (chronic renal failure)      PLAN Patient is a 54y old  Female who presents with a chief complaint of Vertigo (2019 07:50)    pt seen in tele [x  ], reg med floor [   ], bed [ x ], chair at bedside [   ], a+o x3 [x  ], lethargic [  ],  nad [  x]    Headaches much improved, still with slight dizziness       Allergies    No Known Drug Allergies  pineapple (Hives)        Vitals    T(F): 98.6 (19 @ 08:35), Max: 98.7 (11-10-19 @ 23:37)  HR: 68 (19 @ 08:35) (68 - 75)  BP: 116/71 (19 @ 08:35) (116/56 - 155/64)  RR: 16 (19 @ 08:35) (16 - 18)  SpO2: 96% (19 @ 07:49) (95% - 98%)  Wt(kg): --  CAPILLARY BLOOD GLUCOSE      POCT Blood Glucose.: 75 mg/dL (2019 07:50)      Labs                          10.5   5.53  )-----------( 159      ( 2019 06:25 )             33.7           140  |  107  |  67<H>  ----------------------------<  63<L>  5.5<H>   |  23  |  9.50<H>    Ca    8.9      2019 06:25  Phos  7.2       Mg     2.7         TPro  6.0  /  Alb  3.0<L>  /  TBili  0.5  /  DBili  x   /  AST  10  /  ALT  16  /  AlkPhos  61      Hemoglobin A1C, Whole Blood: 4.6: Method: Immunoassay       Reference Range                4.0-5.6%       High risk (prediabetic)        5.7-6.4%       Diabetic, diagnostic             >=6.5%       ADA diabetic treatment goal       <7.0%  The Hemoglobin A1c testing is NGSP-certified.Reference ranges are based  upon the 2010 recommendations of  the American Diabetes Association.  Interpretation may vary for children  and adolescents. % (19 @ 10:26)            .Urine   @ 15:32   <10,000 CFU/mL Normal Urogenital Jinny  --  --    < from: Transthoracic Echocardiogram (19 @ 16:31) >  CONCLUSIONS:  1. Mild posterior mitral annular calcification. No mitral  regurgitation is noted.  2. Probably trileaflet aortic valve is not well seen. No  aortic stenosis. No aortic valve regurgitation seen.  3. Normal aortic root.  4. Normal left atrium.  5. Normal left ventricular internal dimensions and wall  thicknesses.  6. Normal Left Ventricular Systolic Function,  (EF = 65% by  biplane)  7. Grade I diastolic dysfunction (Impaired relaxation).  8. Normal right atrium.  9. Normal right ventricular size and function (RV tissue  Doppler 13 cm/s).  10. RV systolic pressure is normal at  29 mm Hg.  11. Normal tricuspid valve. Trace tricuspid regurgitation.  12. Pulmonic valve not well seen. Trace pulmonic  insufficiency is noted.  13. Agitated saline injection was negative for intracardiac  shunt.  14. Trivial pericardial effusion is seen.      < end of copied text >        Radiology Results      Meds    MEDICATIONS  (STANDING):  amLODIPine   Tablet 10 milliGRAM(s) Oral daily  aspirin  chewable 81 milliGRAM(s) Oral daily  atorvastatin 40 milliGRAM(s) Oral at bedtime  chlorhexidine 4% Liquid 1 Application(s) Topical <User Schedule>  cyanocobalamin 1000 MICROGram(s) Oral daily  folic acid 1 milliGRAM(s) Oral daily  gabapentin 100 milliGRAM(s) Oral daily  heparin  Injectable 5000 Unit(s) SubCutaneous every 12 hours  hydrALAZINE 25 milliGRAM(s) Oral two times a day  insulin lispro (HumaLOG) corrective regimen sliding scale   SubCutaneous Before meals and at bedtime  labetalol 200 milliGRAM(s) Oral daily  lisinopril 40 milliGRAM(s) Oral daily  LORazepam     Tablet 0.5 milliGRAM(s) Oral two times a day  meclizine 25 milliGRAM(s) Oral three times a day  multivitamin/minerals 1 Tablet(s) Oral daily  promethazine Suppository 25 milliGRAM(s) Rectal at bedtime  sevelamer carbonate 800 milliGRAM(s) Oral three times a day with meals  topiramate 50 milliGRAM(s) Oral at bedtime      MEDICATIONS  (PRN):  acetaminophen   Tablet .. 650 milliGRAM(s) Oral every 6 hours PRN Mild Pain (1 - 3), Moderate Pain (4 - 6)  ondansetron Injectable 4 milliGRAM(s) IV Push every 8 hours PRN Nausea and/or Vomiting      Physical Exam    Neuro :  no focal deficits  Respiratory: CTA B/L  CV: RRR, S1S2, no murmurs,   Abdominal: Soft, NT, ND +BS,  Extremities: No edema, + peripheral pulses      ASSESSMENT    vertigo,   Intractable migraine with aura with status migrainosus.  h/o  ESRD on HD (M/W/F),   HTN,   migraine,   DM no longer on meds  Myocardial infarct, old  ESRD (end stage renal disease) on dialysis  Asthma occurring only with upper respiratory infection  Anemia in chronic renal disease  Claustrophobia  Uterine leiomyoma, unspecified location  AUSTIN (obstructive sleep apnea)  Gastroesophageal reflux disease without esophagitis  Vertigo  HLD (hyperlipidemia)  Morbid obesity  AV fistula  S/P craniotomy  S/P hernia repair  S/P  section        PLAN      cont tele,   cont aspirin, statin,   cont meclizine prn,  ct head neg noted  neuro f/u   Cervicogenic vertigo.    s/p trial acute migraine treatment- intravenous valproate 500 mg Q6H x 3 doses with metoclopramide 10 mg IV q6h x 3 doses and magnesium po 400 mg TID .  Continue topiramate - increase to 50 mg BID; promethazine 50 mg suppository now for acute treatment of persistent aura of migraine, warning her of drowsiness and follow up  carotid duplex noted : no significant stenosis   ce q8 x 2 neg noted above  echo with EF = 65%,  Grade I diastolic dysfunction (Impaired relaxation). Agitated saline injection was negative for intracardiac shunt. Trivial pericardial effusion is seen noted above.  orthostatic positive with standing bp above 100  hgba1c 4.6 noted above,  renal f/u   hd today  cont hd as per renal   pulm f/u noted   pft outpt  PT recs home with home PT   cont current meds   d/c plan if cleared by neuro

## 2019-11-11 NOTE — PROGRESS NOTE ADULT - PROBLEM SELECTOR PLAN 2
- ESRD Maintenance HD (M/W/F ) via right arm AVF  - last dialysis Monday 11/6 did not start  - HD today  -elevated potassium  -started on Lokelma  - Avoid Nephrotoxic Meds/ Agents such as (NSAIDs, IV contrast, Aminoglycosides such as gentamicin, Gadolinium contrast, Phosphate containing enemas, etc..)  - Adjust Medications according to eGFR  - Renal diet and fluid restrictions 1000 ml/day  - c/w sevelamer TID w/ meals  - f/u CM (pt will need reinstatement of dialysis upon discharge)  ** Nephrology consulted Dr. Charles - ESRD Maintenance HD (M/W/F ) via right arm AVF  - last dialysis Monday 11/6 did not start  - HD today  -on [facility]  - Avoid Nephrotoxic Meds/ Agents such as (NSAIDs, IV contrast, Aminoglycosides such as gentamicin, Gadolinium contrast, Phosphate containing enemas, etc..)  - Adjust Medications according to eGFR  - Renal diet and fluid restrictions 1000 ml/day  - c/w sevelamer TID w/ meals  ** Nephrology consulted Dr. Charles

## 2019-11-11 NOTE — PROGRESS NOTE ADULT - SUBJECTIVE AND OBJECTIVE BOX
[facility] [facility]   MD Maia Gonzalez D.O. Fatima Sheikh, D.O. Ruoru Wong, PA    From 7 AM - 5 PM:  OFFICE: [phone]  Dr. Beckham cell: [phone]  Dr. Charles cell: [phone]  Dr. Gamino cell: [phone]  BRANDEN Carrasco cell: [phone]    From 5 PM - 7 AM: Answering Service: [phone]        RENAL FOLLOW UP NOTE  --------------------------------------------------------------------------------  HPI: Pt seen and examined after HD. States she feels better today. Still has some dizziness but improved from yesterday and did not change while on HD today. Denies any symptoms while on HD today. No symptoms currently.        PAST HISTORY  --------------------------------------------------------------------------------  No significant changes to PMH, PSH, FHx, SHx, unless otherwise noted    ALLERGIES & MEDICATIONS  --------------------------------------------------------------------------------  Allergies    No Known Drug Allergies  pineapple (Hives)    Intolerances      Standing Inpatient Medications  amLODIPine   Tablet 10 milliGRAM(s) Oral daily  aspirin  chewable 81 milliGRAM(s) Oral daily  atorvastatin 40 milliGRAM(s) Oral at bedtime  chlorhexidine 4% Liquid 1 Application(s) Topical <User Schedule>  cyanocobalamin 1000 MICROGram(s) Oral daily  folic acid 1 milliGRAM(s) Oral daily  gabapentin 100 milliGRAM(s) Oral daily  heparin  Injectable 5000 Unit(s) SubCutaneous every 12 hours  hydrALAZINE 25 milliGRAM(s) Oral two times a day  insulin lispro (HumaLOG) corrective regimen sliding scale   SubCutaneous Before meals and at bedtime  labetalol 200 milliGRAM(s) Oral daily  lisinopril 40 milliGRAM(s) Oral daily  LORazepam     Tablet 0.5 milliGRAM(s) Oral two times a day  meclizine 25 milliGRAM(s) Oral three times a day  multivitamin/minerals 1 Tablet(s) Oral daily  sevelamer carbonate 800 milliGRAM(s) Oral three times a day with meals  topiramate 50 milliGRAM(s) Oral at bedtime    PRN Inpatient Medications  acetaminophen   Tablet .. 650 milliGRAM(s) Oral every 6 hours PRN  ondansetron Injectable 4 milliGRAM(s) IV Push every 8 hours PRN      REVIEW OF SYSTEMS  --------------------------------------------------------------------------------  General: no fever  CVS: no chest pain  RESP: no sob, no cough  ABD: no abdominal pain  : no dysuria,  MSK: no edema     VITALS/PHYSICAL EXAM  --------------------------------------------------------------------------------  T(C): 37.2 (11-11-19 @ 13:24), Max: 37.2 (11-11-19 @ 13:24)  HR: 69 (11-11-19 @ 13:24) (68 - 75)  BP: 151/68 (11-11-19 @ 13:24) (116/56 - 155/64)  RR: 16 (11-11-19 @ 13:24) (16 - 18)  SpO2: 98% (11-11-19 @ 13:24) (95% - 98%)  Wt(kg): --        Physical Exam:  	Gen: NAD  	HEENT: MMM  	Pulm: CTA B/L  	CV: S1S2, + murmur  	Abd: Soft, +BS  	Ext: No LE edema B/L              Neuro: Awake   	Skin: Warm and Dry   	Vascular access: RAVG w/ good thrill and bruit    LABS/STUDIES  --------------------------------------------------------------------------------              10.5   5.53  >-----------<  159      [11-11-19 @ 06:25]              33.7     140  |  107  |  67  ----------------------------<  63      [11-11-19 @ 06:25]  5.5   |  23  |  9.50        Ca     8.9     [11-11-19 @ 06:25]      Mg     2.7     [11-11-19 @ 06:25]      Phos  7.2     [11-11-19 @ 06:25]    TPro  6.0  /  Alb  3.0  /  TBili  0.5  /  DBili  x   /  AST  10  /  ALT  16  /  AlkPhos  61  [11-11-19 @ 06:25]          Creatinine Trend:  SCr 9.50 [11-11 @ 06:25]  SCr 7.52 [11-10 @ 06:50]  SCr 5.65 [11-09 @ 06:06]  SCr 7.13 [11-08 @ 06:18]  SCr 4.96 [11-07 @ 06:31]    Urinalysis - [11-06-19 @ 09:05]      Color Yellow / Appearance Clear / SG 1.015 / pH 9.0      Gluc 50 / Ketone Negative  / Bili Negative / Urobili Negative       Blood Negative / Protein 100 / Leuk Est Negative / Nitrite Negative      RBC 0-2 / WBC 0-2 / Hyaline  / Gran  / Sq Epi  / Non Sq Epi  / Bacteria Trace      PTH -- (Ca 8.9)      [11-07-19 @ 10:25]   173  Vitamin D (25OH) 24.4      [11-07-19 @ 10:25]  HbA1c 4.6      [11-07-19 @ 10:26]  TSH 0.73      [11-07-19 @ 06:31]  Lipid: chol 140, , HDL 41, LDL 75      [11-07-19 @ 06:31]    HBsAg Nonreact      [11-08-19 @ 15:20]  HCV 0.09, Nonreact      [11-08-19 @ 15:20]

## 2019-11-12 DIAGNOSIS — M25.552 PAIN IN LEFT HIP: ICD-10-CM

## 2019-11-12 LAB
ALBUMIN SERPL ELPH-MCNC: 3 G/DL — LOW (ref 3.5–5)
ALP SERPL-CCNC: 64 U/L — SIGNIFICANT CHANGE UP (ref 40–120)
ALT FLD-CCNC: 16 U/L DA — SIGNIFICANT CHANGE UP (ref 10–60)
ANION GAP SERPL CALC-SCNC: 6 MMOL/L — SIGNIFICANT CHANGE UP (ref 5–17)
AST SERPL-CCNC: 10 U/L — SIGNIFICANT CHANGE UP (ref 10–40)
BILIRUB SERPL-MCNC: 0.5 MG/DL — SIGNIFICANT CHANGE UP (ref 0.2–1.2)
BUN SERPL-MCNC: 36 MG/DL — HIGH (ref 7–18)
CALCIUM SERPL-MCNC: 9.1 MG/DL — SIGNIFICANT CHANGE UP (ref 8.4–10.5)
CHLORIDE SERPL-SCNC: 99 MMOL/L — SIGNIFICANT CHANGE UP (ref 96–108)
CO2 SERPL-SCNC: 30 MMOL/L — SIGNIFICANT CHANGE UP (ref 22–31)
CREAT SERPL-MCNC: 6.22 MG/DL — HIGH (ref 0.5–1.3)
GLUCOSE BLDC GLUCOMTR-MCNC: 117 MG/DL — HIGH (ref 70–99)
GLUCOSE BLDC GLUCOMTR-MCNC: 121 MG/DL — HIGH (ref 70–99)
GLUCOSE BLDC GLUCOMTR-MCNC: 180 MG/DL — HIGH (ref 70–99)
GLUCOSE BLDC GLUCOMTR-MCNC: 66 MG/DL — LOW (ref 70–99)
GLUCOSE BLDC GLUCOMTR-MCNC: 76 MG/DL — SIGNIFICANT CHANGE UP (ref 70–99)
GLUCOSE SERPL-MCNC: 81 MG/DL — SIGNIFICANT CHANGE UP (ref 70–99)
HCT VFR BLD CALC: 32.6 % — LOW (ref 34.5–45)
HGB BLD-MCNC: 10.5 G/DL — LOW (ref 11.5–15.5)
MAGNESIUM SERPL-MCNC: 2.2 MG/DL — SIGNIFICANT CHANGE UP (ref 1.6–2.6)
MCHC RBC-ENTMCNC: 30.7 PG — SIGNIFICANT CHANGE UP (ref 27–34)
MCHC RBC-ENTMCNC: 32.2 GM/DL — SIGNIFICANT CHANGE UP (ref 32–36)
MCV RBC AUTO: 95.3 FL — SIGNIFICANT CHANGE UP (ref 80–100)
NRBC # BLD: 0 /100 WBCS — SIGNIFICANT CHANGE UP (ref 0–0)
PHOSPHATE SERPL-MCNC: 6.1 MG/DL — HIGH (ref 2.5–4.5)
PLATELET # BLD AUTO: 148 K/UL — LOW (ref 150–400)
POTASSIUM SERPL-MCNC: 4.9 MMOL/L — SIGNIFICANT CHANGE UP (ref 3.5–5.3)
POTASSIUM SERPL-SCNC: 4.9 MMOL/L — SIGNIFICANT CHANGE UP (ref 3.5–5.3)
PROT SERPL-MCNC: 6.2 G/DL — SIGNIFICANT CHANGE UP (ref 6–8.3)
RBC # BLD: 3.42 M/UL — LOW (ref 3.8–5.2)
RBC # FLD: 13.6 % — SIGNIFICANT CHANGE UP (ref 10.3–14.5)
SODIUM SERPL-SCNC: 135 MMOL/L — SIGNIFICANT CHANGE UP (ref 135–145)
WBC # BLD: 6.45 K/UL — SIGNIFICANT CHANGE UP (ref 3.8–10.5)
WBC # FLD AUTO: 6.45 K/UL — SIGNIFICANT CHANGE UP (ref 3.8–10.5)

## 2019-11-12 PROCEDURE — 73501 X-RAY EXAM HIP UNI 1 VIEW: CPT | Mod: 26,LT

## 2019-11-12 PROCEDURE — 72192 CT PELVIS W/O DYE: CPT | Mod: 26

## 2019-11-12 PROCEDURE — 99232 SBSQ HOSP IP/OBS MODERATE 35: CPT

## 2019-11-12 RX ORDER — DEXTROSE 50 % IN WATER 50 %
12.5 SYRINGE (ML) INTRAVENOUS ONCE
Refills: 0 | Status: DISCONTINUED | OUTPATIENT
Start: 2019-11-12 | End: 2019-11-22

## 2019-11-12 RX ORDER — ACETAMINOPHEN 500 MG
1000 TABLET ORAL ONCE
Refills: 0 | Status: COMPLETED | OUTPATIENT
Start: 2019-11-12 | End: 2019-11-12

## 2019-11-12 RX ORDER — GABAPENTIN 400 MG/1
300 CAPSULE ORAL AT BEDTIME
Refills: 0 | Status: DISCONTINUED | OUTPATIENT
Start: 2019-11-12 | End: 2019-11-22

## 2019-11-12 RX ORDER — TOPIRAMATE 25 MG
25 TABLET ORAL AT BEDTIME
Refills: 0 | Status: DISCONTINUED | OUTPATIENT
Start: 2019-11-12 | End: 2019-11-13

## 2019-11-12 RX ORDER — LANOLIN ALCOHOL/MO/W.PET/CERES
3 CREAM (GRAM) TOPICAL AT BEDTIME
Refills: 0 | Status: DISCONTINUED | OUTPATIENT
Start: 2019-11-12 | End: 2019-11-22

## 2019-11-12 RX ORDER — ACETAMINOPHEN 500 MG
1000 TABLET ORAL ONCE
Refills: 0 | Status: COMPLETED | OUTPATIENT
Start: 2019-11-13 | End: 2019-11-13

## 2019-11-12 RX ORDER — TRAMADOL HYDROCHLORIDE 50 MG/1
25 TABLET ORAL ONCE
Refills: 0 | Status: DISCONTINUED | OUTPATIENT
Start: 2019-11-12 | End: 2019-11-12

## 2019-11-12 RX ORDER — SODIUM CHLORIDE 9 MG/ML
1000 INJECTION, SOLUTION INTRAVENOUS
Refills: 0 | Status: DISCONTINUED | OUTPATIENT
Start: 2019-11-12 | End: 2019-11-22

## 2019-11-12 RX ORDER — DEXTROSE 50 % IN WATER 50 %
25 SYRINGE (ML) INTRAVENOUS ONCE
Refills: 0 | Status: DISCONTINUED | OUTPATIENT
Start: 2019-11-12 | End: 2019-11-22

## 2019-11-12 RX ORDER — DEXTROSE 50 % IN WATER 50 %
15 SYRINGE (ML) INTRAVENOUS ONCE
Refills: 0 | Status: DISCONTINUED | OUTPATIENT
Start: 2019-11-12 | End: 2019-11-22

## 2019-11-12 RX ORDER — OXYCODONE HYDROCHLORIDE 5 MG/1
5 TABLET ORAL ONCE
Refills: 0 | Status: DISCONTINUED | OUTPATIENT
Start: 2019-11-12 | End: 2019-11-12

## 2019-11-12 RX ORDER — DEXAMETHASONE 0.5 MG/5ML
4 ELIXIR ORAL DAILY
Refills: 0 | Status: DISCONTINUED | OUTPATIENT
Start: 2019-11-12 | End: 2019-11-13

## 2019-11-12 RX ORDER — GLUCAGON INJECTION, SOLUTION 0.5 MG/.1ML
1 INJECTION, SOLUTION SUBCUTANEOUS ONCE
Refills: 0 | Status: DISCONTINUED | OUTPATIENT
Start: 2019-11-12 | End: 2019-11-22

## 2019-11-12 RX ADMIN — HEPARIN SODIUM 5000 UNIT(S): 5000 INJECTION INTRAVENOUS; SUBCUTANEOUS at 05:26

## 2019-11-12 RX ADMIN — GABAPENTIN 300 MILLIGRAM(S): 400 CAPSULE ORAL at 22:12

## 2019-11-12 RX ADMIN — PREGABALIN 1000 MICROGRAM(S): 225 CAPSULE ORAL at 13:21

## 2019-11-12 RX ADMIN — Medication 25 MILLIGRAM(S): at 05:25

## 2019-11-12 RX ADMIN — Medication 3 MILLIGRAM(S): at 22:12

## 2019-11-12 RX ADMIN — Medication 650 MILLIGRAM(S): at 01:17

## 2019-11-12 RX ADMIN — Medication 400 MILLIGRAM(S): at 22:12

## 2019-11-12 RX ADMIN — Medication 81 MILLIGRAM(S): at 13:21

## 2019-11-12 RX ADMIN — Medication 400 MILLIGRAM(S): at 17:14

## 2019-11-12 RX ADMIN — Medication 650 MILLIGRAM(S): at 07:12

## 2019-11-12 RX ADMIN — OXYCODONE HYDROCHLORIDE 5 MILLIGRAM(S): 5 TABLET ORAL at 15:50

## 2019-11-12 RX ADMIN — Medication 3 MILLIGRAM(S): at 00:24

## 2019-11-12 RX ADMIN — Medication 25 MILLIGRAM(S): at 17:14

## 2019-11-12 RX ADMIN — TRAMADOL HYDROCHLORIDE 25 MILLIGRAM(S): 50 TABLET ORAL at 00:24

## 2019-11-12 RX ADMIN — Medication 200 MILLIGRAM(S): at 05:25

## 2019-11-12 RX ADMIN — Medication 1000 MILLIGRAM(S): at 22:42

## 2019-11-12 RX ADMIN — HEPARIN SODIUM 5000 UNIT(S): 5000 INJECTION INTRAVENOUS; SUBCUTANEOUS at 17:15

## 2019-11-12 RX ADMIN — SEVELAMER CARBONATE 800 MILLIGRAM(S): 2400 POWDER, FOR SUSPENSION ORAL at 17:14

## 2019-11-12 RX ADMIN — AMLODIPINE BESYLATE 10 MILLIGRAM(S): 2.5 TABLET ORAL at 05:25

## 2019-11-12 RX ADMIN — GABAPENTIN 100 MILLIGRAM(S): 400 CAPSULE ORAL at 13:21

## 2019-11-12 RX ADMIN — Medication 1 TABLET(S): at 13:21

## 2019-11-12 RX ADMIN — TRAMADOL HYDROCHLORIDE 25 MILLIGRAM(S): 50 TABLET ORAL at 00:54

## 2019-11-12 RX ADMIN — Medication 650 MILLIGRAM(S): at 06:42

## 2019-11-12 RX ADMIN — OXYCODONE HYDROCHLORIDE 5 MILLIGRAM(S): 5 TABLET ORAL at 15:20

## 2019-11-12 RX ADMIN — Medication 25 MILLIGRAM(S): at 13:21

## 2019-11-12 RX ADMIN — ATORVASTATIN CALCIUM 40 MILLIGRAM(S): 80 TABLET, FILM COATED ORAL at 22:12

## 2019-11-12 RX ADMIN — Medication 25 MILLIGRAM(S): at 22:12

## 2019-11-12 RX ADMIN — CHLORHEXIDINE GLUCONATE 1 APPLICATION(S): 213 SOLUTION TOPICAL at 05:29

## 2019-11-12 RX ADMIN — Medication 1: at 17:14

## 2019-11-12 RX ADMIN — Medication 4 MILLIGRAM(S): at 17:14

## 2019-11-12 RX ADMIN — Medication 650 MILLIGRAM(S): at 01:47

## 2019-11-12 RX ADMIN — Medication 0.5 MILLIGRAM(S): at 17:14

## 2019-11-12 RX ADMIN — Medication 1 MILLIGRAM(S): at 13:21

## 2019-11-12 RX ADMIN — LISINOPRIL 40 MILLIGRAM(S): 2.5 TABLET ORAL at 05:26

## 2019-11-12 RX ADMIN — SEVELAMER CARBONATE 800 MILLIGRAM(S): 2400 POWDER, FOR SUSPENSION ORAL at 13:21

## 2019-11-12 RX ADMIN — SEVELAMER CARBONATE 800 MILLIGRAM(S): 2400 POWDER, FOR SUSPENSION ORAL at 08:26

## 2019-11-12 RX ADMIN — Medication 0.5 MILLIGRAM(S): at 05:25

## 2019-11-12 RX ADMIN — Medication 1000 MILLIGRAM(S): at 17:44

## 2019-11-12 NOTE — PROGRESS NOTE ADULT - PROBLEM SELECTOR PLAN 2
-ESRD, on HD MWE via right arm AVF  -Avoid Nephrotoxins   -cont Renal diet and fluid restrictions 1000 ml/day  -cont sevelamer TID w/ meals  -Nephro  Dr. Charles

## 2019-11-12 NOTE — PROGRESS NOTE ADULT - SUBJECTIVE AND OBJECTIVE BOX
HPI: 54 year old female from home with PMHx ESRD on HD via right AVF (M/W/F), HTN, hx DM no longer on meds now with episodes of hypoglycemia , Anemia, Asthma, Claustrophobia, GERD, HLD, MI, AUSTIN,  Uterine Leiomyoma and PSHx of AV Fistula, R Antecubital AVF, , Craniotomy, and Hernia Repair gastric Bypass (2017) presents with dizziness which was described as room spinning sensation. NIHSS score on admission was 2. Ct head was negative. EKG: NSR  troponin were negative. s/p tele monitoring. admitted for vertigo, followed by neurology. symptoms Likely due to vestibular migraine and analgesic overuse headache. s/p trial of acute migraine treatment  c/o left hip pain, occasional dizziness today, CT left hip pending, pain mgmt consulted     OVERNIGHT EVENTS: c/o left hip pain limiting movement, occasional dizziness     REVIEW OF SYSTEMS:  CONSTITUTIONAL: No fever, chills  NECK: No pain or stiffness  RESPIRATORY: No cough, SOB  CARDIOVASCULAR: No chest pain, palpitations  GASTROINTESTINAL: No abdominal pain. No nausea, vomiting, or diarrhea  NEUROLOGICAL: +ve dizziness   MUSCULOSKELETAL: left hip pain     T(C): 37 (19 @ 05:28), Max: 37.3 (19 @ 21:10)  HR: 69 (19 @ 05:28) (68 - 74)  BP: 130/42 (19 @ 05:28) (130/42 - 152/73)  RR: 17 (19 @ 05:28) (16 - 18)  SpO2: 97% (19 @ 05:28) (97% - 98%)  Wt(kg): --Vital Signs Last 24 Hrs  T(C): 37 (2019 05:28), Max: 37.3 (2019 21:10)  T(F): 98.6 (2019 05:28), Max: 99.1 (2019 21:10)  HR: 69 (2019 05:28) (68 - 74)  BP: 130/42 (2019 05:28) (130/42 - 152/73)  BP(mean): --  RR: 17 (2019 05:28) (16 - 18)  SpO2: 97% (2019 05:28) (97% - 98%)    MEDICATIONS  (STANDING):  amLODIPine   Tablet 10 milliGRAM(s) Oral daily  aspirin  chewable 81 milliGRAM(s) Oral daily  atorvastatin 40 milliGRAM(s) Oral at bedtime  chlorhexidine 4% Liquid 1 Application(s) Topical <User Schedule>  cyanocobalamin 1000 MICROGram(s) Oral daily  folic acid 1 milliGRAM(s) Oral daily  gabapentin 100 milliGRAM(s) Oral daily  heparin  Injectable 5000 Unit(s) SubCutaneous every 12 hours  hydrALAZINE 25 milliGRAM(s) Oral two times a day  insulin lispro (HumaLOG) corrective regimen sliding scale   SubCutaneous Before meals and at bedtime  labetalol 200 milliGRAM(s) Oral daily  lisinopril 40 milliGRAM(s) Oral daily  LORazepam     Tablet 0.5 milliGRAM(s) Oral two times a day  meclizine 25 milliGRAM(s) Oral three times a day  melatonin 3 milliGRAM(s) Oral at bedtime  multivitamin/minerals 1 Tablet(s) Oral daily  sevelamer carbonate 800 milliGRAM(s) Oral three times a day with meals  topiramate 50 milliGRAM(s) Oral at bedtime    MEDICATIONS  (PRN):  acetaminophen   Tablet .. 650 milliGRAM(s) Oral every 6 hours PRN Mild Pain (1 - 3), Moderate Pain (4 - 6)  ondansetron Injectable 4 milliGRAM(s) IV Push every 8 hours PRN Nausea and/or Vomiting      PHYSICAL EXAM:  GENERAL: NAD  ENMT: Moist mucous membranes  NECK: Supple, No JVD  CHEST/LUNG: Clear to auscultation bilaterally; No rales, rhonchi, wheezing, or rubs  HEART: S1, S2, Regular rate and rhythm  ABDOMEN: Soft, Nontender, Nondistended; Bowel sounds present  NEURO: Alert & Oriented X3  EXTREMITIES: No LE edema, no calf tenderness  SKIN: R AVF     Consultant(s) Notes Reviewed:  [x ] YES  [ ] NO  Care Discussed with Consultants/Other Providers [ x] YES  [ ] NO    LABS:                        10.5   6.45  )-----------( 148      ( 2019 07:20 )             32.6     11-12    135  |  99  |  36<H>  ----------------------------<  81  4.9   |  30  |  6.22<H>    Ca    9.1      2019 07:20  Phos  6.1     11-12  Mg     2.2     -    TPro  6.2  /  Alb  3.0<L>  /  TBili  0.5  /  DBili  x   /  AST  10  /  ALT  16  /  AlkPhos  64  -        CAPILLARY BLOOD GLUCOSE      POCT Blood Glucose.: 76 mg/dL (2019 08:09)  POCT Blood Glucose.: 93 mg/dL (2019 21:32)  POCT Blood Glucose.: 71 mg/dL (2019 17:08)    RADIOLOGY & ADDITIONAL TESTS:    < from: CT Head No Cont (19 @ 07:42) >    EXAM:  CT BRAIN                            PROCEDURE DATE:  2019          INTERPRETATION:  CLINICAL INFORMATION: Vertigo.    COMPARISON: MR dated 2018.    PROCEDURE:    Axial sections of the brain were obtained from base to vertex, without   the intravenous administration of contrast material. Sagittal and coronal   reformats were then generated from the axial images.    FINDINGS:    There is no intracranial hemorrhage, mass or shift of the midline   structures. There are involutional changes.    The brain stem is unremarkable.  No cerebellopontine angle lesion is   appreciated.     The fourth, third and lateral ventricles are normal position.  No   subdural or epidural collections are present.     No acute fracture or destructive lesion is identified. Left frontal ileana   hole is noted.    The sinuses and mastoids are clear.    IMPRESSION:    No acute intracranial hemorrhage.      < end of copied text >      Imaging Personally Reviewed:  [ ] YES  [ ] NO HPI: 54 year old female from home with PMHx ESRD on HD via right AVF (M/W/F), HTN, hx DM no longer on meds now with episodes of hypoglycemia , Anemia, Asthma, Claustrophobia, GERD, HLD, MI, AUST[place],  Uterine Leiomyoma and PSHx of AV Fistula, R Antecubital AVF, , Craniotomy, and Hernia Repair gastric Bypass (2017) presents with dizziness which was described as room spinning sensation. NIHSS score on admission was 2. Ct head was negative. EKG: NSR  troponin were negative. s/p tele monitoring. admitted for vertigo, followed by neurology. symptoms Likely due to vestibular migraine and analgesic overuse headache. s/p trial of acute migraine treatment  c/o left hip pain, occasional dizziness today, CT left hip pending, pain mgmt consulted     OVERNIGHT EVENTS: c/o left hip pain limiting movement, occasional dizziness     REVIEW OF SYSTEMS:  CONSTITUTIONAL: No fever, chills  NECK: No pain or stiffness  RESPIRATORY: No cough, SOB  CARDIOVASCULAR: No chest pain, palpitations  GASTROINTESTINAL: No abdominal pain. No nausea, vomiting, or diarrhea  NEUROLOGICAL: +ve dizziness   MUSCULOSKELETAL: left hip pain     T(C): 37 (19 @ 05:28), Max: 37.3 (19 @ 21:10)  HR: 69 (19 @ 05:28) (68 - 74)  BP: 130/42 (19 @ 05:28) (130/42 - 152/73)  RR: 17 (19 @ 05:28) (16 - 18)  SpO2: 97% (19 @ 05:28) (97% - 98%)  Wt(kg): --Vital Signs Last 24 Hrs  T(C): 37 (2019 05:28), Max: 37.3 (2019 21:10)  T(F): 98.6 (2019 05:28), Max: 99.1 (2019 21:10)  HR: 69 (2019 05:28) (68 - 74)  BP: 130/42 (2019 05:28) (130/42 - 152/73)  BP(mean): --  RR: 17 (2019 05:28) (16 - 18)  SpO2: 97% (2019 05:28) (97% - 98%)    MEDICATIONS  (STANDING):  amLODIPine   Tablet 10 milliGRAM(s) Oral daily  aspirin  chewable 81 milliGRAM(s) Oral daily  atorvastatin 40 milliGRAM(s) Oral at bedtime  chlorhexidine 4% Liquid 1 Application(s) Topical <User Schedule>  cyanocobalamin 1000 MICROGram(s) Oral daily  folic acid 1 milliGRAM(s) Oral daily  gabapentin 100 milliGRAM(s) Oral daily  heparin  Injectable 5000 Unit(s) SubCutaneous every 12 hours  hydrALAZINE 25 milliGRAM(s) Oral two times a day  insulin lispro (HumaLOG) corrective regimen sliding scale   SubCutaneous Before meals and at bedtime  labetalol 200 milliGRAM(s) Oral daily  lisinopril 40 milliGRAM(s) Oral daily  LORazepam     Tablet 0.5 milliGRAM(s) Oral two times a day  meclizine 25 milliGRAM(s) Oral three times a day  melatonin 3 milliGRAM(s) Oral at bedtime  multivitamin/minerals 1 Tablet(s) Oral daily  sevelamer carbonate 800 milliGRAM(s) Oral three times a day with meals  topiramate 50 milliGRAM(s) Oral at bedtime    MEDICATIONS  (PRN):  acetaminophen   Tablet .. 650 milliGRAM(s) Oral every 6 hours PRN Mild Pain (1 - 3), Moderate Pain (4 - 6)  ondansetron Injectable 4 milliGRAM(s) IV Push every 8 hours PRN Nausea and/or Vomiting      PHYSICAL EXAM:  GENERAL: NAD  ENMT: Moist mucous membranes  NECK: Supple, No JVD  CHEST/LUNG: Clear to auscultation bilaterally; No rales, rhonchi, wheezing, or rubs  HEART: S1, S2, Regular rate and rhythm  ABDOMEN: Soft, Nontender, Nondistended; Bowel sounds present  NEURO: Alert & Oriented X3   EXTREMITIES: No LE edema, no calf tenderness  SKIN: R AVF     Consultant(s) Notes Reviewed:  [x ] YES  [ ] NO  Care Discussed with Consultants/Other Providers [ x] YES  [ ] NO    LABS:                        10.5   6.45  )-----------( 148      ( 2019 07:20 )             32.6     11-12    135  |  99  |  36<H>  ----------------------------<  81  4.9   |  30  |  6.22<H>    Ca    9.1      2019 07:20  Phos  6.1     11-12  Mg     2.2     -    TPro  6.2  /  Alb  3.0<L>  /  TBili  0.5  /  DBili  x   /  AST  10  /  ALT  16  /  AlkPhos  64  -        CAPILLARY BLOOD GLUCOSE      POCT Blood Glucose.: 76 mg/dL (2019 08:09)  POCT Blood Glucose.: 93 mg/dL (2019 21:32)  POCT Blood Glucose.: 71 mg/dL (2019 17:08)    RADIOLOGY & ADDITIONAL TESTS:    < from: CT Head No Cont (19 @ 07:42) >    EXAM:  CT BRAIN                            PROCEDURE DATE:  2019          INTERPRETATION:  CLINICAL INFORMATION: Vertigo.    COMPARISON: MR dated 2018.    PROCEDURE:    Axial sections of the brain were obtained from base to vertex, without   the intravenous administration of contrast material. Sagittal and coronal   reformats were then generated from the axial images.    FINDINGS:    There is no intracranial hemorrhage, mass or shift of the midline   structures. There are involutional changes.    The brain stem is unremarkable.  No cerebellopontine angle lesion is   appreciated.     The fourth, third and lateral ventricles are normal position.  No   subdural or epidural collections are present.     No acute fracture or destructive lesion is identified. Left frontal ileana   hole is noted.    The sinuses and mastoids are clear.    IMPRESSION:    No acute intracranial hemorrhage.      < end of copied text >      Imaging Personally Reviewed:  [ ] YES  [ ] NO

## 2019-11-12 NOTE — CONSULT NOTE ADULT - SUBJECTIVE AND OBJECTIVE BOX
HPI:  54 year old female from home with PMHx ESRD on HD via right AVF (M/W/), HTN, hx DM no longer on meds now with episodes of hypoglycemia , Anemia, Asthma, Claustrophobia, GERD, HLD, MI, [place],  Uterine Leiomyoma and PSHx of AV Fistula, R Antecubital AVF, , Craniotomy, and Hernia Repair gastric Bypass (2017) presents with dizziness since Monday night. Reports onset of dizziness described as spinning sensation 2 days ago. Reports she feels as if she were drunk and when she walks feels like shes walking on air. Reports she went to her HD session this morning and told staff of her symptoms who then sent her to ED, patient reports she did not receive her HD today, last received 2 days ago. Reports remote hx vertigo for which she used to take medication but symptoms has not occurred for many years.pt denies any Denies headache, focal weakness , numbness N/V, abdominal pain, hypotension, urinary symptoms, hematuria, melena, hematochezia. Denies smoking, alcohol, illicit drug use. allergic to pineapple    In ED :   NIHSS score on admission : 2  Ct head is negative for any acute event or change (did not receive TPA)  EKG: NSR  , T1 -ve , f/u T2  UA -ve   CXR : Mild cardiomegaly. No acute infiltrate.    LMP : 5 years ago (2019 11:50)          PAIN SCORE:         SCALE USED: (1-10 VNRS)      PAST MEDICAL & SURGICAL HISTORY:  Myocardial infarct, old:   ESRD (end stage renal disease) on dialysis: since 6/15/2015 (M/W/)  Asthma occurring only with upper respiratory infection: never hospitalized or intubated, last use of inhalator last year with winter  Anemia in chronic renal disease  Claustrophobia: when CPAP mask was put on her  Uterine leiomyoma, unspecified location  AUSTIN (obstructive sleep apnea)  Gastroesophageal reflux disease without esophagitis  Vertigo  HLD (hyperlipidemia)  Essential hypertension  Chronic kidney disease, unspecified stage  Diabetes mellitus, type 2  Morbid obesity  AV fistula: 3/18/2016  S/P craniotomy: - s/p fall from 3 floors, no hx of seizure after surgery  S/P hernia repair: incisional-  S/P  section: ,   CRF (chronic renal failure): s/p right antecubital AV formation- 2015, s/p left antecubital formation AV - - not working, s/p right chest permacath 07/15/15      FAMILY HISTORY:  Family history of chronic renal failure  Family history of hypertension  Family history of stroke  Diabetes mellitus, type 2      SOCIAL HISTORY:  [ ] Denies Smoking, Alcohol, or Drug Use    Allergies    No Known Drug Allergies  pineapple (Hives)    Intolerances        PAIN MEDICATIONS:  acetaminophen  IVPB .. 1000 milliGRAM(s) IV Intermittent once  acetaminophen  IVPB .. 1000 milliGRAM(s) IV Intermittent once  acetaminophen  IVPB .. 1000 milliGRAM(s) IV Intermittent once  gabapentin 300 milliGRAM(s) Oral at bedtime  LORazepam     Tablet 0.5 milliGRAM(s) Oral two times a day  meclizine 25 milliGRAM(s) Oral three times a day  melatonin 3 milliGRAM(s) Oral at bedtime  ondansetron Injectable 4 milliGRAM(s) IV Push every 8 hours PRN  oxyCODONE    IR 5 milliGRAM(s) Oral once  topiramate 25 milliGRAM(s) Oral at bedtime    Heme:  aspirin  chewable 81 milliGRAM(s) Oral daily  heparin  Injectable 5000 Unit(s) SubCutaneous every 12 hours    Antibiotics:    Cardiovascular:  amLODIPine   Tablet 10 milliGRAM(s) Oral daily  hydrALAZINE 25 milliGRAM(s) Oral two times a day  labetalol 200 milliGRAM(s) Oral daily  lisinopril 40 milliGRAM(s) Oral daily    GI:    Endocrine:  atorvastatin 40 milliGRAM(s) Oral at bedtime  dexAMETHasone     Tablet 4 milliGRAM(s) Oral daily  dextrose 40% Gel 15 Gram(s) Oral once PRN  dextrose 50% Injectable 12.5 Gram(s) IV Push once  dextrose 50% Injectable 25 Gram(s) IV Push once  dextrose 50% Injectable 25 Gram(s) IV Push once  glucagon  Injectable 1 milliGRAM(s) IntraMuscular once PRN  insulin lispro (HumaLOG) corrective regimen sliding scale   SubCutaneous Before meals and at bedtime    All Other Medications:  chlorhexidine 4% Liquid 1 Application(s) Topical <User Schedule>  cyanocobalamin 1000 MICROGram(s) Oral daily  dextrose 5%. 1000 milliLiter(s) IV Continuous <Continuous>  folic acid 1 milliGRAM(s) Oral daily  multivitamin/minerals 1 Tablet(s) Oral daily  sevelamer carbonate 800 milliGRAM(s) Oral three times a day with meals        Vital Signs Last 24 Hrs  T(C): 37 (2019 13:24), Max: 37.3 (2019 21:10)  T(F): 98.6 (:24), Max: 99.1 (2019 21:10)  HR: 65 (:) (65 - 74)  BP: 128/53 (:24) (128/53 - 152/73)  BP(mean): --  RR: 16 (2019 13:24) (16 - 18)  SpO2: 95% (:) (95% - 98%)    LABS:                          10.5   6.45  )-----------( 148      ( 2019 07:20 )             32.6         135  |  99  |  36<H>  ----------------------------<  81  4.9   |  30  |  6.22<H>    Ca    9.1      2019 07:20  Phos  6.1     -12  Mg     2.2         TPro  6.2  /  Alb  3.0<L>  /  TBili  0.5  /  DBili  x   /  AST  10  /  ALT  16  /  AlkPhos  64  11-12            REVIEW OF SYSTEMS:  CONSTITUTIONAL: No fever, weight loss, or fatigue  NEUROLOGICAL: No headaches, memory loss, loss of strength, numbness, tremors, dizziness or blurred vision  RESPIRATORY: No cough, wheezing, chills or hemoptysis; No shortness of breath  CARDIOVASCULAR: No chest pain, palpitations, dizziness, or leg swelling  GASTROINTESTINAL: No abdominal or epigastric pain. No nausea, vomiting, or hematemesis; No diarrhea or constipation. No melena or hematochezia.  GENITOURINARY: No dysuria, frequency, hematuria, retention or incontinence  MUSCULOSKELETAL: No joint pain or swelling; No muscle, back, or extremity pain, no upper or lower motor strength weakness, no saddle anesthesia, bowel/bladder incontinence, no falls  SKIN: No itching, burning, rashes, or lesions   PSYCHIATRIC: No depression, anxiety, mood swings, or difficulty sleeping    PHYSICAL EXAM:  GENERAL: NAD, well-groomed, well-developed, no signs of toxicity  NERVOUS SYSTEM:  Alert & Oriented X3, Good concentration  HEAD:  Atraumatic, Normocephalic, symmetrical  CHEST/LUNG: Clear to auscultation bilaterally; No rales, rhonchi, wheezing, or rubs  HEART: Regular rate and rhythm; No murmurs, rubs, or gallops  ABDOMEN: Soft, Nontender, Nondistended; Bowel sounds present  EXTREMITIES:  2+ Peripheral Pulses, No clubbing, cyanosis, or edema  MUSCULOSKELETAL: Motor Strength 5/5 B/L upper and lower extremities; moves all extremities equally against gravity; ROM intact; + decreased rom  SKIN: No rashes or lesions    RADIOLOGY:    Drug Screen:          ORT Score -   [ ]  [place]  Reviewed and Copied to Chart HPI:  54 year old female from home with PMHx ESRD on HD via right AVF (M/W/F), HTN, hx DM no longer on meds now with episodes of hypoglycemia , Anemia, Asthma, Claustrophobia, GERD, HLD, MI, [place],  Uterine Leiomyoma and PSHx of AV Fistula, R Antecubital AVF, , Craniotomy, and Hernia Repair gastric Bypass (2017) presents with dizziness since Monday night. Reports onset of dizziness described as spinning sensation 2 days ago. Reports she feels as if she were drunk and when she walks feels like shes walking on air. Reports she went to her HD session this morning and told staff of her symptoms who then sent her to ED, patient reports she did not receive her HD today, last received 2 days ago. Reports remote hx vertigo for which she used to take medication but symptoms has not occurred for many years.pt denies any Denies headache, focal weakness , numbness N/V, abdominal pain, hypotension, urinary symptoms, hematuria, melena, hematochezia. Denies smoking, alcohol, illicit drug use. allergic to pineapple.    In ED :   NIHSS score on admission : 2  Ct head is negative for any acute event or change (did not receive TPA)  EKG: NSR  , T1 -ve , f/u T2  UA -ve   CXR : Mild cardiomegaly. No acute infiltrate.    54 year old female from home admitted with acute onset of left sided pian on day of dialysis.  Pt was sent to ED at the time.  + left hip pain. Patient unable to ambulate due to pain.  No numbness or tingling.  No saddle anesthesia or bowel or bladder inconsistent  + esrd - hd for 3 years.  Pt was also admitted for dizziness and vertigo. Pt started on Topamax.  services -                                                             Accumetrics    LMP : 5 years ago (2019 11:50)          PAIN SCORE:    5/10     SCALE USED: (1-10 VNRS)      PAST MEDICAL & SURGICAL HISTORY:  Myocardial infarct, old:   ESRD (end stage renal disease) on dialysis: since 6/15/2015 (M/W/F)  Asthma occurring only with upper respiratory infection: never hospitalized or intubated, last use of inhalator last year with winter  Anemia in chronic renal disease  Claustrophobia: when CPAP mask was put on her  Uterine leiomyoma, unspecified location  [place] (obstructive sleep apnea)  Gastroesophageal reflux disease without esophagitis  Vertigo  HLD (hyperlipidemia)  Essential hypertension  Chronic kidney disease, unspecified stage  Diabetes mellitus, type 2  Morbid obesity  AV fistula: 3/18/2016  S/P craniotomy: - s/p fall from 3 floors, no hx of seizure after surgery  S/P hernia repair: incisional-  S/P  section: ,   CRF (chronic renal failure): s/p right antecubital AV formation- 2015, s/p left antecubital formation AV - - not working, s/p right chest permacath 07/15/15      FAMILY HISTORY:  Family history of chronic renal failure  Family history of hypertension  Family history of stroke  Diabetes mellitus, type 2      SOCIAL HISTORY:  [x ] Denies Smoking, Alcohol, or Drug Use    Allergies    No Known Drug Allergies  pineapple (Hives)    Intolerances        PAIN MEDICATIONS:  acetaminophen  IVPB .. 1000 milliGRAM(s) IV Intermittent once  acetaminophen  IVPB .. 1000 milliGRAM(s) IV Intermittent once  acetaminophen  IVPB .. 1000 milliGRAM(s) IV Intermittent once  gabapentin 300 milliGRAM(s) Oral at bedtime  LORazepam     Tablet 0.5 milliGRAM(s) Oral two times a day  meclizine 25 milliGRAM(s) Oral three times a day  melatonin 3 milliGRAM(s) Oral at bedtime  ondansetron Injectable 4 milliGRAM(s) IV Push every 8 hours PRN  oxyCODONE    IR 5 milliGRAM(s) Oral once  topiramate 25 milliGRAM(s) Oral at bedtime    Heme:  aspirin  chewable 81 milliGRAM(s) Oral daily  heparin  Injectable 5000 Unit(s) SubCutaneous every 12 hours    Antibiotics:    Cardiovascular:  amLODIPine   Tablet 10 milliGRAM(s) Oral daily  hydrALAZINE 25 milliGRAM(s) Oral two times a day  labetalol 200 milliGRAM(s) Oral daily  lisinopril 40 milliGRAM(s) Oral daily    GI:    Endocrine:  atorvastatin 40 milliGRAM(s) Oral at bedtime  dexAMETHasone     Tablet 4 milliGRAM(s) Oral daily  dextrose 40% Gel 15 Gram(s) Oral once PRN  dextrose 50% Injectable 12.5 Gram(s) IV Push once  dextrose 50% Injectable 25 Gram(s) IV Push once  dextrose 50% Injectable 25 Gram(s) IV Push once  glucagon  Injectable 1 milliGRAM(s) IntraMuscular once PRN  insulin lispro (HumaLOG) corrective regimen sliding scale   SubCutaneous Before meals and at bedtime    All Other Medications:  chlorhexidine 4% Liquid 1 Application(s) Topical <User Schedule>  cyanocobalamin 1000 MICROGram(s) Oral daily  dextrose 5%. 1000 milliLiter(s) IV Continuous <Continuous>  folic acid 1 milliGRAM(s) Oral daily  multivitamin/minerals 1 Tablet(s) Oral daily  sevelamer carbonate 800 milliGRAM(s) Oral three times a day with meals        Vital Signs Last 24 Hrs  T(C): 37 (2019 13:24), Max: 37.3 (2019 21:10)  T(F): 98.6 (2019 13:24), Max: 99.1 (2019 21:10)  HR: 65 (2019 13:24) (65 - 74)  BP: 128/53 (2019 13:24) (128/53 - 152/73)  BP(mean): --  RR: 16 (2019 13:24) (16 - 18)  SpO2: 95% (2019 13:24) (95% - 98%)    LABS:                          10.5   6.45  )-----------( 148      ( 2019 07:20 )             32.6         135  |  99  |  36<H>  ----------------------------<  81  4.9   |  30  |  6.22<H>    Ca    9.1      2019 07:20  Phos  6.1     -12  Mg     2.2         TPro  6.2  /  Alb  3.0<L>  /  TBili  0.5  /  DBili  x   /  AST  10  /  ALT  16  /  AlkPhos  64  11-12            REVIEW OF SYSTEMS:  CONSTITUTIONAL: No fever, weight loss, or fatigue  NEUROLOGICAL: No headaches, memory loss, loss of strength, numbness, tremors, dizziness or blurred vision  RESPIRATORY: No cough, wheezing, chills or hemoptysis; No shortness of breath  CARDIOVASCULAR: No chest pain, palpitations, dizziness, or leg swelling  GASTROINTESTINAL: No abdominal or epigastric pain. No nausea, vomiting, or hematemesis; No diarrhea or constipation. No melena or hematochezia.  GENITOURINARY: No dysuria, frequency, hematuria, + esrd - on hd  MUSCULOSKELETAL: + left hip pain + left leg pain    PHYSICAL EXAM:  GENERAL: NAD, [place]ic speaking,   NERVOUS SYSTEM:  Alert & Oriented X3, Good concentration  CHEST/LUNG: diminished throughout   HEART: Regular rate and rhythm; No murmurs, rubs, or gallops  ABDOMEN: Soft, Nontender, Nondistended; Bowel sounds present  EXTREMITIES:  2+ Peripheral Pulses, No clubbing, cyanosis, or edema  MUSCULOSKELETAL: Motor Strength 5/5 B/L upper and lower extremities; moves all extremities equally against gravity; ROM intact; + decreased rom + left leg tenderness     RADIOLOGY:    < from: Xray Hip 1 View, Left (19 @ 14:40) >    EXAM:  XR HIP 1V LT                            PROCEDURE DATE:  2019          INTERPRETATION:  CLINICAL STATEMENT: Pain.    TECHNIQUE: AP and lateral views of left hip.     COMPARISON: None.    FINDINGS:  There is no acute fracture or dislocation.     Moderate to severe joint space narrowing left hip. Osteophytes at the   superolateral aspect of the acetabulum.    IMPRESSION:  No radiographic evidence of acute fracture or dislocation. If symptoms   persist, consider CT or MRI exam to exclude occult fracture.    < end of copied text >      Drug Screen:    < from: CT Pelvis Bony Only No Cont (19 @ 14:34) >    EXAM:  CT PELVIS BONY ONLY                            PROCEDURE DATE:  2019          INTERPRETATION:  EXAMINATION: CT of the pelvis without contrast    CLINICAL INFORMATION: Left hip pain    TECHNIQUE: Axial CT images were obtained through the pelvis. Coronal and   sagittal reformatted images were made. 3-D reconstruction images were   performed on an independent workstation.    FINDINGS: There are subcentimeter foci of mineralization in the distal   aspect of the left gluteus medius muscle at the level of the left hip.   These may represent foci of calcium hydroxyapatite deposition or age   indeterminant proximally retracted intramuscular enthesophytes from the   left greater trochanter. MRI is recommended to further evaluate if the   patient can tolerate. Otherwise, no fractures or dislocations are   demonstrated. The hip joint spaces are maintained. There is mild lower   lumbar spondylosis. There is bilateral sacroiliac joint arthrosis. There   is pubic symphysis arthrosis.    IMPRESSION: Subcentimeter foci of mineralization in the distal aspect of   the left gluteus medius muscle at the level of the left hip. These may   represent foci of calcium hydroxyapatite deposition or age indeterminant   proximally retracted intramuscular enthesophytes from the left greater   trochanter. MRI is recommended to further evaluate if the patient can   tolerate.    Otherwise, no fractures are demonstrated.    < end of copied text >        ORT Score -   [ ]  [place]  Reviewed and Copied to Chart

## 2019-11-12 NOTE — CHART NOTE - NSCHARTNOTEFT_GEN_A_CORE
Event Note       EVENT: 53 y/o female with PMH of ESRD on HD, asthma, anemia GERD, MI, [place], HLD, DM, s/p craniotomy. Admitted 11/12/19 for vertigo and dizziness. Patient c/o left lower extremity pain level 6/10. Requested meds. Unable to fall asleep.           OBJECTIVE:     Vital Signs Last 24 Hrs  T(C): 37.3 (11 Nov 2019 21:10), Max: 37.3 (11 Nov 2019 21:10)  T(F): 99.1 (11 Nov 2019 21:10), Max: 99.1 (11 Nov 2019 21:10)  HR: 73 (11 Nov 2019 21:10) (68 - 74)  BP: 132/52 (11 Nov 2019 21:10) (116/56 - 155/64)  BP(mean): --  RR: 18 (11 Nov 2019 21:10) (16 - 18)  SpO2: 97% (11 Nov 2019 21:10) (96% - 98%)     LABS:                         10.5   5.53  )-----------( 159      ( 11 Nov 2019 06:25 )             33.7   11-11    140  |  107  |  67<H>  ----------------------------<  63<L>  5.5<H>   |  23  |  9.50<H>    Ca    8.9      11 Nov 2019 06:25  Phos  7.2     11-11  Mg     2.7     11-11    TPro  6.0  /  Alb  3.0<L>  /  TBili  0.5  /  DBili  x   /  AST  10  /  ALT  16  /  AlkPhos  61  11-11                      10.1    7.10  )-----------( 358      ( 28 Oct 2019 06:22 )              29.5      10-28     138  |  105  |  13     ----------------------------<  130<H>     4.3   |  28  |  1.09     Ca    8.6      28 Oct 2019 06:22     ASSESSMENT: No LLE discoloration, swelling or redness. Pain started in left hip and radiates to left knee. Pain aggravated with movement.     PLAN:  Tramadol 25 mg po x 1 stat dose. Melatonin 3 mg po QHS for insomnia.     FOLLOW UP / RESULT:

## 2019-11-12 NOTE — PROGRESS NOTE ADULT - SUBJECTIVE AND OBJECTIVE BOX
CHIEF COMPLAINT:Patient is a 54y old  Female who presents with a chief complaint of Vertigo.Pt has lt hip pain.    	  REVIEW OF SYSTEMS:  CONSTITUTIONAL: No fever, weight loss, or fatigue  EYES: No eye pain, visual disturbances, or discharge  ENT:  No difficulty hearing, tinnitus, vertigo; No sinus or throat pain  NECK: No pain or stiffness  RESPIRATORY: No cough, wheezing, chills or hemoptysis; No Shortness of Breath  CARDIOVASCULAR: No chest pain, palpitations, passing out, dizziness, or leg swelling  GASTROINTESTINAL: No abdominal or epigastric pain. No nausea, vomiting, or hematemesis; No diarrhea or constipation. No melena or hematochezia.  GENITOURINARY: No dysuria, frequency, hematuria, or incontinence  NEUROLOGICAL: No headaches, memory loss, loss of strength, numbness, or tremors  SKIN: No itching, burning, rashes, or lesions   LYMPH Nodes: No enlarged glands  ENDOCRINE: No heat or cold intolerance; No hair loss  MUSCULOSKELETAL: No joint pain or swelling; No muscle, back, or extremity pain  PSYCHIATRIC: No depression, anxiety, mood swings, or difficulty sleeping  HEME/LYMPH: No easy bruising, or bleeding gums  ALLERGY AND IMMUNOLOGIC: No hives or eczema	      PHYSICAL EXAM:  T(C): 37 (11-12-19 @ 05:28), Max: 37.3 (11-11-19 @ 21:10)  HR: 69 (11-12-19 @ 05:28) (68 - 74)  BP: 130/42 (11-12-19 @ 05:28) (130/42 - 152/73)  RR: 17 (11-12-19 @ 05:28) (16 - 18)  SpO2: 97% (11-12-19 @ 05:28) (97% - 98%)      Appearance: Normal	  HEENT:   Normal oral mucosa, PERRL, EOMI	  Lymphatic: No lymphadenopathy  Cardiovascular: Normal S1 S2, No JVD, No murmurs, No edema  Respiratory: Lungs clear to auscultation	  Psychiatry: A & O x 3, Mood & affect appropriate  Gastrointestinal:  Soft, Non-tender, + BS	  Skin: No rashes, No ecchymoses, No cyanosis	  Neurologic: Non-focal  Extremities: Normal range of motion, No clubbing, cyanosis or edema  Vascular: Peripheral pulses palpable 2+ bilaterally    MEDICATIONS  (STANDING):  amLODIPine   Tablet 10 milliGRAM(s) Oral daily  aspirin  chewable 81 milliGRAM(s) Oral daily  atorvastatin 40 milliGRAM(s) Oral at bedtime  chlorhexidine 4% Liquid 1 Application(s) Topical <User Schedule>  cyanocobalamin 1000 MICROGram(s) Oral daily  folic acid 1 milliGRAM(s) Oral daily  gabapentin 100 milliGRAM(s) Oral daily  heparin  Injectable 5000 Unit(s) SubCutaneous every 12 hours  hydrALAZINE 25 milliGRAM(s) Oral two times a day  insulin lispro (HumaLOG) corrective regimen sliding scale   SubCutaneous Before meals and at bedtime  labetalol 200 milliGRAM(s) Oral daily  lisinopril 40 milliGRAM(s) Oral daily  LORazepam     Tablet 0.5 milliGRAM(s) Oral two times a day  meclizine 25 milliGRAM(s) Oral three times a day  melatonin 3 milliGRAM(s) Oral at bedtime  multivitamin/minerals 1 Tablet(s) Oral daily  sevelamer carbonate 800 milliGRAM(s) Oral three times a day with meals  topiramate 50 milliGRAM(s) Oral at bedtime        	  LABS:	 	                       10.5   6.45  )-----------( 148      ( 12 Nov 2019 07:20 )             32.6     11-12    135  |  99  |  36<H>  ----------------------------<  81  4.9   |  30  |  6.22<H>    Ca    9.1      12 Nov 2019 07:20  Phos  6.1     11-12  Mg     2.2     11-12    TPro  6.2  /  Alb  3.0<L>  /  TBili  0.5  /  DBili  x   /  AST  10  /  ALT  16  /  AlkPhos  64  11-12      Lipid Profile: Cholesterol 140  LDL 75  HDL 41      HgA1c: Hemoglobin A1C, Whole Blood: 4.6 % (11-07 @ 10:26)    TSH: Thyroid Stimulating Hormone, Serum: 0.73 uU/mL (11-07 @ 06:31)      	  OBSERVATIONS:  Mitral Valve: Mild posterior mitral annular calcification.  No mitral regurgitation is noted.  Aortic Root: Normal aortic root.  Aortic Valve: Probably trileaflet aortic valve is not well  seen. No aortic stenosis. No aortic valve regurgitation  seen.  Left Atrium: Normal left atrium.  LA volume index = 32  cc/m2.  Left Ventricle: Normal Left Ventricular Systolic Function,  (EF = 65% by biplane) No regional wall motion  abnormalities. Normal left ventricular internal dimensions  and wall thicknesses.  Grade I diastolic dysfunction  (Impaired relaxation).  Right Heart: Normal right atrium. Normal right ventricular  size and function (RV tissue Doppler 13 cm/s). Normal  tricuspid valve. Trace tricuspid regurgitation. Pulmonic  valve not well seen. Trace pulmonic insufficiency is noted.  Pericardium/PleuraTrivial pericardial effusion is seen. A  prominent epicardial fat pad is noted.  Hemodynamic: RA Pressure is 3 mm Hg. RV systolicpressure  is normal at  29 mm Hg. Agitated saline injection was  negative for intracardiac shunt.

## 2019-11-12 NOTE — PROGRESS NOTE ADULT - SUBJECTIVE AND OBJECTIVE BOX
Patient is a 54y old  Female who presents with a chief complaint of Vertigo (12 Nov 2019 11:58)    Awake , alert , lying in bed in NAD. S/p HD yesterday.   INTERVAL HPI/OVERNIGHT EVENTS:      VITAL SIGNS:  T(F): 98.6 (11-12-19 @ 05:28)  HR: 69 (11-12-19 @ 05:28)  BP: 130/42 (11-12-19 @ 05:28)  RR: 17 (11-12-19 @ 05:28)  SpO2: 97% (11-12-19 @ 05:28)  Wt(kg): --  I&O's Detail          REVIEW OF SYSTEMS:    CONSTITUTIONAL:  No fevers, chills, sweats    HEENT:  Eyes:  No diplopia or blurred vision. ENT:  No earache, sore throat or runny nose.    CARDIOVASCULAR:  No pressure, squeezing, tightness, or heaviness about the chest; no palpitations.    RESPIRATORY:  Per HPI    GASTROINTESTINAL:  No abdominal pain, nausea, vomiting or diarrhea.    GENITOURINARY:  No dysuria, frequency or urgency.    NEUROLOGIC:  No paresthesias, fasciculations, seizures or weakness.    PSYCHIATRIC:  No disorder of thought or mood.      PHYSICAL EXAM:    Constitutional: Well developed and nourished  Eyes:Perrla  ENMT: normal  Neck:supple  Respiratory: good air entry  Cardiovascular: S1 S2 regular  Gastrointestinal: Soft, Non tender  Extremities: No edema  Vascular:normal  Neurological:Awake, alert,Ox3  Musculoskeletal:Normal      MEDICATIONS  (STANDING):  amLODIPine   Tablet 10 milliGRAM(s) Oral daily  aspirin  chewable 81 milliGRAM(s) Oral daily  atorvastatin 40 milliGRAM(s) Oral at bedtime  chlorhexidine 4% Liquid 1 Application(s) Topical <User Schedule>  cyanocobalamin 1000 MICROGram(s) Oral daily  dextrose 5%. 1000 milliLiter(s) (50 mL/Hr) IV Continuous <Continuous>  dextrose 50% Injectable 12.5 Gram(s) IV Push once  dextrose 50% Injectable 25 Gram(s) IV Push once  dextrose 50% Injectable 25 Gram(s) IV Push once  folic acid 1 milliGRAM(s) Oral daily  gabapentin 100 milliGRAM(s) Oral daily  heparin  Injectable 5000 Unit(s) SubCutaneous every 12 hours  hydrALAZINE 25 milliGRAM(s) Oral two times a day  insulin lispro (HumaLOG) corrective regimen sliding scale   SubCutaneous Before meals and at bedtime  labetalol 200 milliGRAM(s) Oral daily  lisinopril 40 milliGRAM(s) Oral daily  LORazepam     Tablet 0.5 milliGRAM(s) Oral two times a day  meclizine 25 milliGRAM(s) Oral three times a day  melatonin 3 milliGRAM(s) Oral at bedtime  multivitamin/minerals 1 Tablet(s) Oral daily  sevelamer carbonate 800 milliGRAM(s) Oral three times a day with meals  topiramate 50 milliGRAM(s) Oral at bedtime    MEDICATIONS  (PRN):  acetaminophen   Tablet .. 650 milliGRAM(s) Oral every 6 hours PRN Mild Pain (1 - 3), Moderate Pain (4 - 6)  dextrose 40% Gel 15 Gram(s) Oral once PRN Blood Glucose LESS THAN 70 milliGRAM(s)/deciliter  glucagon  Injectable 1 milliGRAM(s) IntraMuscular once PRN Glucose LESS THAN 70 milligrams/deciliter  ondansetron Injectable 4 milliGRAM(s) IV Push every 8 hours PRN Nausea and/or Vomiting      Allergies    No Known Drug Allergies  pineapple (Hives)    Intolerances        LABS:                        10.5   6.45  )-----------( 148      ( 12 Nov 2019 07:20 )             32.6     11-12    135  |  99  |  36<H>  ----------------------------<  81  4.9   |  30  |  6.22<H>    Ca    9.1      12 Nov 2019 07:20  Phos  6.1     11-12  Mg     2.2     11-12    TPro  6.2  /  Alb  3.0<L>  /  TBili  0.5  /  DBili  x   /  AST  10  /  ALT  16  /  AlkPhos  64  11-12              CAPILLARY BLOOD GLUCOSE      POCT Blood Glucose.: 66 mg/dL (12 Nov 2019 11:50)  POCT Blood Glucose.: 76 mg/dL (12 Nov 2019 08:09)  POCT Blood Glucose.: 93 mg/dL (11 Nov 2019 21:32)  POCT Blood Glucose.: 71 mg/dL (11 Nov 2019 17:08)        RADIOLOGY & ADDITIONAL TESTS:    CXR:  < from: Xray Chest 1 View-PORTABLE IMMEDIATE (11.06.19 @ 07:13) >  IMPRESSION:    Mild cardiomegaly. No acute infiltrate.    < end of copied text >    Ct scan chest:    ekg;    echo:< from: Transthoracic Echocardiogram (11.08.19 @ 16:31) >  1. Mild posterior mitral annular calcification. No mitral  regurgitation is noted.  2. Probably trileaflet aortic valve is not well seen. No  aortic stenosis. No aortic valve regurgitation seen.  3. Normal aortic root.  4. Normal left atrium.  5. Normal left ventricular internal dimensions and wall  thicknesses.  6. Normal Left Ventricular Systolic Function,  (EF = 65% by  biplane)  7. Grade I diastolic dysfunction (Impaired relaxation).  8. Normal right atrium.  9. Normal right ventricular size and function (RV tissue  Doppler 13 cm/s).  10. RV systolic pressure is normal at  29 mm Hg.  11. Normal tricuspid valve. Trace tricuspid regurgitation.  12. Pulmonic valve not well seen. Trace pulmonic  insufficiency is noted.  13. Agitated saline injection was negative for intracardiac  shunt.  14. Trivial pericardial effusion is seen.    < end of copied text > Patient is a 54y old  Female who presents with a chief complaint of Vertigo (12 Nov 2019 11:58)    Awake , alert , lying in bed in NAD. Complaining of severe left hip pain. Ct requested   INTERVAL HPI/OVERNIGHT EVENTS:      VITAL SIGNS:  T(F): 98.6 (11-12-19 @ 05:28)  HR: 69 (11-12-19 @ 05:28)  BP: 130/42 (11-12-19 @ 05:28)  RR: 17 (11-12-19 @ 05:28)  SpO2: 97% (11-12-19 @ 05:28)  Wt(kg): --  I&O's Detail          REVIEW OF SYSTEMS:    CONSTITUTIONAL:  No fevers, chills, sweats    HEENT:  Eyes:  No diplopia or blurred vision. ENT:  No earache, sore throat or runny nose.    CARDIOVASCULAR:  No pressure, squeezing, tightness, or heaviness about the chest; no palpitations.    RESPIRATORY:  Per HPI    GASTROINTESTINAL:  No abdominal pain, nausea, vomiting or diarrhea.    GENITOURINARY:  No dysuria, frequency or urgency.    NEUROLOGIC:  No paresthesias, fasciculations, seizures or weakness.    PSYCHIATRIC:  No disorder of thought or mood.      PHYSICAL EXAM:    Constitutional: Well developed and nourished  Eyes:Perrla  ENMT: normal  Neck:supple  Respiratory: good air entry  Cardiovascular: S1 S2 regular  Gastrointestinal: Soft, Non tender  Extremities: No edema  Vascular:normal  Neurological:Awake, alert,Ox3  Musculoskeletal:Normal      MEDICATIONS  (STANDING):  amLODIPine   Tablet 10 milliGRAM(s) Oral daily  aspirin  chewable 81 milliGRAM(s) Oral daily  atorvastatin 40 milliGRAM(s) Oral at bedtime  chlorhexidine 4% Liquid 1 Application(s) Topical <User Schedule>  cyanocobalamin 1000 MICROGram(s) Oral daily  dextrose 5%. 1000 milliLiter(s) (50 mL/Hr) IV Continuous <Continuous>  dextrose 50% Injectable 12.5 Gram(s) IV Push once  dextrose 50% Injectable 25 Gram(s) IV Push once  dextrose 50% Injectable 25 Gram(s) IV Push once  folic acid 1 milliGRAM(s) Oral daily  gabapentin 100 milliGRAM(s) Oral daily  heparin  Injectable 5000 Unit(s) SubCutaneous every 12 hours  hydrALAZINE 25 milliGRAM(s) Oral two times a day  insulin lispro (HumaLOG) corrective regimen sliding scale   SubCutaneous Before meals and at bedtime  labetalol 200 milliGRAM(s) Oral daily  lisinopril 40 milliGRAM(s) Oral daily  LORazepam     Tablet 0.5 milliGRAM(s) Oral two times a day  meclizine 25 milliGRAM(s) Oral three times a day  melatonin 3 milliGRAM(s) Oral at bedtime  multivitamin/minerals 1 Tablet(s) Oral daily  sevelamer carbonate 800 milliGRAM(s) Oral three times a day with meals  topiramate 50 milliGRAM(s) Oral at bedtime    MEDICATIONS  (PRN):  acetaminophen   Tablet .. 650 milliGRAM(s) Oral every 6 hours PRN Mild Pain (1 - 3), Moderate Pain (4 - 6)  dextrose 40% Gel 15 Gram(s) Oral once PRN Blood Glucose LESS THAN 70 milliGRAM(s)/deciliter  glucagon  Injectable 1 milliGRAM(s) IntraMuscular once PRN Glucose LESS THAN 70 milligrams/deciliter  ondansetron Injectable 4 milliGRAM(s) IV Push every 8 hours PRN Nausea and/or Vomiting      Allergies    No Known Drug Allergies  pineapple (Hives)    Intolerances        LABS:                        10.5   6.45  )-----------( 148      ( 12 Nov 2019 07:20 )             32.6     11-12    135  |  99  |  36<H>  ----------------------------<  81  4.9   |  30  |  6.22<H>    Ca    9.1      12 Nov 2019 07:20  Phos  6.1     11-12  Mg     2.2     11-12    TPro  6.2  /  Alb  3.0<L>  /  TBili  0.5  /  DBili  x   /  AST  10  /  ALT  16  /  AlkPhos  64  11-12              CAPILLARY BLOOD GLUCOSE      POCT Blood Glucose.: 66 mg/dL (12 Nov 2019 11:50)  POCT Blood Glucose.: 76 mg/dL (12 Nov 2019 08:09)  POCT Blood Glucose.: 93 mg/dL (11 Nov 2019 21:32)  POCT Blood Glucose.: 71 mg/dL (11 Nov 2019 17:08)        RADIOLOGY & ADDITIONAL TESTS:    CXR:  < from: Xray Chest 1 View-PORTABLE IMMEDIATE (11.06.19 @ 07:13) >  IMPRESSION:    Mild cardiomegaly. No acute infiltrate.    < end of copied text >    Ct scan chest:    ekg;    echo:< from: Transthoracic Echocardiogram (11.08.19 @ 16:31) >  1. Mild posterior mitral annular calcification. No mitral  regurgitation is noted.  2. Probably trileaflet aortic valve is not well seen. No  aortic stenosis. No aortic valve regurgitation seen.  3. Normal aortic root.  4. Normal left atrium.  5. Normal left ventricular internal dimensions and wall  thicknesses.  6. Normal Left Ventricular Systolic Function,  (EF = 65% by  biplane)  7. Grade I diastolic dysfunction (Impaired relaxation).  8. Normal right atrium.  9. Normal right ventricular size and function (RV tissue  Doppler 13 cm/s).  10. RV systolic pressure is normal at  29 mm Hg.  11. Normal tricuspid valve. Trace tricuspid regurgitation.  12. Pulmonic valve not well seen. Trace pulmonic  insufficiency is noted.  13. Agitated saline injection was negative for intracardiac  shunt.  14. Trivial pericardial effusion is seen.    < end of copied text >

## 2019-11-12 NOTE — PROGRESS NOTE ADULT - PROBLEM SELECTOR PLAN 1
-p/w dizziness and room spinning sensation and headache,  Likely vestibular migraine and analgesic overuse headache  -CT head is negative for any acute changes   -EKG: NSR  , trops negative  -s/p tele monitoring   -cont symptom mgmt with  Meclizine and zofran  -cont ASA and statin   -orthostatics negative  -s/p trial of acute migraine treatment- intravenous valproate 500 mg Q6H x 3 doses with metoclopramide 10 mg IV q6h x 3 doses and magnesium po 400 mg TID   -s/p one dose of promethazine suppository   -c/o ongoing dizziness ? adverse rxn of topiramate, will decrease dose to 25mg as per neuro   -neuro Dr. Aguilera

## 2019-11-12 NOTE — PROGRESS NOTE ADULT - PROBLEM SELECTOR PLAN 5
-c/o left hip pain, aggravated with movement   -f/u CT left hip   -cont pain mgmt   -pain mgmt consult

## 2019-11-12 NOTE — PROGRESS NOTE ADULT - PROBLEM SELECTOR PLAN 3
-cont home doses of Amlodipine , lisinopril and Labetalol with parameters  -currently normotensive   - Dash diet

## 2019-11-12 NOTE — PROGRESS NOTE ADULT - SUBJECTIVE AND OBJECTIVE BOX
Patient is a 54y old  Female who presents with a chief complaint of Vertigo (2019 17:54)    pt seen in icu [  ], reg med floor [ x  ], bed [ x ], chair at bedside [   ], a+o x3 [ x ], lethargic [  ],  nad [ x ]    c/o left hip pain since last night and worsening of her vertiginous symptoms        Allergies    No Known Drug Allergies  pineapple (Hives)        Vitals    T(F): 98.6 (19 @ 05:28), Max: 99.1 (19 @ 21:10)  HR: 69 (19 @ 05:28) (68 - 74)  BP: 130/42 (19 @ 05:28) (130/42 - 152/73)  RR: 17 (19 @ 05:28) (16 - 18)  SpO2: 97% (19 @ 05:28) (97% - 98%)  Wt(kg): --  CAPILLARY BLOOD GLUCOSE      POCT Blood Glucose.: 76 mg/dL (2019 08:09)      Labs                          10.5   6.45  )-----------( 148      ( 2019 07:20 )             32.6           135  |  99  |  36<H>  ----------------------------<  81  4.9   |  30  |  6.22<H>    Ca    9.1      2019 07:20  Phos  6.1       Mg     2.2         TPro  6.2  /  Alb  3.0<L>  /  TBili  0.5  /  DBili  x   /  AST  10  /  ALT  16  /  AlkPhos  64              .Urine   @ 15:32   <10,000 CFU/mL Normal Urogenital Jinny  --  --          Radiology Results      Meds    MEDICATIONS  (STANDING):  amLODIPine   Tablet 10 milliGRAM(s) Oral daily  aspirin  chewable 81 milliGRAM(s) Oral daily  atorvastatin 40 milliGRAM(s) Oral at bedtime  chlorhexidine 4% Liquid 1 Application(s) Topical <User Schedule>  cyanocobalamin 1000 MICROGram(s) Oral daily  folic acid 1 milliGRAM(s) Oral daily  gabapentin 100 milliGRAM(s) Oral daily  heparin  Injectable 5000 Unit(s) SubCutaneous every 12 hours  hydrALAZINE 25 milliGRAM(s) Oral two times a day  insulin lispro (HumaLOG) corrective regimen sliding scale   SubCutaneous Before meals and at bedtime  labetalol 200 milliGRAM(s) Oral daily  lisinopril 40 milliGRAM(s) Oral daily  LORazepam     Tablet 0.5 milliGRAM(s) Oral two times a day  meclizine 25 milliGRAM(s) Oral three times a day  melatonin 3 milliGRAM(s) Oral at bedtime  multivitamin/minerals 1 Tablet(s) Oral daily  sevelamer carbonate 800 milliGRAM(s) Oral three times a day with meals  topiramate 50 milliGRAM(s) Oral at bedtime      MEDICATIONS  (PRN):  acetaminophen   Tablet .. 650 milliGRAM(s) Oral every 6 hours PRN Mild Pain (1 - 3), Moderate Pain (4 - 6)  ondansetron Injectable 4 milliGRAM(s) IV Push every 8 hours PRN Nausea and/or Vomiting      Physical Exam      Neuro :  no focal deficits  Respiratory: CTA B/L  CV: RRR, S1S2, no murmurs,   Abdominal: Soft, NT, ND +BS,  Extremities: No edema, + peripheral pulses, left hip tenderness to palp and rom      ASSESSMENT    vertigo,   Intractable migraine with aura with status migrainosus.  h/o  ESRD on HD (M/W/F),   HTN,   migraine,   DM no longer on meds  Myocardial infarct, old  ESRD (end stage renal disease) on dialysis  Asthma occurring only with upper respiratory infection  Anemia in chronic renal disease  Claustrophobia  Uterine leiomyoma, unspecified location  AUSTIN (obstructive sleep apnea)  Gastroesophageal reflux disease without esophagitis  Vertigo  HLD (hyperlipidemia)  Morbid obesity  AV fistula  S/P craniotomy  S/P hernia repair  S/P  section        PLAN      cont tele,   cont aspirin, statin,   cont meclizine prn,  ct head neg noted  neuro f/u   Cervicogenic vertigo.    s/p trial acute migraine treatment- intravenous valproate 500 mg Q6H x 3 doses with metoclopramide 10 mg IV q6h x 3 doses and magnesium po 400 mg TID .  Continue topiramate - increase to 50 mg BID; promethazine 50 mg suppository now for acute treatment of persistent aura of migraine, warning her of drowsiness and follow up  carotid duplex noted : no significant stenosis   ce q8 x 2 neg noted above  echo with EF = 65%,  Grade I diastolic dysfunction (Impaired relaxation). Agitated saline injection was negative for intracardiac shunt. Trivial pericardial effusion is seen noted above.  orthostatic positive with standing bp above 100  hgba1c 4.6 noted above,  renal f/u   s/p hd yesterday  cont hd as per renal   pulm f/u noted   pft outpt  PT recs home with home PT   check ct left hip to eval left hip pain  pain mgmt eval  cont current meds   d/c plan if cleared  by neuro and ct wnl

## 2019-11-12 NOTE — CHART NOTE - NSCHARTNOTEFT_GEN_A_CORE
Neuro note  patient c/o worsening dizziness after topamax 50 mg bid was started and the patient received HD.  Recommend to decrease Topamax to25mg bid and hydrate the patient sufficiently as per renal recs.  dw attending and NP.

## 2019-11-13 LAB
ANA TITR SER: NEGATIVE — SIGNIFICANT CHANGE UP
GLUCOSE BLDC GLUCOMTR-MCNC: 139 MG/DL — HIGH (ref 70–99)
GLUCOSE BLDC GLUCOMTR-MCNC: 200 MG/DL — HIGH (ref 70–99)
GLUCOSE BLDC GLUCOMTR-MCNC: 291 MG/DL — HIGH (ref 70–99)
GLUCOSE BLDC GLUCOMTR-MCNC: 82 MG/DL — SIGNIFICANT CHANGE UP (ref 70–99)
METHYLMALONATE SERPL-SCNC: 1395 NMOL/L — HIGH (ref 0–378)

## 2019-11-13 PROCEDURE — 72195 MRI PELVIS W/O DYE: CPT | Mod: 26

## 2019-11-13 PROCEDURE — 99231 SBSQ HOSP IP/OBS SF/LOW 25: CPT

## 2019-11-13 RX ORDER — OXYCODONE HYDROCHLORIDE 5 MG/1
5 TABLET ORAL EVERY 6 HOURS
Refills: 0 | Status: DISCONTINUED | OUTPATIENT
Start: 2019-11-13 | End: 2019-11-17

## 2019-11-13 RX ORDER — SENNA PLUS 8.6 MG/1
2 TABLET ORAL AT BEDTIME
Refills: 0 | Status: DISCONTINUED | OUTPATIENT
Start: 2019-11-13 | End: 2019-11-22

## 2019-11-13 RX ORDER — TOPIRAMATE 25 MG
25 TABLET ORAL
Refills: 0 | Status: DISCONTINUED | OUTPATIENT
Start: 2019-11-13 | End: 2019-11-19

## 2019-11-13 RX ORDER — DEXAMETHASONE 0.5 MG/5ML
4 ELIXIR ORAL EVERY 12 HOURS
Refills: 0 | Status: COMPLETED | OUTPATIENT
Start: 2019-11-13 | End: 2019-11-17

## 2019-11-13 RX ADMIN — AMLODIPINE BESYLATE 10 MILLIGRAM(S): 2.5 TABLET ORAL at 06:18

## 2019-11-13 RX ADMIN — SEVELAMER CARBONATE 800 MILLIGRAM(S): 2400 POWDER, FOR SUSPENSION ORAL at 11:58

## 2019-11-13 RX ADMIN — PREGABALIN 1000 MICROGRAM(S): 225 CAPSULE ORAL at 11:57

## 2019-11-13 RX ADMIN — LISINOPRIL 40 MILLIGRAM(S): 2.5 TABLET ORAL at 06:18

## 2019-11-13 RX ADMIN — Medication 0.5 MILLIGRAM(S): at 18:03

## 2019-11-13 RX ADMIN — Medication 81 MILLIGRAM(S): at 11:57

## 2019-11-13 RX ADMIN — HEPARIN SODIUM 5000 UNIT(S): 5000 INJECTION INTRAVENOUS; SUBCUTANEOUS at 06:18

## 2019-11-13 RX ADMIN — Medication 25 MILLIGRAM(S): at 18:00

## 2019-11-13 RX ADMIN — Medication 25 MILLIGRAM(S): at 15:40

## 2019-11-13 RX ADMIN — Medication 1000 MILLIGRAM(S): at 09:19

## 2019-11-13 RX ADMIN — SEVELAMER CARBONATE 800 MILLIGRAM(S): 2400 POWDER, FOR SUSPENSION ORAL at 08:48

## 2019-11-13 RX ADMIN — Medication 0.5 MILLIGRAM(S): at 06:18

## 2019-11-13 RX ADMIN — Medication 1: at 11:58

## 2019-11-13 RX ADMIN — Medication 25 MILLIGRAM(S): at 06:18

## 2019-11-13 RX ADMIN — Medication 4 MILLIGRAM(S): at 06:18

## 2019-11-13 RX ADMIN — HEPARIN SODIUM 5000 UNIT(S): 5000 INJECTION INTRAVENOUS; SUBCUTANEOUS at 18:01

## 2019-11-13 RX ADMIN — SEVELAMER CARBONATE 800 MILLIGRAM(S): 2400 POWDER, FOR SUSPENSION ORAL at 18:00

## 2019-11-13 RX ADMIN — Medication 1 TABLET(S): at 11:57

## 2019-11-13 RX ADMIN — CHLORHEXIDINE GLUCONATE 1 APPLICATION(S): 213 SOLUTION TOPICAL at 06:23

## 2019-11-13 RX ADMIN — Medication 200 MILLIGRAM(S): at 06:18

## 2019-11-13 RX ADMIN — Medication 4 MILLIGRAM(S): at 18:00

## 2019-11-13 RX ADMIN — Medication 400 MILLIGRAM(S): at 08:49

## 2019-11-13 RX ADMIN — Medication 1 MILLIGRAM(S): at 11:57

## 2019-11-13 NOTE — PROGRESS NOTE ADULT - SUBJECTIVE AND OBJECTIVE BOX
NP Note discussed with  Primary Attending    Patient is a 54y old  Female who presents with a chief complaint of Vertigo (13 Nov 2019 14:22)      INTERVAL HPI/OVERNIGHT EVENTS: no new complaints    MEDICATIONS  (STANDING):  amLODIPine   Tablet 10 milliGRAM(s) Oral daily  aspirin  chewable 81 milliGRAM(s) Oral daily  atorvastatin 40 milliGRAM(s) Oral at bedtime  chlorhexidine 4% Liquid 1 Application(s) Topical <User Schedule>  cyanocobalamin 1000 MICROGram(s) Oral daily  dexAMETHasone     Tablet 4 milliGRAM(s) Oral daily  dextrose 5%. 1000 milliLiter(s) (50 mL/Hr) IV Continuous <Continuous>  dextrose 50% Injectable 12.5 Gram(s) IV Push once  dextrose 50% Injectable 25 Gram(s) IV Push once  dextrose 50% Injectable 25 Gram(s) IV Push once  folic acid 1 milliGRAM(s) Oral daily  gabapentin 300 milliGRAM(s) Oral at bedtime  heparin  Injectable 5000 Unit(s) SubCutaneous every 12 hours  hydrALAZINE 25 milliGRAM(s) Oral two times a day  insulin lispro (HumaLOG) corrective regimen sliding scale   SubCutaneous Before meals and at bedtime  labetalol 200 milliGRAM(s) Oral daily  lisinopril 40 milliGRAM(s) Oral daily  LORazepam     Tablet 0.5 milliGRAM(s) Oral two times a day  meclizine 25 milliGRAM(s) Oral three times a day  melatonin 3 milliGRAM(s) Oral at bedtime  multivitamin/minerals 1 Tablet(s) Oral daily  sevelamer carbonate 800 milliGRAM(s) Oral three times a day with meals  topiramate 25 milliGRAM(s) Oral two times a day    MEDICATIONS  (PRN):  dextrose 40% Gel 15 Gram(s) Oral once PRN Blood Glucose LESS THAN 70 milliGRAM(s)/deciliter  glucagon  Injectable 1 milliGRAM(s) IntraMuscular once PRN Glucose LESS THAN 70 milligrams/deciliter  ondansetron Injectable 4 milliGRAM(s) IV Push every 8 hours PRN Nausea and/or Vomiting      __________________________________________________  REVIEW OF SYSTEMS:    CONSTITUTIONAL: No fever,   EYES: no acute visual disturbances  NECK: No pain or stiffness  RESPIRATORY: No cough; No shortness of breath  CARDIOVASCULAR: No chest pain, no palpitations  GASTROINTESTINAL: No pain. No nausea or vomiting; No diarrhea   NEUROLOGICAL: No headache or numbness, no tremors  MUSCULOSKELETAL: No joint pain, no muscle pain  GENITOURINARY: no dysuria, no frequency, no hesitancy  PSYCHIATRY: no depression , no anxiety  ALL OTHER  ROS negative        Vital Signs Last 24 Hrs  T(C): 37.1 (13 Nov 2019 15:16), Max: 37.5 (12 Nov 2019 18:48)  T(F): 98.7 (13 Nov 2019 15:16), Max: 99.5 (12 Nov 2019 18:48)  HR: 83 (13 Nov 2019 15:16) (70 - 83)  BP: 142/58 (13 Nov 2019 15:16) (124/49 - 145/60)  BP(mean): --  RR: 17 (13 Nov 2019 15:16) (16 - 18)  SpO2: 99% (13 Nov 2019 15:16) (96% - 100%)    ________________________________________________  PHYSICAL EXAM:  GENERAL: NAD  HEENT: Normocephalic;  conjunctivae and sclerae clear; moist mucous membranes;   NECK : supple  CHEST/LUNG: Clear to auscultation bilaterally with good air entry   HEART: S1 S2  regular; no murmurs, gallops or rubs  ABDOMEN: Soft, Nontender, Nondistended; Bowel sounds present  EXTREMITIES: no cyanosis; no edema; no calf tenderness  SKIN: warm and dry; no rash  NERVOUS SYSTEM:  Awake and alert; Oriented  to place, person and time ; no new deficits  MUSCULOSKELETAL:  _________________________________________________  LABS:                        10.5   6.45  )-----------( 148      ( 12 Nov 2019 07:20 )             32.6     11-12    135  |  99  |  36<H>  ----------------------------<  81  4.9   |  30  |  6.22<H>    Ca    9.1      12 Nov 2019 07:20  Phos  6.1     11-12  Mg     2.2     11-12    TPro  6.2  /  Alb  3.0<L>  /  TBili  0.5  /  DBili  x   /  AST  10  /  ALT  16  /  AlkPhos  64  11-12        CAPILLARY BLOOD GLUCOSE      POCT Blood Glucose.: 200 mg/dL (13 Nov 2019 11:49)  POCT Blood Glucose.: 82 mg/dL (13 Nov 2019 07:51)  POCT Blood Glucose.: 121 mg/dL (12 Nov 2019 21:17)  POCT Blood Glucose.: 180 mg/dL (12 Nov 2019 16:37)        RADIOLOGY & ADDITIONAL TESTS:    Imaging Personally Reviewed:  YES/NO    Consultant(s) Notes Reviewed:   YES/ No    Care Discussed with Consultants :     Plan of care was discussed with patient and /or primary care giver; all questions and concerns were addressed and care was aligned with patient's wishes. NP Note discussed with  Primary Attending    CC:  left hip pain    Patient is a 54y old  Female who presents with a chief complaint of Vertigo (13 Nov 2019 14:22).  Pt with vertigo and left hip pain. Pt unable to ambulate due to pain. Pain worsens on exertion. No nausea or vomiting.  Pt with esrd - on hd.        INTERVAL HPI/OVERNIGHT EVENTS: no new complaints    MEDICATIONS  (STANDING):  amLODIPine   Tablet 10 milliGRAM(s) Oral daily  aspirin  chewable 81 milliGRAM(s) Oral daily  atorvastatin 40 milliGRAM(s) Oral at bedtime  chlorhexidine 4% Liquid 1 Application(s) Topical <User Schedule>  cyanocobalamin 1000 MICROGram(s) Oral daily  dexAMETHasone     Tablet 4 milliGRAM(s) Oral daily  dextrose 5%. 1000 milliLiter(s) (50 mL/Hr) IV Continuous <Continuous>  dextrose 50% Injectable 12.5 Gram(s) IV Push once  dextrose 50% Injectable 25 Gram(s) IV Push once  dextrose 50% Injectable 25 Gram(s) IV Push once  folic acid 1 milliGRAM(s) Oral daily  gabapentin 300 milliGRAM(s) Oral at bedtime  heparin  Injectable 5000 Unit(s) SubCutaneous every 12 hours  hydrALAZINE 25 milliGRAM(s) Oral two times a day  insulin lispro (HumaLOG) corrective regimen sliding scale   SubCutaneous Before meals and at bedtime  labetalol 200 milliGRAM(s) Oral daily  lisinopril 40 milliGRAM(s) Oral daily  LORazepam     Tablet 0.5 milliGRAM(s) Oral two times a day  meclizine 25 milliGRAM(s) Oral three times a day  melatonin 3 milliGRAM(s) Oral at bedtime  multivitamin/minerals 1 Tablet(s) Oral daily  sevelamer carbonate 800 milliGRAM(s) Oral three times a day with meals  topiramate 25 milliGRAM(s) Oral two times a day    MEDICATIONS  (PRN):  dextrose 40% Gel 15 Gram(s) Oral once PRN Blood Glucose LESS THAN 70 milliGRAM(s)/deciliter  glucagon  Injectable 1 milliGRAM(s) IntraMuscular once PRN Glucose LESS THAN 70 milligrams/deciliter  ondansetron Injectable 4 milliGRAM(s) IV Push every 8 hours PRN Nausea and/or Vomiting      __________________________________________________  REVIEW OF SYSTEMS:    CONSTITUTIONAL: No fever,   EYES: no acute visual disturbances  NECK: No pain or stiffness  RESPIRATORY: No cough; No shortness of breath  CARDIOVASCULAR: No chest pain, no palpitations  GASTROINTESTINAL: No pain. No nausea or vomiting; No diarrhea   NEUROLOGICAL: No headache or numbness, no tremors  MUSCULOSKELETAL: No joint pain, no muscle pain  GENITOURINARY: no dysuria, no frequency, no hesitancy  PSYCHIATRY: no depression , no anxiety  ALL OTHER  ROS negative        Vital Signs Last 24 Hrs  T(C): 37.1 (13 Nov 2019 15:16), Max: 37.5 (12 Nov 2019 18:48)  T(F): 98.7 (13 Nov 2019 15:16), Max: 99.5 (12 Nov 2019 18:48)  HR: 83 (13 Nov 2019 15:16) (70 - 83)  BP: 142/58 (13 Nov 2019 15:16) (124/49 - 145/60)  BP(mean): --  RR: 17 (13 Nov 2019 15:16) (16 - 18)  SpO2: 99% (13 Nov 2019 15:16) (96% - 100%)    ________________________________________________  PHYSICAL EXAM:  GENERAL: NAD  HEENT: Normocephalic;  conjunctivae and sclerae clear; moist mucous membranes;   NECK : supple  CHEST/LUNG: Clear to auscultation bilaterally with good air entry   HEART: S1 S2  regular; no murmurs, gallops or rubs  ABDOMEN: Soft, Nontender, Nondistended; Bowel sounds present  EXTREMITIES: no cyanosis; no edema; no calf tenderness  SKIN: warm and dry; no rash  NERVOUS SYSTEM:  Awake and alert; Oriented  to place, person and time ; no new deficits  MUSCULOSKELETAL:  _________________________________________________  LABS:                        10.5   6.45  )-----------( 148      ( 12 Nov 2019 07:20 )             32.6     11-12    135  |  99  |  36<H>  ----------------------------<  81  4.9   |  30  |  6.22<H>    Ca    9.1      12 Nov 2019 07:20  Phos  6.1     11-12  Mg     2.2     11-12    TPro  6.2  /  Alb  3.0<L>  /  TBili  0.5  /  DBili  x   /  AST  10  /  ALT  16  /  AlkPhos  64  11-12        CAPILLARY BLOOD GLUCOSE      POCT Blood Glucose.: 200 mg/dL (13 Nov 2019 11:49)  POCT Blood Glucose.: 82 mg/dL (13 Nov 2019 07:51)  POCT Blood Glucose.: 121 mg/dL (12 Nov 2019 21:17)  POCT Blood Glucose.: 180 mg/dL (12 Nov 2019 16:37)        RADIOLOGY & ADDITIONAL TESTS:  < from: MR Pelvis Bony Only No Cont (11.13.19 @ 13:05) >    EXAM:  MR PELVIS BONY ONLY                            PROCEDURE DATE:  11/13/2019          INTERPRETATION:  Clinical Information: End-stage renal disease. Evaluate   calcium hydroxyapatite. Left hip pain.    Comparison: CT scan of the pelvis from 11/12/2019.    Technique: MRI of the pelvis was performed utilizing multiplanar imaging   without the administration of intravenous contrast.    Findings:     At the site of mineralization at the left gluteus medius myotendinous   junction there is mild adjacent soft tissue edema(6, 20). This favors   left gluteus medius calcific tendinitis. Left gluteus medius tendon   insertion is intact.    The hamstring origins appear unremarkable. The iliopsoas insertions are   intact. Secondary superior unremarkable.    There is mild pubic symphysis arthrosis. There is mild bilateral hip   arthrosis.    Impression:   Left gluteus medius calcific tendinitis.    < end of copied text >      Imaging Personally Reviewed:  YES/NO    Consultant(s) Notes Reviewed:   YES/ No    Care Discussed with Consultants :     Plan of care was discussed with patient and /or primary care giver; all questions and concerns were addressed and care was aligned with patient's wishes.

## 2019-11-13 NOTE — PROGRESS NOTE ADULT - SUBJECTIVE AND OBJECTIVE BOX
CHIEF COMPLAINT:Patient is a 54y old  Female who presents with a chief complaint of Vertigo.Pt appears comfortable.    	  REVIEW OF SYSTEMS:  CONSTITUTIONAL: No fever, weight loss, or fatigue  EYES: No eye pain, visual disturbances, or discharge  ENT:  No difficulty hearing, tinnitus, vertigo; No sinus or throat pain  NECK: No pain or stiffness  RESPIRATORY: No cough, wheezing, chills or hemoptysis; No Shortness of Breath  CARDIOVASCULAR: No chest pain, palpitations, passing out, dizziness, or leg swelling  GASTROINTESTINAL: No abdominal or epigastric pain. No nausea, vomiting, or hematemesis; No diarrhea or constipation. No melena or hematochezia.  GENITOURINARY: No dysuria, frequency, hematuria, or incontinence  NEUROLOGICAL: No headaches, memory loss, loss of strength, numbness, or tremors  SKIN: No itching, burning, rashes, or lesions   LYMPH Nodes: No enlarged glands  ENDOCRINE: No heat or cold intolerance; No hair loss  MUSCULOSKELETAL: No joint pain or swelling; No muscle, back, or extremity pain  PSYCHIATRIC: No depression, anxiety, mood swings, or difficulty sleeping  HEME/LYMPH: No easy bruising, or bleeding gums  ALLERGY AND IMMUNOLOGIC: No hives or eczema	      PHYSICAL EXAM:  T(C): 36.9 (11-13-19 @ 05:12), Max: 37.5 (11-12-19 @ 18:48)  HR: 70 (11-13-19 @ 05:12) (65 - 79)  BP: 128/54 (11-13-19 @ 05:12) (128/53 - 145/60)  RR: 16 (11-13-19 @ 05:12) (16 - 17)  SpO2: 97% (11-13-19 @ 05:12) (95% - 100%)      Appearance: Normal	  HEENT:   Normal oral mucosa, PERRL, EOMI	  Lymphatic: No lymphadenopathy  Cardiovascular: Normal S1 S2, No JVD, No murmurs, No edema  Respiratory: Lungs clear to auscultation	  Psychiatry: A & O x 3, Mood & affect appropriate  Gastrointestinal:  Soft, Non-tender, + BS	  Skin: No rashes, No ecchymoses, No cyanosis	  Neurologic: Non-focal  Extremities: Normal range of motion, No clubbing, cyanosis or edema  Vascular: Peripheral pulses palpable 2+ bilaterally    MEDICATIONS  (STANDING):  amLODIPine   Tablet 10 milliGRAM(s) Oral daily  aspirin  chewable 81 milliGRAM(s) Oral daily  atorvastatin 40 milliGRAM(s) Oral at bedtime  chlorhexidine 4% Liquid 1 Application(s) Topical <User Schedule>  cyanocobalamin 1000 MICROGram(s) Oral daily  dexAMETHasone     Tablet 4 milliGRAM(s) Oral daily  dextrose 5%. 1000 milliLiter(s) (50 mL/Hr) IV Continuous <Continuous>  dextrose 50% Injectable 12.5 Gram(s) IV Push once  dextrose 50% Injectable 25 Gram(s) IV Push once  dextrose 50% Injectable 25 Gram(s) IV Push once  folic acid 1 milliGRAM(s) Oral daily  gabapentin 300 milliGRAM(s) Oral at bedtime  heparin  Injectable 5000 Unit(s) SubCutaneous every 12 hours  hydrALAZINE 25 milliGRAM(s) Oral two times a day  insulin lispro (HumaLOG) corrective regimen sliding scale   SubCutaneous Before meals and at bedtime  labetalol 200 milliGRAM(s) Oral daily  lisinopril 40 milliGRAM(s) Oral daily  LORazepam     Tablet 0.5 milliGRAM(s) Oral two times a day  meclizine 25 milliGRAM(s) Oral three times a day  melatonin 3 milliGRAM(s) Oral at bedtime  multivitamin/minerals 1 Tablet(s) Oral daily  sevelamer carbonate 800 milliGRAM(s) Oral three times a day with meals  topiramate 25 milliGRAM(s) Oral two times a day      LABS:	 	                       10.5   6.45  )-----------( 148      ( 12 Nov 2019 07:20 )             32.6     11-12    135  |  99  |  36<H>  ----------------------------<  81  4.9   |  30  |  6.22<H>    Ca    9.1      12 Nov 2019 07:20  Phos  6.1     11-12  Mg     2.2     11-12    TPro  6.2  /  Alb  3.0<L>  /  TBili  0.5  /  DBili  x   /  AST  10  /  ALT  16  /  AlkPhos  64  11-12    Lipid Profile: Cholesterol 140  LDL 75  HDL 41      HgA1c: Hemoglobin A1C, Whole Blood: 4.6 % (11-07 @ 10:26)    TSH: Thyroid Stimulating Hormone, Serum: 0.73 uU/mL (11-07 @ 06:31)      	    EXAM:  XR HIP 1V LT                            PROCEDURE DATE:  11/12/2019          INTERPRETATION:  CLINICAL STATEMENT: Pain.    TECHNIQUE: AP and lateral views of left hip.     COMPARISON: None.    FINDINGS:  There is no acute fracture or dislocation.     Moderate to severe joint space narrowing left hip. Osteophytes at the   superolateral aspect of the acetabulum.    IMPRESSION:  No radiographic evidence of acute fracture or dislocation. If symptoms   persist, consider CT or MRI exam to exclude occult fracture.

## 2019-11-13 NOTE — PROGRESS NOTE ADULT - SUBJECTIVE AND OBJECTIVE BOX
Patient is a 54y old  Female who presents with a chief complaint of Vertigo (13 Nov 2019 12:50)  Awake, alert, comfortable in bed in NAD. Still with some left hip pain. MRI results noted. Will need pain control and PT  INTERVAL HPI/OVERNIGHT EVENTS:      VITAL SIGNS:  T(F): 99.4 (11-13-19 @ 12:51)  HR: 78 (11-13-19 @ 12:51)  BP: 124/49 (11-13-19 @ 12:51)  RR: 18 (11-13-19 @ 12:51)  SpO2: 96% (11-13-19 @ 12:51)  Wt(kg): --  I&O's Detail          REVIEW OF SYSTEMS:    CONSTITUTIONAL:  No fevers, chills, sweats    HEENT:  Eyes:  No diplopia or blurred vision. ENT:  No earache, sore throat or runny nose.    CARDIOVASCULAR:  No pressure, squeezing, tightness, or heaviness about the chest; no palpitations.    RESPIRATORY:  Per HPI    GASTROINTESTINAL:  No abdominal pain, nausea, vomiting or diarrhea.    GENITOURINARY:  No dysuria, frequency or urgency.    NEUROLOGIC:  No paresthesias, fasciculations, seizures or weakness.    PSYCHIATRIC:  No disorder of thought or mood.      PHYSICAL EXAM:    Constitutional: Well developed and nourished  Eyes:Perrla  ENMT: normal  Neck:supple  Respiratory: good air entry  Cardiovascular: S1 S2 regular  Gastrointestinal: Soft, Non tender  Extremities: No edema  Vascular:normal  Neurological:Awake, alert,Ox3  Musculoskeletal:Normal      MEDICATIONS  (STANDING):  amLODIPine   Tablet 10 milliGRAM(s) Oral daily  aspirin  chewable 81 milliGRAM(s) Oral daily  atorvastatin 40 milliGRAM(s) Oral at bedtime  chlorhexidine 4% Liquid 1 Application(s) Topical <User Schedule>  cyanocobalamin 1000 MICROGram(s) Oral daily  dexAMETHasone     Tablet 4 milliGRAM(s) Oral daily  dextrose 5%. 1000 milliLiter(s) (50 mL/Hr) IV Continuous <Continuous>  dextrose 50% Injectable 12.5 Gram(s) IV Push once  dextrose 50% Injectable 25 Gram(s) IV Push once  dextrose 50% Injectable 25 Gram(s) IV Push once  folic acid 1 milliGRAM(s) Oral daily  gabapentin 300 milliGRAM(s) Oral at bedtime  heparin  Injectable 5000 Unit(s) SubCutaneous every 12 hours  hydrALAZINE 25 milliGRAM(s) Oral two times a day  insulin lispro (HumaLOG) corrective regimen sliding scale   SubCutaneous Before meals and at bedtime  labetalol 200 milliGRAM(s) Oral daily  lisinopril 40 milliGRAM(s) Oral daily  LORazepam     Tablet 0.5 milliGRAM(s) Oral two times a day  meclizine 25 milliGRAM(s) Oral three times a day  melatonin 3 milliGRAM(s) Oral at bedtime  multivitamin/minerals 1 Tablet(s) Oral daily  sevelamer carbonate 800 milliGRAM(s) Oral three times a day with meals  topiramate 25 milliGRAM(s) Oral two times a day    MEDICATIONS  (PRN):  dextrose 40% Gel 15 Gram(s) Oral once PRN Blood Glucose LESS THAN 70 milliGRAM(s)/deciliter  glucagon  Injectable 1 milliGRAM(s) IntraMuscular once PRN Glucose LESS THAN 70 milligrams/deciliter  ondansetron Injectable 4 milliGRAM(s) IV Push every 8 hours PRN Nausea and/or Vomiting      Allergies    No Known Drug Allergies  pineapple (Hives)    Intolerances        LABS:                        10.5   6.45  )-----------( 148      ( 12 Nov 2019 07:20 )             32.6     11-12    135  |  99  |  36<H>  ----------------------------<  81  4.9   |  30  |  6.22<H>    Ca    9.1      12 Nov 2019 07:20  Phos  6.1     11-12  Mg     2.2     11-12    TPro  6.2  /  Alb  3.0<L>  /  TBili  0.5  /  DBili  x   /  AST  10  /  ALT  16  /  AlkPhos  64  11-12              CAPILLARY BLOOD GLUCOSE      POCT Blood Glucose.: 200 mg/dL (13 Nov 2019 11:49)  POCT Blood Glucose.: 82 mg/dL (13 Nov 2019 07:51)  POCT Blood Glucose.: 121 mg/dL (12 Nov 2019 21:17)  POCT Blood Glucose.: 180 mg/dL (12 Nov 2019 16:37)        RADIOLOGY & ADDITIONAL TESTS:  < from: MR Pelvis Bony Only No Cont (11.13.19 @ 13:05) >  Impression:   Left gluteus medius calcific tendinitis.      < end of copied text >    CXR:    Ct scan chest:    ekg;    echo:

## 2019-11-13 NOTE — PROGRESS NOTE ADULT - SUBJECTIVE AND OBJECTIVE BOX
Patient is a 54y old  Female who presents with a chief complaint of Vertigo (2019 15:14)    pt seen in icu [  ], reg med floor [ x  ], bed [ x ], chair at bedside [   ], a+o x3 [ x ], lethargic [  ],  nad [ x ]    pt still itz dizziness, left hip pain improving      Allergies    No Known Drug Allergies  pineapple (Hives)        Vitals    T(F): 98.4 (19 @ 05:12), Max: 99.5 (19 @ 18:48)  HR: 70 (19 @ 05:12) (65 - 79)  BP: 128/54 (19 @ 05:12) (128/53 - 145/60)  RR: 16 (19 @ 05:12) (16 - 17)  SpO2: 97% (19 @ 05:12) (95% - 100%)  Wt(kg): --  CAPILLARY BLOOD GLUCOSE      POCT Blood Glucose.: 82 mg/dL (2019 07:51)      Labs                          10.5   6.45  )-----------( 148      ( 2019 07:20 )             32.6       -12    135  |  99  |  36<H>  ----------------------------<  81  4.9   |  30  |  6.22<H>    Ca    9.1      2019 07:20  Phos  6.1     -  Mg     2.2         TPro  6.2  /  Alb  3.0<L>  /  TBili  0.5  /  DBili  x   /  AST  10  /  ALT  16  /  AlkPhos  64  11-12            .Urine  - @ 15:32   <10,000 CFU/mL Normal Urogenital Jinny  --  --          Radiology Results    < from: CT Pelvis Bony Only No Cont (19 @ 14:34) >  IMPRESSION: Subcentimeter foci of mineralization in the distal aspect of   the left gluteus medius muscle at the level of the left hip. These may   represent foci of calcium hydroxyapatite deposition or age indeterminant   proximally retracted intramuscular enthesophytes from the left greater   trochanter. MRI is recommended to further evaluate if the patient can   tolerate.    Otherwise, no fractures are demonstrated.    < end of copied text >        Meds    MEDICATIONS  (STANDING):  acetaminophen  IVPB .. 1000 milliGRAM(s) IV Intermittent once  amLODIPine   Tablet 10 milliGRAM(s) Oral daily  aspirin  chewable 81 milliGRAM(s) Oral daily  atorvastatin 40 milliGRAM(s) Oral at bedtime  chlorhexidine 4% Liquid 1 Application(s) Topical <User Schedule>  cyanocobalamin 1000 MICROGram(s) Oral daily  dexAMETHasone     Tablet 4 milliGRAM(s) Oral daily  dextrose 5%. 1000 milliLiter(s) (50 mL/Hr) IV Continuous <Continuous>  dextrose 50% Injectable 12.5 Gram(s) IV Push once  dextrose 50% Injectable 25 Gram(s) IV Push once  dextrose 50% Injectable 25 Gram(s) IV Push once  folic acid 1 milliGRAM(s) Oral daily  gabapentin 300 milliGRAM(s) Oral at bedtime  heparin  Injectable 5000 Unit(s) SubCutaneous every 12 hours  hydrALAZINE 25 milliGRAM(s) Oral two times a day  insulin lispro (HumaLOG) corrective regimen sliding scale   SubCutaneous Before meals and at bedtime  labetalol 200 milliGRAM(s) Oral daily  lisinopril 40 milliGRAM(s) Oral daily  LORazepam     Tablet 0.5 milliGRAM(s) Oral two times a day  meclizine 25 milliGRAM(s) Oral three times a day  melatonin 3 milliGRAM(s) Oral at bedtime  multivitamin/minerals 1 Tablet(s) Oral daily  sevelamer carbonate 800 milliGRAM(s) Oral three times a day with meals  topiramate 25 milliGRAM(s) Oral at bedtime      MEDICATIONS  (PRN):  dextrose 40% Gel 15 Gram(s) Oral once PRN Blood Glucose LESS THAN 70 milliGRAM(s)/deciliter  glucagon  Injectable 1 milliGRAM(s) IntraMuscular once PRN Glucose LESS THAN 70 milligrams/deciliter  ondansetron Injectable 4 milliGRAM(s) IV Push every 8 hours PRN Nausea and/or Vomiting      Physical Exam      Neuro :  no focal deficits  Respiratory: CTA B/L  CV: RRR, S1S2, no murmurs,   Abdominal: Soft, NT, ND +BS,  Extremities: No edema, + peripheral pulses, moderate left hip tenderness to palp and rom      ASSESSMENT    vertigo,   Intractable migraine with aura with status migrainosus.   left hip calcific tendinitis  h/o  ESRD on HD (M/W/F),   HTN,   migraine,   DM no longer on meds  Myocardial infarct, old  ESRD (end stage renal disease) on dialysis  Asthma occurring only with upper respiratory infection  Anemia in chronic renal disease  Claustrophobia  Uterine leiomyoma, unspecified location  [place] (obstructive sleep apnea)  Gastroesophageal reflux disease without esophagitis  Vertigo  HLD (hyperlipidemia)  Morbid obesity  AV fistula  S/P craniotomy  S/P hernia repair  S/P  section        PLAN      cont tele,   cont aspirin, statin,   cont meclizine prn,  ct head neg noted  neuro f/u noted  Cervicogenic vertigo.    s/p trial acute migraine treatment- intravenous valproate 500 mg Q6H x 3 doses with metoclopramide 10 mg IV q6h x 3 doses and magnesium po 400 mg TID .  decr topamax to 25mg bid  carotid duplex noted : no significant stenosis   ce q8 x 2 neg noted above  echo with EF = 65%,  Grade I diastolic dysfunction (Impaired relaxation). Agitated saline injection was negative for intracardiac shunt. Trivial pericardial effusion is seen noted above.  orthostatic positive with standing bp above 100  hgba1c 4.6 noted above,  renal f/u   pt for hd today  cont hd as per renal   pulm f/u noted   pft outpt  PT recs home with home PT   ct left hip-pelv with mineralization in the distal aspect of the left gluteus medius muscle at the level of the left hip. These may represent foci of calcium hydroxyapatite deposition or age indeterminant   proximally retracted intramuscular enthesophytes from the left greater trochanter noted above   f/u mri bony pelv to further eval ct results  pain mgmt eval  cont current meds   d/c plan pending mri

## 2019-11-13 NOTE — PROGRESS NOTE ADULT - SUBJECTIVE AND OBJECTIVE BOX
[facility] [place] [facility] to [facility]   MD Maia Gonzalez D.O. Fatima Sheikh, D.O. Ruoru Wong, PA    From 7 AM - 5 PM:  OFFICE: [phone]  Dr. Beckham cell: [phone]  Dr. Charles cell: [phone]  Dr. Gamino cell: [phone]  BRANDEN Carrasco cell: [phone]    From 5 PM - 7 AM: Answering Service: [phone]        RENAL FOLLOW UP NOTE  --------------------------------------------------------------------------------  HPI: Pt seen and examined. States she fell on the way to the bathroom today. Did not hit head. Has leg pain. Had MRI this AM, results pending. Did not have HD yet. Still has dizziness, no change.        PAST HISTORY  --------------------------------------------------------------------------------  No significant changes to PMH, PSH, FHx, SHx, unless otherwise noted    ALLERGIES & MEDICATIONS  --------------------------------------------------------------------------------  Allergies    No Known Drug Allergies  pineapple (Hives)    Intolerances      Standing Inpatient Medications  amLODIPine   Tablet 10 milliGRAM(s) Oral daily  aspirin  chewable 81 milliGRAM(s) Oral daily  atorvastatin 40 milliGRAM(s) Oral at bedtime  chlorhexidine 4% Liquid 1 Application(s) Topical <User Schedule>  cyanocobalamin 1000 MICROGram(s) Oral daily  dexAMETHasone     Tablet 4 milliGRAM(s) Oral every 12 hours  dextrose 5%. 1000 milliLiter(s) IV Continuous <Continuous>  dextrose 50% Injectable 12.5 Gram(s) IV Push once  dextrose 50% Injectable 25 Gram(s) IV Push once  dextrose 50% Injectable 25 Gram(s) IV Push once  folic acid 1 milliGRAM(s) Oral daily  gabapentin 300 milliGRAM(s) Oral at bedtime  heparin  Injectable 5000 Unit(s) SubCutaneous every 12 hours  hydrALAZINE 25 milliGRAM(s) Oral two times a day  insulin lispro (HumaLOG) corrective regimen sliding scale   SubCutaneous Before meals and at bedtime  labetalol 200 milliGRAM(s) Oral daily  lisinopril 40 milliGRAM(s) Oral daily  LORazepam     Tablet 0.5 milliGRAM(s) Oral two times a day  meclizine 25 milliGRAM(s) Oral three times a day  melatonin 3 milliGRAM(s) Oral at bedtime  multivitamin/minerals 1 Tablet(s) Oral daily  senna 2 Tablet(s) Oral at bedtime  sevelamer carbonate 800 milliGRAM(s) Oral three times a day with meals  topiramate 25 milliGRAM(s) Oral two times a day    PRN Inpatient Medications  dextrose 40% Gel 15 Gram(s) Oral once PRN  glucagon  Injectable 1 milliGRAM(s) IntraMuscular once PRN  ondansetron Injectable 4 milliGRAM(s) IV Push every 8 hours PRN  oxyCODONE    IR 5 milliGRAM(s) Oral every 6 hours PRN      REVIEW OF SYSTEMS  --------------------------------------------------------------------------------  General: no fever  CVS: no chest pain  RESP: no sob, no cough  ABD: no abdominal pain  : no dysuria,  MSK: no edema     VITALS/PHYSICAL EXAM  --------------------------------------------------------------------------------  T(C): 37.1 (11-13-19 @ 15:16), Max: 37.4 (11-12-19 @ 20:57)  HR: 83 (11-13-19 @ 15:16) (70 - 83)  BP: 142/58 (11-13-19 @ 15:16) (124/49 - 142/58)  RR: 17 (11-13-19 @ 15:16) (16 - 18)  SpO2: 99% (11-13-19 @ 15:16) (96% - 100%)  Wt(kg): --        Physical Exam:  	Gen: NAD  	HEENT: MMM  	Pulm: CTA B/L  	CV: S1S2, + murmur  	Abd: Soft, +BS  	Ext: No LE edema B/L              Neuro: Awake   	Skin: Warm and Dry   	Vascular access: RAVLISSY w/ good thrill and bruit    LABS/STUDIES  --------------------------------------------------------------------------------              10.5   6.45  >-----------<  148      [11-12-19 @ 07:20]              32.6     135  |  99  |  36  ----------------------------<  81      [11-12-19 @ 07:20]  4.9   |  30  |  6.22        Ca     9.1     [11-12-19 @ 07:20]      Mg     2.2     [11-12-19 @ 07:20]      Phos  6.1     [11-12-19 @ 07:20]    TPro  6.2  /  Alb  3.0  /  TBili  0.5  /  DBili  x   /  AST  10  /  ALT  16  /  AlkPhos  64  [11-12-19 @ 07:20]          Creatinine Trend:  SCr 6.22 [11-12 @ 07:20]  SCr 9.50 [11-11 @ 06:25]  SCr 7.52 [11-10 @ 06:50]  SCr 5.65 [11-09 @ 06:06]  SCr 7.13 [11-08 @ 06:18]    Urinalysis - [11-06-19 @ 09:05]      Color Yellow / Appearance Clear / SG 1.015 / pH 9.0      Gluc 50 / Ketone Negative  / Bili Negative / Urobili Negative       Blood Negative / Protein 100 / Leuk Est Negative / Nitrite Negative      RBC 0-2 / WBC 0-2 / Hyaline  / Gran  / Sq Epi  / Non Sq Epi  / Bacteria Trace      PTH -- (Ca 8.9)      [11-07-19 @ 10:25]   173  Vitamin D (25OH) 24.4      [11-07-19 @ 10:25]  HbA1c 4.6      [11-07-19 @ 10:26]  TSH 0.73      [11-07-19 @ 06:31]  Lipid: chol 140, , HDL 41, LDL 75      [11-07-19 @ 06:31]    HBsAg Nonreact      [11-08-19 @ 15:20]  HCV 0.09, Nonreact      [11-08-19 @ 15:20]    DICKSON: titer Negative, pattern --      [11-09-19 @ 11:03]  SPEP Interpretation: Normal Electrophoresis Pattern      [11-09-19 @ 11:24]

## 2019-11-13 NOTE — PROGRESS NOTE ADULT - PROBLEM SELECTOR PLAN 1
- left hip - Left gluteus medius calcific tendinitis.  - gabapentin 300mg po q hs  - decadron 4mg po q daily - can increase to 2x a day for 4 days  - + dizziness - topamax dose was decreased   - oxycodone 5mg po q 6 hours prn  - stool softeners

## 2019-11-14 LAB
ANION GAP SERPL CALC-SCNC: 14 MMOL/L — SIGNIFICANT CHANGE UP (ref 5–17)
BUN SERPL-MCNC: 87 MG/DL — HIGH (ref 7–18)
CALCIUM SERPL-MCNC: 9.4 MG/DL — SIGNIFICANT CHANGE UP (ref 8.4–10.5)
CHLORIDE SERPL-SCNC: 100 MMOL/L — SIGNIFICANT CHANGE UP (ref 96–108)
CO2 SERPL-SCNC: 20 MMOL/L — LOW (ref 22–31)
CREAT SERPL-MCNC: 9.98 MG/DL — HIGH (ref 0.5–1.3)
GLUCOSE BLDC GLUCOMTR-MCNC: 122 MG/DL — HIGH (ref 70–99)
GLUCOSE BLDC GLUCOMTR-MCNC: 138 MG/DL — HIGH (ref 70–99)
GLUCOSE BLDC GLUCOMTR-MCNC: 172 MG/DL — HIGH (ref 70–99)
GLUCOSE BLDC GLUCOMTR-MCNC: 174 MG/DL — HIGH (ref 70–99)
GLUCOSE SERPL-MCNC: 266 MG/DL — HIGH (ref 70–99)
HCT VFR BLD CALC: 32.4 % — LOW (ref 34.5–45)
HGB BLD-MCNC: 10.4 G/DL — LOW (ref 11.5–15.5)
MCHC RBC-ENTMCNC: 31 PG — SIGNIFICANT CHANGE UP (ref 27–34)
MCHC RBC-ENTMCNC: 32.1 GM/DL — SIGNIFICANT CHANGE UP (ref 32–36)
MCV RBC AUTO: 96.7 FL — SIGNIFICANT CHANGE UP (ref 80–100)
NRBC # BLD: 0 /100 WBCS — SIGNIFICANT CHANGE UP (ref 0–0)
PLATELET # BLD AUTO: 159 K/UL — SIGNIFICANT CHANGE UP (ref 150–400)
POTASSIUM SERPL-MCNC: 5.3 MMOL/L — SIGNIFICANT CHANGE UP (ref 3.5–5.3)
POTASSIUM SERPL-SCNC: 5.3 MMOL/L — SIGNIFICANT CHANGE UP (ref 3.5–5.3)
RBC # BLD: 3.35 M/UL — LOW (ref 3.8–5.2)
RBC # FLD: 13.5 % — SIGNIFICANT CHANGE UP (ref 10.3–14.5)
SODIUM SERPL-SCNC: 134 MMOL/L — LOW (ref 135–145)
WBC # BLD: 6.52 K/UL — SIGNIFICANT CHANGE UP (ref 3.8–10.5)
WBC # FLD AUTO: 6.52 K/UL — SIGNIFICANT CHANGE UP (ref 3.8–10.5)

## 2019-11-14 PROCEDURE — 70496 CT ANGIOGRAPHY HEAD: CPT | Mod: 26

## 2019-11-14 PROCEDURE — 70498 CT ANGIOGRAPHY NECK: CPT | Mod: 26

## 2019-11-14 PROCEDURE — 99232 SBSQ HOSP IP/OBS MODERATE 35: CPT

## 2019-11-14 RX ORDER — MECLIZINE HCL 12.5 MG
50 TABLET ORAL THREE TIMES A DAY
Refills: 0 | Status: DISCONTINUED | OUTPATIENT
Start: 2019-11-14 | End: 2019-11-19

## 2019-11-14 RX ADMIN — SEVELAMER CARBONATE 800 MILLIGRAM(S): 2400 POWDER, FOR SUSPENSION ORAL at 20:21

## 2019-11-14 RX ADMIN — SENNA PLUS 2 TABLET(S): 8.6 TABLET ORAL at 00:06

## 2019-11-14 RX ADMIN — SENNA PLUS 2 TABLET(S): 8.6 TABLET ORAL at 23:08

## 2019-11-14 RX ADMIN — Medication 0.5 MILLIGRAM(S): at 05:40

## 2019-11-14 RX ADMIN — Medication 25 MILLIGRAM(S): at 20:21

## 2019-11-14 RX ADMIN — HEPARIN SODIUM 5000 UNIT(S): 5000 INJECTION INTRAVENOUS; SUBCUTANEOUS at 20:22

## 2019-11-14 RX ADMIN — ATORVASTATIN CALCIUM 40 MILLIGRAM(S): 80 TABLET, FILM COATED ORAL at 23:09

## 2019-11-14 RX ADMIN — Medication 0.5 MILLIGRAM(S): at 20:22

## 2019-11-14 RX ADMIN — ATORVASTATIN CALCIUM 40 MILLIGRAM(S): 80 TABLET, FILM COATED ORAL at 00:08

## 2019-11-14 RX ADMIN — Medication 50 MILLIGRAM(S): at 23:08

## 2019-11-14 RX ADMIN — Medication 3 MILLIGRAM(S): at 00:08

## 2019-11-14 RX ADMIN — Medication 3 MILLIGRAM(S): at 23:09

## 2019-11-14 RX ADMIN — Medication 3: at 00:09

## 2019-11-14 RX ADMIN — Medication 4 MILLIGRAM(S): at 05:40

## 2019-11-14 RX ADMIN — CHLORHEXIDINE GLUCONATE 1 APPLICATION(S): 213 SOLUTION TOPICAL at 05:41

## 2019-11-14 RX ADMIN — Medication 25 MILLIGRAM(S): at 05:41

## 2019-11-14 RX ADMIN — HEPARIN SODIUM 5000 UNIT(S): 5000 INJECTION INTRAVENOUS; SUBCUTANEOUS at 05:40

## 2019-11-14 RX ADMIN — AMLODIPINE BESYLATE 10 MILLIGRAM(S): 2.5 TABLET ORAL at 05:40

## 2019-11-14 RX ADMIN — Medication 1: at 08:50

## 2019-11-14 RX ADMIN — Medication 25 MILLIGRAM(S): at 20:22

## 2019-11-14 RX ADMIN — LISINOPRIL 40 MILLIGRAM(S): 2.5 TABLET ORAL at 05:41

## 2019-11-14 RX ADMIN — Medication 25 MILLIGRAM(S): at 00:06

## 2019-11-14 RX ADMIN — GABAPENTIN 300 MILLIGRAM(S): 400 CAPSULE ORAL at 23:09

## 2019-11-14 RX ADMIN — Medication 200 MILLIGRAM(S): at 05:40

## 2019-11-14 RX ADMIN — GABAPENTIN 300 MILLIGRAM(S): 400 CAPSULE ORAL at 00:06

## 2019-11-14 RX ADMIN — Medication 4 MILLIGRAM(S): at 20:22

## 2019-11-14 NOTE — PROGRESS NOTE ADULT - SUBJECTIVE AND OBJECTIVE BOX
Patient is a 54y old  Female who presents with a chief complaint of Vertigo (14 Nov 2019 11:23)  Awake, alert, comfortable in NAD. Still with dizziness. S/p fall yesterday    INTERVAL HPI/OVERNIGHT EVENTS:      VITAL SIGNS:  T(F): 98.4 (11-14-19 @ 09:48)  HR: 76 (11-14-19 @ 09:48)  BP: 143/66 (11-14-19 @ 09:48)  RR: 16 (11-14-19 @ 09:48)  SpO2: 98% (11-14-19 @ 05:28)  Wt(kg): --  I&O's Detail          REVIEW OF SYSTEMS:    CONSTITUTIONAL:  No fevers, chills, sweats    HEENT:  Eyes:  No diplopia or blurred vision. ENT:  No earache, sore throat or runny nose.    CARDIOVASCULAR:  No pressure, squeezing, tightness, or heaviness about the chest; no palpitations.    RESPIRATORY:  Per HPI    GASTROINTESTINAL:  No abdominal pain, nausea, vomiting or diarrhea.    GENITOURINARY:  No dysuria, frequency or urgency.    NEUROLOGIC:  No paresthesias, fasciculations, seizures or weakness.    PSYCHIATRIC:  No disorder of thought or mood.      PHYSICAL EXAM:    Constitutional: Well developed and nourished  Eyes:Perrla  ENMT: normal  Neck:supple  Respiratory: good air entry  Cardiovascular: S1 S2 regular  Gastrointestinal: Soft, Non tender  Extremities: No edema  Vascular:normal  Neurological:Awake, alert,Ox3  Musculoskeletal:Normal      MEDICATIONS  (STANDING):  amLODIPine   Tablet 10 milliGRAM(s) Oral daily  aspirin  chewable 81 milliGRAM(s) Oral daily  atorvastatin 40 milliGRAM(s) Oral at bedtime  chlorhexidine 4% Liquid 1 Application(s) Topical <User Schedule>  cyanocobalamin 1000 MICROGram(s) Oral daily  dexAMETHasone     Tablet 4 milliGRAM(s) Oral every 12 hours  dextrose 5%. 1000 milliLiter(s) (50 mL/Hr) IV Continuous <Continuous>  dextrose 50% Injectable 12.5 Gram(s) IV Push once  dextrose 50% Injectable 25 Gram(s) IV Push once  dextrose 50% Injectable 25 Gram(s) IV Push once  folic acid 1 milliGRAM(s) Oral daily  gabapentin 300 milliGRAM(s) Oral at bedtime  heparin  Injectable 5000 Unit(s) SubCutaneous every 12 hours  hydrALAZINE 25 milliGRAM(s) Oral two times a day  insulin lispro (HumaLOG) corrective regimen sliding scale   SubCutaneous Before meals and at bedtime  labetalol 200 milliGRAM(s) Oral daily  lisinopril 40 milliGRAM(s) Oral daily  LORazepam     Tablet 0.5 milliGRAM(s) Oral two times a day  meclizine 25 milliGRAM(s) Oral three times a day  melatonin 3 milliGRAM(s) Oral at bedtime  multivitamin/minerals 1 Tablet(s) Oral daily  senna 2 Tablet(s) Oral at bedtime  sevelamer carbonate 800 milliGRAM(s) Oral three times a day with meals  topiramate 25 milliGRAM(s) Oral two times a day    MEDICATIONS  (PRN):  dextrose 40% Gel 15 Gram(s) Oral once PRN Blood Glucose LESS THAN 70 milliGRAM(s)/deciliter  glucagon  Injectable 1 milliGRAM(s) IntraMuscular once PRN Glucose LESS THAN 70 milligrams/deciliter  ondansetron Injectable 4 milliGRAM(s) IV Push every 8 hours PRN Nausea and/or Vomiting  oxyCODONE    IR 5 milliGRAM(s) Oral every 6 hours PRN Severe Pain (7 - 10)      Allergies    No Known Drug Allergies  pineapple (Hives)    Intolerances        LABS:                        10.4   6.52  )-----------( 159      ( 14 Nov 2019 10:01 )             32.4     11-14    134<L>  |  100  |  87<H>  ----------------------------<  266<H>  5.3   |  20<L>  |  9.98<H>    Ca    9.4      14 Nov 2019 10:01                CAPILLARY BLOOD GLUCOSE      POCT Blood Glucose.: 122 mg/dL (14 Nov 2019 11:08)  POCT Blood Glucose.: 174 mg/dL (14 Nov 2019 08:50)  POCT Blood Glucose.: 291 mg/dL (13 Nov 2019 21:28)  POCT Blood Glucose.: 139 mg/dL (13 Nov 2019 16:38)        RADIOLOGY & ADDITIONAL TESTS:    CXR:    Ct scan chest:    ekg;    echo:

## 2019-11-14 NOTE — PROGRESS NOTE ADULT - SUBJECTIVE AND OBJECTIVE BOX
Patient is a 54y old  Female who presents with a chief complaint of Vertigo (2019 20:02)    pt seen in icu [  ], reg med floor [ x  ], bed [ x ], chair at bedside [   ], a+o x3 [ x ], lethargic [  ],  nad [ x ]    pt still with significant vertiginous symptoms, left hip pain improved with tx      Allergies    No Known Drug Allergies  pineapple (Hives)        Vitals    T(F): 98.5 (19 @ 05:28), Max: 99.7 (19 @ 20:58)  HR: 78 (19 @ 05:28) (78 - 89)  BP: 136/64 (19 @ 05:28) (124/49 - 152/55)  RR: 16 (19 @ 05:28) (16 - 18)  SpO2: 98% (19 @ 05:28) (96% - 99%)  Wt(kg): --  CAPILLARY BLOOD GLUCOSE      POCT Blood Glucose.: 174 mg/dL (2019 08:50)      Labs                          10.4   6.52  )-----------( 159      ( 2019 10:01 )             32.4                     .Urine  11-06 @ 15:32   <10,000 CFU/mL Normal Urogenital Jinny  --  --          Radiology Results    < from: MR Pelvis Bony Only No Cont (19 @ 13:05) >  Findings:     At the site of mineralization at the left gluteus medius myotendinous   junction there is mild adjacent soft tissue edema(6, 20). This favors   left gluteus medius calcific tendinitis. Left gluteus medius tendon   insertion is intact.    The hamstring origins appear unremarkable. The iliopsoas insertions are   intact. Secondary superior unremarkable.    There is mild pubic symphysis arthrosis. There is mild bilateral hip   arthrosis.    Impression:   Left gluteus medius calcific tendinitis.    < end of copied text >        Meds    MEDICATIONS  (STANDING):  amLODIPine   Tablet 10 milliGRAM(s) Oral daily  aspirin  chewable 81 milliGRAM(s) Oral daily  atorvastatin 40 milliGRAM(s) Oral at bedtime  chlorhexidine 4% Liquid 1 Application(s) Topical <User Schedule>  cyanocobalamin 1000 MICROGram(s) Oral daily  dexAMETHasone     Tablet 4 milliGRAM(s) Oral every 12 hours  dextrose 5%. 1000 milliLiter(s) (50 mL/Hr) IV Continuous <Continuous>  dextrose 50% Injectable 12.5 Gram(s) IV Push once  dextrose 50% Injectable 25 Gram(s) IV Push once  dextrose 50% Injectable 25 Gram(s) IV Push once  folic acid 1 milliGRAM(s) Oral daily  gabapentin 300 milliGRAM(s) Oral at bedtime  heparin  Injectable 5000 Unit(s) SubCutaneous every 12 hours  hydrALAZINE 25 milliGRAM(s) Oral two times a day  insulin lispro (HumaLOG) corrective regimen sliding scale   SubCutaneous Before meals and at bedtime  labetalol 200 milliGRAM(s) Oral daily  lisinopril 40 milliGRAM(s) Oral daily  LORazepam     Tablet 0.5 milliGRAM(s) Oral two times a day  meclizine 25 milliGRAM(s) Oral three times a day  melatonin 3 milliGRAM(s) Oral at bedtime  multivitamin/minerals 1 Tablet(s) Oral daily  senna 2 Tablet(s) Oral at bedtime  sevelamer carbonate 800 milliGRAM(s) Oral three times a day with meals  topiramate 25 milliGRAM(s) Oral two times a day      MEDICATIONS  (PRN):  dextrose 40% Gel 15 Gram(s) Oral once PRN Blood Glucose LESS THAN 70 milliGRAM(s)/deciliter  glucagon  Injectable 1 milliGRAM(s) IntraMuscular once PRN Glucose LESS THAN 70 milligrams/deciliter  ondansetron Injectable 4 milliGRAM(s) IV Push every 8 hours PRN Nausea and/or Vomiting  oxyCODONE    IR 5 milliGRAM(s) Oral every 6 hours PRN Severe Pain (7 - 10)      Physical Exam      Neuro :  no focal deficits  Respiratory: CTA B/L  CV: RRR, S1S2, no murmurs,   Abdominal: Soft, NT, ND +BS,  Extremities: No edema, + peripheral pulses, moderate left hip tenderness to palp and rom      ASSESSMENT    vertigo,   Intractable migraine with aura with status migrainosus.   left hip calcific tendinitis  h/o  ESRD on HD (M/W/F),   HTN,   migraine,   DM no longer on meds  Myocardial infarct, old  ESRD (end stage renal disease) on dialysis  Asthma occurring only with upper respiratory infection  Anemia in chronic renal disease  Claustrophobia  Uterine leiomyoma, unspecified location  AUSTIN (obstructive sleep apnea)  Gastroesophageal reflux disease without esophagitis  Vertigo  HLD (hyperlipidemia)  Morbid obesity  AV fistula  S/P craniotomy  S/P hernia repair  S/P  section        PLAN      cont tele,   cont aspirin, statin,   cont meclizine prn,  ct head neg noted  neuro f/u noted  Cervicogenic vertigo.    s/p trial acute migraine treatment- intravenous valproate 500 mg Q6H x 3 doses with metoclopramide 10 mg IV q6h x 3 doses and magnesium po 400 mg TID .  decr topamax to 25mg bid  carotid duplex noted : no significant stenosis   f/u cta head and neck  ce q8 x 2 neg noted above  echo with EF = 65%,  Grade I diastolic dysfunction (Impaired relaxation). Agitated saline injection was negative for intracardiac shunt. Trivial pericardial effusion is seen noted above.  orthostatic positive with standing bp above 100  hgba1c 4.6 noted above,  renal f/u   pt for hd today  cont hd as per renal   pulm f/u noted   pft outpt  PT recs home with home PT   ct left hip-pelv with mineralization in the distal aspect of the left gluteus medius muscle at the level of the left hip. These may represent foci of calcium hydroxyapatite deposition or age indeterminant   proximally retracted intramuscular enthesophytes from the left greater trochanter noted above   mri bony pelv with calcific tendinitis noted  pain mgmt eval noted  gabapentin 300mg po q hs  cont decadron 4mg po q daily - can increase to 2x a day for 4 days  cont oxycodone 5mg po q 6 hours prn  cont current meds   d/c plan pending cta head and neck

## 2019-11-14 NOTE — CHART NOTE - NSCHARTNOTEFT_GEN_A_CORE
Reassessment:   54yFemalePatient is a 54y old  Female who presents with a chief complaint of Vertigo (2019 15:50)      Factors impacting intake: [ ] none [ ] nausea  [ ] vomiting [ ] diarrhea [ ] constipation  [ ]chewing problems [ ] swallowing issues  [ X] other:     Diet Presciption: Diet, Renal Restrictions:   For patients receiving Renal Replacement - No Protein Restr, No Conc K, No Conc Phos, Low Sodium  DASH/TLC {Sodium & Cholesterol Restricted} (19 @ 09:19)    Intake: Visited pt. alert, report po intake fair, consuming 40-50% & tolerating, encourage po intake, willing to have Nepro 1 can BID at mealtime, had HD in the am today & awaiting test/procedure after 5.00pm today, d/w RN, rec. c/w  Renal Replacement - No Protein Restr, No Conc K, No Conc Phos, Low Sodium diet & add Nepro 1 can BID as medically feasible. RD available    Daily Weight in k.3 (2019 13:20)  Weight in k.9 (2019 09:48)  Weight in k.1 (10 Nov 2019 04:43)  Weight in k.1 (2019 07:57)  Weight in k.5 (2019 11:04)  Weight in k.5 (2019 05:00)    % Weight Change: wt. variation noted since pt. HD     Pertinent Medications: MEDICATIONS  (STANDING):  amLODIPine   Tablet 10 milliGRAM(s) Oral daily  aspirin  chewable 81 milliGRAM(s) Oral daily  atorvastatin 40 milliGRAM(s) Oral at bedtime  chlorhexidine 4% Liquid 1 Application(s) Topical <User Schedule>  cyanocobalamin 1000 MICROGram(s) Oral daily  dexAMETHasone     Tablet 4 milliGRAM(s) Oral every 12 hours  dextrose 5%. 1000 milliLiter(s) (50 mL/Hr) IV Continuous <Continuous>  dextrose 50% Injectable 12.5 Gram(s) IV Push once  dextrose 50% Injectable 25 Gram(s) IV Push once  dextrose 50% Injectable 25 Gram(s) IV Push once  folic acid 1 milliGRAM(s) Oral daily  gabapentin 300 milliGRAM(s) Oral at bedtime  heparin  Injectable 5000 Unit(s) SubCutaneous every 12 hours  hydrALAZINE 25 milliGRAM(s) Oral two times a day  insulin lispro (HumaLOG) corrective regimen sliding scale   SubCutaneous Before meals and at bedtime  labetalol 200 milliGRAM(s) Oral daily  lisinopril 40 milliGRAM(s) Oral daily  LORazepam     Tablet 0.5 milliGRAM(s) Oral two times a day  meclizine 25 milliGRAM(s) Oral three times a day  melatonin 3 milliGRAM(s) Oral at bedtime  multivitamin/minerals 1 Tablet(s) Oral daily  senna 2 Tablet(s) Oral at bedtime  sevelamer carbonate 800 milliGRAM(s) Oral three times a day with meals  topiramate 25 milliGRAM(s) Oral two times a day    MEDICATIONS  (PRN):  dextrose 40% Gel 15 Gram(s) Oral once PRN Blood Glucose LESS THAN 70 milliGRAM(s)/deciliter  glucagon  Injectable 1 milliGRAM(s) IntraMuscular once PRN Glucose LESS THAN 70 milligrams/deciliter  ondansetron Injectable 4 milliGRAM(s) IV Push every 8 hours PRN Nausea and/or Vomiting  oxyCODONE    IR 5 milliGRAM(s) Oral every 6 hours PRN Severe Pain (7 - 10)    Pertinent Labs:  Na134 mmol/L<L> Glu 266 mg/dL<H> K+ 5.3 mmol/L Cr  9.98 mg/dL<H> BUN 87 mg/dL<H> 11-12 Phos 6.1 mg/dL<H> 11-12 Alb 3.0 g/dL<L> 1107 [id number] 4.6 %  Chol 140 mg/dL LDL 75 mg/dL HDL 41 mg/dL<L> Trig 118 mg/dL     CAPILLARY BLOOD GLUCOSE      POCT Blood Glucose.: 122 mg/dL (2019 11:08)  POCT Blood Glucose.: 174 mg/dL (2019 08:50)  POCT Blood Glucose.: 291 mg/dL (2019 21:28)  POCT Blood Glucose.: 139 mg/dL (2019 16:38)    Skin: intact     Estimated Needs:   [X ] no change since previous assessment  [ ] recalculated:     Previous Nutrition Diagnosis:   [ ] Inadequate Energy Intake [X ]Inadequate Oral Intake [ ] Excessive Energy Intake   [ ] Underweight [ ] Increased Nutrient Needs [ ] Overweight/Obesity   [ ] Altered GI Function [ ] Unintended Weight Loss [ ] Food & Nutrition Related Knowledge Deficit [ ] Malnutrition     Nutrition Diagnosis is [ X] ongoing  [ ] resolved [ ] not applicable     New Nutrition Diagnosis: [ ] not applicable     Interventions: To meet nutrition needs   Recommend  [ ] Change Diet To:  [ ] Nutrition Supplement  [ ] Nutrition Support  [ X] Other: Nephrocaps daily as medically feasible     Monitoring and Evaluation:   [X ] PO intake [ x ] Tolerance to diet prescription [ x ] weights [ x ] labs[ x ] follow up per protocol  [ ] other:

## 2019-11-14 NOTE — PROGRESS NOTE ADULT - SUBJECTIVE AND OBJECTIVE BOX
Neurology Follow up note    Name  ORI JAMESON    Subjective:  patient denies dizziness  she endorses vertigo  headache has resolved    Review of Systems:  Constitutional:      no fever  Respiratory:                     no cough         MEDICATIONS  (STANDING):  amLODIPine   Tablet 10 milliGRAM(s) Oral daily  aspirin  chewable 81 milliGRAM(s) Oral daily  atorvastatin 40 milliGRAM(s) Oral at bedtime  chlorhexidine 4% Liquid 1 Application(s) Topical <User Schedule>  cyanocobalamin 1000 MICROGram(s) Oral daily  dexAMETHasone     Tablet 4 milliGRAM(s) Oral every 12 hours  dextrose 5%. 1000 milliLiter(s) (50 mL/Hr) IV Continuous <Continuous>  dextrose 50% Injectable 12.5 Gram(s) IV Push once  dextrose 50% Injectable 25 Gram(s) IV Push once  dextrose 50% Injectable 25 Gram(s) IV Push once  folic acid 1 milliGRAM(s) Oral daily  gabapentin 300 milliGRAM(s) Oral at bedtime  heparin  Injectable 5000 Unit(s) SubCutaneous every 12 hours  hydrALAZINE 25 milliGRAM(s) Oral two times a day  insulin lispro (HumaLOG) corrective regimen sliding scale   SubCutaneous Before meals and at bedtime  labetalol 200 milliGRAM(s) Oral daily  lisinopril 40 milliGRAM(s) Oral daily  LORazepam     Tablet 0.5 milliGRAM(s) Oral two times a day  meclizine 25 milliGRAM(s) Oral three times a day  melatonin 3 milliGRAM(s) Oral at bedtime  multivitamin/minerals 1 Tablet(s) Oral daily  senna 2 Tablet(s) Oral at bedtime  sevelamer carbonate 800 milliGRAM(s) Oral three times a day with meals  topiramate 25 milliGRAM(s) Oral two times a day    MEDICATIONS  (PRN):  dextrose 40% Gel 15 Gram(s) Oral once PRN Blood Glucose LESS THAN 70 milliGRAM(s)/deciliter  glucagon  Injectable 1 milliGRAM(s) IntraMuscular once PRN Glucose LESS THAN 70 milligrams/deciliter  ondansetron Injectable 4 milliGRAM(s) IV Push every 8 hours PRN Nausea and/or Vomiting  oxyCODONE    IR 5 milliGRAM(s) Oral every 6 hours PRN Severe Pain (7 - 10)      Allergies    No Known Drug Allergies  pineapple (Hives)    Intolerances        Objective:   Vital Signs Last 24 Hrs  T(C): 37.1 (14 Nov 2019 15:03), Max: 37.6 (13 Nov 2019 20:58)  T(F): 98.8 (14 Nov 2019 15:03), Max: 99.7 (13 Nov 2019 20:58)  HR: 82 (14 Nov 2019 15:03) (76 - 89)  BP: 142/70 (14 Nov 2019 15:03) (136/64 - 152/55)  BP(mean): --  RR: 16 (14 Nov 2019 15:03) (16 - 16)  SpO2: 99% (14 Nov 2019 15:03) (96% - 99%)    General Exam:   General appearance: No acute distress                 Cardiovascular: Pedal dorsalis pulses intact bilaterally    Neurological Exam:  Mental Status: Orientated to self, date and place.  Attention intact.  No dysarthria, aphasia or neglect.    Cranial Nerves: CN I - not tested.  PERRL, EOMI, VFF, no nystagmus or diplopia.  No APD.  Fundi not visualized bilaterally.  CN V1-3 intact to light touch.  No facial asymmetry.      Motor:   Tone: normal.                  Strength: intact throughout    Sensation: intact to light touch  Other:    11-14    134<L>  |  100  |  87<H>  ----------------------------<  266<H>  5.3   |  20<L>  |  9.98<H>    Ca    9.4      14 Nov 2019 10:01      11-14    134<L>  |  100  |  87<H>  ----------------------------<  266<H>  5.3   |  20<L>  |  9.98<H>    Ca    9.4      14 Nov 2019 10:01          Radiology

## 2019-11-14 NOTE — PROGRESS NOTE ADULT - SUBJECTIVE AND OBJECTIVE BOX
[facility]   MD Maia Gonzalez D.O. Fatima Sheikh, D.O. Ruoru Wong, PA    From 7 AM - 5 PM:  OFFICE: [phone]  Dr. Beckham cell: [phone]  Dr. Charles cell: [phone]  Dr. Gamino cell: [phone]  BRANDEN Carrasco cell: [phone]    From 5 PM - 7 AM: Answering Service: [phone]        RENAL FOLLOW UP NOTE  --------------------------------------------------------------------------------  HPI: Pt seen and examined. Has dizziness and left hip/leg pain today. No other symptoms. Appears comfortable this AM.         PAST HISTORY  --------------------------------------------------------------------------------  No significant changes to PMH, PSH, FHx, SHx, unless otherwise noted    ALLERGIES & MEDICATIONS  --------------------------------------------------------------------------------  Allergies    No Known Drug Allergies  pineapple (Hives)    Intolerances      Standing Inpatient Medications  amLODIPine   Tablet 10 milliGRAM(s) Oral daily  aspirin  chewable 81 milliGRAM(s) Oral daily  atorvastatin 40 milliGRAM(s) Oral at bedtime  chlorhexidine 4% Liquid 1 Application(s) Topical <User Schedule>  cyanocobalamin 1000 MICROGram(s) Oral daily  dexAMETHasone     Tablet 4 milliGRAM(s) Oral every 12 hours  dextrose 5%. 1000 milliLiter(s) IV Continuous <Continuous>  dextrose 50% Injectable 12.5 Gram(s) IV Push once  dextrose 50% Injectable 25 Gram(s) IV Push once  dextrose 50% Injectable 25 Gram(s) IV Push once  folic acid 1 milliGRAM(s) Oral daily  gabapentin 300 milliGRAM(s) Oral at bedtime  heparin  Injectable 5000 Unit(s) SubCutaneous every 12 hours  hydrALAZINE 25 milliGRAM(s) Oral two times a day  insulin lispro (HumaLOG) corrective regimen sliding scale   SubCutaneous Before meals and at bedtime  labetalol 200 milliGRAM(s) Oral daily  lisinopril 40 milliGRAM(s) Oral daily  LORazepam     Tablet 0.5 milliGRAM(s) Oral two times a day  meclizine 25 milliGRAM(s) Oral three times a day  melatonin 3 milliGRAM(s) Oral at bedtime  multivitamin/minerals 1 Tablet(s) Oral daily  senna 2 Tablet(s) Oral at bedtime  sevelamer carbonate 800 milliGRAM(s) Oral three times a day with meals  topiramate 25 milliGRAM(s) Oral two times a day    PRN Inpatient Medications  dextrose 40% Gel 15 Gram(s) Oral once PRN  glucagon  Injectable 1 milliGRAM(s) IntraMuscular once PRN  ondansetron Injectable 4 milliGRAM(s) IV Push every 8 hours PRN  oxyCODONE    IR 5 milliGRAM(s) Oral every 6 hours PRN      REVIEW OF SYSTEMS  --------------------------------------------------------------------------------  General: no fever  CVS: no chest pain  RESP: no sob, no cough  ABD: no abdominal pain  : no dysuria,  MSK: no edema     VITALS/PHYSICAL EXAM  --------------------------------------------------------------------------------  T(C): 36.9 (11-14-19 @ 09:48), Max: 37.6 (11-13-19 @ 20:58)  HR: 76 (11-14-19 @ 09:48) (76 - 89)  BP: 143/66 (11-14-19 @ 09:48) (124/49 - 152/55)  RR: 16 (11-14-19 @ 09:48) (16 - 18)  SpO2: 98% (11-14-19 @ 05:28) (96% - 99%)  Wt(kg): --        Physical Exam:  	Gen: NAD  	HEENT: MMM  	Pulm: CTA B/L  	CV: S1S2, + murmur  	Abd: Soft, +BS  	Ext: No LE edema B/L              Neuro: Awake   	Skin: Warm and Dry   	Vascular access: RAVG w/ good thrill and bruit    LABS/STUDIES  --------------------------------------------------------------------------------              10.4   6.52  >-----------<  159      [11-14-19 @ 10:01]              32.4     134  |  100  |  87  ----------------------------<  266      [11-14-19 @ 10:01]  5.3   |  20  |  9.98        Ca     9.4     [11-14-19 @ 10:01]            Creatinine Trend:  SCr 9.98 [11-14 @ 10:01]  SCr 6.22 [11-12 @ 07:20]  SCr 9.50 [11-11 @ 06:25]  SCr 7.52 [11-10 @ 06:50]  SCr 5.65 [11-09 @ 06:06]    Urinalysis - [11-06-19 @ 09:05]      Color Yellow / Appearance Clear / SG 1.015 / pH 9.0      Gluc 50 / Ketone Negative  / Bili Negative / Urobili Negative       Blood Negative / Protein 100 / Leuk Est Negative / Nitrite Negative      RBC 0-2 / WBC 0-2 / Hyaline  / Gran  / Sq Epi  / Non Sq Epi  / Bacteria Trace      PTH -- (Ca 8.9)      [11-07-19 @ 10:25]   173  Vitamin D (25OH) 24.4      [11-07-19 @ 10:25]  HbA1c 4.6      [11-07-19 @ 10:26]  TSH 0.73      [11-07-19 @ 06:31]  Lipid: chol 140, , HDL 41, LDL 75      [11-07-19 @ 06:31]    HBsAg Nonreact      [11-08-19 @ 15:20]  HCV 0.09, Nonreact      [11-08-19 @ 15:20]    DICKSON: titer Negative, pattern --      [11-09-19 @ 11:03]  SPEP Interpretation: Normal Electrophoresis Pattern      [11-09-19 @ 11:24]

## 2019-11-15 LAB
ANION GAP SERPL CALC-SCNC: 11 MMOL/L — SIGNIFICANT CHANGE UP (ref 5–17)
BUN SERPL-MCNC: 62 MG/DL — HIGH (ref 7–18)
CALCIUM SERPL-MCNC: 8.9 MG/DL — SIGNIFICANT CHANGE UP (ref 8.4–10.5)
CHLORIDE SERPL-SCNC: 100 MMOL/L — SIGNIFICANT CHANGE UP (ref 96–108)
CO2 SERPL-SCNC: 26 MMOL/L — SIGNIFICANT CHANGE UP (ref 22–31)
CREAT SERPL-MCNC: 7.09 MG/DL — HIGH (ref 0.5–1.3)
GLUCOSE BLDC GLUCOMTR-MCNC: 109 MG/DL — HIGH (ref 70–99)
GLUCOSE BLDC GLUCOMTR-MCNC: 252 MG/DL — HIGH (ref 70–99)
GLUCOSE BLDC GLUCOMTR-MCNC: 78 MG/DL — SIGNIFICANT CHANGE UP (ref 70–99)
GLUCOSE BLDC GLUCOMTR-MCNC: 96 MG/DL — SIGNIFICANT CHANGE UP (ref 70–99)
GLUCOSE SERPL-MCNC: 94 MG/DL — SIGNIFICANT CHANGE UP (ref 70–99)
HCT VFR BLD CALC: 32.8 % — LOW (ref 34.5–45)
HGB BLD-MCNC: 10.5 G/DL — LOW (ref 11.5–15.5)
MCHC RBC-ENTMCNC: 30.7 PG — SIGNIFICANT CHANGE UP (ref 27–34)
MCHC RBC-ENTMCNC: 32 GM/DL — SIGNIFICANT CHANGE UP (ref 32–36)
MCV RBC AUTO: 95.9 FL — SIGNIFICANT CHANGE UP (ref 80–100)
NRBC # BLD: 0 /100 WBCS — SIGNIFICANT CHANGE UP (ref 0–0)
PLATELET # BLD AUTO: 184 K/UL — SIGNIFICANT CHANGE UP (ref 150–400)
POTASSIUM SERPL-MCNC: 5.3 MMOL/L — SIGNIFICANT CHANGE UP (ref 3.5–5.3)
POTASSIUM SERPL-SCNC: 5.3 MMOL/L — SIGNIFICANT CHANGE UP (ref 3.5–5.3)
RBC # BLD: 3.42 M/UL — LOW (ref 3.8–5.2)
RBC # FLD: 13.4 % — SIGNIFICANT CHANGE UP (ref 10.3–14.5)
SODIUM SERPL-SCNC: 137 MMOL/L — SIGNIFICANT CHANGE UP (ref 135–145)
WBC # BLD: 7.51 K/UL — SIGNIFICANT CHANGE UP (ref 3.8–10.5)
WBC # FLD AUTO: 7.51 K/UL — SIGNIFICANT CHANGE UP (ref 3.8–10.5)

## 2019-11-15 PROCEDURE — 99232 SBSQ HOSP IP/OBS MODERATE 35: CPT

## 2019-11-15 PROCEDURE — 70551 MRI BRAIN STEM W/O DYE: CPT | Mod: 26

## 2019-11-15 RX ADMIN — Medication 0.5 MILLIGRAM(S): at 05:37

## 2019-11-15 RX ADMIN — Medication 50 MILLIGRAM(S): at 21:29

## 2019-11-15 RX ADMIN — Medication 1 TABLET(S): at 13:01

## 2019-11-15 RX ADMIN — Medication 3 MILLIGRAM(S): at 22:23

## 2019-11-15 RX ADMIN — SEVELAMER CARBONATE 800 MILLIGRAM(S): 2400 POWDER, FOR SUSPENSION ORAL at 13:01

## 2019-11-15 RX ADMIN — SEVELAMER CARBONATE 800 MILLIGRAM(S): 2400 POWDER, FOR SUSPENSION ORAL at 08:10

## 2019-11-15 RX ADMIN — GABAPENTIN 300 MILLIGRAM(S): 400 CAPSULE ORAL at 22:23

## 2019-11-15 RX ADMIN — Medication 3: at 12:46

## 2019-11-15 RX ADMIN — Medication 4 MILLIGRAM(S): at 21:33

## 2019-11-15 RX ADMIN — Medication 200 MILLIGRAM(S): at 05:37

## 2019-11-15 RX ADMIN — Medication 25 MILLIGRAM(S): at 05:43

## 2019-11-15 RX ADMIN — Medication 50 MILLIGRAM(S): at 13:01

## 2019-11-15 RX ADMIN — Medication 81 MILLIGRAM(S): at 13:02

## 2019-11-15 RX ADMIN — CHLORHEXIDINE GLUCONATE 1 APPLICATION(S): 213 SOLUTION TOPICAL at 05:38

## 2019-11-15 RX ADMIN — LISINOPRIL 40 MILLIGRAM(S): 2.5 TABLET ORAL at 05:37

## 2019-11-15 RX ADMIN — HEPARIN SODIUM 5000 UNIT(S): 5000 INJECTION INTRAVENOUS; SUBCUTANEOUS at 05:36

## 2019-11-15 RX ADMIN — Medication 50 MILLIGRAM(S): at 06:26

## 2019-11-15 RX ADMIN — AMLODIPINE BESYLATE 10 MILLIGRAM(S): 2.5 TABLET ORAL at 05:37

## 2019-11-15 RX ADMIN — SENNA PLUS 2 TABLET(S): 8.6 TABLET ORAL at 21:29

## 2019-11-15 RX ADMIN — Medication 25 MILLIGRAM(S): at 05:39

## 2019-11-15 RX ADMIN — Medication 1 MILLIGRAM(S): at 13:01

## 2019-11-15 RX ADMIN — Medication 4 MILLIGRAM(S): at 05:37

## 2019-11-15 RX ADMIN — ATORVASTATIN CALCIUM 40 MILLIGRAM(S): 80 TABLET, FILM COATED ORAL at 21:28

## 2019-11-15 NOTE — PROGRESS NOTE ADULT - SUBJECTIVE AND OBJECTIVE BOX
CHIEF COMPLAINT:Patient is a 54y old  Female who presents with a chief complaint of Vertigo .Pt appears comfortable.    	  REVIEW OF SYSTEMS:  CONSTITUTIONAL: No fever, weight loss, or fatigue  EYES: No eye pain, visual disturbances, or discharge  ENT:  No difficulty hearing, tinnitus, vertigo; No sinus or throat pain  NECK: No pain or stiffness  RESPIRATORY: No cough, wheezing, chills or hemoptysis; No Shortness of Breath  CARDIOVASCULAR: No chest pain, palpitations, passing out, dizziness, or leg swelling  GASTROINTESTINAL: No abdominal or epigastric pain. No nausea, vomiting, or hematemesis; No diarrhea or constipation. No melena or hematochezia.  GENITOURINARY: No dysuria, frequency, hematuria, or incontinence  NEUROLOGICAL: No headaches, memory loss, loss of strength, numbness, or tremors  SKIN: No itching, burning, rashes, or lesions   LYMPH Nodes: No enlarged glands  ENDOCRINE: No heat or cold intolerance; No hair loss  MUSCULOSKELETAL: No joint pain or swelling; No muscle, back, or extremity pain  PSYCHIATRIC: No depression, anxiety, mood swings, or difficulty sleeping  HEME/LYMPH: No easy bruising, or bleeding gums  ALLERGY AND IMMUNOLOGIC: No hives or eczema	      PHYSICAL EXAM:  T(C): 36.9 (11-15-19 @ 13:04), Max: 37.1 (11-14-19 @ 15:03)  HR: 75 (11-15-19 @ 13:04) (63 - 96)  BP: 140/45 (11-15-19 @ 13:04) (133/58 - 142/70)  RR: 16 (11-15-19 @ 13:04) (16 - 16)  SpO2: 98% (11-15-19 @ 13:04) (97% - 99%)        Appearance: Normal	  HEENT:   Normal oral mucosa, PERRL, EOMI	  Lymphatic: No lymphadenopathy  Cardiovascular: Normal S1 S2, No JVD, No murmurs, No edema  Respiratory: Lungs clear to auscultation	  Psychiatry: A & O x 3, Mood & affect appropriate  Gastrointestinal:  Soft, Non-tender, + BS	  Skin: No rashes, No ecchymoses, No cyanosis	  Neurologic: Non-focal  Extremities: Normal range of motion, No clubbing, cyanosis or edema  Vascular: Peripheral pulses palpable 2+ bilaterally    MEDICATIONS  (STANDING):  amLODIPine   Tablet 10 milliGRAM(s) Oral daily  aspirin  chewable 81 milliGRAM(s) Oral daily  atorvastatin 40 milliGRAM(s) Oral at bedtime  chlorhexidine 4% Liquid 1 Application(s) Topical <User Schedule>  dexAMETHasone     Tablet 4 milliGRAM(s) Oral every 12 hours  dextrose 5%. 1000 milliLiter(s) (50 mL/Hr) IV Continuous <Continuous>  dextrose 50% Injectable 12.5 Gram(s) IV Push once  dextrose 50% Injectable 25 Gram(s) IV Push once  dextrose 50% Injectable 25 Gram(s) IV Push once  folic acid 1 milliGRAM(s) Oral daily  gabapentin 300 milliGRAM(s) Oral at bedtime  heparin  Injectable 5000 Unit(s) SubCutaneous every 12 hours  hydrALAZINE 25 milliGRAM(s) Oral two times a day  insulin lispro (HumaLOG) corrective regimen sliding scale   SubCutaneous Before meals and at bedtime  labetalol 200 milliGRAM(s) Oral daily  lisinopril 40 milliGRAM(s) Oral daily  LORazepam     Tablet 0.5 milliGRAM(s) Oral two times a day  meclizine 50 milliGRAM(s) Oral three times a day  melatonin 3 milliGRAM(s) Oral at bedtime  multivitamin/minerals 1 Tablet(s) Oral daily  senna 2 Tablet(s) Oral at bedtime  sevelamer carbonate 800 milliGRAM(s) Oral three times a day with meals  topiramate 25 milliGRAM(s) Oral two times a day      	  LABS:	 	                       10.4   6.52  )-----------( 159      ( 14 Nov 2019 10:01 )             32.4     11-14    134<L>  |  100  |  87<H>  ----------------------------<  266<H>  5.3   |  20<L>  |  9.98<H>    Ca    9.4      14 Nov 2019 10:01        Lipid Profile: Cholesterol 140  LDL 75  HDL 41      HgA1c: Hemoglobin A1C, Whole Blood: 4.6 % (11-07 @ 10:26)    TSH: Thyroid Stimulating Hormone, Serum: 0.73 uU/mL (11-07 @ 06:31)      	  EXAM:  MR BRAIN                            PROCEDURE DATE:  11/15/2019          INTERPRETATION:  MRI brain without contrast    History dizziness    Comparison CT performed the prior day    There is no mass effect, cortical edema or hydrocephalus. Overall   cortical volume is preserved. There is mild scattered chronic   microvascular ischemic change in the periventricular white matter. There   is no evidence of acute infarct or previous parenchymal hemorrhage. The   orbital and sellar contents and cerebellar tonsils are within normal   limits.    IMPRESSION:    No acute findings

## 2019-11-15 NOTE — PROGRESS NOTE ADULT - SUBJECTIVE AND OBJECTIVE BOX
Neurology Follow up note    Name  ORI JAMESON    Subjective:  continues to have vertigo  had mri brain    Review of Systems:  Constitutional:      no fever  Respiratory:                     no cough         MEDICATIONS  (STANDING):  amLODIPine   Tablet 10 milliGRAM(s) Oral daily  aspirin  chewable 81 milliGRAM(s) Oral daily  atorvastatin 40 milliGRAM(s) Oral at bedtime  chlorhexidine 4% Liquid 1 Application(s) Topical <User Schedule>  dexAMETHasone     Tablet 4 milliGRAM(s) Oral every 12 hours  dextrose 5%. 1000 milliLiter(s) (50 mL/Hr) IV Continuous <Continuous>  dextrose 50% Injectable 12.5 Gram(s) IV Push once  dextrose 50% Injectable 25 Gram(s) IV Push once  dextrose 50% Injectable 25 Gram(s) IV Push once  folic acid 1 milliGRAM(s) Oral daily  gabapentin 300 milliGRAM(s) Oral at bedtime  heparin  Injectable 5000 Unit(s) SubCutaneous every 12 hours  hydrALAZINE 25 milliGRAM(s) Oral two times a day  insulin lispro (HumaLOG) corrective regimen sliding scale   SubCutaneous Before meals and at bedtime  labetalol 200 milliGRAM(s) Oral daily  lisinopril 40 milliGRAM(s) Oral daily  LORazepam     Tablet 0.5 milliGRAM(s) Oral two times a day  meclizine 50 milliGRAM(s) Oral three times a day  melatonin 3 milliGRAM(s) Oral at bedtime  multivitamin/minerals 1 Tablet(s) Oral daily  senna 2 Tablet(s) Oral at bedtime  sevelamer carbonate 800 milliGRAM(s) Oral three times a day with meals  topiramate 25 milliGRAM(s) Oral two times a day    MEDICATIONS  (PRN):  dextrose 40% Gel 15 Gram(s) Oral once PRN Blood Glucose LESS THAN 70 milliGRAM(s)/deciliter  glucagon  Injectable 1 milliGRAM(s) IntraMuscular once PRN Glucose LESS THAN 70 milligrams/deciliter  ondansetron Injectable 4 milliGRAM(s) IV Push every 8 hours PRN Nausea and/or Vomiting  oxyCODONE    IR 5 milliGRAM(s) Oral every 6 hours PRN Severe Pain (7 - 10)      Allergies    No Known Drug Allergies  pineapple (Hives)    Intolerances        Objective:   Vital Signs Last 24 Hrs  T(C): 36.9 (15 Nov 2019 13:04), Max: 37.1 (15 Nov 2019 05:16)  T(F): 98.4 (15 Nov 2019 13:04), Max: 98.8 (15 Nov 2019 05:16)  HR: 75 (15 Nov 2019 13:04) (63 - 96)  BP: 140/45 (15 Nov 2019 13:04) (133/58 - 140/45)  BP(mean): --  RR: 16 (15 Nov 2019 13:04) (16 - 16)  SpO2: 98% (15 Nov 2019 13:04) (97% - 98%)    General Exam:   General appearance: No acute distress                 Cardiovascular: Pedal dorsalis pulses intact bilaterally    Neurological Exam:  Mental Status: Orientated to self, date and place.  Attention intact.  No dysarthria, aphasia or neglect.    Cranial Nerves: CN I - not tested.  PERRL, EOMI, VFF, no nystagmus or diplopia.  No APD.  Fundi not visualized bilaterally.   No facial asymmetry.  Hearing intact to finger rub bilaterally.      Motor:   Tone: normal.                  Strength: intact throughout    Sensation: intact to light touch    Other:    11-14    134<L>  |  100  |  87<H>  ----------------------------<  266<H>  5.3   |  20<L>  |  9.98<H>    Ca    9.4      14 Nov 2019 10:01      11-14    134<L>  |  100  |  87<H>  ----------------------------<  266<H>  5.3   |  20<L>  |  9.98<H>    Ca    9.4      14 Nov 2019 10:01          Radiology    < from: MR Head No Cont (11.15.19 @ 09:34) >    EXAM:  MR BRAIN                            PROCEDURE DATE:  11/15/2019          INTERPRETATION:  MRI brain without contrast    History dizziness    Comparison CT performed the prior day    There is no mass effect, cortical edema or hydrocephalus. Overall   cortical volume is preserved. There is mild scattered chronic   microvascular ischemic change in the periventricular white matter. There   is no evidence of acute infarct or previous parenchymal hemorrhage. The   orbital and sellar contents and cerebellar tonsils are within normal   limits.    IMPRESSION:    No acute findings                GATITO PANIAGUA M.D., ATTENDING RADIOLOGIST  This document has been electronically signed. Nov 15 2019  9:47AM        < end of copied text >

## 2019-11-15 NOTE — PROGRESS NOTE ADULT - SUBJECTIVE AND OBJECTIVE BOX
Patient is a 54y old  Female who presents with a chief complaint of Vertigo (15 Nov 2019 09:09)    pt seen in icu [  ], reg med floor [ x  ], bed [ x ], chair at bedside [   ], a+o x3 [ x ], lethargic [  ],  nad [ x ]    pt still c/o vertiginous symptoms      Allergies    No Known Drug Allergies  pineapple (Hives)        Vitals    T(F): 98.8 (11-15-19 @ 05:16), Max: 98.8 (19 @ 15:03)  HR: 63 (11-15-19 @ 05:16) (63 - 96)  BP: 133/58 (11-15-19 @ 05:16) (133/58 - 142/70)  RR: 16 (11-15-19 @ 05:16) (16 - 16)  SpO2: 98% (11-15-19 @ 05:16) (97% - 99%)  Wt(kg): --  CAPILLARY BLOOD GLUCOSE      POCT Blood Glucose.: 109 mg/dL (15 Nov 2019 07:55)      Labs                          10.4   6.52  )-----------( 159      ( 2019 10:01 )             32.4       11    134<L>  |  100  |  87<H>  ----------------------------<  266<H>  5.3   |  20<L>  |  9.98<H>    Ca    9.4      2019 10:01              .Urine  11-06 @ 15:32   <10,000 CFU/mL Normal Urogenital Jinny  --  --          Radiology Results    < from: CT Angio Neck w/ IV Cont (19 @ 20:58) >  CTA BRAIN:  The [place] and vertebrobasilar system shows no evidence of   significant stenosis, occlusion or saccular aneurysm dilation. No   evidence for arterial venous malformation. The vertebral arteries are   codominant.      CTA NECK:  Mild left carotid bulb calcifications without known narrowing. The   bilateral internal carotid arteries are tortuous with hairpin turns. A   left-sided aortic archis demonstrated. There is normal relationship to   the great vessels. The common carotid arteries, internal carotid arteries   and vertebral arteries are normal in caliber. No evidence of stenosis,   occlusion or saccular aneurysm dilation. The vertebral arteries are   codominant.      IMPRESSION:      No large vessel occlusion.    < end of copied text >      < from: MR Head No Cont (11.15.19 @ 09:34) >  There is no mass effect, cortical edema or hydrocephalus. Overall   cortical volume is preserved. There is mild scattered chronic   microvascular ischemic change in the periventricular white matter. There   is no evidence of acute infarct or previous parenchymal hemorrhage. The   orbital and sellar contents and cerebellar tonsils are within normal   limits.    IMPRESSION:    No acute findings    < end of copied text >        Meds    MEDICATIONS  (STANDING):  amLODIPine   Tablet 10 milliGRAM(s) Oral daily  aspirin  chewable 81 milliGRAM(s) Oral daily  atorvastatin 40 milliGRAM(s) Oral at bedtime  chlorhexidine 4% Liquid 1 Application(s) Topical <User Schedule>  dexAMETHasone     Tablet 4 milliGRAM(s) Oral every 12 hours  dextrose 5%. 1000 milliLiter(s) (50 mL/Hr) IV Continuous <Continuous>  dextrose 50% Injectable 12.5 Gram(s) IV Push once  dextrose 50% Injectable 25 Gram(s) IV Push once  dextrose 50% Injectable 25 Gram(s) IV Push once  folic acid 1 milliGRAM(s) Oral daily  gabapentin 300 milliGRAM(s) Oral at bedtime  heparin  Injectable 5000 Unit(s) SubCutaneous every 12 hours  hydrALAZINE 25 milliGRAM(s) Oral two times a day  insulin lispro (HumaLOG) corrective regimen sliding scale   SubCutaneous Before meals and at bedtime  labetalol 200 milliGRAM(s) Oral daily  lisinopril 40 milliGRAM(s) Oral daily  LORazepam     Tablet 0.5 milliGRAM(s) Oral two times a day  meclizine 50 milliGRAM(s) Oral three times a day  melatonin 3 milliGRAM(s) Oral at bedtime  multivitamin/minerals 1 Tablet(s) Oral daily  senna 2 Tablet(s) Oral at bedtime  sevelamer carbonate 800 milliGRAM(s) Oral three times a day with meals  topiramate 25 milliGRAM(s) Oral two times a day      MEDICATIONS  (PRN):  dextrose 40% Gel 15 Gram(s) Oral once PRN Blood Glucose LESS THAN 70 milliGRAM(s)/deciliter  glucagon  Injectable 1 milliGRAM(s) IntraMuscular once PRN Glucose LESS THAN 70 milligrams/deciliter  ondansetron Injectable 4 milliGRAM(s) IV Push every 8 hours PRN Nausea and/or Vomiting  oxyCODONE    IR 5 milliGRAM(s) Oral every 6 hours PRN Severe Pain (7 - 10)      Physical Exam      Neuro :  no focal deficits  Respiratory: CTA B/L  CV: RRR, S1S2, no murmurs,   Abdominal: Soft, NT, ND +BS,  Extremities: No edema, + peripheral pulses, moderate left hip tenderness to palp and rom      ASSESSMENT    vertigo,   Intractable migraine with aura with status migrainosus.   left hip calcific tendinitis  h/o  ESRD on HD (M/W/F),   HTN,   migraine,   DM no longer on meds  Myocardial infarct, old  ESRD (end stage renal disease) on dialysis  Asthma occurring only with upper respiratory infection  Anemia in chronic renal disease  Claustrophobia  Uterine leiomyoma, unspecified location  [place] (obstructive sleep apnea)  Gastroesophageal reflux disease without esophagitis  Vertigo  HLD (hyperlipidemia)  Morbid obesity  AV fistula  S/P craniotomy  S/P hernia repair  S/P  section        PLAN      cont tele,   cont aspirin, statin,   meclizine increased to 50mg tid,  ct head neg noted  Cervicogenic vertigo.    s/p trial acute migraine treatment- intravenous valproate 500 mg Q6H x 3 doses with metoclopramide 10 mg IV q6h x 3 doses and magnesium po 400 mg TID .  cont topamax to 25mg bid  carotid duplex noted : no significant stenosis   cta head and neck neg for large vessel occlusion noted above   mri neg for acute patho noted above  neuro f/u  ce q8 x 2 neg noted above  echo with EF = 65%,  Grade I diastolic dysfunction (Impaired relaxation). Agitated saline injection was negative for intracardiac shunt. Trivial pericardial effusion is seen noted above.  orthostatic positive with standing bp above 100  hgba1c 4.6 noted above,  renal f/u   pt s/p hd yesterday  cont hd as per renal   pulm f/u noted   pft outpt  PT recs home with home PT   ct left hip-pelv with mineralization in the distal aspect of the left gluteus medius muscle at the level of the left hip. These may represent foci of calcium hydroxyapatite deposition or age indeterminant   proximally retracted intramuscular enthesophytes from the left greater trochanter noted above   mri bony pelv with calcific tendinitis noted  pain mgmt eval noted  gabapentin 300mg po q hs  cont decadron 4mg po q daily - can increase to 2x a day for 4 days  cont oxycodone 5mg po q 6 hours prn  cont current meds   d/c plan pending  neuro f/u

## 2019-11-15 NOTE — PROGRESS NOTE ADULT - SUBJECTIVE AND OBJECTIVE BOX
Patient is a 54y old  Female who presents with a chief complaint of Vertigo (15 Nov 2019 07:32)    Awake, alert, comfortable in NAD. Still with occasional dizziness. S/p fall. No cough or sob at this moment.      INTERVAL HPI/OVERNIGHT EVENTS:      VITAL SIGNS:  T(F): 98.8 (11-15-19 @ 05:16)  HR: 63 (11-15-19 @ 05:16)  BP: 133/58 (11-15-19 @ 05:16)  RR: 16 (11-15-19 @ 05:16)  SpO2: 98% (11-15-19 @ 05:16)  Wt(kg): --  I&O's Detail          REVIEW OF SYSTEMS:    CONSTITUTIONAL:  No fevers, chills, sweats    HEENT:  Eyes:  No diplopia or blurred vision. ENT:  No earache, sore throat or runny nose.    CARDIOVASCULAR:  No pressure, squeezing, tightness, or heaviness about the chest; no palpitations.    RESPIRATORY:  Per HPI    GASTROINTESTINAL:  No abdominal pain, nausea, vomiting or diarrhea.    GENITOURINARY:  No dysuria, frequency or urgency.    NEUROLOGIC:  No paresthesias, fasciculations, seizures or weakness.    PSYCHIATRIC:  No disorder of thought or mood.      PHYSICAL EXAM:    Constitutional: Well developed and nourished  Eyes:Perrla  ENMT: normal  Neck:supple  Respiratory: good air entry  Cardiovascular: S1 S2 regular  Gastrointestinal: Soft, Non tender  Extremities: No edema  Vascular:normal  Neurological:Awake, alert,Ox3  Musculoskeletal:Normal      MEDICATIONS  (STANDING):  amLODIPine   Tablet 10 milliGRAM(s) Oral daily  aspirin  chewable 81 milliGRAM(s) Oral daily  atorvastatin 40 milliGRAM(s) Oral at bedtime  chlorhexidine 4% Liquid 1 Application(s) Topical <User Schedule>  dexAMETHasone     Tablet 4 milliGRAM(s) Oral every 12 hours  dextrose 5%. 1000 milliLiter(s) (50 mL/Hr) IV Continuous <Continuous>  dextrose 50% Injectable 12.5 Gram(s) IV Push once  dextrose 50% Injectable 25 Gram(s) IV Push once  dextrose 50% Injectable 25 Gram(s) IV Push once  folic acid 1 milliGRAM(s) Oral daily  gabapentin 300 milliGRAM(s) Oral at bedtime  heparin  Injectable 5000 Unit(s) SubCutaneous every 12 hours  hydrALAZINE 25 milliGRAM(s) Oral two times a day  insulin lispro (HumaLOG) corrective regimen sliding scale   SubCutaneous Before meals and at bedtime  labetalol 200 milliGRAM(s) Oral daily  lisinopril 40 milliGRAM(s) Oral daily  LORazepam     Tablet 0.5 milliGRAM(s) Oral two times a day  meclizine 50 milliGRAM(s) Oral three times a day  melatonin 3 milliGRAM(s) Oral at bedtime  multivitamin/minerals 1 Tablet(s) Oral daily  senna 2 Tablet(s) Oral at bedtime  sevelamer carbonate 800 milliGRAM(s) Oral three times a day with meals  topiramate 25 milliGRAM(s) Oral two times a day    MEDICATIONS  (PRN):  dextrose 40% Gel 15 Gram(s) Oral once PRN Blood Glucose LESS THAN 70 milliGRAM(s)/deciliter  glucagon  Injectable 1 milliGRAM(s) IntraMuscular once PRN Glucose LESS THAN 70 milligrams/deciliter  ondansetron Injectable 4 milliGRAM(s) IV Push every 8 hours PRN Nausea and/or Vomiting  oxyCODONE    IR 5 milliGRAM(s) Oral every 6 hours PRN Severe Pain (7 - 10)      Allergies    No Known Drug Allergies  pineapple (Hives)    Intolerances        LABS:                        10.4   6.52  )-----------( 159      ( 14 Nov 2019 10:01 )             32.4     11-14    134<L>  |  100  |  87<H>  ----------------------------<  266<H>  5.3   |  20<L>  |  9.98<H>    Ca    9.4      14 Nov 2019 10:01                CAPILLARY BLOOD GLUCOSE      POCT Blood Glucose.: 109 mg/dL (15 Nov 2019 07:55)  POCT Blood Glucose.: 138 mg/dL (14 Nov 2019 21:40)  POCT Blood Glucose.: 172 mg/dL (14 Nov 2019 16:46)  POCT Blood Glucose.: 122 mg/dL (14 Nov 2019 11:08)        RADIOLOGY & ADDITIONAL TESTS:    CXR:    Ct scan chest:    ekg;    echo:

## 2019-11-15 NOTE — PROGRESS NOTE ADULT - SUBJECTIVE AND OBJECTIVE BOX
[facility] [place] [facility]   MD MELBA OLIVEIRA D.O, PA    TELEPHONE NUMBERS:  OFFICE: [phone]  DR. SINGH CELL: [phone]  RANDY OTERO CELL: [phone]  DR. MARIE CELL:  [phone]    RENAL FOLLOW UP NOTE  --------------------------------------------------------------------------------  HPI: Pt seen and examined at bedside. Enoc SOB, chest pain     PAST HISTORY  --------------------------------------------------------------------------------  No significant changes to PMH, PSH, FHx, SHx, unless otherwise noted    ALLERGIES & MEDICATIONS  --------------------------------------------------------------------------------  Allergies    No Known Drug Allergies  pineapple (Hives)    Intolerances      Standing Inpatient Medications  amLODIPine   Tablet 10 milliGRAM(s) Oral daily  aspirin  chewable 81 milliGRAM(s) Oral daily  atorvastatin 40 milliGRAM(s) Oral at bedtime  chlorhexidine 4% Liquid 1 Application(s) Topical <User Schedule>  dexAMETHasone     Tablet 4 milliGRAM(s) Oral every 12 hours  dextrose 5%. 1000 milliLiter(s) IV Continuous <Continuous>  dextrose 50% Injectable 12.5 Gram(s) IV Push once  dextrose 50% Injectable 25 Gram(s) IV Push once  dextrose 50% Injectable 25 Gram(s) IV Push once  folic acid 1 milliGRAM(s) Oral daily  gabapentin 300 milliGRAM(s) Oral at bedtime  heparin  Injectable 5000 Unit(s) SubCutaneous every 12 hours  hydrALAZINE 25 milliGRAM(s) Oral two times a day  insulin lispro (HumaLOG) corrective regimen sliding scale   SubCutaneous Before meals and at bedtime  labetalol 200 milliGRAM(s) Oral daily  lisinopril 40 milliGRAM(s) Oral daily  LORazepam     Tablet 0.5 milliGRAM(s) Oral two times a day  meclizine 50 milliGRAM(s) Oral three times a day  melatonin 3 milliGRAM(s) Oral at bedtime  multivitamin/minerals 1 Tablet(s) Oral daily  senna 2 Tablet(s) Oral at bedtime  sevelamer carbonate 800 milliGRAM(s) Oral three times a day with meals  topiramate 25 milliGRAM(s) Oral two times a day    PRN Inpatient Medications  dextrose 40% Gel 15 Gram(s) Oral once PRN  glucagon  Injectable 1 milliGRAM(s) IntraMuscular once PRN  ondansetron Injectable 4 milliGRAM(s) IV Push every 8 hours PRN  oxyCODONE    IR 5 milliGRAM(s) Oral every 6 hours PRN      REVIEW OF SYSTEMS  --------------------------------------------------------------------------------  General: no fever  CVS: no chest pain  RESP: no sob, no cough  ABD: no abdominal pain  : no dysuria,  MSK: no edema     VITALS/PHYSICAL EXAM  --------------------------------------------------------------------------------  T(C): 37.1 (11-15-19 @ 05:16), Max: 37.1 (11-14-19 @ 15:03)  HR: 63 (11-15-19 @ 05:16) (63 - 96)  BP: 133/58 (11-15-19 @ 05:16) (133/58 - 143/66)  RR: 16 (11-15-19 @ 05:16) (16 - 16)  SpO2: 98% (11-15-19 @ 05:16) (97% - 99%)  Wt(kg): --  Height (cm): 165.1 (11-15-19 @ 06:12)      Physical Exam:  	Gen: NAD  	HEENT: MMM  	Pulm: CTA B/L  	CV: S1S2  	Abd: Soft, +BS  	Ext: No LE edema B/L                      Neuro: Awake   	Skin: Warm and Dry   	Vascular access: +AVF, + thrill, + bruit    LABS/STUDIES  --------------------------------------------------------------------------------              10.4   6.52  >-----------<  159      [11-14-19 @ 10:01]              32.4     134  |  100  |  87  ----------------------------<  266      [11-14-19 @ 10:01]  5.3   |  20  |  9.98        Ca     9.4     [11-14-19 @ 10:01]    Creatinine Trend:  SCr 9.98 [11-14 @ 10:01]  SCr 6.22 [11-12 @ 07:20]  SCr 9.50 [11-11 @ 06:25]  SCr 7.52 [11-10 @ 06:50]  SCr 5.65 [11-09 @ 06:06]    Urinalysis - [11-06-19 @ 09:05]      Color Yellow / Appearance Clear / SG 1.015 / pH 9.0      Gluc 50 / Ketone Negative  / Bili Negative / Urobili Negative       Blood Negative / Protein 100 / Leuk Est Negative / Nitrite Negative      RBC 0-2 / WBC 0-2 / Hyaline  / Gran  / Sq Epi  / Non Sq Epi  / Bacteria Trace      PTH -- (Ca 8.9)      [11-07-19 @ 10:25]   173  Vitamin D (25OH) 24.4      [11-07-19 @ 10:25]  HbA1c 4.6      [11-07-19 @ 10:26]  TSH 0.73      [11-07-19 @ 06:31]  Lipid: chol 140, , HDL 41, LDL 75      [11-07-19 @ 06:31]    HBsAg Nonreact      [11-08-19 @ 15:20]  HCV 0.09, Nonreact      [11-08-19 @ 15:20]    DICKSON: titer Negative, pattern --      [11-09-19 @ 11:03]  SPEP Interpretation: Normal Electrophoresis Pattern      [11-09-19 @ 11:24]

## 2019-11-15 NOTE — PROGRESS NOTE ADULT - PROBLEM SELECTOR PLAN 6
cont meds  Neuro follow up.  CTA of head and neck. cont meds  Neuro follow up.  CTA of head and neck neg  For MRI of Brain today

## 2019-11-15 NOTE — CHART NOTE - NSCHARTNOTEFT_GEN_A_CORE
EVENT:  Pt continues to experience dizziness      HPI:  54 year old female from home with PMHx ESRD on HD via right AVF (M/W/F), HTN, hx DM no longer on meds now with episodes of hypoglycemia , Anemia, Asthma, Claustrophobia, GERD, HLD, MI, AUSTIN,  Uterine Leiomyoma and PSHx of AV Fistula, R Antecubital AVF, , Craniotomy, and Hernia Repair gastric Bypass (2017) presents with dizziness since Monday night. Reports onset of dizziness described as spinning sensation 2 days ago. Reports she feels as if she were drunk and when she walks feels like shes walking on air. Reports she went to her HD session this morning and told staff of her symptoms who then sent her to ED, patient reports she did not receive her HD today, last received 2 days ago. Reports remote hx vertigo for which she used to take medication but symptoms has not occurred for many years.pt denies any Denies headache, focal weakness , numbness N/V, abdominal pain, hypotension, urinary symptoms, hematuria, melena, hematochezia. Denies smoking, alcohol, illicit drug use. allergic to pineapple    In ED :   NIHSS score on admission : 2  Ct head is negative for any acute event or change (did not receive TPA)  EKG: NSR  , T1 -ve , f/u T2  UA -ve   CXR : Mild cardiomegaly. No acute infiltrate.    LMP : 5 years ago (2019 11:50)        OBJECTIVE:  Vital Signs Last 24 Hrs  T(C): 36.9 (15 Nov 2019 13:04), Max: 37.1 (15 Nov 2019 05:16)  T(F): 98.4 (15 Nov 2019 13:04), Max: 98.8 (15 Nov 2019 05:16)  HR: 75 (15 Nov 2019 13:04) (63 - 96)  BP: 140/45 (15 Nov 2019 13:04) (133/58 - 140/45)  BP(mean): --  RR: 16 (15 Nov 2019 13:04) (16 - 16)  SpO2: 98% (15 Nov 2019 13:04) (97% - 98%)    LABS:                        10.5   7.51  )-----------( 184      ( 15 Nov 2019 15:48 )             32.8     11-15    137  |  100  |  62<H>  ----------------------------<  94  5.3   |  26  |  7.09<H>    Ca    8.9      15 Nov 2019 15:48        ASSESSMENT:  Pt still with periods of dizziness    Problem:  CTA of head and neck negative.  MRI negative.      PLAN:  Increased Meclizine per Neuro recommendations.  ENT consulted.

## 2019-11-16 LAB
ANION GAP SERPL CALC-SCNC: 11 MMOL/L — SIGNIFICANT CHANGE UP (ref 5–17)
BUN SERPL-MCNC: 36 MG/DL — HIGH (ref 7–18)
CALCIUM SERPL-MCNC: 9.2 MG/DL — SIGNIFICANT CHANGE UP (ref 8.4–10.5)
CHLORIDE SERPL-SCNC: 98 MMOL/L — SIGNIFICANT CHANGE UP (ref 96–108)
CO2 SERPL-SCNC: 29 MMOL/L — SIGNIFICANT CHANGE UP (ref 22–31)
CREAT SERPL-MCNC: 4.88 MG/DL — HIGH (ref 0.5–1.3)
GLUCOSE BLDC GLUCOMTR-MCNC: 104 MG/DL — HIGH (ref 70–99)
GLUCOSE BLDC GLUCOMTR-MCNC: 110 MG/DL — HIGH (ref 70–99)
GLUCOSE BLDC GLUCOMTR-MCNC: 147 MG/DL — HIGH (ref 70–99)
GLUCOSE BLDC GLUCOMTR-MCNC: 150 MG/DL — HIGH (ref 70–99)
GLUCOSE SERPL-MCNC: 124 MG/DL — HIGH (ref 70–99)
HCT VFR BLD CALC: 35.2 % — SIGNIFICANT CHANGE UP (ref 34.5–45)
HGB BLD-MCNC: 11.3 G/DL — LOW (ref 11.5–15.5)
MCHC RBC-ENTMCNC: 30.5 PG — SIGNIFICANT CHANGE UP (ref 27–34)
MCHC RBC-ENTMCNC: 32.1 GM/DL — SIGNIFICANT CHANGE UP (ref 32–36)
MCV RBC AUTO: 95.1 FL — SIGNIFICANT CHANGE UP (ref 80–100)
NRBC # BLD: 0 /100 WBCS — SIGNIFICANT CHANGE UP (ref 0–0)
PLATELET # BLD AUTO: 198 K/UL — SIGNIFICANT CHANGE UP (ref 150–400)
POTASSIUM SERPL-MCNC: 4.6 MMOL/L — SIGNIFICANT CHANGE UP (ref 3.5–5.3)
POTASSIUM SERPL-SCNC: 4.6 MMOL/L — SIGNIFICANT CHANGE UP (ref 3.5–5.3)
RBC # BLD: 3.7 M/UL — LOW (ref 3.8–5.2)
RBC # FLD: 13.4 % — SIGNIFICANT CHANGE UP (ref 10.3–14.5)
SODIUM SERPL-SCNC: 138 MMOL/L — SIGNIFICANT CHANGE UP (ref 135–145)
WBC # BLD: 7.09 K/UL — SIGNIFICANT CHANGE UP (ref 3.8–10.5)
WBC # FLD AUTO: 7.09 K/UL — SIGNIFICANT CHANGE UP (ref 3.8–10.5)

## 2019-11-16 RX ADMIN — SEVELAMER CARBONATE 800 MILLIGRAM(S): 2400 POWDER, FOR SUSPENSION ORAL at 17:27

## 2019-11-16 RX ADMIN — Medication 25 MILLIGRAM(S): at 17:27

## 2019-11-16 RX ADMIN — Medication 81 MILLIGRAM(S): at 13:02

## 2019-11-16 RX ADMIN — GABAPENTIN 300 MILLIGRAM(S): 400 CAPSULE ORAL at 21:35

## 2019-11-16 RX ADMIN — ATORVASTATIN CALCIUM 40 MILLIGRAM(S): 80 TABLET, FILM COATED ORAL at 23:28

## 2019-11-16 RX ADMIN — Medication 1 MILLIGRAM(S): at 13:02

## 2019-11-16 RX ADMIN — Medication 25 MILLIGRAM(S): at 05:34

## 2019-11-16 RX ADMIN — CHLORHEXIDINE GLUCONATE 1 APPLICATION(S): 213 SOLUTION TOPICAL at 05:36

## 2019-11-16 RX ADMIN — HEPARIN SODIUM 5000 UNIT(S): 5000 INJECTION INTRAVENOUS; SUBCUTANEOUS at 17:28

## 2019-11-16 RX ADMIN — LISINOPRIL 40 MILLIGRAM(S): 2.5 TABLET ORAL at 05:35

## 2019-11-16 RX ADMIN — Medication 4 MILLIGRAM(S): at 05:35

## 2019-11-16 RX ADMIN — Medication 50 MILLIGRAM(S): at 13:02

## 2019-11-16 RX ADMIN — Medication 4 MILLIGRAM(S): at 17:27

## 2019-11-16 RX ADMIN — Medication 50 MILLIGRAM(S): at 05:35

## 2019-11-16 RX ADMIN — SEVELAMER CARBONATE 800 MILLIGRAM(S): 2400 POWDER, FOR SUSPENSION ORAL at 08:35

## 2019-11-16 RX ADMIN — AMLODIPINE BESYLATE 10 MILLIGRAM(S): 2.5 TABLET ORAL at 05:35

## 2019-11-16 RX ADMIN — HEPARIN SODIUM 5000 UNIT(S): 5000 INJECTION INTRAVENOUS; SUBCUTANEOUS at 05:35

## 2019-11-16 RX ADMIN — Medication 200 MILLIGRAM(S): at 05:34

## 2019-11-16 RX ADMIN — Medication 50 MILLIGRAM(S): at 21:35

## 2019-11-16 RX ADMIN — Medication 25 MILLIGRAM(S): at 05:35

## 2019-11-16 RX ADMIN — SENNA PLUS 2 TABLET(S): 8.6 TABLET ORAL at 21:34

## 2019-11-16 RX ADMIN — SEVELAMER CARBONATE 800 MILLIGRAM(S): 2400 POWDER, FOR SUSPENSION ORAL at 13:02

## 2019-11-16 RX ADMIN — Medication 3 MILLIGRAM(S): at 21:35

## 2019-11-16 RX ADMIN — Medication 1 TABLET(S): at 13:02

## 2019-11-16 NOTE — PROGRESS NOTE ADULT - SUBJECTIVE AND OBJECTIVE BOX
Patient is a 54y old  Female who presents with a chief complaint of Vertigo (15 Nov 2019 16:03)    pt seen in icu [  ], reg med floor [ x  ], bed [ x ], chair at bedside [   ], a+o x3 [ x ], lethargic [  ],  nad [ x ]    pt still c/o vertiginous symptoms        Allergies    No Known Drug Allergies  pineapple (Hives)        Vitals    T(F): 98.9 (19 @ 09:02), Max: 99 (11-15-19 @ 20:02)  HR: 79 (19 @ 09:02) (68 - 79)  BP: 140/57 (19 @ 09:02) (120/67 - 148/71)  RR: 16 (19 @ 09:02) (16 - 16)  SpO2: 99% (19 @ 09:02) (97% - 99%)  Wt(kg): --  CAPILLARY BLOOD GLUCOSE      POCT Blood Glucose.: 110 mg/dL (2019 08:03)      Labs                          11.3   7.09  )-----------( 198      ( 2019 07:18 )             35.2           138  |  98  |  36<H>  ----------------------------<  124<H>  4.6   |  29  |  4.88<H>    Ca    9.2      2019 07:18              .Urine  11-06 @ 15:32   <10,000 CFU/mL Normal Urogenital Jinny  --  --          Radiology Results      Meds    MEDICATIONS  (STANDING):  amLODIPine   Tablet 10 milliGRAM(s) Oral daily  aspirin  chewable 81 milliGRAM(s) Oral daily  atorvastatin 40 milliGRAM(s) Oral at bedtime  chlorhexidine 4% Liquid 1 Application(s) Topical <User Schedule>  dexAMETHasone     Tablet 4 milliGRAM(s) Oral every 12 hours  dextrose 5%. 1000 milliLiter(s) (50 mL/Hr) IV Continuous <Continuous>  dextrose 50% Injectable 12.5 Gram(s) IV Push once  dextrose 50% Injectable 25 Gram(s) IV Push once  dextrose 50% Injectable 25 Gram(s) IV Push once  folic acid 1 milliGRAM(s) Oral daily  gabapentin 300 milliGRAM(s) Oral at bedtime  heparin  Injectable 5000 Unit(s) SubCutaneous every 12 hours  hydrALAZINE 25 milliGRAM(s) Oral two times a day  insulin lispro (HumaLOG) corrective regimen sliding scale   SubCutaneous Before meals and at bedtime  labetalol 200 milliGRAM(s) Oral daily  lisinopril 40 milliGRAM(s) Oral daily  meclizine 50 milliGRAM(s) Oral three times a day  melatonin 3 milliGRAM(s) Oral at bedtime  multivitamin/minerals 1 Tablet(s) Oral daily  senna 2 Tablet(s) Oral at bedtime  sevelamer carbonate 800 milliGRAM(s) Oral three times a day with meals  topiramate 25 milliGRAM(s) Oral two times a day      MEDICATIONS  (PRN):  dextrose 40% Gel 15 Gram(s) Oral once PRN Blood Glucose LESS THAN 70 milliGRAM(s)/deciliter  glucagon  Injectable 1 milliGRAM(s) IntraMuscular once PRN Glucose LESS THAN 70 milligrams/deciliter  ondansetron Injectable 4 milliGRAM(s) IV Push every 8 hours PRN Nausea and/or Vomiting  oxyCODONE    IR 5 milliGRAM(s) Oral every 6 hours PRN Severe Pain (7 - 10)      Physical Exam      Neuro :  no focal deficits  Respiratory: CTA B/L  CV: RRR, S1S2, no murmurs,   Abdominal: Soft, NT, ND +BS,  Extremities: No edema, + peripheral pulses, moderate left hip tenderness to palp and rom      ASSESSMENT    peripheral vertigo,   Intractable migraine with aura with status migrainosus.   left hip calcific tendinitis  h/o  ESRD on HD (M/W/F),   HTN,   migraine,   DM no longer on meds  Myocardial infarct, old  ESRD (end stage renal disease) on dialysis  Asthma occurring only with upper respiratory infection  Anemia in chronic renal disease  Claustrophobia  Uterine leiomyoma, unspecified location  AUSTIN (obstructive sleep apnea)  Gastroesophageal reflux disease without esophagitis  Vertigo  HLD (hyperlipidemia)  Morbid obesity  AV fistula  S/P craniotomy  S/P hernia repair  S/P  section        PLAN      cont tele,   cont aspirin, statin,   cont meclizine to 50mg tid,  ct head neg noted  Cervicogenic vertigo.    s/p trial acute migraine treatment- intravenous valproate 500 mg Q6H x 3 doses with metoclopramide 10 mg IV q6h x 3 doses and magnesium po 400 mg TID .  cont topamax to 25mg bid  carotid duplex noted : no significant stenosis   cta head and neck neg for large vessel occlusion noted above   mri neg for acute patho noted above  neuro f/u noted  If the patient continues to have vertigo, can consider trial of benzo  ent eval.  ce q8 x 2 neg noted above  echo with EF = 65%,  Grade I diastolic dysfunction (Impaired relaxation). Agitated saline injection was negative for intracardiac shunt. Trivial pericardial effusion is seen noted above.  orthostatic positive with standing bp above 100  hgba1c 4.6 noted above,  renal f/u   cont hd as per renal   pulm f/u noted   pft outpt  PT recs home with home PT   ct left hip-pelv with mineralization in the distal aspect of the left gluteus medius muscle at the level of the left hip. These may represent foci of calcium hydroxyapatite deposition or age indeterminant   proximally retracted intramuscular enthesophytes from the left greater trochanter noted above   mri bony pelv with calcific tendinitis noted  pain mgmt eval noted  gabapentin 300mg po q hs  cont decadron 4mg po q daily - can increase to 2x a day for 4 days  cont oxycodone 5mg po q 6 hours prn  cont current meds

## 2019-11-16 NOTE — PROGRESS NOTE ADULT - SUBJECTIVE AND OBJECTIVE BOX
CHIEF COMPLAINT:Patient is a 54y old  Female who presents with a chief complaint of Vertigo.Pt still dizzy.    	  REVIEW OF SYSTEMS:  CONSTITUTIONAL: No fever, weight loss, or fatigue  EYES: No eye pain, visual disturbances, or discharge  ENT:  No difficulty hearing, tinnitus, vertigo; No sinus or throat pain  NECK: No pain or stiffness  RESPIRATORY: No cough, wheezing, chills or hemoptysis; No Shortness of Breath  CARDIOVASCULAR: No chest pain, palpitations, passing out, +dizziness  GASTROINTESTINAL: No abdominal or epigastric pain. No nausea, vomiting, or hematemesis; No diarrhea or constipation. No melena or hematochezia.  GENITOURINARY: No dysuria, frequency, hematuria, or incontinence  NEUROLOGICAL: No headaches, memory loss, loss of strength, numbness, or tremors  SKIN: No itching, burning, rashes, or lesions   LYMPH Nodes: No enlarged glands  ENDOCRINE: No heat or cold intolerance; No hair loss  MUSCULOSKELETAL: No joint pain or swelling; No muscle, back, or extremity pain  PSYCHIATRIC: No depression, anxiety, mood swings, or difficulty sleeping  HEME/LYMPH: No easy bruising, or bleeding gums  ALLERGY AND IMMUNOLOGIC: No hives or eczema	      PHYSICAL EXAM:  T(C): 37.2 (11-16-19 @ 09:02), Max: 37.2 (11-15-19 @ 20:02)  HR: 79 (11-16-19 @ 09:02) (68 - 79)  BP: 140/57 (11-16-19 @ 09:02) (120/67 - 148/71)  RR: 16 (11-16-19 @ 09:02) (16 - 16)  SpO2: 99% (11-16-19 @ 09:02) (97% - 99%)  Wt(kg): --  I&O's Summary      Appearance: Normal	  HEENT:   Normal oral mucosa, PERRL, EOMI	  Lymphatic: No lymphadenopathy  Cardiovascular: Normal S1 S2, No JVD, No murmurs, No edema  Respiratory: Lungs clear to auscultation	  Psychiatry: A & O x 3, Mood & affect appropriate  Gastrointestinal:  Soft, Non-tender, + BS	  Skin: No rashes, No ecchymoses, No cyanosis	  Neurologic: Non-focal  Extremities: Normal range of motion, No clubbing, cyanosis or edema  Vascular: Peripheral pulses palpable 2+ bilaterally    MEDICATIONS  (STANDING):  amLODIPine   Tablet 10 milliGRAM(s) Oral daily  aspirin  chewable 81 milliGRAM(s) Oral daily  atorvastatin 40 milliGRAM(s) Oral at bedtime  chlorhexidine 4% Liquid 1 Application(s) Topical <User Schedule>  dexAMETHasone     Tablet 4 milliGRAM(s) Oral every 12 hours  dextrose 5%. 1000 milliLiter(s) (50 mL/Hr) IV Continuous <Continuous>  dextrose 50% Injectable 12.5 Gram(s) IV Push once  dextrose 50% Injectable 25 Gram(s) IV Push once  dextrose 50% Injectable 25 Gram(s) IV Push once  folic acid 1 milliGRAM(s) Oral daily  gabapentin 300 milliGRAM(s) Oral at bedtime  heparin  Injectable 5000 Unit(s) SubCutaneous every 12 hours  hydrALAZINE 25 milliGRAM(s) Oral two times a day  insulin lispro (HumaLOG) corrective regimen sliding scale   SubCutaneous Before meals and at bedtime  labetalol 200 milliGRAM(s) Oral daily  lisinopril 40 milliGRAM(s) Oral daily  meclizine 50 milliGRAM(s) Oral three times a day  melatonin 3 milliGRAM(s) Oral at bedtime  multivitamin/minerals 1 Tablet(s) Oral daily  senna 2 Tablet(s) Oral at bedtime  sevelamer carbonate 800 milliGRAM(s) Oral three times a day with meals  topiramate 25 milliGRAM(s) Oral two times a day        	  LABS:	 	                       11.3   7.09  )-----------( 198      ( 16 Nov 2019 07:18 )             35.2     11-16    138  |  98  |  36<H>  ----------------------------<  124<H>  4.6   |  29  |  4.88<H>    Ca    9.2      16 Nov 2019 07:18        Lipid Profile: Cholesterol 140  LDL 75  HDL 41      HgA1c: Hemoglobin A1C, Whole Blood: 4.6 % (11-07 @ 10:26)    TSH: Thyroid Stimulating Hormone, Serum: 0.73 uU/mL (11-07 @ 06:31)

## 2019-11-16 NOTE — PROGRESS NOTE ADULT - SUBJECTIVE AND OBJECTIVE BOX
ORI JAMESON  54y  Patient is a 54y old  Female who presents with a chief complaint of Vertigo (16 Nov 2019 13:02)    HPI:  ESRD with Headaches and Vertigo. Last HD yesterday.    HEALTH ISSUES - PROBLEM Dx:  Hip pain, acute, left: Hip pain, acute, left  Anxiety: Anxiety  Morbid obesity: Morbid obesity  Asthma occurring only with upper respiratory infection: Asthma occurring only with upper respiratory infection  AUSTIN (obstructive sleep apnea): AUSTIN (obstructive sleep apnea)  Prophylactic measure: Prophylactic measure  Diabetes mellitus, type 2: Diabetes mellitus, type 2  Essential hypertension: Essential hypertension  ESRD (end stage renal disease) on dialysis: ESRD (end stage renal disease) on dialysis  Vertigo: Vertigo        MEDICATIONS  (STANDING):  amLODIPine   Tablet 10 milliGRAM(s) Oral daily  aspirin  chewable 81 milliGRAM(s) Oral daily  atorvastatin 40 milliGRAM(s) Oral at bedtime  chlorhexidine 4% Liquid 1 Application(s) Topical <User Schedule>  dexAMETHasone     Tablet 4 milliGRAM(s) Oral every 12 hours  dextrose 5%. 1000 milliLiter(s) (50 mL/Hr) IV Continuous <Continuous>  dextrose 50% Injectable 12.5 Gram(s) IV Push once  dextrose 50% Injectable 25 Gram(s) IV Push once  dextrose 50% Injectable 25 Gram(s) IV Push once  folic acid 1 milliGRAM(s) Oral daily  gabapentin 300 milliGRAM(s) Oral at bedtime  heparin  Injectable 5000 Unit(s) SubCutaneous every 12 hours  hydrALAZINE 25 milliGRAM(s) Oral two times a day  insulin lispro (HumaLOG) corrective regimen sliding scale   SubCutaneous Before meals and at bedtime  labetalol 200 milliGRAM(s) Oral daily  lisinopril 40 milliGRAM(s) Oral daily  meclizine 50 milliGRAM(s) Oral three times a day  melatonin 3 milliGRAM(s) Oral at bedtime  multivitamin/minerals 1 Tablet(s) Oral daily  senna 2 Tablet(s) Oral at bedtime  sevelamer carbonate 800 milliGRAM(s) Oral three times a day with meals  topiramate 25 milliGRAM(s) Oral two times a day    MEDICATIONS  (PRN):  dextrose 40% Gel 15 Gram(s) Oral once PRN Blood Glucose LESS THAN 70 milliGRAM(s)/deciliter  glucagon  Injectable 1 milliGRAM(s) IntraMuscular once PRN Glucose LESS THAN 70 milligrams/deciliter  ondansetron Injectable 4 milliGRAM(s) IV Push every 8 hours PRN Nausea and/or Vomiting  oxyCODONE    IR 5 milliGRAM(s) Oral every 6 hours PRN Severe Pain (7 - 10)    Vital Signs Last 24 Hrs  T(C): 36.8 (16 Nov 2019 20:56), Max: 37.2 (16 Nov 2019 09:02)  T(F): 98.2 (16 Nov 2019 20:56), Max: 98.9 (16 Nov 2019 09:02)  HR: 72 (16 Nov 2019 20:56) (68 - 79)  BP: 126/50 (16 Nov 2019 20:56) (120/67 - 140/57)  BP(mean): --  RR: 16 (16 Nov 2019 20:56) (16 - 16)  SpO2: 98% (16 Nov 2019 20:56) (97% - 100%)  Daily     Daily     PHYSICAL EXAM:  Constitutional:  She appears comfortable and not distressed. Not diaphoretic.    Respiratory: The lungs are clear to auscultation. No dullness and expansion is normal.    Cardiovascular: S1 and S2 are normal. No mummurs, rubs or gallops are present.    Gastrointestinal: The abdomen is soft. No tenderness is present. No masses are present. Bowel sounds are normal.    Genitourinary: The bladder is not distended. No CVA tenderness is present.    Extremities: No edema is noted. No deformities are present.    Neurological: Cognition is normal. Tone, power and sensation are normal. Gait is steady.                            11.3   7.09  )-----------( 198      ( 16 Nov 2019 07:18 )             35.2     11-16    138  |  98  |  36<H>  ----------------------------<  124<H>  4.6   |  29  |  4.88<H>    Ca    9.2      16 Nov 2019 07:18

## 2019-11-16 NOTE — PROGRESS NOTE ADULT - PROBLEM SELECTOR PLAN 6
cont meds  Neuro follow up.  CTA of head and neck neg   MRI of Brain cont meds  Neuro follow up.  CTA of head and neck neg   MRI of Brain noted  Will need ENT eval

## 2019-11-16 NOTE — PROGRESS NOTE ADULT - SUBJECTIVE AND OBJECTIVE BOX
Patient is a 54y old  Female who presents with a chief complaint of Vertigo (16 Nov 2019 09:47)    Awake, alert, comfortable in NAD. Still with occasional dizziness. S/p fall. No cough or sob at this moment.      INTERVAL HPI/OVERNIGHT EVENTS:      VITAL SIGNS:  T(F): 98.9 (11-16-19 @ 09:02)  HR: 79 (11-16-19 @ 09:02)  BP: 140/57 (11-16-19 @ 09:02)  RR: 16 (11-16-19 @ 09:02)  SpO2: 99% (11-16-19 @ 09:02)  Wt(kg): --  I&O's Detail          REVIEW OF SYSTEMS:    CONSTITUTIONAL:  No fevers, chills, sweats    HEENT:  Eyes:  No diplopia or blurred vision. ENT:  No earache, sore throat or runny nose.    CARDIOVASCULAR:  No pressure, squeezing, tightness, or heaviness about the chest; no palpitations.    RESPIRATORY:  Per HPI    GASTROINTESTINAL:  No abdominal pain, nausea, vomiting or diarrhea.    GENITOURINARY:  No dysuria, frequency or urgency.    NEUROLOGIC:  No paresthesias, fasciculations, seizures or weakness.    PSYCHIATRIC:  No disorder of thought or mood.      PHYSICAL EXAM:    Constitutional: Well developed and nourished  Eyes:Perrla  ENMT: normal  Neck:supple  Respiratory: good air entry  Cardiovascular: S1 S2 regular  Gastrointestinal: Soft, Non tender  Extremities: No edema  Vascular:normal  Neurological:Awake, alert,Ox3  Musculoskeletal:Normal      MEDICATIONS  (STANDING):  amLODIPine   Tablet 10 milliGRAM(s) Oral daily  aspirin  chewable 81 milliGRAM(s) Oral daily  atorvastatin 40 milliGRAM(s) Oral at bedtime  chlorhexidine 4% Liquid 1 Application(s) Topical <User Schedule>  dexAMETHasone     Tablet 4 milliGRAM(s) Oral every 12 hours  dextrose 5%. 1000 milliLiter(s) (50 mL/Hr) IV Continuous <Continuous>  dextrose 50% Injectable 12.5 Gram(s) IV Push once  dextrose 50% Injectable 25 Gram(s) IV Push once  dextrose 50% Injectable 25 Gram(s) IV Push once  folic acid 1 milliGRAM(s) Oral daily  gabapentin 300 milliGRAM(s) Oral at bedtime  heparin  Injectable 5000 Unit(s) SubCutaneous every 12 hours  hydrALAZINE 25 milliGRAM(s) Oral two times a day  insulin lispro (HumaLOG) corrective regimen sliding scale   SubCutaneous Before meals and at bedtime  labetalol 200 milliGRAM(s) Oral daily  lisinopril 40 milliGRAM(s) Oral daily  meclizine 50 milliGRAM(s) Oral three times a day  melatonin 3 milliGRAM(s) Oral at bedtime  multivitamin/minerals 1 Tablet(s) Oral daily  senna 2 Tablet(s) Oral at bedtime  sevelamer carbonate 800 milliGRAM(s) Oral three times a day with meals  topiramate 25 milliGRAM(s) Oral two times a day    MEDICATIONS  (PRN):  dextrose 40% Gel 15 Gram(s) Oral once PRN Blood Glucose LESS THAN 70 milliGRAM(s)/deciliter  glucagon  Injectable 1 milliGRAM(s) IntraMuscular once PRN Glucose LESS THAN 70 milligrams/deciliter  ondansetron Injectable 4 milliGRAM(s) IV Push every 8 hours PRN Nausea and/or Vomiting  oxyCODONE    IR 5 milliGRAM(s) Oral every 6 hours PRN Severe Pain (7 - 10)      Allergies    No Known Drug Allergies  pineapple (Hives)    Intolerances        LABS:                        11.3   7.09  )-----------( 198      ( 16 Nov 2019 07:18 )             35.2     11-16    138  |  98  |  36<H>  ----------------------------<  124<H>  4.6   |  29  |  4.88<H>    Ca    9.2      16 Nov 2019 07:18                CAPILLARY BLOOD GLUCOSE      POCT Blood Glucose.: 104 mg/dL (16 Nov 2019 11:45)  POCT Blood Glucose.: 110 mg/dL (16 Nov 2019 08:03)  POCT Blood Glucose.: 96 mg/dL (15 Nov 2019 19:55)  POCT Blood Glucose.: 78 mg/dL (15 Nov 2019 16:58)        RADIOLOGY & ADDITIONAL TESTS:    CXR:    Ct scan chest:    ekg;    echo: Patient is a 54y old  Female who presents with a chief complaint of Vertigo (16 Nov 2019 09:47)    Awake, alert, comfortable in NAD. Still with occasional dizziness. S/p fall. No cough or sob at this moment. Unable to ambulate without risk of falling      INTERVAL HPI/OVERNIGHT EVENTS:      VITAL SIGNS:  T(F): 98.9 (11-16-19 @ 09:02)  HR: 79 (11-16-19 @ 09:02)  BP: 140/57 (11-16-19 @ 09:02)  RR: 16 (11-16-19 @ 09:02)  SpO2: 99% (11-16-19 @ 09:02)  Wt(kg): --  I&O's Detail          REVIEW OF SYSTEMS:    CONSTITUTIONAL:  No fevers, chills, sweats    HEENT:  Eyes:  No diplopia or blurred vision. ENT:  No earache, sore throat or runny nose.    CARDIOVASCULAR:  No pressure, squeezing, tightness, or heaviness about the chest; no palpitations.    RESPIRATORY:  Per HPI    GASTROINTESTINAL:  No abdominal pain, nausea, vomiting or diarrhea.    GENITOURINARY:  No dysuria, frequency or urgency.    NEUROLOGIC:  No paresthesias, fasciculations, seizures or weakness.    PSYCHIATRIC:  No disorder of thought or mood.      PHYSICAL EXAM:    Constitutional: Well developed and nourished  Eyes:Perrla  ENMT: normal  Neck:supple  Respiratory: good air entry  Cardiovascular: S1 S2 regular  Gastrointestinal: Soft, Non tender  Extremities: No edema  Vascular:normal  Neurological:Awake, alert,Ox3  Musculoskeletal:Normal      MEDICATIONS  (STANDING):  amLODIPine   Tablet 10 milliGRAM(s) Oral daily  aspirin  chewable 81 milliGRAM(s) Oral daily  atorvastatin 40 milliGRAM(s) Oral at bedtime  chlorhexidine 4% Liquid 1 Application(s) Topical <User Schedule>  dexAMETHasone     Tablet 4 milliGRAM(s) Oral every 12 hours  dextrose 5%. 1000 milliLiter(s) (50 mL/Hr) IV Continuous <Continuous>  dextrose 50% Injectable 12.5 Gram(s) IV Push once  dextrose 50% Injectable 25 Gram(s) IV Push once  dextrose 50% Injectable 25 Gram(s) IV Push once  folic acid 1 milliGRAM(s) Oral daily  gabapentin 300 milliGRAM(s) Oral at bedtime  heparin  Injectable 5000 Unit(s) SubCutaneous every 12 hours  hydrALAZINE 25 milliGRAM(s) Oral two times a day  insulin lispro (HumaLOG) corrective regimen sliding scale   SubCutaneous Before meals and at bedtime  labetalol 200 milliGRAM(s) Oral daily  lisinopril 40 milliGRAM(s) Oral daily  meclizine 50 milliGRAM(s) Oral three times a day  melatonin 3 milliGRAM(s) Oral at bedtime  multivitamin/minerals 1 Tablet(s) Oral daily  senna 2 Tablet(s) Oral at bedtime  sevelamer carbonate 800 milliGRAM(s) Oral three times a day with meals  topiramate 25 milliGRAM(s) Oral two times a day    MEDICATIONS  (PRN):  dextrose 40% Gel 15 Gram(s) Oral once PRN Blood Glucose LESS THAN 70 milliGRAM(s)/deciliter  glucagon  Injectable 1 milliGRAM(s) IntraMuscular once PRN Glucose LESS THAN 70 milligrams/deciliter  ondansetron Injectable 4 milliGRAM(s) IV Push every 8 hours PRN Nausea and/or Vomiting  oxyCODONE    IR 5 milliGRAM(s) Oral every 6 hours PRN Severe Pain (7 - 10)      Allergies    No Known Drug Allergies  pineapple (Hives)    Intolerances        LABS:                        11.3   7.09  )-----------( 198      ( 16 Nov 2019 07:18 )             35.2     11-16    138  |  98  |  36<H>  ----------------------------<  124<H>  4.6   |  29  |  4.88<H>    Ca    9.2      16 Nov 2019 07:18                CAPILLARY BLOOD GLUCOSE      POCT Blood Glucose.: 104 mg/dL (16 Nov 2019 11:45)  POCT Blood Glucose.: 110 mg/dL (16 Nov 2019 08:03)  POCT Blood Glucose.: 96 mg/dL (15 Nov 2019 19:55)  POCT Blood Glucose.: 78 mg/dL (15 Nov 2019 16:58)        RADIOLOGY & ADDITIONAL TESTS:    CXR:    Ct scan chest:    ekg;    echo:

## 2019-11-17 LAB
ANION GAP SERPL CALC-SCNC: 11 MMOL/L — SIGNIFICANT CHANGE UP (ref 5–17)
BUN SERPL-MCNC: 69 MG/DL — HIGH (ref 7–18)
CALCIUM SERPL-MCNC: 9.4 MG/DL — SIGNIFICANT CHANGE UP (ref 8.4–10.5)
CHLORIDE SERPL-SCNC: 99 MMOL/L — SIGNIFICANT CHANGE UP (ref 96–108)
CO2 SERPL-SCNC: 26 MMOL/L — SIGNIFICANT CHANGE UP (ref 22–31)
CREAT SERPL-MCNC: 7.39 MG/DL — HIGH (ref 0.5–1.3)
GLUCOSE BLDC GLUCOMTR-MCNC: 143 MG/DL — HIGH (ref 70–99)
GLUCOSE BLDC GLUCOMTR-MCNC: 165 MG/DL — HIGH (ref 70–99)
GLUCOSE BLDC GLUCOMTR-MCNC: 226 MG/DL — HIGH (ref 70–99)
GLUCOSE BLDC GLUCOMTR-MCNC: 294 MG/DL — HIGH (ref 70–99)
GLUCOSE SERPL-MCNC: 97 MG/DL — SIGNIFICANT CHANGE UP (ref 70–99)
HCT VFR BLD CALC: 33.5 % — LOW (ref 34.5–45)
HGB BLD-MCNC: 10.8 G/DL — LOW (ref 11.5–15.5)
MCHC RBC-ENTMCNC: 30.5 PG — SIGNIFICANT CHANGE UP (ref 27–34)
MCHC RBC-ENTMCNC: 32.2 GM/DL — SIGNIFICANT CHANGE UP (ref 32–36)
MCV RBC AUTO: 94.6 FL — SIGNIFICANT CHANGE UP (ref 80–100)
NRBC # BLD: 0 /100 WBCS — SIGNIFICANT CHANGE UP (ref 0–0)
PLATELET # BLD AUTO: 197 K/UL — SIGNIFICANT CHANGE UP (ref 150–400)
POTASSIUM SERPL-MCNC: 5 MMOL/L — SIGNIFICANT CHANGE UP (ref 3.5–5.3)
POTASSIUM SERPL-SCNC: 5 MMOL/L — SIGNIFICANT CHANGE UP (ref 3.5–5.3)
RBC # BLD: 3.54 M/UL — LOW (ref 3.8–5.2)
RBC # FLD: 13.3 % — SIGNIFICANT CHANGE UP (ref 10.3–14.5)
SODIUM SERPL-SCNC: 136 MMOL/L — SIGNIFICANT CHANGE UP (ref 135–145)
WBC # BLD: 6.94 K/UL — SIGNIFICANT CHANGE UP (ref 3.8–10.5)
WBC # FLD AUTO: 6.94 K/UL — SIGNIFICANT CHANGE UP (ref 3.8–10.5)

## 2019-11-17 RX ADMIN — Medication 1 TABLET(S): at 12:11

## 2019-11-17 RX ADMIN — ATORVASTATIN CALCIUM 40 MILLIGRAM(S): 80 TABLET, FILM COATED ORAL at 21:55

## 2019-11-17 RX ADMIN — SEVELAMER CARBONATE 800 MILLIGRAM(S): 2400 POWDER, FOR SUSPENSION ORAL at 08:46

## 2019-11-17 RX ADMIN — AMLODIPINE BESYLATE 10 MILLIGRAM(S): 2.5 TABLET ORAL at 05:52

## 2019-11-17 RX ADMIN — Medication 50 MILLIGRAM(S): at 21:55

## 2019-11-17 RX ADMIN — Medication 50 MILLIGRAM(S): at 17:04

## 2019-11-17 RX ADMIN — HEPARIN SODIUM 5000 UNIT(S): 5000 INJECTION INTRAVENOUS; SUBCUTANEOUS at 05:52

## 2019-11-17 RX ADMIN — Medication 1: at 17:03

## 2019-11-17 RX ADMIN — Medication 200 MILLIGRAM(S): at 05:53

## 2019-11-17 RX ADMIN — SENNA PLUS 2 TABLET(S): 8.6 TABLET ORAL at 21:55

## 2019-11-17 RX ADMIN — GABAPENTIN 300 MILLIGRAM(S): 400 CAPSULE ORAL at 21:55

## 2019-11-17 RX ADMIN — Medication 25 MILLIGRAM(S): at 17:05

## 2019-11-17 RX ADMIN — Medication 2: at 12:09

## 2019-11-17 RX ADMIN — LISINOPRIL 40 MILLIGRAM(S): 2.5 TABLET ORAL at 05:54

## 2019-11-17 RX ADMIN — Medication 4 MILLIGRAM(S): at 05:51

## 2019-11-17 RX ADMIN — Medication 3 MILLIGRAM(S): at 21:55

## 2019-11-17 RX ADMIN — Medication 81 MILLIGRAM(S): at 12:11

## 2019-11-17 RX ADMIN — Medication 258 MILLIGRAM(S): at 10:29

## 2019-11-17 RX ADMIN — Medication 25 MILLIGRAM(S): at 17:04

## 2019-11-17 RX ADMIN — HEPARIN SODIUM 5000 UNIT(S): 5000 INJECTION INTRAVENOUS; SUBCUTANEOUS at 17:04

## 2019-11-17 RX ADMIN — SEVELAMER CARBONATE 800 MILLIGRAM(S): 2400 POWDER, FOR SUSPENSION ORAL at 17:04

## 2019-11-17 RX ADMIN — CHLORHEXIDINE GLUCONATE 1 APPLICATION(S): 213 SOLUTION TOPICAL at 05:54

## 2019-11-17 RX ADMIN — SEVELAMER CARBONATE 800 MILLIGRAM(S): 2400 POWDER, FOR SUSPENSION ORAL at 12:10

## 2019-11-17 RX ADMIN — OXYCODONE HYDROCHLORIDE 5 MILLIGRAM(S): 5 TABLET ORAL at 21:55

## 2019-11-17 RX ADMIN — Medication 3: at 21:55

## 2019-11-17 RX ADMIN — Medication 1 MILLIGRAM(S): at 12:11

## 2019-11-17 RX ADMIN — Medication 25 MILLIGRAM(S): at 05:52

## 2019-11-17 RX ADMIN — OXYCODONE HYDROCHLORIDE 5 MILLIGRAM(S): 5 TABLET ORAL at 22:25

## 2019-11-17 RX ADMIN — Medication 50 MILLIGRAM(S): at 05:51

## 2019-11-17 RX ADMIN — Medication 25 MILLIGRAM(S): at 05:53

## 2019-11-17 NOTE — PROGRESS NOTE ADULT - SUBJECTIVE AND OBJECTIVE BOX
Patient is a 54y old  Female who presents with a chief complaint of Vertigo (17 Nov 2019 07:08)    Awake, alert, comfortable in NAD. Still with occasional dizziness. S/p fall. No cough or sob at this moment. Unable to ambulate without risk of falling      INTERVAL HPI/OVERNIGHT EVENTS:      VITAL SIGNS:  T(F): 98.2 (11-17-19 @ 05:23)  HR: 63 (11-17-19 @ 05:23)  BP: 121/53 (11-17-19 @ 05:23)  RR: 16 (11-17-19 @ 05:23)  SpO2: 98% (11-17-19 @ 05:23)  Wt(kg): --  I&O's Detail          REVIEW OF SYSTEMS:    CONSTITUTIONAL:  No fevers, chills, sweats    HEENT:  Eyes:  No diplopia or blurred vision. ENT:  No earache, sore throat or runny nose.    CARDIOVASCULAR:  No pressure, squeezing, tightness, or heaviness about the chest; no palpitations.    RESPIRATORY:  Per HPI    GASTROINTESTINAL:  No abdominal pain, nausea, vomiting or diarrhea.    GENITOURINARY:  No dysuria, frequency or urgency.    NEUROLOGIC:  No paresthesias, fasciculations, seizures or weakness.    PSYCHIATRIC:  No disorder of thought or mood.      PHYSICAL EXAM:    Constitutional: Well developed and nourished  Eyes:Perrla  ENMT: normal  Neck:supple  Respiratory: good air entry  Cardiovascular: S1 S2 regular  Gastrointestinal: Soft, Non tender  Extremities: No edema  Vascular:normal  Neurological:Awake, alert,Ox3  Musculoskeletal:Normal      MEDICATIONS  (STANDING):  amLODIPine   Tablet 10 milliGRAM(s) Oral daily  aspirin  chewable 81 milliGRAM(s) Oral daily  atorvastatin 40 milliGRAM(s) Oral at bedtime  chlorhexidine 4% Liquid 1 Application(s) Topical <User Schedule>  dextrose 5%. 1000 milliLiter(s) (50 mL/Hr) IV Continuous <Continuous>  dextrose 50% Injectable 12.5 Gram(s) IV Push once  dextrose 50% Injectable 25 Gram(s) IV Push once  dextrose 50% Injectable 25 Gram(s) IV Push once  folic acid 1 milliGRAM(s) Oral daily  gabapentin 300 milliGRAM(s) Oral at bedtime  heparin  Injectable 5000 Unit(s) SubCutaneous every 12 hours  hydrALAZINE 25 milliGRAM(s) Oral two times a day  insulin lispro (HumaLOG) corrective regimen sliding scale   SubCutaneous Before meals and at bedtime  labetalol 200 milliGRAM(s) Oral daily  lisinopril 40 milliGRAM(s) Oral daily  meclizine 50 milliGRAM(s) Oral three times a day  melatonin 3 milliGRAM(s) Oral at bedtime  methylPREDNISolone sodium succinate IVPB 1000 milliGRAM(s) IV Intermittent daily  multivitamin/minerals 1 Tablet(s) Oral daily  senna 2 Tablet(s) Oral at bedtime  sevelamer carbonate 800 milliGRAM(s) Oral three times a day with meals  topiramate 25 milliGRAM(s) Oral two times a day    MEDICATIONS  (PRN):  dextrose 40% Gel 15 Gram(s) Oral once PRN Blood Glucose LESS THAN 70 milliGRAM(s)/deciliter  glucagon  Injectable 1 milliGRAM(s) IntraMuscular once PRN Glucose LESS THAN 70 milligrams/deciliter  ondansetron Injectable 4 milliGRAM(s) IV Push every 8 hours PRN Nausea and/or Vomiting  oxyCODONE    IR 5 milliGRAM(s) Oral every 6 hours PRN Severe Pain (7 - 10)      Allergies    No Known Drug Allergies  pineapple (Hives)    Intolerances        LABS:                        10.8   6.94  )-----------( 197      ( 17 Nov 2019 06:17 )             33.5     11-17    136  |  99  |  69<H>  ----------------------------<  97  5.0   |  26  |  7.39<H>    Ca    9.4      17 Nov 2019 06:17                CAPILLARY BLOOD GLUCOSE      POCT Blood Glucose.: 143 mg/dL (17 Nov 2019 08:09)  POCT Blood Glucose.: 150 mg/dL (16 Nov 2019 21:27)  POCT Blood Glucose.: 147 mg/dL (16 Nov 2019 16:08)  POCT Blood Glucose.: 104 mg/dL (16 Nov 2019 11:45)        RADIOLOGY & ADDITIONAL TESTS:    CXR:    Ct scan chest:    ekg;    echo: Patient is a 54y old  Female who presents with a chief complaint of Vertigo (17 Nov 2019 07:08)    Awake, alert, comfortable in NAD. Still with occasional dizziness. S/p fall. No cough or sob at this moment. Unable to ambulate without risk of falling. Awaiting ENT eval      INTERVAL HPI/OVERNIGHT EVENTS:      VITAL SIGNS:  T(F): 98.2 (11-17-19 @ 05:23)  HR: 63 (11-17-19 @ 05:23)  BP: 121/53 (11-17-19 @ 05:23)  RR: 16 (11-17-19 @ 05:23)  SpO2: 98% (11-17-19 @ 05:23)  Wt(kg): --  I&O's Detail          REVIEW OF SYSTEMS:    CONSTITUTIONAL:  No fevers, chills, sweats    HEENT:  Eyes:  No diplopia or blurred vision. ENT:  No earache, sore throat or runny nose.    CARDIOVASCULAR:  No pressure, squeezing, tightness, or heaviness about the chest; no palpitations.    RESPIRATORY:  Per HPI    GASTROINTESTINAL:  No abdominal pain, nausea, vomiting or diarrhea.    GENITOURINARY:  No dysuria, frequency or urgency.    NEUROLOGIC:  No paresthesias, fasciculations, seizures or weakness.    PSYCHIATRIC:  No disorder of thought or mood.      PHYSICAL EXAM:    Constitutional: Well developed and nourished  Eyes:Perrla  ENMT: normal  Neck:supple  Respiratory: good air entry  Cardiovascular: S1 S2 regular  Gastrointestinal: Soft, Non tender  Extremities: No edema  Vascular:normal  Neurological:Awake, alert,Ox3  Musculoskeletal:Normal      MEDICATIONS  (STANDING):  amLODIPine   Tablet 10 milliGRAM(s) Oral daily  aspirin  chewable 81 milliGRAM(s) Oral daily  atorvastatin 40 milliGRAM(s) Oral at bedtime  chlorhexidine 4% Liquid 1 Application(s) Topical <User Schedule>  dextrose 5%. 1000 milliLiter(s) (50 mL/Hr) IV Continuous <Continuous>  dextrose 50% Injectable 12.5 Gram(s) IV Push once  dextrose 50% Injectable 25 Gram(s) IV Push once  dextrose 50% Injectable 25 Gram(s) IV Push once  folic acid 1 milliGRAM(s) Oral daily  gabapentin 300 milliGRAM(s) Oral at bedtime  heparin  Injectable 5000 Unit(s) SubCutaneous every 12 hours  hydrALAZINE 25 milliGRAM(s) Oral two times a day  insulin lispro (HumaLOG) corrective regimen sliding scale   SubCutaneous Before meals and at bedtime  labetalol 200 milliGRAM(s) Oral daily  lisinopril 40 milliGRAM(s) Oral daily  meclizine 50 milliGRAM(s) Oral three times a day  melatonin 3 milliGRAM(s) Oral at bedtime  methylPREDNISolone sodium succinate IVPB 1000 milliGRAM(s) IV Intermittent daily  multivitamin/minerals 1 Tablet(s) Oral daily  senna 2 Tablet(s) Oral at bedtime  sevelamer carbonate 800 milliGRAM(s) Oral three times a day with meals  topiramate 25 milliGRAM(s) Oral two times a day    MEDICATIONS  (PRN):  dextrose 40% Gel 15 Gram(s) Oral once PRN Blood Glucose LESS THAN 70 milliGRAM(s)/deciliter  glucagon  Injectable 1 milliGRAM(s) IntraMuscular once PRN Glucose LESS THAN 70 milligrams/deciliter  ondansetron Injectable 4 milliGRAM(s) IV Push every 8 hours PRN Nausea and/or Vomiting  oxyCODONE    IR 5 milliGRAM(s) Oral every 6 hours PRN Severe Pain (7 - 10)      Allergies    No Known Drug Allergies  pineapple (Hives)    Intolerances        LABS:                        10.8   6.94  )-----------( 197      ( 17 Nov 2019 06:17 )             33.5     11-17    136  |  99  |  69<H>  ----------------------------<  97  5.0   |  26  |  7.39<H>    Ca    9.4      17 Nov 2019 06:17                CAPILLARY BLOOD GLUCOSE      POCT Blood Glucose.: 143 mg/dL (17 Nov 2019 08:09)  POCT Blood Glucose.: 150 mg/dL (16 Nov 2019 21:27)  POCT Blood Glucose.: 147 mg/dL (16 Nov 2019 16:08)  POCT Blood Glucose.: 104 mg/dL (16 Nov 2019 11:45)        RADIOLOGY & ADDITIONAL TESTS:    CXR:    Ct scan chest:    ekg;    echo:

## 2019-11-17 NOTE — PROGRESS NOTE ADULT - SUBJECTIVE AND OBJECTIVE BOX
Patient is a 54y old  Female who presents with a chief complaint of Vertigo (2019 18:29)    pt seen in icu [  ], reg med floor [ x  ], bed [ x ], chair at bedside [   ], a+o x3 [ x ], lethargic [  ],  nad [ x ]    Allergies    No Known Drug Allergies  pineapple (Hives)        Vitals    T(F): 98.2 (19 @ 05:23), Max: 98.9 (19 @ 09:02)  HR: 63 (19 @ 05:23) (63 - 79)  BP: 121/53 (19 @ 05:23) (121/53 - 140/57)  RR: 16 (19 @ 05:23) (16 - 16)  SpO2: 98% (19 @ 05:23) (98% - 100%)  Wt(kg): --  CAPILLARY BLOOD GLUCOSE      POCT Blood Glucose.: 150 mg/dL (2019 21:27)      Labs                          10.8   6.94  )-----------( 197      ( 2019 06:17 )             33.5           136  |  99  |  69<H>  ----------------------------<  97  5.0   |  26  |  7.39<H>    Ca    9.4      2019 06:17              .Urine   @ 15:32   <10,000 CFU/mL Normal Urogenital Jinny  --  --          Radiology Results      Meds    MEDICATIONS  (STANDING):  amLODIPine   Tablet 10 milliGRAM(s) Oral daily  aspirin  chewable 81 milliGRAM(s) Oral daily  atorvastatin 40 milliGRAM(s) Oral at bedtime  chlorhexidine 4% Liquid 1 Application(s) Topical <User Schedule>  dextrose 5%. 1000 milliLiter(s) (50 mL/Hr) IV Continuous <Continuous>  dextrose 50% Injectable 12.5 Gram(s) IV Push once  dextrose 50% Injectable 25 Gram(s) IV Push once  dextrose 50% Injectable 25 Gram(s) IV Push once  folic acid 1 milliGRAM(s) Oral daily  gabapentin 300 milliGRAM(s) Oral at bedtime  heparin  Injectable 5000 Unit(s) SubCutaneous every 12 hours  hydrALAZINE 25 milliGRAM(s) Oral two times a day  insulin lispro (HumaLOG) corrective regimen sliding scale   SubCutaneous Before meals and at bedtime  labetalol 200 milliGRAM(s) Oral daily  lisinopril 40 milliGRAM(s) Oral daily  meclizine 50 milliGRAM(s) Oral three times a day  melatonin 3 milliGRAM(s) Oral at bedtime  multivitamin/minerals 1 Tablet(s) Oral daily  senna 2 Tablet(s) Oral at bedtime  sevelamer carbonate 800 milliGRAM(s) Oral three times a day with meals  topiramate 25 milliGRAM(s) Oral two times a day      MEDICATIONS  (PRN):  dextrose 40% Gel 15 Gram(s) Oral once PRN Blood Glucose LESS THAN 70 milliGRAM(s)/deciliter  glucagon  Injectable 1 milliGRAM(s) IntraMuscular once PRN Glucose LESS THAN 70 milligrams/deciliter  ondansetron Injectable 4 milliGRAM(s) IV Push every 8 hours PRN Nausea and/or Vomiting  oxyCODONE    IR 5 milliGRAM(s) Oral every 6 hours PRN Severe Pain (7 - 10)      Physical Exam      Neuro :  no focal deficits  Respiratory: CTA B/L  CV: RRR, S1S2, no murmurs,   Abdominal: Soft, NT, ND +BS,  Extremities: No edema, + peripheral pulses,      ASSESSMENT    peripheral vertigo,   Intractable migraine with aura with status migrainosus.   left hip calcific tendinitis  h/o  ESRD on HD (M/W/F),   HTN,   migraine,   DM no longer on meds  Myocardial infarct, old  ESRD (end stage renal disease) on dialysis  Asthma occurring only with upper respiratory infection  Anemia in chronic renal disease  Claustrophobia  Uterine leiomyoma, unspecified location  AUSTIN (obstructive sleep apnea)  Gastroesophageal reflux disease without esophagitis  Vertigo  HLD (hyperlipidemia)  Morbid obesity  AV fistula  S/P craniotomy  S/P hernia repair  S/P  section        PLAN      cont tele,   cont aspirin, statin,   cont meclizine to 50mg tid,  ct head neg noted  Cervicogenic vertigo.    s/p trial acute migraine treatment- intravenous valproate 500 mg Q6H x 3 doses with metoclopramide 10 mg IV q6h x 3 doses and magnesium po 400 mg TID .  cont topamax to 25mg bid  carotid duplex noted : no significant stenosis   cta head and neck neg for large vessel occlusion noted above   mri neg for acute patho noted above  neuro f/u noted  If the patient continues to have vertigo, can consider trial of benzo  ent eval.  ce q8 x 2 neg noted above  echo with EF = 65%,  Grade I diastolic dysfunction (Impaired relaxation). Agitated saline injection was negative for intracardiac shunt. Trivial pericardial effusion is seen noted above.  orthostatic positive with standing bp above 100  hgba1c 4.6 noted above,  renal f/u   cont hd as per renal   pulm f/u noted   pft outpt  PT recs home with home PT   ct left hip-pelv with mineralization in the distal aspect of the left gluteus medius muscle at the level of the left hip. These may represent foci of calcium hydroxyapatite deposition or age indeterminant   proximally retracted intramuscular enthesophytes from the left greater trochanter noted above   mri bony pelv with calcific tendinitis noted  pain mgmt eval noted  gabapentin 300mg po q hs  cont decadron 4mg po q daily - can increase to 2x a day for 4 days  cont oxycodone 5mg po q 6 hours prn  cont current meds Patient is a 54y old  Female who presents with a chief complaint of Vertigo (2019 18:29)    pt seen in icu [  ], reg med floor [ x  ], bed [ x ], chair at bedside [   ], a+o x3 [ x ], lethargic [  ],  nad [ x ]     pt states no improvement in vertigo    Allergies    No Known Drug Allergies  pineapple (Hives)        Vitals    T(F): 98.2 (19 @ 05:23), Max: 98.9 (19 @ 09:02)  HR: 63 (19 @ 05:23) (63 - 79)  BP: 121/53 (19 @ 05:23) (121/53 - 140/57)  RR: 16 (19 @ 05:23) (16 - 16)  SpO2: 98% (19 @ 05:23) (98% - 100%)  Wt(kg): --  CAPILLARY BLOOD GLUCOSE      POCT Blood Glucose.: 150 mg/dL (2019 21:27)      Labs                          10.8   6.94  )-----------( 197      ( 2019 06:17 )             33.5           136  |  99  |  69<H>  ----------------------------<  97  5.0   |  26  |  7.39<H>    Ca    9.4      2019 06:17              .Urine   @ 15:32   <10,000 CFU/mL Normal Urogenital Jinny  --  --          Radiology Results      Meds    MEDICATIONS  (STANDING):  amLODIPine   Tablet 10 milliGRAM(s) Oral daily  aspirin  chewable 81 milliGRAM(s) Oral daily  atorvastatin 40 milliGRAM(s) Oral at bedtime  chlorhexidine 4% Liquid 1 Application(s) Topical <User Schedule>  dextrose 5%. 1000 milliLiter(s) (50 mL/Hr) IV Continuous <Continuous>  dextrose 50% Injectable 12.5 Gram(s) IV Push once  dextrose 50% Injectable 25 Gram(s) IV Push once  dextrose 50% Injectable 25 Gram(s) IV Push once  folic acid 1 milliGRAM(s) Oral daily  gabapentin 300 milliGRAM(s) Oral at bedtime  heparin  Injectable 5000 Unit(s) SubCutaneous every 12 hours  hydrALAZINE 25 milliGRAM(s) Oral two times a day  insulin lispro (HumaLOG) corrective regimen sliding scale   SubCutaneous Before meals and at bedtime  labetalol 200 milliGRAM(s) Oral daily  lisinopril 40 milliGRAM(s) Oral daily  meclizine 50 milliGRAM(s) Oral three times a day  melatonin 3 milliGRAM(s) Oral at bedtime  multivitamin/minerals 1 Tablet(s) Oral daily  senna 2 Tablet(s) Oral at bedtime  sevelamer carbonate 800 milliGRAM(s) Oral three times a day with meals  topiramate 25 milliGRAM(s) Oral two times a day      MEDICATIONS  (PRN):  dextrose 40% Gel 15 Gram(s) Oral once PRN Blood Glucose LESS THAN 70 milliGRAM(s)/deciliter  glucagon  Injectable 1 milliGRAM(s) IntraMuscular once PRN Glucose LESS THAN 70 milligrams/deciliter  ondansetron Injectable 4 milliGRAM(s) IV Push every 8 hours PRN Nausea and/or Vomiting  oxyCODONE    IR 5 milliGRAM(s) Oral every 6 hours PRN Severe Pain (7 - 10)      Physical Exam      Neuro :  no focal deficits  Respiratory: CTA B/L  CV: RRR, S1S2, no murmurs,   Abdominal: Soft, NT, ND +BS,  Extremities: No edema, + peripheral pulses,      ASSESSMENT    peripheral vertigo,   Intractable migraine with aura with status migrainosus.   left hip calcific tendinitis  h/o  ESRD on HD (M/W/F),   HTN,   migraine,   DM no longer on meds  Myocardial infarct, old  ESRD (end stage renal disease) on dialysis  Asthma occurring only with upper respiratory infection  Anemia in chronic renal disease  Claustrophobia  Uterine leiomyoma, unspecified location  AUSTIN (obstructive sleep apnea)  Gastroesophageal reflux disease without esophagitis  Vertigo  HLD (hyperlipidemia)  Morbid obesity  AV fistula  S/P craniotomy  S/P hernia repair  S/P  section        PLAN      cont tele,   cont aspirin, statin,   cont meclizine to 50mg tid,  ct head neg noted  Cervicogenic vertigo.    s/p trial acute migraine treatment- intravenous valproate 500 mg Q6H x 3 doses with metoclopramide 10 mg IV q6h x 3 doses and magnesium po 400 mg TID .  cont topamax to 25mg bid  carotid duplex noted : no significant stenosis   cta head and neck neg for large vessel occlusion noted above   mri neg for acute patho noted above  neuro f/u noted  start solumedrol 1 gram daily x 3 days  ent eval.  ce q8 x 2 neg noted above  echo with EF = 65%,  Grade I diastolic dysfunction (Impaired relaxation). Agitated saline injection was negative for intracardiac shunt. Trivial pericardial effusion is seen noted above.  orthostatic positive with standing bp above 100  hgba1c 4.6 noted above,  renal f/u   cont hd as per renal   pulm f/u noted   pft outpt  PT recs home with home PT   ct left hip-pelv with mineralization in the distal aspect of the left gluteus medius muscle at the level of the left hip. These may represent foci of calcium hydroxyapatite deposition or age indeterminant   proximally retracted intramuscular enthesophytes from the left greater trochanter noted above   mri bony pelv with calcific tendinitis noted  pain mgmt eval noted  gabapentin 300mg po q hs  cont decadron 4mg po q daily - can increase to 2x a day for 4 days  cont oxycodone 5mg po q 6 hours prn  cont current meds

## 2019-11-17 NOTE — PROGRESS NOTE ADULT - SUBJECTIVE AND OBJECTIVE BOX
CHIEF COMPLAINT:Patient is a 54y old  Female who presents with a chief complaint of Vertigo.Pt appears comfortable.    	  REVIEW OF SYSTEMS:  CONSTITUTIONAL: No fever, weight loss, or fatigue  EYES: No eye pain, visual disturbances, or discharge  ENT:  No difficulty hearing, tinnitus, vertigo; No sinus or throat pain  NECK: No pain or stiffness  RESPIRATORY: No cough, wheezing, chills or hemoptysis; No Shortness of Breath  CARDIOVASCULAR: No chest pain, palpitations, passing out, dizziness, or leg swelling  GASTROINTESTINAL: No abdominal or epigastric pain. No nausea, vomiting, or hematemesis; No diarrhea or constipation. No melena or hematochezia.  GENITOURINARY: No dysuria, frequency, hematuria, or incontinence  NEUROLOGICAL: No headaches, memory loss, loss of strength, numbness, or tremors  SKIN: No itching, burning, rashes, or lesions   LYMPH Nodes: No enlarged glands  ENDOCRINE: No heat or cold intolerance; No hair loss  MUSCULOSKELETAL: No joint pain or swelling; No muscle, back, or extremity pain  PSYCHIATRIC: No depression, anxiety, mood swings, or difficulty sleeping  HEME/LYMPH: No easy bruising, or bleeding gums  ALLERGY AND IMMUNOLOGIC: No hives or eczema	    PHYSICAL EXAM:  T(C): 36.8 (11-17-19 @ 05:23), Max: 36.8 (11-16-19 @ 20:56)  HR: 63 (11-17-19 @ 05:23) (63 - 79)  BP: 121/53 (11-17-19 @ 05:23) (121/53 - 126/59)  RR: 16 (11-17-19 @ 05:23) (16 - 16)  SpO2: 98% (11-17-19 @ 05:23) (98% - 100%)      Appearance: Normal	  HEENT:   Normal oral mucosa, PERRL, EOMI	  Lymphatic: No lymphadenopathy  Cardiovascular: Normal S1 S2, No JVD, No murmurs, No edema  Respiratory: Lungs clear to auscultation	  Psychiatry: A & O x 3, Mood & affect appropriate  Gastrointestinal:  Soft, Non-tender, + BS	  Skin: No rashes, No ecchymoses, No cyanosis	  Neurologic: Non-focal  Extremities: Normal range of motion, No clubbing, cyanosis or edema  Vascular: Peripheral pulses palpable 2+ bilaterally    MEDICATIONS  (STANDING):  amLODIPine   Tablet 10 milliGRAM(s) Oral daily  aspirin  chewable 81 milliGRAM(s) Oral daily  atorvastatin 40 milliGRAM(s) Oral at bedtime  chlorhexidine 4% Liquid 1 Application(s) Topical <User Schedule>  dextrose 5%. 1000 milliLiter(s) (50 mL/Hr) IV Continuous <Continuous>  dextrose 50% Injectable 12.5 Gram(s) IV Push once  dextrose 50% Injectable 25 Gram(s) IV Push once  dextrose 50% Injectable 25 Gram(s) IV Push once  folic acid 1 milliGRAM(s) Oral daily  gabapentin 300 milliGRAM(s) Oral at bedtime  heparin  Injectable 5000 Unit(s) SubCutaneous every 12 hours  hydrALAZINE 25 milliGRAM(s) Oral two times a day  insulin lispro (HumaLOG) corrective regimen sliding scale   SubCutaneous Before meals and at bedtime  labetalol 200 milliGRAM(s) Oral daily  lisinopril 40 milliGRAM(s) Oral daily  meclizine 50 milliGRAM(s) Oral three times a day  melatonin 3 milliGRAM(s) Oral at bedtime  methylPREDNISolone sodium succinate IVPB 1000 milliGRAM(s) IV Intermittent daily  multivitamin/minerals 1 Tablet(s) Oral daily  senna 2 Tablet(s) Oral at bedtime  sevelamer carbonate 800 milliGRAM(s) Oral three times a day with meals  topiramate 25 milliGRAM(s) Oral two times a day        	  LABS:	 	                       10.8   6.94  )-----------( 197      ( 17 Nov 2019 06:17 )             33.5     11-17    136  |  99  |  69<H>  ----------------------------<  97  5.0   |  26  |  7.39<H>    Ca    9.4      17 Nov 2019 06:17        Lipid Profile: Cholesterol 140  LDL 75  HDL 41      HgA1c: Hemoglobin A1C, Whole Blood: 4.6 % (11-07 @ 10:26)    TSH: Thyroid Stimulating Hormone, Serum: 0.73 uU/mL (11-07 @ 06:31)

## 2019-11-18 LAB
GLUCOSE BLDC GLUCOMTR-MCNC: 137 MG/DL — HIGH (ref 70–99)
GLUCOSE BLDC GLUCOMTR-MCNC: 200 MG/DL — HIGH (ref 70–99)
GLUCOSE BLDC GLUCOMTR-MCNC: 216 MG/DL — HIGH (ref 70–99)
GLUCOSE BLDC GLUCOMTR-MCNC: 303 MG/DL — HIGH (ref 70–99)

## 2019-11-18 RX ADMIN — Medication 1 MILLIGRAM(S): at 11:54

## 2019-11-18 RX ADMIN — AMLODIPINE BESYLATE 10 MILLIGRAM(S): 2.5 TABLET ORAL at 06:20

## 2019-11-18 RX ADMIN — Medication 25 MILLIGRAM(S): at 17:52

## 2019-11-18 RX ADMIN — Medication 4: at 21:45

## 2019-11-18 RX ADMIN — SEVELAMER CARBONATE 800 MILLIGRAM(S): 2400 POWDER, FOR SUSPENSION ORAL at 11:54

## 2019-11-18 RX ADMIN — Medication 1 TABLET(S): at 11:54

## 2019-11-18 RX ADMIN — SENNA PLUS 2 TABLET(S): 8.6 TABLET ORAL at 21:45

## 2019-11-18 RX ADMIN — SEVELAMER CARBONATE 800 MILLIGRAM(S): 2400 POWDER, FOR SUSPENSION ORAL at 16:44

## 2019-11-18 RX ADMIN — Medication 50 MILLIGRAM(S): at 21:45

## 2019-11-18 RX ADMIN — LISINOPRIL 40 MILLIGRAM(S): 2.5 TABLET ORAL at 06:20

## 2019-11-18 RX ADMIN — Medication 1: at 12:31

## 2019-11-18 RX ADMIN — ATORVASTATIN CALCIUM 40 MILLIGRAM(S): 80 TABLET, FILM COATED ORAL at 21:45

## 2019-11-18 RX ADMIN — Medication 25 MILLIGRAM(S): at 06:21

## 2019-11-18 RX ADMIN — Medication 50 MILLIGRAM(S): at 06:20

## 2019-11-18 RX ADMIN — GABAPENTIN 300 MILLIGRAM(S): 400 CAPSULE ORAL at 21:45

## 2019-11-18 RX ADMIN — Medication 2: at 16:44

## 2019-11-18 RX ADMIN — HEPARIN SODIUM 5000 UNIT(S): 5000 INJECTION INTRAVENOUS; SUBCUTANEOUS at 06:21

## 2019-11-18 RX ADMIN — Medication 200 MILLIGRAM(S): at 06:21

## 2019-11-18 RX ADMIN — Medication 3 MILLIGRAM(S): at 21:45

## 2019-11-18 RX ADMIN — SEVELAMER CARBONATE 800 MILLIGRAM(S): 2400 POWDER, FOR SUSPENSION ORAL at 09:00

## 2019-11-18 RX ADMIN — HEPARIN SODIUM 5000 UNIT(S): 5000 INJECTION INTRAVENOUS; SUBCUTANEOUS at 17:51

## 2019-11-18 RX ADMIN — Medication 50 MILLIGRAM(S): at 15:37

## 2019-11-18 RX ADMIN — Medication 81 MILLIGRAM(S): at 11:54

## 2019-11-18 RX ADMIN — Medication 258 MILLIGRAM(S): at 06:21

## 2019-11-18 RX ADMIN — CHLORHEXIDINE GLUCONATE 1 APPLICATION(S): 213 SOLUTION TOPICAL at 06:21

## 2019-11-18 NOTE — PROGRESS NOTE ADULT - SUBJECTIVE AND OBJECTIVE BOX
[facility]   MD Maia Gonzalez D.O. Fatima Sheikh, D.O. Ruoru Wong, PA    From 7 AM - 5 PM:  OFFICE: [phone]  Dr. Beckham cell: [phone]  Dr. Charles cell: [phone]  Dr. Gamino cell: [phone]  BRANDEN Carrasco cell: [phone]    From 5 PM - 7 AM: Answering Service: [phone]        RENAL FOLLOW UP NOTE  --------------------------------------------------------------------------------  HPI: Pt seen and examined. Has severe dizziness and mild SOB this AM. Denies any CP, cough, fever, chills, N/V.        PAST HISTORY  --------------------------------------------------------------------------------  No significant changes to PMH, PSH, FHx, SHx, unless otherwise noted    ALLERGIES & MEDICATIONS  --------------------------------------------------------------------------------  Allergies    No Known Drug Allergies  pineapple (Hives)    Intolerances      Standing Inpatient Medications  amLODIPine   Tablet 10 milliGRAM(s) Oral daily  aspirin  chewable 81 milliGRAM(s) Oral daily  atorvastatin 40 milliGRAM(s) Oral at bedtime  chlorhexidine 4% Liquid 1 Application(s) Topical <User Schedule>  dextrose 5%. 1000 milliLiter(s) IV Continuous <Continuous>  dextrose 50% Injectable 12.5 Gram(s) IV Push once  dextrose 50% Injectable 25 Gram(s) IV Push once  dextrose 50% Injectable 25 Gram(s) IV Push once  folic acid 1 milliGRAM(s) Oral daily  gabapentin 300 milliGRAM(s) Oral at bedtime  heparin  Injectable 5000 Unit(s) SubCutaneous every 12 hours  hydrALAZINE 25 milliGRAM(s) Oral two times a day  insulin lispro (HumaLOG) corrective regimen sliding scale   SubCutaneous Before meals and at bedtime  labetalol 200 milliGRAM(s) Oral daily  lisinopril 40 milliGRAM(s) Oral daily  meclizine 50 milliGRAM(s) Oral three times a day  melatonin 3 milliGRAM(s) Oral at bedtime  methylPREDNISolone sodium succinate IVPB 1000 milliGRAM(s) IV Intermittent daily  multivitamin/minerals 1 Tablet(s) Oral daily  senna 2 Tablet(s) Oral at bedtime  sevelamer carbonate 800 milliGRAM(s) Oral three times a day with meals  topiramate 25 milliGRAM(s) Oral two times a day    PRN Inpatient Medications  dextrose 40% Gel 15 Gram(s) Oral once PRN  glucagon  Injectable 1 milliGRAM(s) IntraMuscular once PRN  ondansetron Injectable 4 milliGRAM(s) IV Push every 8 hours PRN  oxyCODONE    IR 5 milliGRAM(s) Oral every 6 hours PRN      REVIEW OF SYSTEMS  --------------------------------------------------------------------------------  General: no fever  CVS: no chest pain  RESP: no sob, no cough  ABD: no abdominal pain  : no dysuria,  MSK: no edema     VITALS/PHYSICAL EXAM  --------------------------------------------------------------------------------  T(C): 36.5 (11-18-19 @ 05:26), Max: 37 (11-17-19 @ 20:51)  HR: 64 (11-18-19 @ 05:26) (64 - 72)  BP: 143/65 (11-18-19 @ 05:26) (120/52 - 143/65)  RR: 18 (11-18-19 @ 05:26) (18 - 18)  SpO2: 98% (11-18-19 @ 05:26) (98% - 100%)  Wt(kg): --        Physical Exam:  	Gen: NAD  	HEENT: MMM  	Pulm: CTA B/L  	CV: S1S2  	Abd: Soft, +BS  	Ext: No LE edema B/L              Neuro: Awake   	Skin: Warm and Dry   	Vascular access: RAVG w/ good thrill and bruit    LABS/STUDIES  --------------------------------------------------------------------------------              10.8   6.94  >-----------<  197      [11-17-19 @ 06:17]              33.5     136  |  99  |  69  ----------------------------<  97      [11-17-19 @ 06:17]  5.0   |  26  |  7.39        Ca     9.4     [11-17-19 @ 06:17]            Creatinine Trend:  SCr 7.39 [11-17 @ 06:17]  SCr 4.88 [11-16 @ 07:18]  SCr 7.09 [11-15 @ 15:48]  SCr 9.98 [11-14 @ 10:01]  SCr 6.22 [11-12 @ 07:20]    Urinalysis - [11-06-19 @ 09:05]      Color Yellow / Appearance Clear / SG 1.015 / pH 9.0      Gluc 50 / Ketone Negative  / Bili Negative / Urobili Negative       Blood Negative / Protein 100 / Leuk Est Negative / Nitrite Negative      RBC 0-2 / WBC 0-2 / Hyaline  / Gran  / Sq Epi  / Non Sq Epi  / Bacteria Trace      PTH -- (Ca 8.9)      [11-07-19 @ 10:25]   173  Vitamin D (25OH) 24.4      [11-07-19 @ 10:25]  HbA1c 4.6      [11-07-19 @ 10:26]  TSH 0.73      [11-07-19 @ 06:31]  Lipid: chol 140, , HDL 41, LDL 75      [11-07-19 @ 06:31]    HBsAg Nonreact      [11-08-19 @ 15:20]  HCV 0.09, Nonreact      [11-08-19 @ 15:20]    DICKSON: titer Negative, pattern --      [11-09-19 @ 11:03]  SPEP Interpretation: Normal Electrophoresis Pattern      [11-09-19 @ 11:24]

## 2019-11-18 NOTE — PROGRESS NOTE ADULT - SUBJECTIVE AND OBJECTIVE BOX
Patient is a 54y old  Female who presents with a chief complaint of Vertigo (2019 12:13)    pt seen in icu [  ], reg med floor [ x  ], bed [ x ], chair at bedside [   ], a+o x3 [ x ], lethargic [  ],  nad [ x ]     pt still with vertigo      Allergies    No Known Drug Allergies  pineapple (Hives)        Vitals    T(F): 97.7 (19 @ 05:26), Max: 98.6 (19 @ 20:51)  HR: 64 (19 @ 05:26) (64 - 72)  BP: 143/65 (19 @ 05:26) (120/52 - 143/65)  RR: 18 (19 @ 05:26) (18 - 18)  SpO2: 98% (19 @ 05:26) (98% - 100%)  Wt(kg): --  CAPILLARY BLOOD GLUCOSE      POCT Blood Glucose.: 137 mg/dL (2019 08:17)      Labs                          10.8   6.94  )-----------( 197      ( 2019 06:17 )             33.5           136  |  99  |  69<H>  ----------------------------<  97  5.0   |  26  |  7.39<H>    Ca    9.4      2019 06:17              .Urine  - @ 15:32   <10,000 CFU/mL Normal Urogenital Jinny  --  --          Radiology Results      Meds    MEDICATIONS  (STANDING):  amLODIPine   Tablet 10 milliGRAM(s) Oral daily  aspirin  chewable 81 milliGRAM(s) Oral daily  atorvastatin 40 milliGRAM(s) Oral at bedtime  chlorhexidine 4% Liquid 1 Application(s) Topical <User Schedule>  dextrose 5%. 1000 milliLiter(s) (50 mL/Hr) IV Continuous <Continuous>  dextrose 50% Injectable 12.5 Gram(s) IV Push once  dextrose 50% Injectable 25 Gram(s) IV Push once  dextrose 50% Injectable 25 Gram(s) IV Push once  folic acid 1 milliGRAM(s) Oral daily  gabapentin 300 milliGRAM(s) Oral at bedtime  heparin  Injectable 5000 Unit(s) SubCutaneous every 12 hours  hydrALAZINE 25 milliGRAM(s) Oral two times a day  insulin lispro (HumaLOG) corrective regimen sliding scale   SubCutaneous Before meals and at bedtime  labetalol 200 milliGRAM(s) Oral daily  lisinopril 40 milliGRAM(s) Oral daily  meclizine 50 milliGRAM(s) Oral three times a day  melatonin 3 milliGRAM(s) Oral at bedtime  methylPREDNISolone sodium succinate IVPB 1000 milliGRAM(s) IV Intermittent daily  multivitamin/minerals 1 Tablet(s) Oral daily  senna 2 Tablet(s) Oral at bedtime  sevelamer carbonate 800 milliGRAM(s) Oral three times a day with meals  topiramate 25 milliGRAM(s) Oral two times a day      MEDICATIONS  (PRN):  dextrose 40% Gel 15 Gram(s) Oral once PRN Blood Glucose LESS THAN 70 milliGRAM(s)/deciliter  glucagon  Injectable 1 milliGRAM(s) IntraMuscular once PRN Glucose LESS THAN 70 milligrams/deciliter  ondansetron Injectable 4 milliGRAM(s) IV Push every 8 hours PRN Nausea and/or Vomiting  oxyCODONE    IR 5 milliGRAM(s) Oral every 6 hours PRN Severe Pain (7 - 10)      Physical Exam    Neuro :  no focal deficits  Respiratory: CTA B/L  CV: RRR, S1S2, no murmurs,   Abdominal: Soft, NT, ND +BS,  Extremities: No edema, + peripheral pulses,      ASSESSMENT    peripheral vertigo,   Intractable migraine with aura with status migrainosus.   left hip calcific tendinitis  h/o  ESRD on HD (M/W/F),   HTN,   migraine,   DM no longer on meds  Myocardial infarct, old  ESRD (end stage renal disease) on dialysis  Asthma occurring only with upper respiratory infection  Anemia in chronic renal disease  Claustrophobia  Uterine leiomyoma, unspecified location  AUSTIN (obstructive sleep apnea)  Gastroesophageal reflux disease without esophagitis  Vertigo  HLD (hyperlipidemia)  Morbid obesity  AV fistula  S/P craniotomy  S/P hernia repair  S/P  section        PLAN         cont aspirin, statin,   cont meclizine to 50mg tid,  ct head neg noted  Cervicogenic vertigo.    s/p trial acute migraine treatment- intravenous valproate 500 mg Q6H x 3 doses with metoclopramide 10 mg IV q6h x 3 doses and magnesium po 400 mg TID .  cont topamax to 25mg bid  carotid duplex noted : no significant stenosis   cta head and neck neg for large vessel occlusion noted above   mri neg for acute patho noted above  neuro f/u   cont solumedrol 1 gram daily dsy 2/3 days  ent eval still pending.  ce q8 x 2 neg noted above  echo with EF = 65%,  Grade I diastolic dysfunction (Impaired relaxation). Agitated saline injection was negative for intracardiac shunt. Trivial pericardial effusion is seen noted above.  orthostatic positive with standing bp above 100  hgba1c 4.6 noted above,  renal f/u   cont hd as per renal   pulm f/u noted   pft outpt  PT recs home with home PT   ct left hip-pelv with mineralization in the distal aspect of the left gluteus medius muscle at the level of the left hip. These may represent foci of calcium hydroxyapatite deposition or age indeterminant   proximally retracted intramuscular enthesophytes from the left greater trochanter noted above   mri bony pelv with calcific tendinitis noted  pain mgmt eval noted  gabapentin 300mg po q hs  completed course of decadron for pain  cont oxycodone 5mg po q 6 hours prn  cont current meds

## 2019-11-18 NOTE — PROGRESS NOTE ADULT - SUBJECTIVE AND OBJECTIVE BOX
Patient is awake, alert, lying in bed in NAD.     INTERVAL HPI/OVERNIGHT EVENTS:      VITAL SIGNS:  T(F): 97.7 (11-18-19 @ 05:26)  HR: 64 (11-18-19 @ 05:26)  BP: 143/65 (11-18-19 @ 05:26)  RR: 18 (11-18-19 @ 05:26)  SpO2: 98% (11-18-19 @ 05:26)  Wt(kg): --  I&O's Detail    REVIEW OF SYSTEMS:    CONSTITUTIONAL:  No fevers, chills, sweats    HEENT:  Eyes:  No diplopia or blurred vision. ENT:  No earache, sore throat or runny nose.    CARDIOVASCULAR:  No pressure, squeezing, tightness, or heaviness about the chest; no palpitations.    RESPIRATORY:  Per HPI    GASTROINTESTINAL:  No abdominal pain, nausea, vomiting or diarrhea.    GENITOURINARY:  No dysuria, frequency or urgency.    NEUROLOGIC:  No paresthesias, fasciculations, seizures or weakness.    PSYCHIATRIC:  No disorder of thought or mood.      PHYSICAL EXAM:    Constitutional: Well developed and nourished  Eyes: Perrla  ENMT: normal  Neck: supple  Respiratory: good air entry  Cardiovascular: S1 S2 regular  Gastrointestinal: Soft, Non tender  Extremities: No edema  Vascular: normal  Neurological: Awake, alert,Ox3  Musculoskeletal: Normal      MEDICATIONS  (STANDING):  amLODIPine   Tablet 10 milliGRAM(s) Oral daily  aspirin  chewable 81 milliGRAM(s) Oral daily  atorvastatin 40 milliGRAM(s) Oral at bedtime  chlorhexidine 4% Liquid 1 Application(s) Topical <User Schedule>  dextrose 5%. 1000 milliLiter(s) (50 mL/Hr) IV Continuous <Continuous>  dextrose 50% Injectable 12.5 Gram(s) IV Push once  dextrose 50% Injectable 25 Gram(s) IV Push once  dextrose 50% Injectable 25 Gram(s) IV Push once  folic acid 1 milliGRAM(s) Oral daily  gabapentin 300 milliGRAM(s) Oral at bedtime  heparin  Injectable 5000 Unit(s) SubCutaneous every 12 hours  hydrALAZINE 25 milliGRAM(s) Oral two times a day  insulin lispro (HumaLOG) corrective regimen sliding scale   SubCutaneous Before meals and at bedtime  labetalol 200 milliGRAM(s) Oral daily  lisinopril 40 milliGRAM(s) Oral daily  meclizine 50 milliGRAM(s) Oral three times a day  melatonin 3 milliGRAM(s) Oral at bedtime  methylPREDNISolone sodium succinate IVPB 1000 milliGRAM(s) IV Intermittent daily  multivitamin/minerals 1 Tablet(s) Oral daily  senna 2 Tablet(s) Oral at bedtime  sevelamer carbonate 800 milliGRAM(s) Oral three times a day with meals  topiramate 25 milliGRAM(s) Oral two times a day    MEDICATIONS  (PRN):  dextrose 40% Gel 15 Gram(s) Oral once PRN Blood Glucose LESS THAN 70 milliGRAM(s)/deciliter  glucagon  Injectable 1 milliGRAM(s) IntraMuscular once PRN Glucose LESS THAN 70 milligrams/deciliter  ondansetron Injectable 4 milliGRAM(s) IV Push every 8 hours PRN Nausea and/or Vomiting  oxyCODONE    IR 5 milliGRAM(s) Oral every 6 hours PRN Severe Pain (7 - 10)      Allergies    No Known Drug Allergies  pineapple (Hives)    Intolerances        LABS:                        10.8   6.94  )-----------( 197      ( 17 Nov 2019 06:17 )             33.5     11-17    136  |  99  |  69<H>  ----------------------------<  97  5.0   |  26  |  7.39<H>    Ca    9.4      17 Nov 2019 06:17                CAPILLARY BLOOD GLUCOSE      POCT Blood Glucose.: 137 mg/dL (18 Nov 2019 08:17)  POCT Blood Glucose.: 294 mg/dL (17 Nov 2019 21:09)  POCT Blood Glucose.: 165 mg/dL (17 Nov 2019 16:29)        RADIOLOGY & ADDITIONAL TESTS:    CXR:    Ct scan chest:    ekg;    echo:

## 2019-11-18 NOTE — PROGRESS NOTE ADULT - SUBJECTIVE AND OBJECTIVE BOX
CHIEF COMPLAINT:Patient is a 54y old  Female who presents with a chief complaint of Vertigo.Pt reports still dizzy..    	  REVIEW OF SYSTEMS:  CONSTITUTIONAL: No fever, weight loss, or fatigue  EYES: No eye pain, visual disturbances, or discharge  ENT:  No difficulty hearing, tinnitus, vertigo; No sinus or throat pain  NECK: No pain or stiffness  RESPIRATORY: No cough, wheezing, chills or hemoptysis; No Shortness of Breath  CARDIOVASCULAR: No chest pain, palpitations, passing out, + dizziness  GASTROINTESTINAL: No abdominal or epigastric pain. No nausea, vomiting, or hematemesis; No diarrhea or constipation. No melena or hematochezia.  GENITOURINARY: No dysuria, frequency, hematuria, or incontinence  NEUROLOGICAL: No headaches, memory loss, loss of strength, numbness, or tremors  SKIN: No itching, burning, rashes, or lesions   LYMPH Nodes: No enlarged glands  ENDOCRINE: No heat or cold intolerance; No hair loss  MUSCULOSKELETAL: No joint pain or swelling; No muscle, back, or extremity pain  PSYCHIATRIC: No depression, anxiety, mood swings, or difficulty sleeping  HEME/LYMPH: No easy bruising, or bleeding gums  ALLERGY AND IMMUNOLOGIC: No hives or eczema	      PHYSICAL EXAM:  T(C): 36.5 (11-18-19 @ 05:26), Max: 37 (11-17-19 @ 20:51)  HR: 64 (11-18-19 @ 05:26) (64 - 72)  BP: 143/65 (11-18-19 @ 05:26) (120/52 - 143/65)  RR: 18 (11-18-19 @ 05:26) (18 - 18)  SpO2: 98% (11-18-19 @ 05:26) (98% - 100%)      Appearance: Normal	  HEENT:   Normal oral mucosa, PERRL, EOMI	  Lymphatic: No lymphadenopathy  Cardiovascular: Normal S1 S2, No JVD, No murmurs, No edema  Respiratory: Lungs clear to auscultation	  Psychiatry: A & O x 3, Mood & affect appropriate  Gastrointestinal:  Soft, Non-tender, + BS	  Skin: No rashes, No ecchymoses, No cyanosis	  Neurologic: Non-focal  Extremities: Normal range of motion, No clubbing, cyanosis or edema  Vascular: Peripheral pulses palpable 2+ bilaterally    MEDICATIONS  (STANDING):  amLODIPine   Tablet 10 milliGRAM(s) Oral daily  aspirin  chewable 81 milliGRAM(s) Oral daily  atorvastatin 40 milliGRAM(s) Oral at bedtime  chlorhexidine 4% Liquid 1 Application(s) Topical <User Schedule>  dextrose 5%. 1000 milliLiter(s) (50 mL/Hr) IV Continuous <Continuous>  dextrose 50% Injectable 12.5 Gram(s) IV Push once  dextrose 50% Injectable 25 Gram(s) IV Push once  dextrose 50% Injectable 25 Gram(s) IV Push once  folic acid 1 milliGRAM(s) Oral daily  gabapentin 300 milliGRAM(s) Oral at bedtime  heparin  Injectable 5000 Unit(s) SubCutaneous every 12 hours  hydrALAZINE 25 milliGRAM(s) Oral two times a day  insulin lispro (HumaLOG) corrective regimen sliding scale   SubCutaneous Before meals and at bedtime  labetalol 200 milliGRAM(s) Oral daily  lisinopril 40 milliGRAM(s) Oral daily  meclizine 50 milliGRAM(s) Oral three times a day  melatonin 3 milliGRAM(s) Oral at bedtime  methylPREDNISolone sodium succinate IVPB 1000 milliGRAM(s) IV Intermittent daily  multivitamin/minerals 1 Tablet(s) Oral daily  senna 2 Tablet(s) Oral at bedtime  sevelamer carbonate 800 milliGRAM(s) Oral three times a day with meals  topiramate 25 milliGRAM(s) Oral two times a day        	  LABS:	 	                      10.8   6.94  )-----------( 197      ( 17 Nov 2019 06:17 )             33.5     11-17    136  |  99  |  69<H>  ----------------------------<  97  5.0   |  26  |  7.39<H>    Ca    9.4      17 Nov 2019 06:17        Lipid Profile: Cholesterol 140  LDL 75  HDL 41      HgA1c: Hemoglobin A1C, Whole Blood: 4.6 % (11-07 @ 10:26)    TSH: Thyroid Stimulating Hormone, Serum: 0.73 uU/mL (11-07 @ 06:31)

## 2019-11-19 DIAGNOSIS — E87.5 HYPERKALEMIA: ICD-10-CM

## 2019-11-19 LAB
ANION GAP SERPL CALC-SCNC: 17 MMOL/L — SIGNIFICANT CHANGE UP (ref 5–17)
BUN SERPL-MCNC: 146 MG/DL — HIGH (ref 7–18)
CALCIUM SERPL-MCNC: 9.6 MG/DL — SIGNIFICANT CHANGE UP (ref 8.4–10.5)
CHLORIDE SERPL-SCNC: 101 MMOL/L — SIGNIFICANT CHANGE UP (ref 96–108)
CO2 SERPL-SCNC: 18 MMOL/L — LOW (ref 22–31)
CREAT SERPL-MCNC: 11.7 MG/DL — HIGH (ref 0.5–1.3)
GLUCOSE BLDC GLUCOMTR-MCNC: 139 MG/DL — HIGH (ref 70–99)
GLUCOSE BLDC GLUCOMTR-MCNC: 175 MG/DL — HIGH (ref 70–99)
GLUCOSE BLDC GLUCOMTR-MCNC: 212 MG/DL — HIGH (ref 70–99)
GLUCOSE BLDC GLUCOMTR-MCNC: 248 MG/DL — HIGH (ref 70–99)
GLUCOSE SERPL-MCNC: 154 MG/DL — HIGH (ref 70–99)
HCT VFR BLD CALC: 31.8 % — LOW (ref 34.5–45)
HGB BLD-MCNC: 10.3 G/DL — LOW (ref 11.5–15.5)
MCHC RBC-ENTMCNC: 30.4 PG — SIGNIFICANT CHANGE UP (ref 27–34)
MCHC RBC-ENTMCNC: 32.4 GM/DL — SIGNIFICANT CHANGE UP (ref 32–36)
MCV RBC AUTO: 93.8 FL — SIGNIFICANT CHANGE UP (ref 80–100)
NRBC # BLD: 0 /100 WBCS — SIGNIFICANT CHANGE UP (ref 0–0)
PLATELET # BLD AUTO: 228 K/UL — SIGNIFICANT CHANGE UP (ref 150–400)
POTASSIUM SERPL-MCNC: 6.4 MMOL/L — CRITICAL HIGH (ref 3.5–5.3)
POTASSIUM SERPL-SCNC: 6.4 MMOL/L — CRITICAL HIGH (ref 3.5–5.3)
RBC # BLD: 3.39 M/UL — LOW (ref 3.8–5.2)
RBC # FLD: 13.6 % — SIGNIFICANT CHANGE UP (ref 10.3–14.5)
SODIUM SERPL-SCNC: 136 MMOL/L — SIGNIFICANT CHANGE UP (ref 135–145)
WBC # BLD: 11.83 K/UL — HIGH (ref 3.8–10.5)
WBC # FLD AUTO: 11.83 K/UL — HIGH (ref 3.8–10.5)

## 2019-11-19 PROCEDURE — 99232 SBSQ HOSP IP/OBS MODERATE 35: CPT

## 2019-11-19 RX ORDER — DIPHENHYDRAMINE HCL 50 MG
12.5 CAPSULE ORAL EVERY 6 HOURS
Refills: 0 | Status: DISCONTINUED | OUTPATIENT
Start: 2019-11-19 | End: 2019-11-19

## 2019-11-19 RX ORDER — DIPHENHYDRAMINE HCL 50 MG
12.5 CAPSULE ORAL EVERY 6 HOURS
Refills: 0 | Status: DISCONTINUED | OUTPATIENT
Start: 2019-11-19 | End: 2019-11-22

## 2019-11-19 RX ORDER — TOPIRAMATE 25 MG
50 TABLET ORAL
Refills: 0 | Status: DISCONTINUED | OUTPATIENT
Start: 2019-11-19 | End: 2019-11-20

## 2019-11-19 RX ORDER — VALPROIC ACID (AS SODIUM SALT) 250 MG/5ML
500 SOLUTION, ORAL ORAL EVERY 6 HOURS
Refills: 0 | Status: COMPLETED | OUTPATIENT
Start: 2019-11-19 | End: 2019-11-20

## 2019-11-19 RX ORDER — METOCLOPRAMIDE HCL 10 MG
10 TABLET ORAL EVERY 6 HOURS
Refills: 0 | Status: COMPLETED | OUTPATIENT
Start: 2019-11-19 | End: 2019-11-20

## 2019-11-19 RX ADMIN — Medication 200 MILLIGRAM(S): at 05:25

## 2019-11-19 RX ADMIN — Medication 3 MILLIGRAM(S): at 21:51

## 2019-11-19 RX ADMIN — CHLORHEXIDINE GLUCONATE 1 APPLICATION(S): 213 SOLUTION TOPICAL at 05:26

## 2019-11-19 RX ADMIN — Medication 25 MILLIGRAM(S): at 05:25

## 2019-11-19 RX ADMIN — Medication 50 MILLIGRAM(S): at 17:23

## 2019-11-19 RX ADMIN — SEVELAMER CARBONATE 800 MILLIGRAM(S): 2400 POWDER, FOR SUSPENSION ORAL at 17:23

## 2019-11-19 RX ADMIN — Medication 25 MILLIGRAM(S): at 17:23

## 2019-11-19 RX ADMIN — GABAPENTIN 300 MILLIGRAM(S): 400 CAPSULE ORAL at 21:51

## 2019-11-19 RX ADMIN — ATORVASTATIN CALCIUM 40 MILLIGRAM(S): 80 TABLET, FILM COATED ORAL at 21:51

## 2019-11-19 RX ADMIN — SENNA PLUS 2 TABLET(S): 8.6 TABLET ORAL at 21:51

## 2019-11-19 RX ADMIN — Medication 2: at 17:18

## 2019-11-19 RX ADMIN — HEPARIN SODIUM 5000 UNIT(S): 5000 INJECTION INTRAVENOUS; SUBCUTANEOUS at 05:25

## 2019-11-19 RX ADMIN — Medication 2: at 21:52

## 2019-11-19 RX ADMIN — Medication 50 MILLIGRAM(S): at 05:25

## 2019-11-19 RX ADMIN — HEPARIN SODIUM 5000 UNIT(S): 5000 INJECTION INTRAVENOUS; SUBCUTANEOUS at 17:25

## 2019-11-19 RX ADMIN — Medication 258 MILLIGRAM(S): at 05:25

## 2019-11-19 RX ADMIN — AMLODIPINE BESYLATE 10 MILLIGRAM(S): 2.5 TABLET ORAL at 05:25

## 2019-11-19 RX ADMIN — LISINOPRIL 40 MILLIGRAM(S): 2.5 TABLET ORAL at 05:25

## 2019-11-19 RX ADMIN — Medication 27.5 MILLIGRAM(S): at 18:16

## 2019-11-19 NOTE — PROGRESS NOTE ADULT - SUBJECTIVE AND OBJECTIVE BOX
NP Note discussed with  Primary Attending    54 year old female from home with PMHx ESRD on HD via right AVF (M/W/F), HTN, hx DM no longer on meds now with episodes of hypoglycemia , Anemia, Asthma, Claustrophobia, GERD, HLD, MI, [place],  Uterine Leiomyoma and PSHx of AV Fistula, R Antecubital AVF, , Craniotomy, and Hernia Repair gastric Bypass (2017) presents with dizziness    INTERVAL HPI/OVERNIGHT EVENTS: cta head and neck neg for large vessel occlusion noted above   mri neg for acute patho however patient remains dizzy, neuro, Dr. Reed to f/u    MEDICATIONS  (STANDING):  amLODIPine   Tablet 10 milliGRAM(s) Oral daily  aspirin  chewable 81 milliGRAM(s) Oral daily  atorvastatin 40 milliGRAM(s) Oral at bedtime  chlorhexidine 4% Liquid 1 Application(s) Topical <User Schedule>  dextrose 5%. 1000 milliLiter(s) (50 mL/Hr) IV Continuous <Continuous>  dextrose 50% Injectable 12.5 Gram(s) IV Push once  dextrose 50% Injectable 25 Gram(s) IV Push once  dextrose 50% Injectable 25 Gram(s) IV Push once  folic acid 1 milliGRAM(s) Oral daily  gabapentin 300 milliGRAM(s) Oral at bedtime  heparin  Injectable 5000 Unit(s) SubCutaneous every 12 hours  hydrALAZINE 25 milliGRAM(s) Oral two times a day  insulin lispro (HumaLOG) corrective regimen sliding scale   SubCutaneous Before meals and at bedtime  labetalol 200 milliGRAM(s) Oral daily  lisinopril 40 milliGRAM(s) Oral daily  melatonin 3 milliGRAM(s) Oral at bedtime  methylPREDNISolone sodium succinate IVPB 1000 milliGRAM(s) IV Intermittent daily  metoclopramide Injectable 10 milliGRAM(s) IV Push every 6 hours  multivitamin/minerals 1 Tablet(s) Oral daily  senna 2 Tablet(s) Oral at bedtime  sevelamer carbonate 800 milliGRAM(s) Oral three times a day with meals  topiramate 50 milliGRAM(s) Oral two times a day  valproate sodium IVPB 500 milliGRAM(s) IV Intermittent every 6 hours    MEDICATIONS  (PRN):  dextrose 40% Gel 15 Gram(s) Oral once PRN Blood Glucose LESS THAN 70 milliGRAM(s)/deciliter  diphenhydrAMINE   Elixir 12.5 milliGRAM(s) Oral every 6 hours PRN Rash and/or Itching  glucagon  Injectable 1 milliGRAM(s) IntraMuscular once PRN Glucose LESS THAN 70 milligrams/deciliter  ondansetron Injectable 4 milliGRAM(s) IV Push every 8 hours PRN Nausea and/or Vomiting  oxyCODONE    IR 5 milliGRAM(s) Oral every 6 hours PRN Severe Pain (7 - 10)      __________________________________________________  REVIEW OF SYSTEMS:    CONSTITUTIONAL: No fever,   EYES: no acute visual disturbances  NECK: No pain or stiffness  RESPIRATORY: No cough; No shortness of breath  CARDIOVASCULAR: No chest pain, no palpitations  GASTROINTESTINAL: No pain. No nausea or vomiting; No diarrhea   NEUROLOGICAL: No headache or numbness, no tremors  MUSCULOSKELETAL: No joint pain, no muscle pain  GENITOURINARY: no dysuria, no frequency, no hesitancy  PSYCHIATRY: no depression , no anxiety  ALL OTHER  ROS negative        Vital Signs Last 24 Hrs  T(C): 36.8 (2019 13:55), Max: 37.4 (2019 20:44)  T(F): 98.3 (2019 13:55), Max: 99.3 (2019 20:44)  HR: 77 (2019 13:55) (66 - 90)  BP: 137/60 (2019 13:55) (115/57 - 140/56)  BP(mean): --  RR: 17 (2019 13:55) (17 - 18)  SpO2: 97% (2019 13:55) (97% - 100%)    ________________________________________________  PHYSICAL EXAM:  GENERAL: NAD  HEENT: Normocephalic;  conjunctivae and sclerae clear; moist mucous membranes;   NECK : supple  CHEST/LUNG: Clear to auscultation bilaterally with good air entry   HEART: S1 S2  regular; no murmurs, gallops or rubs  ABDOMEN: Soft, Nontender, Nondistended; Bowel sounds present  EXTREMITIES: no cyanosis; no edema; no calf tenderness  SKIN: warm and dry; no rash  NERVOUS SYSTEM:  Awake and alert; Oriented  to place, person and time ; no new deficits    _________________________________________________  LABS:                        10.3   11.83 )-----------( 228      ( 2019 08:56 )             31.8         136  |  101  |  146<H>  ----------------------------<  154<H>  6.4<HH>   |  18<L>  |  11.70<H>    Ca    9.6      2019 08:56          CAPILLARY BLOOD GLUCOSE      POCT Blood Glucose.: 175 mg/dL (2019 10:54)  POCT Blood Glucose.: 139 mg/dL (2019 07:57)  POCT Blood Glucose.: 303 mg/dL (2019 21:21)        RADIOLOGY & ADDITIONAL TESTS:    Imaging Personally Reviewed:  YES/NO    Consultant(s) Notes Reviewed:   YES/ No    Care Discussed with Consultants :     Plan of care was discussed with patient and /or primary care giver; all questions and concerns were addressed and care was aligned with patient's wishes.

## 2019-11-19 NOTE — PROGRESS NOTE ADULT - SUBJECTIVE AND OBJECTIVE BOX
Patient is a 54y old  Female who presents with a chief complaint of Vertigo (2019 11:19)    pt seen in icu [  ], reg med floor [ x  ], bed [ x ], chair at bedside [   ], a+o x3 [ x ], lethargic [  ],  nad [ x ]     pt still with vertigo      Allergies    No Known Drug Allergies  pineapple (Hives)        Vitals    T(F): 98.1 (19 @ 08:51), Max: 99.3 (19 @ 20:44)  HR: 66 (19 @ 08:51) (66 - 90)  BP: 115/57 (19 @ 08:51) (115/57 - 140/56)  RR: 18 (19 @ 08:51) (18 - 18)  SpO2: 100% (19 @ 05:15) (95% - 100%)  Wt(kg): --  CAPILLARY BLOOD GLUCOSE      POCT Blood Glucose.: 175 mg/dL (2019 10:54)      Labs                          10.3   11.83 )-----------( 228      ( 2019 08:56 )             31.8           136  |  101  |  146<H>  ----------------------------<  154<H>  6.4<HH>   |  18<L>  |  11.70<H>    Ca    9.6      2019 08:56              .Urine  11- @ 15:32   <10,000 CFU/mL Normal Urogenital Jinny  --  --          Radiology Results      Meds    MEDICATIONS  (STANDING):  amLODIPine   Tablet 10 milliGRAM(s) Oral daily  aspirin  chewable 81 milliGRAM(s) Oral daily  atorvastatin 40 milliGRAM(s) Oral at bedtime  chlorhexidine 4% Liquid 1 Application(s) Topical <User Schedule>  dextrose 5%. 1000 milliLiter(s) (50 mL/Hr) IV Continuous <Continuous>  dextrose 50% Injectable 12.5 Gram(s) IV Push once  dextrose 50% Injectable 25 Gram(s) IV Push once  dextrose 50% Injectable 25 Gram(s) IV Push once  folic acid 1 milliGRAM(s) Oral daily  gabapentin 300 milliGRAM(s) Oral at bedtime  heparin  Injectable 5000 Unit(s) SubCutaneous every 12 hours  hydrALAZINE 25 milliGRAM(s) Oral two times a day  insulin lispro (HumaLOG) corrective regimen sliding scale   SubCutaneous Before meals and at bedtime  labetalol 200 milliGRAM(s) Oral daily  lisinopril 40 milliGRAM(s) Oral daily  meclizine 50 milliGRAM(s) Oral three times a day  melatonin 3 milliGRAM(s) Oral at bedtime  methylPREDNISolone sodium succinate IVPB 1000 milliGRAM(s) IV Intermittent daily  multivitamin/minerals 1 Tablet(s) Oral daily  senna 2 Tablet(s) Oral at bedtime  sevelamer carbonate 800 milliGRAM(s) Oral three times a day with meals  topiramate 25 milliGRAM(s) Oral two times a day      MEDICATIONS  (PRN):  dextrose 40% Gel 15 Gram(s) Oral once PRN Blood Glucose LESS THAN 70 milliGRAM(s)/deciliter  glucagon  Injectable 1 milliGRAM(s) IntraMuscular once PRN Glucose LESS THAN 70 milligrams/deciliter  ondansetron Injectable 4 milliGRAM(s) IV Push every 8 hours PRN Nausea and/or Vomiting  oxyCODONE    IR 5 milliGRAM(s) Oral every 6 hours PRN Severe Pain (7 - 10)      Physical Exam      Neuro :  no focal deficits  Respiratory: CTA B/L  CV: RRR, S1S2, no murmurs,   Abdominal: Soft, NT, ND +BS,  Extremities: No edema, + peripheral pulses,      ASSESSMENT    peripheral vertigo,   Intractable migraine with aura with status migrainosus.   left hip calcific tendinitis  h/o  ESRD on HD (M/W/F),   HTN,   migraine,   DM no longer on meds  Myocardial infarct, old  ESRD (end stage renal disease) on dialysis  Asthma occurring only with upper respiratory infection  Anemia in chronic renal disease  Claustrophobia  Uterine leiomyoma, unspecified location  [place] (obstructive sleep apnea)  Gastroesophageal reflux disease without esophagitis  Vertigo  HLD (hyperlipidemia)  Morbid obesity  AV fistula  S/P craniotomy  S/P hernia repair  S/P  section        PLAN         cont aspirin, statin,   cont meclizine to 50mg tid,  ct head neg noted  Cervicogenic vertigo.    s/p trial acute migraine treatment- intravenous valproate 500 mg Q6H x 3 doses with metoclopramide 10 mg IV q6h x 3 doses and magnesium po 400 mg TID .  cont topamax to 25mg bid  carotid duplex noted : no significant stenosis   cta head and neck neg for large vessel occlusion noted above   mri neg for acute patho noted above  neuro f/u   cont solumedrol 1 gram daily day 3/3 days  ent eval rec outpt ent  ce q8 x 2 neg noted above  echo with EF = 65%,  Grade I diastolic dysfunction (Impaired relaxation). Agitated saline injection was negative for intracardiac shunt. Trivial pericardial effusion is seen noted above.  orthostatic positive with standing bp above 100  hgba1c 4.6 noted above,  renal f/u   cont hd as per renal   pulm f/u noted   pft outpt  PT recs home with home PT   ct left hip-pelv with mineralization in the distal aspect of the left gluteus medius muscle at the level of the left hip. These may represent foci of calcium hydroxyapatite deposition or age indeterminant   proximally retracted intramuscular enthesophytes from the left greater trochanter noted above   mri bony pelv with calcific tendinitis noted  pain mgmt eval noted  gabapentin 300mg po q hs  completed course of decadron for pain  cont oxycodone 5mg po q 6 hours prn  cont current meds

## 2019-11-19 NOTE — PROGRESS NOTE ADULT - PROBLEM SELECTOR PLAN 7
Ativan 0.25 mgs po BID. cont meds  Neuro follow up.  CTA of head and neck neg   MRI of Brain noted  Will need ENT eval.

## 2019-11-19 NOTE — PROGRESS NOTE ADULT - SUBJECTIVE AND OBJECTIVE BOX
[facility] [place] [facility]   MD Maia Gonzalez D.O. Fatima Sheikh, D.O. Ruoru Wong, PA    From 7 AM - 5 PM:  OFFICE: [phone]  Dr. Beckham cell: [phone]  Dr. Charles cell: [phone]  Dr. Gamino cell: [phone]  BRANDEN Carrasco cell: [phone]    From 5 PM - 7 AM: Answering Service: [phone]        RENAL FOLLOW UP NOTE  --------------------------------------------------------------------------------  HPI: Pt seen and examined during HD today. States she still has dizziness but is better than yesterday. No N/V, SOB.        PAST HISTORY  --------------------------------------------------------------------------------  No significant changes to PMH, PSH, FHx, SHx, unless otherwise noted    ALLERGIES & MEDICATIONS  --------------------------------------------------------------------------------  Allergies    No Known Drug Allergies  pineapple (Hives)    Intolerances      Standing Inpatient Medications  amLODIPine   Tablet 10 milliGRAM(s) Oral daily  aspirin  chewable 81 milliGRAM(s) Oral daily  atorvastatin 40 milliGRAM(s) Oral at bedtime  chlorhexidine 4% Liquid 1 Application(s) Topical <User Schedule>  dextrose 5%. 1000 milliLiter(s) IV Continuous <Continuous>  dextrose 50% Injectable 12.5 Gram(s) IV Push once  dextrose 50% Injectable 25 Gram(s) IV Push once  dextrose 50% Injectable 25 Gram(s) IV Push once  folic acid 1 milliGRAM(s) Oral daily  gabapentin 300 milliGRAM(s) Oral at bedtime  heparin  Injectable 5000 Unit(s) SubCutaneous every 12 hours  hydrALAZINE 25 milliGRAM(s) Oral two times a day  insulin lispro (HumaLOG) corrective regimen sliding scale   SubCutaneous Before meals and at bedtime  labetalol 200 milliGRAM(s) Oral daily  lisinopril 40 milliGRAM(s) Oral daily  melatonin 3 milliGRAM(s) Oral at bedtime  methylPREDNISolone sodium succinate IVPB 1000 milliGRAM(s) IV Intermittent daily  metoclopramide Injectable 10 milliGRAM(s) IV Push every 6 hours  multivitamin/minerals 1 Tablet(s) Oral daily  senna 2 Tablet(s) Oral at bedtime  sevelamer carbonate 800 milliGRAM(s) Oral three times a day with meals  topiramate 50 milliGRAM(s) Oral two times a day  valproate sodium IVPB 500 milliGRAM(s) IV Intermittent every 6 hours    PRN Inpatient Medications  dextrose 40% Gel 15 Gram(s) Oral once PRN  diphenhydrAMINE   Elixir 12.5 milliGRAM(s) Oral every 6 hours PRN  glucagon  Injectable 1 milliGRAM(s) IntraMuscular once PRN  ondansetron Injectable 4 milliGRAM(s) IV Push every 8 hours PRN  oxyCODONE    IR 5 milliGRAM(s) Oral every 6 hours PRN      REVIEW OF SYSTEMS  --------------------------------------------------------------------------------  General: no fever  CVS: no chest pain  RESP: no sob, no cough  ABD: no abdominal pain  : no dysuria,  MSK: no edema     VITALS/PHYSICAL EXAM  --------------------------------------------------------------------------------  T(C): 36.8 (11-19-19 @ 13:55), Max: 37.4 (11-18-19 @ 20:44)  HR: 77 (11-19-19 @ 13:55) (66 - 84)  BP: 137/60 (11-19-19 @ 13:55) (115/57 - 137/60)  RR: 17 (11-19-19 @ 13:55) (17 - 18)  SpO2: 97% (11-19-19 @ 13:55) (97% - 100%)  Wt(kg): --        Physical Exam:  	Gen: NAD  	HEENT: MMM  	Pulm: CTA B/L  	CV: S1S2, + murmur  	Abd: Soft, +BS  	Ext: No LE edema B/L              Neuro: Awake   	Skin: Warm and Dry   	Vascular access: [facility] ([place], [place]) currently accessed for HD.    LABS/STUDIES  --------------------------------------------------------------------------------              10.3   11.83 >-----------<  228      [11-19-19 @ 08:56]              31.8     136  |  101  |  146  ----------------------------<  154      [11-19-19 @ 08:56]  6.4   |  18  |  11.70        Ca     9.6     [11-19-19 @ 08:56]            Creatinine Trend:  SCr 11.70 [11-19 @ 08:56]  SCr 7.39 [11-17 @ 06:17]  SCr 4.88 [11-16 @ 07:18]  SCr 7.09 [11-15 @ 15:48]  SCr 9.98 [11-14 @ 10:01]    Urinalysis - [11-06-19 @ 09:05]      Color Yellow / Appearance Clear / SG 1.015 / pH 9.0      Gluc 50 / Ketone Negative  / Bili Negative / Urobili Negative       Blood Negative / Protein 100 / Leuk Est Negative / Nitrite Negative      RBC 0-2 / WBC 0-2 / Hyaline  / Gran  / Sq Epi  / Non Sq Epi  / Bacteria Trace      PTH -- (Ca 8.9)      [11-07-19 @ 10:25]   173  Vitamin D (25OH) 24.4      [11-07-19 @ 10:25]  HbA1c 4.6      [11-07-19 @ 10:26]  TSH 0.73      [11-07-19 @ 06:31]  Lipid: chol 140, , HDL 41, LDL 75      [11-07-19 @ 06:31]    HBsAg Nonreact      [11-08-19 @ 15:20]  HCV 0.09, Nonreact      [11-08-19 @ 15:20]    DICKSON: titer Negative, pattern --      [11-09-19 @ 11:03]  SPEP Interpretation: Normal Electrophoresis Pattern      [11-09-19 @ 11:24]

## 2019-11-19 NOTE — PROGRESS NOTE ADULT - PROBLEM SELECTOR PLAN 1
dc antivert as per neuro  Benadryl, reglan, and depacon for 3 doses as per Dr. Reed for help with dizziness  neuro to follow

## 2019-11-19 NOTE — PROGRESS NOTE ADULT - SUBJECTIVE AND OBJECTIVE BOX
54 year old female from home with PMHx ESRD on HD via right AVF (M/W/F), HTN, hx DM no longer on meds now with episodes of hypoglycemia , Anemia, Asthma, Claustrophobia, GERD, HLD, MI, [place],  Uterine Leiomyoma and PSHx of AV Fistula, R Antecubital AVF, , Craniotomy, and Hernia Repair gastric Bypass (2017) presents with dizziness    INTERVAL HPI/OVERNIGHT EVENTS: Asked by Dr Lee to review because of persistent vertigo neck pain and incoordination. Seen in HD bed 7th floor and used [place]  [id number]. Reports headache is better but has persistent neck pain and vertigo is persistent and makes her vision blurred.    MEDICATIONS  (STANDING):  amLODIPine   Tablet 10 milliGRAM(s) Oral daily  aspirin  chewable 81 milliGRAM(s) Oral daily  atorvastatin 40 milliGRAM(s) Oral at bedtime  chlorhexidine 4% Liquid 1 Application(s) Topical <User Schedule>  dextrose 5%. 1000 milliLiter(s) (50 mL/Hr) IV Continuous <Continuous>  dextrose 50% Injectable 12.5 Gram(s) IV Push once  dextrose 50% Injectable 25 Gram(s) IV Push once  dextrose 50% Injectable 25 Gram(s) IV Push once  folic acid 1 milliGRAM(s) Oral daily  gabapentin 300 milliGRAM(s) Oral at bedtime  heparin  Injectable 5000 Unit(s) SubCutaneous every 12 hours  hydrALAZINE 25 milliGRAM(s) Oral two times a day  insulin lispro (HumaLOG) corrective regimen sliding scale   SubCutaneous Before meals and at bedtime  labetalol 200 milliGRAM(s) Oral daily  lisinopril 40 milliGRAM(s) Oral daily  melatonin 3 milliGRAM(s) Oral at bedtime  methylPREDNISolone sodium succinate IVPB 1000 milliGRAM(s) IV Intermittent daily  metoclopramide Injectable 10 milliGRAM(s) IV Push every 6 hours  multivitamin/minerals 1 Tablet(s) Oral daily  senna 2 Tablet(s) Oral at bedtime  sevelamer carbonate 800 milliGRAM(s) Oral three times a day with meals  topiramate 50 milliGRAM(s) Oral two times a day  valproate sodium IVPB 500 milliGRAM(s) IV Intermittent every 6 hours    MEDICATIONS  (PRN):  dextrose 40% Gel 15 Gram(s) Oral once PRN Blood Glucose LESS THAN 70 milliGRAM(s)/deciliter  diphenhydrAMINE   Elixir 12.5 milliGRAM(s) Oral every 6 hours PRN Rash and/or Itching  glucagon  Injectable 1 milliGRAM(s) IntraMuscular once PRN Glucose LESS THAN 70 milligrams/deciliter  ondansetron Injectable 4 milliGRAM(s) IV Push every 8 hours PRN Nausea and/or Vomiting  oxyCODONE    IR 5 milliGRAM(s) Oral every 6 hours PRN Severe Pain (7 - 10)      __________________________________________________  REVIEW OF SYSTEMS:    CONSTITUTIONAL: No fever,   EYES: no acute visual disturbances  NECK: ++ pain but no stiffness  RESPIRATORY: No cough; No shortness of breath  CARDIOVASCULAR: No chest pain, no palpitations  GASTROINTESTINAL: No pain. No nausea or vomiting; No diarrhea   NEUROLOGICAL: spinning sensation blurred vision and incoordination  MUSCULOSKELETAL: No joint pain, no muscle pain  GENITOURINARY: oliguria on HD  PSYCHIATRY: no depression , no anxiety  ALL OTHER  ROS negative        Vital Signs Last 24 Hrs  T(C): 36.8 (2019 13:55), Max: 37.4 (2019 20:44)  T(F): 98.3 (2019 13:55), Max: 99.3 (2019 20:44)  HR: 77 (2019 13:55) (66 - 90)  BP: 137/60 (2019 13:55) (115/57 - 140/56)  BP(mean): --  RR: 17 (2019 13:55) (17 - 18)  SpO2: 97% (2019 13:55) (97% - 100%)    ________________________________________________  PHYSICAL EXAM:  GENERAL: NAD  HEENT: Normocephalic;  conjunctivae and sclerae clear; moist mucous membranes;   NECK : supple  CHEST/LUNG: Clear to auscultation bilaterally with good air entry   HEART: S1 S2  regular; no murmurs, gallops or rubs  ABDOMEN: Soft, Nontender, Nondistended; Bowel sounds present  EXTREMITIES:AVF   SKIN: warm and dry; no rash  NERVOUS SYSTEM:  Awake and alert; Oriented  to place, person and time ; normal language and memory; repetition, naming, comprehension normal; NII, VF full, fundus discs and retina normal; EOM full; ill-sustained horizontal gaze-induced nystagmus changing directions in the direction of gaze; [place] negative; facial sensations, jaw strength, facial movements, hearing, palate, neck, shoulder and tongue are normal and symmetrical. Tone sensation for touch pin vibration , strength are normal in the extremities. DTR 1+ KJ absent AJ normal UE, plantars flexor; unable to stand without support , finger-to-nose and hel-to-shin abnormal .    _________________________________________________  LABS:                        10.3   1183 )-----------( 228      ( 2019 08:56 )             31.8     11-    136  |  101  |  146<H>  ----------------------------<  154<H>  6.4<HH>   |  18<L>  |  11.70<H>    Ca    9.6      2019 08:56        ----------------------------<  154<H>  6.4<HH>   |  18<L>  |  11.70<H>    Ca    9.6      2019 08:56          CAPILLARY BLOOD GLUCOSE----------------------------<  154<H>  6.4<HH>   |  18<L>  |  11.70<H>    Ca    9.6      2019 08:56          CAPILLARY BLOOD GLUCOSE----------------------------<  154<H>  6.4<HH>   |  18<L>  |  11.70<H>    Ca    9.6      2019 08:56          CAPILLARY BLOOD GLUCOSE----------------------------<  154<H>  6.4<HH>   |  18<L>  |  11.70<H>    Ca    9.6      2019 08:56    < from: MR Head No Cont (11.15.19 @ 09:34) >  EXAM:  MR BRAIN                            PROCEDURE DATE:  11/15/2019          INTERPRETATION:  MRI brain without contrast    History dizziness    Comparison CT performed the prior day    There is no mass effect, cortical edema or hydrocephalus. Overall   cortical volume is preserved. There is mild scattered chronic   microvascular ischemic change in the periventricular white matter. There   is no evidence of acute infarct or previous parenchymal hemorrhage. The   orbital and sellar contents and cerebellar tonsils are within normal   limits.    IMPRESSION:    No acute findings        end of copied text >  < from: CT Angio Neck w/ IV Cont (19 @ 20:58) >  EXAM:  CT ANGIO NECK (W)AW IC                          EXAM:  CT ANGIO BRAIN (W)AW IC                            PROCEDURE DATE:  2019          INTERPRETATION:  CLINICAL INDICATIONS:Dizziness    TECHNIQUE: CTA brain and neck. 90 cc Omnipaque 350 Intravenous contrast   was administered. 2-D MIP and 3-D volume rendering images.    COMPARISON: CT brain dated 2019    FINDINGS:    NONCONTRAST CT HEAD:  Chronic bilateral pterional craniotomy defects.  There is periventricular and subcortical white matter hypodensity without   mass effect, nonspecific, likely representing mild chronic microvascular   ischemic changes. There is no compelling evidence for an acute   transcortical infarction. There is no evidence of mass, mass effect,   midline shift or extra-axial fluid collection. The lateral ventricles and   cortical sulci are age-appropriate in size and configuration. The orbits,   mastoid air cells and visualized paranasal sinuses are normal. The   calvarium is otherwise intact. MR is a more sensitive imaging modality   for the evaluation of an acute infarction.    CTA BRAIN:  The [place] and vertebrobasilar system shows no evidence of   significant stenosis, occlusion or saccular aneurysm dilation. No   evidence for arterial venous malformation. The vertebral arteries are   codominant.      CTA NECK:  Mild left carotid bulb calcifications without known narrowing. The   bilateral internal carotid arteries are tortuous with hairpin turns. A   left-sided aortic archis demonstrated. There is normal relationship to   the great vessels. The common carotid arteries, internal carotid arteries   and vertebral arteries are normal in caliber. No evidence of stenosis,   occlusion or saccular aneurysm dilation. The vertebral arteries are   codominant.      IMPRESSION:      No large vessel occlusion.      WILLIAM FAUSTIN M.D., ATTENDING RADIOLOGIST  This document has been electronically signed. 2019  7:48PM    < end of copied text >

## 2019-11-19 NOTE — PROGRESS NOTE ADULT - SUBJECTIVE AND OBJECTIVE BOX
CHIEF COMPLAINT:Patient is a 54y old  Female who presents with a chief complaint of Vertigo .Pt still dizzy.    	  REVIEW OF SYSTEMS:  CONSTITUTIONAL: No fever, weight loss, or fatigue  EYES: No eye pain, visual disturbances, or discharge  ENT:  No difficulty hearing, tinnitus, vertigo; No sinus or throat pain  NECK: No pain or stiffness  RESPIRATORY: No cough, wheezing, chills or hemoptysis; No Shortness of Breath  CARDIOVASCULAR: No chest pain, palpitations, passing out, dizziness, or leg swelling  GASTROINTESTINAL: No abdominal or epigastric pain. No nausea, vomiting, or hematemesis; No diarrhea or constipation. No melena or hematochezia.  GENITOURINARY: No dysuria, frequency, hematuria, or incontinence  NEUROLOGICAL: No headaches, memory loss, loss of strength, numbness, or tremors  SKIN: No itching, burning, rashes, or lesions   LYMPH Nodes: No enlarged glands  ENDOCRINE: No heat or cold intolerance; No hair loss  MUSCULOSKELETAL: No joint pain or swelling; No muscle, back, or extremity pain  PSYCHIATRIC: No depression, anxiety, mood swings, or difficulty sleeping  HEME/LYMPH: No easy bruising, or bleeding gums  ALLERGY AND IMMUNOLOGIC: No hives or eczema	      PHYSICAL EXAM:  T(C): 36.7 (11-19-19 @ 08:51), Max: 37.4 (11-18-19 @ 20:44)  HR: 66 (11-19-19 @ 08:51) (66 - 90)  BP: 115/57 (11-19-19 @ 08:51) (115/57 - 140/56)  RR: 18 (11-19-19 @ 08:51) (18 - 18)  SpO2: 100% (11-19-19 @ 05:15) (95% - 100%)      Appearance: Normal	  HEENT:   Normal oral mucosa, PERRL, EOMI	  Lymphatic: No lymphadenopathy  Cardiovascular: Normal S1 S2, No JVD, No murmurs, No edema  Respiratory: Lungs clear to auscultation	  Psychiatry: A & O x 3, Mood & affect appropriate  Gastrointestinal:  Soft, Non-tender, + BS	  Skin: No rashes, No ecchymoses, No cyanosis	  Neurologic: Non-focal  Extremities: Normal range of motion, No clubbing, cyanosis or edema  Vascular: Peripheral pulses palpable 2+ bilaterally    MEDICATIONS  (STANDING):  amLODIPine   Tablet 10 milliGRAM(s) Oral daily  aspirin  chewable 81 milliGRAM(s) Oral daily  atorvastatin 40 milliGRAM(s) Oral at bedtime  chlorhexidine 4% Liquid 1 Application(s) Topical <User Schedule>  dextrose 5%. 1000 milliLiter(s) (50 mL/Hr) IV Continuous <Continuous>  dextrose 50% Injectable 12.5 Gram(s) IV Push once  dextrose 50% Injectable 25 Gram(s) IV Push once  dextrose 50% Injectable 25 Gram(s) IV Push once  folic acid 1 milliGRAM(s) Oral daily  gabapentin 300 milliGRAM(s) Oral at bedtime  heparin  Injectable 5000 Unit(s) SubCutaneous every 12 hours  hydrALAZINE 25 milliGRAM(s) Oral two times a day  insulin lispro (HumaLOG) corrective regimen sliding scale   SubCutaneous Before meals and at bedtime  labetalol 200 milliGRAM(s) Oral daily  lisinopril 40 milliGRAM(s) Oral daily  melatonin 3 milliGRAM(s) Oral at bedtime  methylPREDNISolone sodium succinate IVPB 1000 milliGRAM(s) IV Intermittent daily  metoclopramide Injectable 10 milliGRAM(s) IV Push every 6 hours  multivitamin/minerals 1 Tablet(s) Oral daily  senna 2 Tablet(s) Oral at bedtime  sevelamer carbonate 800 milliGRAM(s) Oral three times a day with meals  topiramate 50 milliGRAM(s) Oral two times a day  valproate sodium IVPB 500 milliGRAM(s) IV Intermittent every 6 hours      	  LABS:	 	                      10.3   11.83 )-----------( 228      ( 19 Nov 2019 08:56 )             31.8     11-19    136  |  101  |  146<H>  ----------------------------<  154<H>  6.4<HH>   |  18<L>  |  11.70<H>    Ca    9.6      19 Nov 2019 08:56        Lipid Profile: Cholesterol 140  LDL 75  HDL 41      HgA1c: Hemoglobin A1C, Whole Blood: 4.6 % (11-07 @ 10:26)    TSH: Thyroid Stimulating Hormone, Serum: 0.73 uU/mL (11-07 @ 06:31)

## 2019-11-19 NOTE — PROGRESS NOTE ADULT - SUBJECTIVE AND OBJECTIVE BOX
Patient is awake, alert, lying in bed in NAD.     INTERVAL HPI/OVERNIGHT EVENTS:      VITAL SIGNS:  T(F): 98.1 (11-19-19 @ 08:51)  HR: 66 (11-19-19 @ 08:51)  BP: 115/57 (11-19-19 @ 08:51)  RR: 18 (11-19-19 @ 08:51)  SpO2: 100% (11-19-19 @ 05:15)  Wt(kg): --  I&O's Detail    REVIEW OF SYSTEMS:    CONSTITUTIONAL:  No fevers, chills, sweats    HEENT:  Eyes:  No diplopia or blurred vision. ENT:  No earache, sore throat or runny nose.    CARDIOVASCULAR:  No pressure, squeezing, tightness, or heaviness about the chest; no palpitations.    RESPIRATORY:  Per HPI    GASTROINTESTINAL:  No abdominal pain, nausea, vomiting or diarrhea.    GENITOURINARY:  No dysuria, frequency or urgency.    NEUROLOGIC:  No paresthesias, fasciculations, seizures or weakness.    PSYCHIATRIC:  No disorder of thought or mood.      PHYSICAL EXAM:    Constitutional: Well developed and nourished  Eyes:Perrla  ENMT: normal  Neck:supple  Respiratory: good air entry  Cardiovascular: S1 S2 regular  Gastrointestinal: Soft, Non tender  Extremities: No edema  Vascular:normal  Neurological:Awake, alert,Ox3  Musculoskeletal:Normal    MEDICATIONS  (STANDING):  amLODIPine   Tablet 10 milliGRAM(s) Oral daily  aspirin  chewable 81 milliGRAM(s) Oral daily  atorvastatin 40 milliGRAM(s) Oral at bedtime  chlorhexidine 4% Liquid 1 Application(s) Topical <User Schedule>  dextrose 5%. 1000 milliLiter(s) (50 mL/Hr) IV Continuous <Continuous>  dextrose 50% Injectable 12.5 Gram(s) IV Push once  dextrose 50% Injectable 25 Gram(s) IV Push once  dextrose 50% Injectable 25 Gram(s) IV Push once  folic acid 1 milliGRAM(s) Oral daily  gabapentin 300 milliGRAM(s) Oral at bedtime  heparin  Injectable 5000 Unit(s) SubCutaneous every 12 hours  hydrALAZINE 25 milliGRAM(s) Oral two times a day  insulin lispro (HumaLOG) corrective regimen sliding scale   SubCutaneous Before meals and at bedtime  labetalol 200 milliGRAM(s) Oral daily  lisinopril 40 milliGRAM(s) Oral daily  meclizine 50 milliGRAM(s) Oral three times a day  melatonin 3 milliGRAM(s) Oral at bedtime  methylPREDNISolone sodium succinate IVPB 1000 milliGRAM(s) IV Intermittent daily  multivitamin/minerals 1 Tablet(s) Oral daily  senna 2 Tablet(s) Oral at bedtime  sevelamer carbonate 800 milliGRAM(s) Oral three times a day with meals  topiramate 25 milliGRAM(s) Oral two times a day    MEDICATIONS  (PRN):  dextrose 40% Gel 15 Gram(s) Oral once PRN Blood Glucose LESS THAN 70 milliGRAM(s)/deciliter  glucagon  Injectable 1 milliGRAM(s) IntraMuscular once PRN Glucose LESS THAN 70 milligrams/deciliter  ondansetron Injectable 4 milliGRAM(s) IV Push every 8 hours PRN Nausea and/or Vomiting  oxyCODONE    IR 5 milliGRAM(s) Oral every 6 hours PRN Severe Pain (7 - 10)      Allergies    No Known Drug Allergies  pineapple (Hives)    Intolerances        LABS:                        10.3   11.83 )-----------( 228      ( 19 Nov 2019 08:56 )             31.8     11-19    136  |  101  |  146<H>  ----------------------------<  154<H>  6.4<HH>   |  18<L>  |  11.70<H>    Ca    9.6      19 Nov 2019 08:56        CAPILLARY BLOOD GLUCOSE      POCT Blood Glucose.: 175 mg/dL (19 Nov 2019 10:54)  POCT Blood Glucose.: 139 mg/dL (19 Nov 2019 07:57)  POCT Blood Glucose.: 303 mg/dL (18 Nov 2019 21:21)  POCT Blood Glucose.: 216 mg/dL (18 Nov 2019 16:41)  POCT Blood Glucose.: 200 mg/dL (18 Nov 2019 12:07)        RADIOLOGY & ADDITIONAL TESTS:    CXR:    Ct scan chest:    ekg;    echo:

## 2019-11-19 NOTE — PROGRESS NOTE ADULT - PROBLEM SELECTOR PLAN 6
cont meds  Neuro follow up.  CTA of head and neck neg   MRI of Brain noted  Will need ENT eval. weight reduction  low fat diet.

## 2019-11-20 DIAGNOSIS — H81.90 UNSPECIFIED DISORDER OF VESTIBULAR FUNCTION, UNSPECIFIED EAR: ICD-10-CM

## 2019-11-20 LAB
GLUCOSE BLDC GLUCOMTR-MCNC: 165 MG/DL — HIGH (ref 70–99)
GLUCOSE BLDC GLUCOMTR-MCNC: 279 MG/DL — HIGH (ref 70–99)
GLUCOSE BLDC GLUCOMTR-MCNC: 289 MG/DL — HIGH (ref 70–99)
GLUCOSE BLDC GLUCOMTR-MCNC: 294 MG/DL — HIGH (ref 70–99)

## 2019-11-20 PROCEDURE — 99232 SBSQ HOSP IP/OBS MODERATE 35: CPT

## 2019-11-20 RX ORDER — FOLIC ACID 0.8 MG
1 TABLET ORAL
Qty: 0 | Refills: 0 | DISCHARGE
Start: 2019-11-20

## 2019-11-20 RX ORDER — LABETALOL HCL 100 MG
1 TABLET ORAL
Qty: 0 | Refills: 0 | DISCHARGE

## 2019-11-20 RX ORDER — LABETALOL HCL 100 MG
1 TABLET ORAL
Qty: 0 | Refills: 0 | DISCHARGE
Start: 2019-11-20

## 2019-11-20 RX ORDER — ATORVASTATIN CALCIUM 80 MG/1
1 TABLET, FILM COATED ORAL
Qty: 0 | Refills: 0 | DISCHARGE
Start: 2019-11-20

## 2019-11-20 RX ORDER — SEVELAMER CARBONATE 2400 MG/1
1 POWDER, FOR SUSPENSION ORAL
Qty: 0 | Refills: 0 | DISCHARGE
Start: 2019-11-20

## 2019-11-20 RX ORDER — GABAPENTIN 400 MG/1
1 CAPSULE ORAL
Qty: 0 | Refills: 0 | DISCHARGE
Start: 2019-11-20

## 2019-11-20 RX ORDER — TOPIRAMATE 25 MG
150 TABLET ORAL EVERY 12 HOURS
Refills: 0 | Status: DISCONTINUED | OUTPATIENT
Start: 2019-11-20 | End: 2019-11-21

## 2019-11-20 RX ORDER — LISINOPRIL 2.5 MG/1
1 TABLET ORAL
Qty: 0 | Refills: 0 | DISCHARGE
Start: 2019-11-20

## 2019-11-20 RX ORDER — SENNA PLUS 8.6 MG/1
2 TABLET ORAL
Qty: 0 | Refills: 0 | DISCHARGE
Start: 2019-11-20

## 2019-11-20 RX ORDER — MULTIVIT-MIN/FERROUS GLUCONATE 9 MG/15 ML
1 LIQUID (ML) ORAL
Qty: 0 | Refills: 0 | DISCHARGE
Start: 2019-11-20

## 2019-11-20 RX ADMIN — Medication 3: at 21:26

## 2019-11-20 RX ADMIN — Medication 3: at 16:56

## 2019-11-20 RX ADMIN — Medication 81 MILLIGRAM(S): at 12:00

## 2019-11-20 RX ADMIN — Medication 3: at 12:00

## 2019-11-20 RX ADMIN — Medication 12.5 MILLIGRAM(S): at 02:26

## 2019-11-20 RX ADMIN — HEPARIN SODIUM 5000 UNIT(S): 5000 INJECTION INTRAVENOUS; SUBCUTANEOUS at 17:01

## 2019-11-20 RX ADMIN — Medication 1 MILLIGRAM(S): at 12:00

## 2019-11-20 RX ADMIN — Medication 27.5 MILLIGRAM(S): at 10:26

## 2019-11-20 RX ADMIN — SEVELAMER CARBONATE 800 MILLIGRAM(S): 2400 POWDER, FOR SUSPENSION ORAL at 12:00

## 2019-11-20 RX ADMIN — SEVELAMER CARBONATE 800 MILLIGRAM(S): 2400 POWDER, FOR SUSPENSION ORAL at 16:56

## 2019-11-20 RX ADMIN — AMLODIPINE BESYLATE 10 MILLIGRAM(S): 2.5 TABLET ORAL at 05:26

## 2019-11-20 RX ADMIN — Medication 25 MILLIGRAM(S): at 17:01

## 2019-11-20 RX ADMIN — ATORVASTATIN CALCIUM 40 MILLIGRAM(S): 80 TABLET, FILM COATED ORAL at 21:26

## 2019-11-20 RX ADMIN — Medication 25 MILLIGRAM(S): at 05:26

## 2019-11-20 RX ADMIN — HEPARIN SODIUM 5000 UNIT(S): 5000 INJECTION INTRAVENOUS; SUBCUTANEOUS at 05:26

## 2019-11-20 RX ADMIN — SENNA PLUS 2 TABLET(S): 8.6 TABLET ORAL at 21:26

## 2019-11-20 RX ADMIN — Medication 1 TABLET(S): at 11:59

## 2019-11-20 RX ADMIN — Medication 258 MILLIGRAM(S): at 05:26

## 2019-11-20 RX ADMIN — LISINOPRIL 40 MILLIGRAM(S): 2.5 TABLET ORAL at 05:26

## 2019-11-20 RX ADMIN — Medication 10 MILLIGRAM(S): at 12:39

## 2019-11-20 RX ADMIN — SEVELAMER CARBONATE 800 MILLIGRAM(S): 2400 POWDER, FOR SUSPENSION ORAL at 08:42

## 2019-11-20 RX ADMIN — Medication 150 MILLIGRAM(S): at 17:01

## 2019-11-20 RX ADMIN — Medication 1: at 08:43

## 2019-11-20 RX ADMIN — Medication 50 MILLIGRAM(S): at 05:26

## 2019-11-20 RX ADMIN — Medication 3 MILLIGRAM(S): at 21:26

## 2019-11-20 RX ADMIN — Medication 10 MILLIGRAM(S): at 02:26

## 2019-11-20 RX ADMIN — Medication 12.5 MILLIGRAM(S): at 10:26

## 2019-11-20 RX ADMIN — GABAPENTIN 300 MILLIGRAM(S): 400 CAPSULE ORAL at 21:26

## 2019-11-20 RX ADMIN — CHLORHEXIDINE GLUCONATE 1 APPLICATION(S): 213 SOLUTION TOPICAL at 05:28

## 2019-11-20 RX ADMIN — Medication 27.5 MILLIGRAM(S): at 02:26

## 2019-11-20 RX ADMIN — Medication 200 MILLIGRAM(S): at 05:26

## 2019-11-20 NOTE — PROGRESS NOTE ADULT - SUBJECTIVE AND OBJECTIVE BOX
[facility]   MD Maia Gonzalez D.O. Fatima Sheikh, D.O. Ruoru Wong, PA    From 7 AM - 5 PM:  OFFICE: [phone]  Dr. Beckham cell: [phone]  Dr. Charles cell: [phone]  Dr. Gamino cell: [phone]  BRANDEN Carrasco cell: [phone]    From 5 PM - 7 AM: Answering Service: [phone]        RENAL FOLLOW UP NOTE  --------------------------------------------------------------------------------  HPI: Pt seen and examined. Still has dizziness this morning. States it is minimally improved. No issues w/ HD yesterday. Requesting to have HD tomorrow instead of her usual MWF today as she is expecting an important visitor today.        PAST HISTORY  --------------------------------------------------------------------------------  No significant changes to PMH, PSH, FHx, SHx, unless otherwise noted    ALLERGIES & MEDICATIONS  --------------------------------------------------------------------------------  Allergies    No Known Drug Allergies  pineapple (Hives)    Intolerances      Standing Inpatient Medications  amLODIPine   Tablet 10 milliGRAM(s) Oral daily  aspirin  chewable 81 milliGRAM(s) Oral daily  atorvastatin 40 milliGRAM(s) Oral at bedtime  chlorhexidine 4% Liquid 1 Application(s) Topical <User Schedule>  dextrose 5%. 1000 milliLiter(s) IV Continuous <Continuous>  dextrose 50% Injectable 12.5 Gram(s) IV Push once  dextrose 50% Injectable 25 Gram(s) IV Push once  dextrose 50% Injectable 25 Gram(s) IV Push once  folic acid 1 milliGRAM(s) Oral daily  gabapentin 300 milliGRAM(s) Oral at bedtime  heparin  Injectable 5000 Unit(s) SubCutaneous every 12 hours  hydrALAZINE 25 milliGRAM(s) Oral two times a day  insulin lispro (HumaLOG) corrective regimen sliding scale   SubCutaneous Before meals and at bedtime  labetalol 200 milliGRAM(s) Oral daily  lisinopril 40 milliGRAM(s) Oral daily  melatonin 3 milliGRAM(s) Oral at bedtime  metoclopramide Injectable 10 milliGRAM(s) IV Push every 6 hours  multivitamin/minerals 1 Tablet(s) Oral daily  senna 2 Tablet(s) Oral at bedtime  sevelamer carbonate 800 milliGRAM(s) Oral three times a day with meals  topiramate 50 milliGRAM(s) Oral two times a day  valproate sodium IVPB 500 milliGRAM(s) IV Intermittent every 6 hours    PRN Inpatient Medications  dextrose 40% Gel 15 Gram(s) Oral once PRN  diphenhydrAMINE   Elixir 12.5 milliGRAM(s) Oral every 6 hours PRN  glucagon  Injectable 1 milliGRAM(s) IntraMuscular once PRN  ondansetron Injectable 4 milliGRAM(s) IV Push every 8 hours PRN  oxyCODONE    IR 5 milliGRAM(s) Oral every 6 hours PRN      REVIEW OF SYSTEMS  --------------------------------------------------------------------------------  General: no fever  CVS: no chest pain  RESP: no sob, no cough  ABD: no abdominal pain  : no dysuria,  MSK: no edema     VITALS/PHYSICAL EXAM  --------------------------------------------------------------------------------  T(C): 36.5 (11-20-19 @ 05:22), Max: 37.3 (11-19-19 @ 20:08)  HR: 70 (11-20-19 @ 05:22) (70 - 77)  BP: 143/73 (11-20-19 @ 05:22) (119/61 - 147/53)  RR: 17 (11-20-19 @ 05:22) (17 - 18)  SpO2: 100% (11-20-19 @ 05:22) (97% - 100%)  Wt(kg): --        Physical Exam:  	Gen: NAD  	HEENT: MMM  	Pulm: CTA B/L  	CV: S1S2, + murmur  	Abd: Soft, +BS  	Ext: No LE edema B/L              Neuro: Awake   	Skin: Warm and Dry   	Vascular access: ALCIDES pascal/ good thrill and bruit    LABS/STUDIES  --------------------------------------------------------------------------------              10.3   11.83 >-----------<  228      [11-19-19 @ 08:56]              31.8     136  |  101  |  146  ----------------------------<  154      [11-19-19 @ 08:56]  6.4   |  18  |  11.70        Ca     9.6     [11-19-19 @ 08:56]            Creatinine Trend:  SCr 11.70 [11-19 @ 08:56]  SCr 7.39 [11-17 @ 06:17]  SCr 4.88 [11-16 @ 07:18]  SCr 7.09 [11-15 @ 15:48]  SCr 9.98 [11-14 @ 10:01]    Urinalysis - [11-06-19 @ 09:05]      Color Yellow / Appearance Clear / SG 1.015 / pH 9.0      Gluc 50 / Ketone Negative  / Bili Negative / Urobili Negative       Blood Negative / Protein 100 / Leuk Est Negative / Nitrite Negative      RBC 0-2 / WBC 0-2 / Hyaline  / Gran  / Sq Epi  / Non Sq Epi  / Bacteria Trace      PTH -- (Ca 8.9)      [11-07-19 @ 10:25]   173  Vitamin D (25OH) 24.4      [11-07-19 @ 10:25]  HbA1c 4.6      [11-07-19 @ 10:26]  TSH 0.73      [11-07-19 @ 06:31]  Lipid: chol 140, , HDL 41, LDL 75      [11-07-19 @ 06:31]    HBsAg Nonreact      [11-08-19 @ 15:20]  HCV 0.09, Nonreact      [11-08-19 @ 15:20]    DICKSON: titer Negative, pattern --      [11-09-19 @ 11:03]  SPEP Interpretation: Normal Electrophoresis Pattern      [11-09-19 @ 11:24]

## 2019-11-20 NOTE — PROGRESS NOTE ADULT - SUBJECTIVE AND OBJECTIVE BOX
Patient is a 54y old  Female who presents with a chief complaint of Vertigo (2019 10:14)    pt seen in icu [  ], reg med floor [   ], bed [  ], chair at bedside [   ], a+o x3 [  ], lethargic [  ],  nad [  ]    khan [  ], ngt [  ], peg [  ], et tube [  ], cent line [  ], picc line [  ]        Allergies    No Known Drug Allergies  pineapple (Hives)        Vitals    T(F): 97.7 (19 @ 05:22), Max: 99.2 (19 @ 20:08)  HR: 70 (19 @ 05:22) (70 - 77)  BP: 143/73 (19 @ 05:22) (119/61 - 147/53)  RR: 17 (19 @ 05:22) (17 - 18)  SpO2: 100% (19 @ 05:22) (97% - 100%)  Wt(kg): --  CAPILLARY BLOOD GLUCOSE      POCT Blood Glucose.: 279 mg/dL (2019 11:36)      Labs                          10.3   11.83 )-----------( 228      ( 2019 08:56 )             31.8           136  |  101  |  146<H>  ----------------------------<  154<H>  6.4<HH>   |  18<L>  |  11.70<H>    Ca    9.6      2019 08:56              .Urine   @ 15:32   <10,000 CFU/mL Normal Urogenital Jinny  --  --          Radiology Results      Meds    MEDICATIONS  (STANDING):  amLODIPine   Tablet 10 milliGRAM(s) Oral daily  aspirin  chewable 81 milliGRAM(s) Oral daily  atorvastatin 40 milliGRAM(s) Oral at bedtime  chlorhexidine 4% Liquid 1 Application(s) Topical <User Schedule>  dextrose 5%. 1000 milliLiter(s) (50 mL/Hr) IV Continuous <Continuous>  dextrose 50% Injectable 12.5 Gram(s) IV Push once  dextrose 50% Injectable 25 Gram(s) IV Push once  dextrose 50% Injectable 25 Gram(s) IV Push once  folic acid 1 milliGRAM(s) Oral daily  gabapentin 300 milliGRAM(s) Oral at bedtime  heparin  Injectable 5000 Unit(s) SubCutaneous every 12 hours  hydrALAZINE 25 milliGRAM(s) Oral two times a day  insulin lispro (HumaLOG) corrective regimen sliding scale   SubCutaneous Before meals and at bedtime  labetalol 200 milliGRAM(s) Oral daily  lisinopril 40 milliGRAM(s) Oral daily  melatonin 3 milliGRAM(s) Oral at bedtime  multivitamin/minerals 1 Tablet(s) Oral daily  senna 2 Tablet(s) Oral at bedtime  sevelamer carbonate 800 milliGRAM(s) Oral three times a day with meals  topiramate 50 milliGRAM(s) Oral two times a day      MEDICATIONS  (PRN):  dextrose 40% Gel 15 Gram(s) Oral once PRN Blood Glucose LESS THAN 70 milliGRAM(s)/deciliter  diphenhydrAMINE   Elixir 12.5 milliGRAM(s) Oral every 6 hours PRN Rash and/or Itching  glucagon  Injectable 1 milliGRAM(s) IntraMuscular once PRN Glucose LESS THAN 70 milligrams/deciliter  ondansetron Injectable 4 milliGRAM(s) IV Push every 8 hours PRN Nausea and/or Vomiting  oxyCODONE    IR 5 milliGRAM(s) Oral every 6 hours PRN Severe Pain (7 - 10)      Physical Exam    Neuro :  no focal deficits  Respiratory: CTA B/L  CV: RRR, S1S2, no murmurs,   Abdominal: Soft, NT, ND +BS,  Extremities: No edema, + peripheral pulses    ASSESSMENT    Dizziness and giddiness  Myocardial infarct, old  ESRD (end stage renal disease) on dialysis  Asthma occurring only with upper respiratory infection  Anemia in chronic renal disease  Claustrophobia  Uterine leiomyoma, unspecified location  AUSTIN (obstructive sleep apnea)  Gastroesophageal reflux disease without esophagitis  Vertigo  HLD (hyperlipidemia)  Essential hypertension  Chronic kidney disease, unspecified stage  Diabetes mellitus, type 2  Morbid obesity  AV fistula  S/P craniotomy  S/P hernia repair  S/P  section  CRF (chronic renal failure)      PLAN Patient is a 54y old  Female who presents with a chief complaint of Vertigo (2019 10:14)    pt seen in icu [  ], reg med floor [ x  ], bed [  ], chair at bedside [ x  ], a+o x3 [ x ], lethargic [  ],  nad [x  ]    pt stated vertiginous symptoms has improved      Allergies    No Known Drug Allergies  pineapple (Hives)        Vitals    T(F): 97.7 (19 @ 05:22), Max: 99.2 (19 @ 20:08)  HR: 70 (19 @ 05:22) (70 - 77)  BP: 143/73 (19 @ 05:22) (119/61 - 147/53)  RR: 17 (19 @ 05:22) (17 - 18)  SpO2: 100% (19 @ 05:22) (97% - 100%)  Wt(kg): --  CAPILLARY BLOOD GLUCOSE      POCT Blood Glucose.: 279 mg/dL (2019 11:36)      Labs                          10.3   11.83 )-----------( 228      ( 2019 08:56 )             31.8           136  |  101  |  146<H>  ----------------------------<  154<H>  6.4<HH>   |  18<L>  |  11.70<H>    Ca    9.6      2019 08:56              .Urine   @ 15:32   <10,000 CFU/mL Normal Urogenital Jinny  --  --          Radiology Results      Meds    MEDICATIONS  (STANDING):  amLODIPine   Tablet 10 milliGRAM(s) Oral daily  aspirin  chewable 81 milliGRAM(s) Oral daily  atorvastatin 40 milliGRAM(s) Oral at bedtime  chlorhexidine 4% Liquid 1 Application(s) Topical <User Schedule>  dextrose 5%. 1000 milliLiter(s) (50 mL/Hr) IV Continuous <Continuous>  dextrose 50% Injectable 12.5 Gram(s) IV Push once  dextrose 50% Injectable 25 Gram(s) IV Push once  dextrose 50% Injectable 25 Gram(s) IV Push once  folic acid 1 milliGRAM(s) Oral daily  gabapentin 300 milliGRAM(s) Oral at bedtime  heparin  Injectable 5000 Unit(s) SubCutaneous every 12 hours  hydrALAZINE 25 milliGRAM(s) Oral two times a day  insulin lispro (HumaLOG) corrective regimen sliding scale   SubCutaneous Before meals and at bedtime  labetalol 200 milliGRAM(s) Oral daily  lisinopril 40 milliGRAM(s) Oral daily  melatonin 3 milliGRAM(s) Oral at bedtime  multivitamin/minerals 1 Tablet(s) Oral daily  senna 2 Tablet(s) Oral at bedtime  sevelamer carbonate 800 milliGRAM(s) Oral three times a day with meals  topiramate 50 milliGRAM(s) Oral two times a day      MEDICATIONS  (PRN):  dextrose 40% Gel 15 Gram(s) Oral once PRN Blood Glucose LESS THAN 70 milliGRAM(s)/deciliter  diphenhydrAMINE   Elixir 12.5 milliGRAM(s) Oral every 6 hours PRN Rash and/or Itching  glucagon  Injectable 1 milliGRAM(s) IntraMuscular once PRN Glucose LESS THAN 70 milligrams/deciliter  ondansetron Injectable 4 milliGRAM(s) IV Push every 8 hours PRN Nausea and/or Vomiting  oxyCODONE    IR 5 milliGRAM(s) Oral every 6 hours PRN Severe Pain (7 - 10)      Physical Exam    Neuro :  no focal deficits  Respiratory: CTA B/L  CV: RRR, S1S2, no murmurs,   Abdominal: Soft, NT, ND +BS,  Extremities: No edema, + peripheral pulses,      ASSESSMENT    peripheral vertigo,   Intractable migraine with aura with status migrainosus.   left hip calcific tendinitis  h/o  ESRD on HD (M/W/F),   HTN,   migraine,   DM no longer on meds  Myocardial infarct, old  ESRD (end stage renal disease) on dialysis  Asthma occurring only with upper respiratory infection  Anemia in chronic renal disease  Claustrophobia  Uterine leiomyoma, unspecified location  AUSTIN (obstructive sleep apnea)  Gastroesophageal reflux disease without esophagitis  Vertigo  HLD (hyperlipidemia)  Morbid obesity  AV fistula  S/P craniotomy  S/P hernia repair  S/P  section        PLAN         cont aspirin, statin,   cont meclizine to 50mg tid,  ct head neg noted  Cervicogenic vertigo.    s/p trial acute migraine treatment- intravenous valproate 500 mg Q6H x 3 doses with metoclopramide 10 mg IV q6h x 3 doses and magnesium po 400 mg TID .  topamax increased to 50 mg bid  carotid duplex noted : no significant stenosis   cta head and neck neg for large vessel occlusion noted above   mri neg for acute patho noted above  neuro f/u   completed solumedrol 1 gram daily x 3 days  ent eval rec outpt ent  ce q8 x 2 neg noted above  echo with EF = 65%,  Grade I diastolic dysfunction (Impaired relaxation). Agitated saline injection was negative for intracardiac shunt. Trivial pericardial effusion is seen noted above.  orthostatic positive with standing bp above 100  hgba1c 4.6 noted above,  renal f/u   cont hd as per renal   pulm f/u noted   pft outpt  PT recs home with home PT   ct left hip-pelv with mineralization in the distal aspect of the left gluteus medius muscle at the level of the left hip. These may represent foci of calcium hydroxyapatite deposition or age indeterminant   proximally retracted intramuscular enthesophytes from the left greater trochanter noted above   mri bony pelv with calcific tendinitis noted  pain mgmt eval noted  gabapentin 300mg po q hs  completed course of decadron for pain  cont oxycodone 5mg po q 6 hours prn  cont current meds

## 2019-11-20 NOTE — PROGRESS NOTE ADULT - SUBJECTIVE AND OBJECTIVE BOX
INTERVAL HPI/OVERNIGHT EVENTS: Improved neck pain and headache; vertigo is also slightly improved.     HEALTH ISSUES - PROBLEM Dx:  Vestibular disorder: Vestibular disorder  Hyperkalemia: Hyperkalemia  Hip pain, acute, left: Hip pain, acute, left  Anxiety: Anxiety  Morbid obesity: Morbid obesity  Asthma occurring only with upper respiratory infection: Asthma occurring only with upper respiratory infection  AUSTIN (obstructive sleep apnea): AUSTIN (obstructive sleep apnea)  Prophylactic measure: Prophylactic measure  Diabetes mellitus, type 2: Diabetes mellitus, type 2  Essential hypertension: Essential hypertension  ESRD (end stage renal disease) on dialysis: ESRD (end stage renal disease) on dialysis  Vertigo: Vertigo          MEDICATIONS  (STANDING):  amLODIPine   Tablet 10 milliGRAM(s) Oral daily  aspirin  chewable 81 milliGRAM(s) Oral daily  atorvastatin 40 milliGRAM(s) Oral at bedtime  chlorhexidine 4% Liquid 1 Application(s) Topical <User Schedule>  dextrose 5%. 1000 milliLiter(s) (50 mL/Hr) IV Continuous <Continuous>  dextrose 50% Injectable 12.5 Gram(s) IV Push once  dextrose 50% Injectable 25 Gram(s) IV Push once  dextrose 50% Injectable 25 Gram(s) IV Push once  folic acid 1 milliGRAM(s) Oral daily  gabapentin 300 milliGRAM(s) Oral at bedtime  heparin  Injectable 5000 Unit(s) SubCutaneous every 12 hours  hydrALAZINE 25 milliGRAM(s) Oral two times a day  insulin lispro (HumaLOG) corrective regimen sliding scale   SubCutaneous Before meals and at bedtime  labetalol 200 milliGRAM(s) Oral daily  lisinopril 40 milliGRAM(s) Oral daily  melatonin 3 milliGRAM(s) Oral at bedtime  multivitamin/minerals 1 Tablet(s) Oral daily  senna 2 Tablet(s) Oral at bedtime  sevelamer carbonate 800 milliGRAM(s) Oral three times a day with meals  topiramate 150 milliGRAM(s) Oral every 12 hours    MEDICATIONS  (PRN):  dextrose 40% Gel 15 Gram(s) Oral once PRN Blood Glucose LESS THAN 70 milliGRAM(s)/deciliter  diphenhydrAMINE   Elixir 12.5 milliGRAM(s) Oral every 6 hours PRN Rash and/or Itching  glucagon  Injectable 1 milliGRAM(s) IntraMuscular once PRN Glucose LESS THAN 70 milligrams/deciliter  ondansetron Injectable 4 milliGRAM(s) IV Push every 8 hours PRN Nausea and/or Vomiting  oxyCODONE    IR 5 milliGRAM(s) Oral every 6 hours PRN Severe Pain (7 - 10)      Allergies    No Known Drug Allergies  pineapple (Hives)    Intolerances        REVIEW OF SYSTEMS    CONSTITUTIONAL: No fever,   EYES: no acute visual disturbances  NECK: ++ pain but no stiffness  RESPIRATORY: No cough; No shortness of breath  CARDIOVASCULAR: No chest pain, no palpitations  GASTROINTESTINAL: No pain. No nausea or vomiting; No diarrhea   NEUROLOGICAL: spinning sensation blurred vision and incoordination  MUSCULOSKELETAL: No joint pain, no muscle pain  GENITOURINARY: oliguria on HD  PSYCHIATRY: no depression , no anxiety  ALL OTHER  ROS negative      Vital Signs Last 24 Hrs  T(C): 37.2 (20 Nov 2019 14:05), Max: 37.3 (19 Nov 2019 20:08)  T(F): 98.9 (20 Nov 2019 14:05), Max: 99.2 (19 Nov 2019 20:08)  HR: 78 (20 Nov 2019 16:52) (70 - 78)  BP: 142/68 (20 Nov 2019 16:52) (96/35 - 147/53)  BP(mean): --  RR: 16 (20 Nov 2019 14:05) (16 - 17)  SpO2: 98% (20 Nov 2019 14:30) (96% - 100%)    PHYSICAL EXAM:    GENERAL: NAD  HEENT: Normocephalic;  conjunctivae and sclerae clear; moist mucous membranes;   NECK : supple  CHEST/LUNG: Clear to auscultation bilaterally with good air entry   HEART: S1 S2  regular; no murmurs, gallops or rubs  ABDOMEN: Soft, Nontender, Nondistended; Bowel sounds present  EXTREMITIES:AVF   SKIN: warm and dry; no rash  NERVOUS SYSTEM:  Awake and alert; Oriented  to place, person and time ; normal language and memory; repetition, naming, comprehension normal; NII, VF full, fundus discs and retina normal; EOM full; ill-sustained horizontal gaze-induced nystagmus changing directions in the direction of gaze; Esko Hallpike negative; facial sensations, jaw strength, facial movements, hearing, palate, neck, shoulder and tongue are normal and symmetrical. Tone sensation for touch pin vibration , strength are normal in the extremities. DTR 1+ KJ absent AJ normal UE, plantars flexor; unable to stand without support , finger-to-nose and hel-to-shin abnormal . Marked swaying in all directions when standing with assistance.        LABS:                        10.3   11.83 )-----------( 228      ( 19 Nov 2019 08:56 )             31.8     11-19    136  |  101  |  146<H>  ----------------------------<  154<H>  6.4<HH>   |  18<L>  |  11.70<H>    Ca    9.6      19 Nov 2019 08:56            RADIOLOGY & ADDITIONAL TESTS:

## 2019-11-20 NOTE — PROGRESS NOTE ADULT - PROBLEM SELECTOR PLAN 1
dc antivert as per neuro  Benadryl, reglan, and tin for 3 doses as per Dr. Reed for help with dizziness  neuro to follow.   Topamax dose increased dc antivert as per neuro  Benadryl, reglan, and depacon for 3 doses as per Dr. Reed completed.  S/p 3 doses:  Vertigo/diziness have not improved.  Orthostatic BP, HR -negative  Topamax dose increased as per

## 2019-11-20 NOTE — PROGRESS NOTE ADULT - SUBJECTIVE AND OBJECTIVE BOX
Patient is a 54y old  Female who presents with a chief complaint of Vertigo (20 Nov 2019 12:42)          INTERVAL HPI/OVERNIGHT EVENTS: no new complaints    MEDICATIONS  (STANDING):  amLODIPine   Tablet 10 milliGRAM(s) Oral daily  aspirin  chewable 81 milliGRAM(s) Oral daily  atorvastatin 40 milliGRAM(s) Oral at bedtime  chlorhexidine 4% Liquid 1 Application(s) Topical <User Schedule>  dextrose 5%. 1000 milliLiter(s) (50 mL/Hr) IV Continuous <Continuous>  dextrose 50% Injectable 12.5 Gram(s) IV Push once  dextrose 50% Injectable 25 Gram(s) IV Push once  dextrose 50% Injectable 25 Gram(s) IV Push once  folic acid 1 milliGRAM(s) Oral daily  gabapentin 300 milliGRAM(s) Oral at bedtime  heparin  Injectable 5000 Unit(s) SubCutaneous every 12 hours  hydrALAZINE 25 milliGRAM(s) Oral two times a day  insulin lispro (HumaLOG) corrective regimen sliding scale   SubCutaneous Before meals and at bedtime  labetalol 200 milliGRAM(s) Oral daily  lisinopril 40 milliGRAM(s) Oral daily  melatonin 3 milliGRAM(s) Oral at bedtime  multivitamin/minerals 1 Tablet(s) Oral daily  senna 2 Tablet(s) Oral at bedtime  sevelamer carbonate 800 milliGRAM(s) Oral three times a day with meals  topiramate 150 milliGRAM(s) Oral every 12 hours    MEDICATIONS  (PRN):  dextrose 40% Gel 15 Gram(s) Oral once PRN Blood Glucose LESS THAN 70 milliGRAM(s)/deciliter  diphenhydrAMINE   Elixir 12.5 milliGRAM(s) Oral every 6 hours PRN Rash and/or Itching  glucagon  Injectable 1 milliGRAM(s) IntraMuscular once PRN Glucose LESS THAN 70 milligrams/deciliter  ondansetron Injectable 4 milliGRAM(s) IV Push every 8 hours PRN Nausea and/or Vomiting  oxyCODONE    IR 5 milliGRAM(s) Oral every 6 hours PRN Severe Pain (7 - 10)      __________________________________________________  REVIEW OF SYSTEMS:    CONSTITUTIONAL: No fever,   EYES: somewhat blurry vision  NECK: No pain or stiffness  RESPIRATORY: No cough; No shortness of breath  CARDIOVASCULAR: No chest pain, no palpitations  GASTROINTESTINAL: No pain. No nausea or vomiting; No diarrhea   NEUROLOGICAL: Intermittent occipital  headache , no numbness  MUSCULOSKELETAL: No joint pain, no muscle pain  GENITOURINARY: HD M-W-F  PSYCHIATRY: no depression , no anxiety  ALL OTHER  ROS negative        Vital Signs Last 24 Hrs  T(C): 36.5 (20 Nov 2019 05:22), Max: 37.3 (19 Nov 2019 20:08)  T(F): 97.7 (20 Nov 2019 05:22), Max: 99.2 (19 Nov 2019 20:08)  HR: 70 (20 Nov 2019 05:22) (70 - 77)  BP: 143/73 (20 Nov 2019 05:22) (137/60 - 147/53)  BP(mean): --  RR: 17 (20 Nov 2019 05:22) (17 - 17)  SpO2: 100% (20 Nov 2019 05:22) (97% - 100%)    ________________________________________________  PHYSICAL EXAM:  GENERAL: NAD  HEENT: Normocephalic;  conjunctivae and sclerae clear; moist mucous membranes;   NECK : supple  CHEST/LUNG: Clear to auscultation bilaterally with good air entry   HEART: S1 S2  regular; no murmurs, gallops or rubs  ABDOMEN: Soft, Nontender, Nondistended; Bowel sounds present  EXTREMITIES: no cyanosis; no edema; no calf tenderness  SKIN: warm and dry; no rash  NERVOUS SYSTEM:  Awake and alert; Oriented  to place, person and time ; no new deficits  hands mild tremor when lifting upper extremities    _________________________________________________  LABS:                        10.3   11.83 )-----------( 228      ( 19 Nov 2019 08:56 )             31.8     11-19    136  |  101  |  146<H>  ----------------------------<  154<H>  6.4<HH>   |  18<L>  |  11.70<H>    Ca    9.6      19 Nov 2019 08:56          CAPILLARY BLOOD GLUCOSE      POCT Blood Glucose.: 279 mg/dL (20 Nov 2019 11:36)  POCT Blood Glucose.: 165 mg/dL (20 Nov 2019 07:47)  POCT Blood Glucose.: 212 mg/dL (19 Nov 2019 21:12)  POCT Blood Glucose.: 248 mg/dL (19 Nov 2019 16:49)        RADIOLOGY & ADDITIONAL TESTS:    Imaging  Reviewed:  YES/NO    Consultant(s) Notes Reviewed:   YES/ No      Plan of care was discussed with patient and /or primary care giver; all questions and concerns were addressed

## 2019-11-20 NOTE — PROGRESS NOTE ADULT - SUBJECTIVE AND OBJECTIVE BOX
Patient is awake, alert, oob to chair in NAD. Pt c/o dizziness. Ongoing HD.     INTERVAL HPI/OVERNIGHT EVENTS:      VITAL SIGNS:  T(F): 97.7 (11-20-19 @ 05:22)  HR: 70 (11-20-19 @ 05:22)  BP: 143/73 (11-20-19 @ 05:22)  RR: 17 (11-20-19 @ 05:22)  SpO2: 100% (11-20-19 @ 05:22)  Wt(kg): --  I&O's Detail    REVIEW OF SYSTEMS:    CONSTITUTIONAL:  No fevers, chills, sweats    HEENT:  Eyes:  No diplopia or blurred vision. ENT:  No earache, sore throat or runny nose.    CARDIOVASCULAR:  No pressure, squeezing, tightness, or heaviness about the chest; no palpitations.    RESPIRATORY:  Per HPI    GASTROINTESTINAL:  No abdominal pain, nausea, vomiting or diarrhea.    GENITOURINARY:  No dysuria, frequency or urgency.    NEUROLOGIC:  No paresthesias, fasciculations, seizures or weakness.    PSYCHIATRIC:  No disorder of thought or mood.      PHYSICAL EXAM:    Constitutional: Well developed and nourished  Eyes:Perrla  ENMT: normal  Neck:supple  Respiratory: good air entry  Cardiovascular: S1 S2 regular  Gastrointestinal: Soft, Non tender  Extremities: No edema  Vascular:normal  Neurological:Awake, alert,Ox3  Musculoskeletal:Normal      MEDICATIONS  (STANDING):  amLODIPine   Tablet 10 milliGRAM(s) Oral daily  aspirin  chewable 81 milliGRAM(s) Oral daily  atorvastatin 40 milliGRAM(s) Oral at bedtime  chlorhexidine 4% Liquid 1 Application(s) Topical <User Schedule>  dextrose 5%. 1000 milliLiter(s) (50 mL/Hr) IV Continuous <Continuous>  dextrose 50% Injectable 12.5 Gram(s) IV Push once  dextrose 50% Injectable 25 Gram(s) IV Push once  dextrose 50% Injectable 25 Gram(s) IV Push once  folic acid 1 milliGRAM(s) Oral daily  gabapentin 300 milliGRAM(s) Oral at bedtime  heparin  Injectable 5000 Unit(s) SubCutaneous every 12 hours  hydrALAZINE 25 milliGRAM(s) Oral two times a day  insulin lispro (HumaLOG) corrective regimen sliding scale   SubCutaneous Before meals and at bedtime  labetalol 200 milliGRAM(s) Oral daily  lisinopril 40 milliGRAM(s) Oral daily  melatonin 3 milliGRAM(s) Oral at bedtime  metoclopramide Injectable 10 milliGRAM(s) IV Push every 6 hours  multivitamin/minerals 1 Tablet(s) Oral daily  senna 2 Tablet(s) Oral at bedtime  sevelamer carbonate 800 milliGRAM(s) Oral three times a day with meals  topiramate 50 milliGRAM(s) Oral two times a day  valproate sodium IVPB 500 milliGRAM(s) IV Intermittent every 6 hours    MEDICATIONS  (PRN):  dextrose 40% Gel 15 Gram(s) Oral once PRN Blood Glucose LESS THAN 70 milliGRAM(s)/deciliter  diphenhydrAMINE   Elixir 12.5 milliGRAM(s) Oral every 6 hours PRN Rash and/or Itching  glucagon  Injectable 1 milliGRAM(s) IntraMuscular once PRN Glucose LESS THAN 70 milligrams/deciliter  ondansetron Injectable 4 milliGRAM(s) IV Push every 8 hours PRN Nausea and/or Vomiting  oxyCODONE IR 5 milliGRAM(s) Oral every 6 hours PRN Severe Pain (7 - 10)      Allergies    No Known Drug Allergies  pineapple (Hives)    Intolerances      LABS:                        10.3   11.83 )-----------( 228      ( 19 Nov 2019 08:56 )             31.8     11-19    136  |  101  |  146<H>  ----------------------------<  154<H>  6.4<HH>   |  18<L>  |  11.70<H>    Ca    9.6      19 Nov 2019 08:56        CAPILLARY BLOOD GLUCOSE      POCT Blood Glucose.: 165 mg/dL (20 Nov 2019 07:47)  POCT Blood Glucose.: 212 mg/dL (19 Nov 2019 21:12)  POCT Blood Glucose.: 248 mg/dL (19 Nov 2019 16:49)  POCT Blood Glucose.: 175 mg/dL (19 Nov 2019 10:54)        RADIOLOGY & ADDITIONAL TESTS:  < from: MR Head No Cont (11.15.19 @ 09:34) >  IMPRESSION:    No acute findings    < end of copied text >    CXR:    Ct scan chest:    ekg;    echo:

## 2019-11-21 LAB
ANION GAP SERPL CALC-SCNC: 13 MMOL/L — SIGNIFICANT CHANGE UP (ref 5–17)
BUN SERPL-MCNC: 106 MG/DL — HIGH (ref 7–18)
CALCIUM SERPL-MCNC: 9.2 MG/DL — SIGNIFICANT CHANGE UP (ref 8.4–10.5)
CHLORIDE SERPL-SCNC: 101 MMOL/L — SIGNIFICANT CHANGE UP (ref 96–108)
CO2 SERPL-SCNC: 24 MMOL/L — SIGNIFICANT CHANGE UP (ref 22–31)
CREAT SERPL-MCNC: 8.3 MG/DL — HIGH (ref 0.5–1.3)
GLUCOSE BLDC GLUCOMTR-MCNC: 140 MG/DL — HIGH (ref 70–99)
GLUCOSE BLDC GLUCOMTR-MCNC: 143 MG/DL — HIGH (ref 70–99)
GLUCOSE BLDC GLUCOMTR-MCNC: 197 MG/DL — HIGH (ref 70–99)
GLUCOSE BLDC GLUCOMTR-MCNC: 96 MG/DL — SIGNIFICANT CHANGE UP (ref 70–99)
GLUCOSE SERPL-MCNC: 162 MG/DL — HIGH (ref 70–99)
HCT VFR BLD CALC: 29.2 % — LOW (ref 34.5–45)
HGB BLD-MCNC: 9.5 G/DL — LOW (ref 11.5–15.5)
MCHC RBC-ENTMCNC: 30.4 PG — SIGNIFICANT CHANGE UP (ref 27–34)
MCHC RBC-ENTMCNC: 32.5 GM/DL — SIGNIFICANT CHANGE UP (ref 32–36)
MCV RBC AUTO: 93.3 FL — SIGNIFICANT CHANGE UP (ref 80–100)
NRBC # BLD: 0 /100 WBCS — SIGNIFICANT CHANGE UP (ref 0–0)
PLATELET # BLD AUTO: 194 K/UL — SIGNIFICANT CHANGE UP (ref 150–400)
POTASSIUM SERPL-MCNC: 4.7 MMOL/L — SIGNIFICANT CHANGE UP (ref 3.5–5.3)
POTASSIUM SERPL-SCNC: 4.7 MMOL/L — SIGNIFICANT CHANGE UP (ref 3.5–5.3)
RBC # BLD: 3.13 M/UL — LOW (ref 3.8–5.2)
RBC # FLD: 13.4 % — SIGNIFICANT CHANGE UP (ref 10.3–14.5)
SODIUM SERPL-SCNC: 138 MMOL/L — SIGNIFICANT CHANGE UP (ref 135–145)
WBC # BLD: 11.07 K/UL — HIGH (ref 3.8–10.5)
WBC # FLD AUTO: 11.07 K/UL — HIGH (ref 3.8–10.5)

## 2019-11-21 RX ORDER — ACETAMINOPHEN 500 MG
650 TABLET ORAL EVERY 6 HOURS
Refills: 0 | Status: DISCONTINUED | OUTPATIENT
Start: 2019-11-21 | End: 2019-11-22

## 2019-11-21 RX ORDER — TOPIRAMATE 25 MG
50 TABLET ORAL
Refills: 0 | Status: DISCONTINUED | OUTPATIENT
Start: 2019-11-21 | End: 2019-11-22

## 2019-11-21 RX ORDER — TOPIRAMATE 25 MG
100 TABLET ORAL
Refills: 0 | Status: DISCONTINUED | OUTPATIENT
Start: 2019-11-21 | End: 2019-11-22

## 2019-11-21 RX ADMIN — Medication 200 MILLIGRAM(S): at 06:09

## 2019-11-21 RX ADMIN — Medication 650 MILLIGRAM(S): at 23:19

## 2019-11-21 RX ADMIN — Medication 650 MILLIGRAM(S): at 22:49

## 2019-11-21 RX ADMIN — Medication 25 MILLIGRAM(S): at 17:01

## 2019-11-21 RX ADMIN — CHLORHEXIDINE GLUCONATE 1 APPLICATION(S): 213 SOLUTION TOPICAL at 06:09

## 2019-11-21 RX ADMIN — Medication 1: at 11:15

## 2019-11-21 RX ADMIN — Medication 1 MILLIGRAM(S): at 16:21

## 2019-11-21 RX ADMIN — Medication 1 TABLET(S): at 16:22

## 2019-11-21 RX ADMIN — Medication 50 MILLIGRAM(S): at 16:22

## 2019-11-21 RX ADMIN — Medication 81 MILLIGRAM(S): at 16:20

## 2019-11-21 RX ADMIN — HEPARIN SODIUM 5000 UNIT(S): 5000 INJECTION INTRAVENOUS; SUBCUTANEOUS at 06:09

## 2019-11-21 RX ADMIN — Medication 25 MILLIGRAM(S): at 06:09

## 2019-11-21 RX ADMIN — HEPARIN SODIUM 5000 UNIT(S): 5000 INJECTION INTRAVENOUS; SUBCUTANEOUS at 17:01

## 2019-11-21 RX ADMIN — SEVELAMER CARBONATE 800 MILLIGRAM(S): 2400 POWDER, FOR SUSPENSION ORAL at 17:01

## 2019-11-21 RX ADMIN — Medication 3 MILLIGRAM(S): at 22:49

## 2019-11-21 RX ADMIN — ATORVASTATIN CALCIUM 40 MILLIGRAM(S): 80 TABLET, FILM COATED ORAL at 22:49

## 2019-11-21 RX ADMIN — Medication 150 MILLIGRAM(S): at 06:09

## 2019-11-21 RX ADMIN — LISINOPRIL 40 MILLIGRAM(S): 2.5 TABLET ORAL at 06:09

## 2019-11-21 RX ADMIN — SEVELAMER CARBONATE 800 MILLIGRAM(S): 2400 POWDER, FOR SUSPENSION ORAL at 08:28

## 2019-11-21 RX ADMIN — AMLODIPINE BESYLATE 10 MILLIGRAM(S): 2.5 TABLET ORAL at 06:09

## 2019-11-21 RX ADMIN — GABAPENTIN 300 MILLIGRAM(S): 400 CAPSULE ORAL at 22:51

## 2019-11-21 NOTE — PROGRESS NOTE ADULT - SUBJECTIVE AND OBJECTIVE BOX
CHIEF COMPLAINT:Patient is a 54y old  Female who presents with a chief complaint of Vertigo.Pt appears comfortable.    	  REVIEW OF SYSTEMS:  CONSTITUTIONAL: No fever, weight loss, or fatigue  EYES: No eye pain, visual disturbances, or discharge  ENT:  No difficulty hearing, tinnitus, vertigo; No sinus or throat pain  NECK: No pain or stiffness  RESPIRATORY: No cough, wheezing, chills or hemoptysis; No Shortness of Breath  CARDIOVASCULAR: No chest pain, palpitations, passing out, dizziness, or leg swelling  GASTROINTESTINAL: No abdominal or epigastric pain. No nausea, vomiting, or hematemesis; No diarrhea or constipation. No melena or hematochezia.  GENITOURINARY: No dysuria, frequency, hematuria, or incontinence  NEUROLOGICAL: No headaches, memory loss, loss of strength, numbness, or tremors  SKIN: No itching, burning, rashes, or lesions   LYMPH Nodes: No enlarged glands  ENDOCRINE: No heat or cold intolerance; No hair loss  MUSCULOSKELETAL: No joint pain or swelling; No muscle, back, or extremity pain  PSYCHIATRIC: No depression, anxiety, mood swings, or difficulty sleeping  HEME/LYMPH: No easy bruising, or bleeding gums  ALLERGY AND IMMUNOLOGIC: No hives or eczema	      PHYSICAL EXAM:  T(C): 36.7 (11-21-19 @ 05:05), Max: 37.3 (11-20-19 @ 20:41)  HR: 65 (11-21-19 @ 05:05) (65 - 78)  BP: 114/54 (11-21-19 @ 05:05) (96/35 - 142/68)  RR: 18 (11-21-19 @ 05:05) (16 - 18)  SpO2: 100% (11-21-19 @ 05:05) (96% - 100%)      Appearance: Normal	  HEENT:   Normal oral mucosa, PERRL, EOMI	  Lymphatic: No lymphadenopathy  Cardiovascular: Normal S1 S2, No JVD, No murmurs, No edema  Respiratory: Lungs clear to auscultation	  Psychiatry: A & O x 3, Mood & affect appropriate  Gastrointestinal:  Soft, Non-tender, + BS	  Skin: No rashes, No ecchymoses, No cyanosis	  Neurologic: Non-focal  Extremities: Normal range of motion, No clubbing, cyanosis or edema  Vascular: Peripheral pulses palpable 2+ bilaterally    MEDICATIONS  (STANDING):  amLODIPine   Tablet 10 milliGRAM(s) Oral daily  aspirin  chewable 81 milliGRAM(s) Oral daily  atorvastatin 40 milliGRAM(s) Oral at bedtime  chlorhexidine 4% Liquid 1 Application(s) Topical <User Schedule>  dextrose 5%. 1000 milliLiter(s) (50 mL/Hr) IV Continuous <Continuous>  dextrose 50% Injectable 12.5 Gram(s) IV Push once  dextrose 50% Injectable 25 Gram(s) IV Push once  dextrose 50% Injectable 25 Gram(s) IV Push once  folic acid 1 milliGRAM(s) Oral daily  gabapentin 300 milliGRAM(s) Oral at bedtime  heparin  Injectable 5000 Unit(s) SubCutaneous every 12 hours  hydrALAZINE 25 milliGRAM(s) Oral two times a day  insulin lispro (HumaLOG) corrective regimen sliding scale   SubCutaneous Before meals and at bedtime  labetalol 200 milliGRAM(s) Oral daily  lisinopril 40 milliGRAM(s) Oral daily  melatonin 3 milliGRAM(s) Oral at bedtime  multivitamin/minerals 1 Tablet(s) Oral daily  senna 2 Tablet(s) Oral at bedtime  sevelamer carbonate 800 milliGRAM(s) Oral three times a day with meals  topiramate 50 milliGRAM(s) Oral <User Schedule>  topiramate 100 milliGRAM(s) Oral <User Schedule>        	  LABS:	 	                        9.5    11.07 )-----------( 194      ( 21 Nov 2019 12:11 )             29.2     11-21    138  |  101  |  106<H>  ----------------------------<  162<H>  4.7   |  24  |  8.30<H>    Ca    9.2      21 Nov 2019 12:11        Lipid Profile: Cholesterol 140  LDL 75  HDL 41      HgA1c: Hemoglobin A1C, Whole Blood: 4.6 % (11-07 @ 10:26)    TSH: Thyroid Stimulating Hormone, Serum: 0.73 uU/mL (11-07 @ 06:31)

## 2019-11-21 NOTE — PROGRESS NOTE ADULT - PROBLEM SELECTOR PLAN 6
dc antivert as per neuro  Benadryl, reglan, and depacon for 3 doses as per Dr. Reed completed.  S/p 3 doses:  Vertigo/diziness have not improved.  Orthostatic BP, HR -negative  Topamax dose increased as per .

## 2019-11-21 NOTE — CHART NOTE - NSCHARTNOTEFT_GEN_A_CORE
EVENT: c/o pain    OBJECTIVE:  Vital Signs Last 24 Hrs  T(C): 37.2 (2019 20:48), Max: 37.2 (2019 20:48)  T(F): 99 (2019 20:48), Max: 99 (2019 20:48)  HR: 73 (2019 20:48) (65 - 82)  BP: 126/68 (2019 20:48) (114/54 - 142/54)  BP(mean): --  RR: 17 (2019 20:48) (16 - 18)  SpO2: 96% (2019 20:48) (96% - 100%)    FOCUSED PHYSICAL EXAM:    LABS:                        9.5    11.07 )-----------( 194      ( 2019 12:11 )             29.2     11-    138  |  101  |  106<H>  ----------------------------<  162<H>  4.7   |  24  |  8.30<H>    Ca    9.2      2019 12:11        EKG:   IMGAGING:    ASSESSMENT:  HPI:  54 year old female from home with PMHx ESRD on HD via right AVF (M/W/F), HTN, hx DM no longer on meds now with episodes of hypoglycemia , Anemia, Asthma, Claustrophobia, GERD, HLD, MI, AUSTIN,  Uterine Leiomyoma and PSHx of AV Fistula, R Antecubital AVF, , Craniotomy, and Hernia Repair gastric Bypass (2017) presents with dizziness since Monday night. Reports onset of dizziness described as spinning sensation 2 days ago. Reports she feels as if she were drunk and when she walks feels like shes walking on air. Reports she went to her HD session this morning and told staff of her symptoms who then sent her to ED, patient reports she did not receive her HD today, last received 2 days ago. Reports remote hx vertigo for which she used to take medication but symptoms has not occurred for many years.pt denies any Denies headache, focal weakness , numbness N/V, abdominal pain, hypotension, urinary symptoms, hematuria, melena, hematochezia. Denies smoking, alcohol, illicit drug use. allergic to pineapple    In ED :   NIHSS score on admission : 2  Ct head is negative for any acute event or change (did not receive TPA)  EKG: NSR  , T1 -ve , f/u T2  UA -ve   CXR : Mild cardiomegaly. No acute infiltrate.    LMP : 5 years ago (2019 11:50)      PLAN:   MEDICATIONS  (STANDING):  amLODIPine   Tablet 10 milliGRAM(s) Oral daily  aspirin  chewable 81 milliGRAM(s) Oral daily  atorvastatin 40 milliGRAM(s) Oral at bedtime  chlorhexidine 4% Liquid 1 Application(s) Topical <User Schedule>  dextrose 5%. 1000 milliLiter(s) (50 mL/Hr) IV Continuous <Continuous>  dextrose 50% Injectable 12.5 Gram(s) IV Push once  dextrose 50% Injectable 25 Gram(s) IV Push once  dextrose 50% Injectable 25 Gram(s) IV Push once  folic acid 1 milliGRAM(s) Oral daily  gabapentin 300 milliGRAM(s) Oral at bedtime  heparin  Injectable 5000 Unit(s) SubCutaneous every 12 hours  hydrALAZINE 25 milliGRAM(s) Oral two times a day  insulin lispro (HumaLOG) corrective regimen sliding scale   SubCutaneous Before meals and at bedtime  labetalol 200 milliGRAM(s) Oral daily  lisinopril 40 milliGRAM(s) Oral daily  melatonin 3 milliGRAM(s) Oral at bedtime  multivitamin/minerals 1 Tablet(s) Oral daily  senna 2 Tablet(s) Oral at bedtime  sevelamer carbonate 800 milliGRAM(s) Oral three times a day with meals  topiramate 50 milliGRAM(s) Oral <User Schedule>  topiramate 100 milliGRAM(s) Oral <User Schedule>    MEDICATIONS  (PRN):  acetaminophen   Tablet .. 650 milliGRAM(s) Oral every 6 hours PRN Mild Pain (1 - 3), Moderate Pain (4 - 6)  dextrose 40% Gel 15 Gram(s) Oral once PRN Blood Glucose LESS THAN 70 milliGRAM(s)/deciliter  diphenhydrAMINE   Elixir 12.5 milliGRAM(s) Oral every 6 hours PRN Rash and/or Itching  glucagon  Injectable 1 milliGRAM(s) IntraMuscular once PRN Glucose LESS THAN 70 milligrams/deciliter  ondansetron Injectable 4 milliGRAM(s) IV Push every 8 hours PRN Nausea and/or Vomiting    FOLLOW UP / RESULT: EVENT: c/o pain to upper back, requesting Tylenol    BRIEF HPI: 54 year old female from home with PMHx ESRD on HD via right AVF (M/W/F), HTN, hx DM no longer on meds now with episodes of hypoglycemia , Anemia, Asthma, Claustrophobia, GERD, HLD, MI, [place],  Uterine Leiomyoma and PSHx of AV Fistula, R Antecubital AVF, , Craniotomy, and Hernia Repair gastric Bypass (2017) presents with dizziness since Monday night. Reports onset of dizziness described as spinning sensation, Neurology on board. Pt was treated with po med for vertigo. Pt continue to c/o dizziness.  Discharge planning to acute rehab and EENT consultant as OP for probable vestibular disorder.    OBJECTIVE:  Vital Signs Last 24 Hrs  T(C): 37.2 (2019 20:48), Max: 37.2 (2019 20:48)  T(F): 99 (2019 20:48), Max: 99 (2019 20:48)  HR: 73 (2019 20:48) (65 - 82)  BP: 126/68 (2019 20:48) (114/54 - 142/54)  BP(mean): --  RR: 17 (2019 20:48) (16 - 18)  SpO2: 96% (2019 20:48) (96% - 100%)    FOCUSED PHYSICAL EXAM:  GENERAL: Obese middle age female laying=g in bed  HEAD:  Atraumatic, Normocephalic  EYES: EOMI, PERRLA, conjunctiva and sclera clear  NECK: Supple, No JVD  CHEST/LUNG: Clear to auscultation bilaterally; No wheezes or rales  HEART: Regular rate and rhythm; No murmurs, rubs, or gallops  ABDOMEN: Obese, Soft, Nontender, Nondistended, Normal bowel sounds  EXTREMITIES:  2+ Peripheral Pulses, No clubbing, cyanosis, or edema  Neurological: AOx3  Skin: Warm and dry  Psychiatric: Normal affect    LABS:                        9.5    11.07 )-----------( 194      ( 2019 12:11 )             29.2     11-21    138  |  101  |  106<H>  ----------------------------<  162<H>  4.7   |  24  |  8.30<H>    Ca    9.2      2019 12:11    [id number]  Ventricular Rate 67 BPM  Atrial Rate 67 BPM  P-R Interval 138 ms  QRS Duration 72 ms  Q-T Interval 440 ms  QTC Calculation(Bezet) 464 ms  P Axis 23 degrees  R Axis 23 degrees  T Axis 49 degrees  Diagnosis Line Sinus rhythm with occasional Premature ventricular complexes  Otherwise normal ECG    IMAGING:  EXAM:  XR CHEST PORTABLE IMMED 1V                        PROCEDURE DATE:  2019    INTERPRETATION:  CLINICAL INDICATION: 54 years  Female with dizziness.  COMPARISON: 10/8/2015  AP view of the chest is rotated to the right and demonstrates the lungs   to be clear. There is no pleural effusion. There is no pneumothorax.  The heart is mildly enlarged. The mediastinum and lisa cannot be assessed   due to rotation.  The pulmonary vasculature is normal.   Mild thoracic degenerative changes are present.  IMPRESSION:  Mild cardiomegaly. No acute infiltrate.    PROBLEM: Upper back pain 3/10 probably due to mild thoracic degenerative changes  PLAN:   1. Acetaminophen Tablet 650 jany GRAM(s) Oral every 6 hours PRN Mild Pain (1 - 3), Moderate Pain (4 - 6)  2. Reassess pain per policy

## 2019-11-21 NOTE — CHART NOTE - NSCHARTNOTEFT_GEN_A_CORE
Reassessment:   54yFemalePatient is a 54y old  Female who presents with a chief complaint of Vertigo (2019 13:30)      Factors impacting intake: [ ] none [ ] nausea  [ ] vomiting [ ] diarrhea [ ] constipation  [ ]chewing problems [ ] swallowing issues  [X ] other:     Diet Presciption: Diet, Renal Restrictions:   For patients receiving Renal Replacement - No Protein Restr, No Conc K, No Conc Phos, Low Sodium  Consistent Carbohydrate {No Snacks}  DASH/TLC {Sodium & Cholesterol Restricted} (19 @ 13:39)    Intake: Pt. visited reports appetite improving, intake >50% & tolerating, per flow sheet intake 100% noted, had HD today in early afternoon, no reports of GI distress, rec. d/w  Renal Restrictions:  For patients receiving Renal Replacement - No Protein Restr, No Conc K, No Conc Phos, Low Sodium  Consistent Carbohydrate {No Snacks}, DASH/TLC {Sodium & Cholesterol Restricted} & Nephrocaps as medically feasible. RD available.     Daily Weight in k.5 (2019 16:07)      Pertinent Medications: MEDICATIONS  (STANDING):  amLODIPine   Tablet 10 milliGRAM(s) Oral daily  aspirin  chewable 81 milliGRAM(s) Oral daily  atorvastatin 40 milliGRAM(s) Oral at bedtime  chlorhexidine 4% Liquid 1 Application(s) Topical <User Schedule>  dextrose 5%. 1000 milliLiter(s) (50 mL/Hr) IV Continuous <Continuous>  dextrose 50% Injectable 12.5 Gram(s) IV Push once  dextrose 50% Injectable 25 Gram(s) IV Push once  dextrose 50% Injectable 25 Gram(s) IV Push once  folic acid 1 milliGRAM(s) Oral daily  gabapentin 300 milliGRAM(s) Oral at bedtime  heparin  Injectable 5000 Unit(s) SubCutaneous every 12 hours  hydrALAZINE 25 milliGRAM(s) Oral two times a day  insulin lispro (HumaLOG) corrective regimen sliding scale   SubCutaneous Before meals and at bedtime  labetalol 200 milliGRAM(s) Oral daily  lisinopril 40 milliGRAM(s) Oral daily  melatonin 3 milliGRAM(s) Oral at bedtime  multivitamin/minerals 1 Tablet(s) Oral daily  senna 2 Tablet(s) Oral at bedtime  sevelamer carbonate 800 milliGRAM(s) Oral three times a day with meals  topiramate 50 milliGRAM(s) Oral <User Schedule>  topiramate 100 milliGRAM(s) Oral <User Schedule>    MEDICATIONS  (PRN):  dextrose 40% Gel 15 Gram(s) Oral once PRN Blood Glucose LESS THAN 70 milliGRAM(s)/deciliter  diphenhydrAMINE   Elixir 12.5 milliGRAM(s) Oral every 6 hours PRN Rash and/or Itching  glucagon  Injectable 1 milliGRAM(s) IntraMuscular once PRN Glucose LESS THAN 70 milligrams/deciliter  ondansetron Injectable 4 milliGRAM(s) IV Push every 8 hours PRN Nausea and/or Vomiting    Pertinent Labs:  Na138 mmol/L Glu 162 mg/dL<H> K+ 4.7 mmol/L Cr  8.30 mg/dL<H>  mg/dL<H>  [id number] 4.6 %  Chol 140 mg/dL LDL 75 mg/dL HDL 41 mg/dL<L> Trig 118 mg/dL     CAPILLARY BLOOD GLUCOSE      POCT Blood Glucose.: 197 mg/dL (2019 11:13)  POCT Blood Glucose.: 143 mg/dL (2019 08:03)  POCT Blood Glucose.: 294 mg/dL (2019 20:54)  POCT Blood Glucose.: 289 mg/dL (2019 16:46)    Skin: intact    Estimated Needs:   [ ] no change since previous assessment  [ ] recalculated:     Previous Nutrition Diagnosis:   [ ] Inadequate Energy Intake [ ]Inadequate Oral Intake [ X] Excessive Energy Intake   [ ] Underweight [ ] Increased Nutrient Needs [ ] Overweight/Obesity   [ ] Altered GI Function [ ] Unintended Weight Loss [ ] Food & Nutrition Related Knowledge Deficit [ ] Malnutrition     Nutrition Diagnosis is [ ] ongoing  [ ] resolved [X ] not applicable [ ]    New Nutrition Diagnosis: [ ] not applicable     Interventions: To meet nutrition needs   Recommend  [ ] Change Diet To:  [ ] Nutrition Supplement  [ ] Nutrition Support  [ ] Other:     Monitoring and Evaluation:   [ X] PO intake [ x ] Tolerance to diet prescription [ x ] weights [ x ] labs[ x ] follow up per protocol  [ ] other:

## 2019-11-21 NOTE — PROGRESS NOTE ADULT - PROBLEM SELECTOR PLAN 7
Cont meds  Neuro follow up.  CTA of head and neck neg  MRI of Brain noted  ENT follow up as OP  PT/Rehab

## 2019-11-21 NOTE — PROGRESS NOTE ADULT - SUBJECTIVE AND OBJECTIVE BOX
Patient is a 54y old  Female who presents with a chief complaint of Vertigo (21 Nov 2019 08:47)  Awake, alert, comfortable in bed in NAD. Still with dizziness    INTERVAL HPI/OVERNIGHT EVENTS:      VITAL SIGNS:  T(F): 98 (11-21-19 @ 05:05)  HR: 65 (11-21-19 @ 05:05)  BP: 114/54 (11-21-19 @ 05:05)  RR: 18 (11-21-19 @ 05:05)  SpO2: 100% (11-21-19 @ 05:05)  Wt(kg): --  I&O's Detail          REVIEW OF SYSTEMS:    CONSTITUTIONAL:  No fevers, chills, sweats    HEENT:  Eyes:  No diplopia or blurred vision. ENT:  No earache, sore throat or runny nose.    CARDIOVASCULAR:  No pressure, squeezing, tightness, or heaviness about the chest; no palpitations.    RESPIRATORY:  Per HPI    GASTROINTESTINAL:  No abdominal pain, nausea, vomiting or diarrhea.    GENITOURINARY:  No dysuria, frequency or urgency.    NEUROLOGIC:  No paresthesias, fasciculations, seizures or weakness.    PSYCHIATRIC:  No disorder of thought or mood.      PHYSICAL EXAM:    Constitutional: Well developed and nourished  Eyes:Perrla  ENMT: normal  Neck:supple  Respiratory: good air entry  Cardiovascular: S1 S2 regular  Gastrointestinal: Soft, Non tender  Extremities: No edema  Vascular:normal  Neurological:Awake, alert,Ox3  Musculoskeletal:Normal      MEDICATIONS  (STANDING):  amLODIPine   Tablet 10 milliGRAM(s) Oral daily  aspirin  chewable 81 milliGRAM(s) Oral daily  atorvastatin 40 milliGRAM(s) Oral at bedtime  chlorhexidine 4% Liquid 1 Application(s) Topical <User Schedule>  dextrose 5%. 1000 milliLiter(s) (50 mL/Hr) IV Continuous <Continuous>  dextrose 50% Injectable 12.5 Gram(s) IV Push once  dextrose 50% Injectable 25 Gram(s) IV Push once  dextrose 50% Injectable 25 Gram(s) IV Push once  folic acid 1 milliGRAM(s) Oral daily  gabapentin 300 milliGRAM(s) Oral at bedtime  heparin  Injectable 5000 Unit(s) SubCutaneous every 12 hours  hydrALAZINE 25 milliGRAM(s) Oral two times a day  insulin lispro (HumaLOG) corrective regimen sliding scale   SubCutaneous Before meals and at bedtime  labetalol 200 milliGRAM(s) Oral daily  lisinopril 40 milliGRAM(s) Oral daily  melatonin 3 milliGRAM(s) Oral at bedtime  multivitamin/minerals 1 Tablet(s) Oral daily  senna 2 Tablet(s) Oral at bedtime  sevelamer carbonate 800 milliGRAM(s) Oral three times a day with meals  topiramate 50 milliGRAM(s) Oral <User Schedule>  topiramate 100 milliGRAM(s) Oral <User Schedule>    MEDICATIONS  (PRN):  dextrose 40% Gel 15 Gram(s) Oral once PRN Blood Glucose LESS THAN 70 milliGRAM(s)/deciliter  diphenhydrAMINE   Elixir 12.5 milliGRAM(s) Oral every 6 hours PRN Rash and/or Itching  glucagon  Injectable 1 milliGRAM(s) IntraMuscular once PRN Glucose LESS THAN 70 milligrams/deciliter  ondansetron Injectable 4 milliGRAM(s) IV Push every 8 hours PRN Nausea and/or Vomiting      Allergies    No Known Drug Allergies  pineapple (Hives)    Intolerances        LABS:                    CAPILLARY BLOOD GLUCOSE      POCT Blood Glucose.: 143 mg/dL (21 Nov 2019 08:03)  POCT Blood Glucose.: 294 mg/dL (20 Nov 2019 20:54)  POCT Blood Glucose.: 289 mg/dL (20 Nov 2019 16:46)  POCT Blood Glucose.: 279 mg/dL (20 Nov 2019 11:36)        RADIOLOGY & ADDITIONAL TESTS:    CXR:    Ct scan chest:    ekg;    echo:

## 2019-11-21 NOTE — PROGRESS NOTE ADULT - SUBJECTIVE AND OBJECTIVE BOX
[facility]   MD Maia Gonzalez D.O. Fatima Sheikh, D.O. Ruoru Wong, PA    From 7 AM - 5 PM:  OFFICE: [phone]  Dr. Beckham cell: [phone]  Dr. Charles cell: [phone]  Dr. Gamino cell: [phone]  BRANDEN Carrasco cell: [phone]    From 5 PM - 7 AM: Answering Service: [phone]        RENAL FOLLOW UP NOTE  --------------------------------------------------------------------------------  HPI: Pt seen and examined. Still has dizziness this morning. No SOB, cough, N/V, other symptoms this AM.        PAST HISTORY  --------------------------------------------------------------------------------  No significant changes to PMH, PSH, FHx, SHx, unless otherwise noted    ALLERGIES & MEDICATIONS  --------------------------------------------------------------------------------  Allergies    No Known Drug Allergies  pineapple (Hives)    Intolerances      Standing Inpatient Medications  amLODIPine   Tablet 10 milliGRAM(s) Oral daily  aspirin  chewable 81 milliGRAM(s) Oral daily  atorvastatin 40 milliGRAM(s) Oral at bedtime  chlorhexidine 4% Liquid 1 Application(s) Topical <User Schedule>  dextrose 5%. 1000 milliLiter(s) IV Continuous <Continuous>  dextrose 50% Injectable 12.5 Gram(s) IV Push once  dextrose 50% Injectable 25 Gram(s) IV Push once  dextrose 50% Injectable 25 Gram(s) IV Push once  folic acid 1 milliGRAM(s) Oral daily  gabapentin 300 milliGRAM(s) Oral at bedtime  heparin  Injectable 5000 Unit(s) SubCutaneous every 12 hours  hydrALAZINE 25 milliGRAM(s) Oral two times a day  insulin lispro (HumaLOG) corrective regimen sliding scale   SubCutaneous Before meals and at bedtime  labetalol 200 milliGRAM(s) Oral daily  lisinopril 40 milliGRAM(s) Oral daily  melatonin 3 milliGRAM(s) Oral at bedtime  multivitamin/minerals 1 Tablet(s) Oral daily  senna 2 Tablet(s) Oral at bedtime  sevelamer carbonate 800 milliGRAM(s) Oral three times a day with meals  topiramate 50 milliGRAM(s) Oral <User Schedule>  topiramate 100 milliGRAM(s) Oral <User Schedule>    PRN Inpatient Medications  dextrose 40% Gel 15 Gram(s) Oral once PRN  diphenhydrAMINE   Elixir 12.5 milliGRAM(s) Oral every 6 hours PRN  glucagon  Injectable 1 milliGRAM(s) IntraMuscular once PRN  ondansetron Injectable 4 milliGRAM(s) IV Push every 8 hours PRN      REVIEW OF SYSTEMS  --------------------------------------------------------------------------------  General: no fever  CVS: no chest pain  RESP: no sob, no cough  ABD: no abdominal pain  : no dysuria,  MSK: no edema     VITALS/PHYSICAL EXAM  --------------------------------------------------------------------------------  T(C): 36.7 (11-21-19 @ 05:05), Max: 37.3 (11-20-19 @ 20:41)  HR: 65 (11-21-19 @ 05:05) (65 - 78)  BP: 114/54 (11-21-19 @ 05:05) (96/35 - 142/68)  RR: 18 (11-21-19 @ 05:05) (16 - 18)  SpO2: 100% (11-21-19 @ 05:05) (96% - 100%)  Wt(kg): --        Physical Exam:  	Gen: NAD  	HEENT: MMM  	Pulm: CTA B/L  	CV: S1S2, + murmur  	Abd: Soft, +BS  	Ext: No LE edema B/L              Neuro: Awake   	Skin: Warm and Dry   	Vascular access: RAVG w/ good thrill and bruit    LABS/STUDIES  --------------------------------------------------------------------------------              10.3   11.83 >-----------<  228      [11-19-19 @ 08:56]              31.8     136  |  101  |  146  ----------------------------<  154      [11-19-19 @ 08:56]  6.4   |  18  |  11.70        Ca     9.6     [11-19-19 @ 08:56]            Creatinine Trend:  SCr 11.70 [11-19 @ 08:56]  SCr 7.39 [11-17 @ 06:17]  SCr 4.88 [11-16 @ 07:18]  SCr 7.09 [11-15 @ 15:48]  SCr 9.98 [11-14 @ 10:01]    Urinalysis - [11-06-19 @ 09:05]      Color Yellow / Appearance Clear / SG 1.015 / pH 9.0      Gluc 50 / Ketone Negative  / Bili Negative / Urobili Negative       Blood Negative / Protein 100 / Leuk Est Negative / Nitrite Negative      RBC 0-2 / WBC 0-2 / Hyaline  / Gran  / Sq Epi  / Non Sq Epi  / Bacteria Trace      PTH -- (Ca 8.9)      [11-07-19 @ 10:25]   173  Vitamin D (25OH) 24.4      [11-07-19 @ 10:25]  HbA1c 4.6      [11-07-19 @ 10:26]  TSH 0.73      [11-07-19 @ 06:31]  Lipid: chol 140, , HDL 41, LDL 75      [11-07-19 @ 06:31]    HBsAg Nonreact      [11-08-19 @ 15:20]  HCV 0.09, Nonreact      [11-08-19 @ 15:20]    DICKSON: titer Negative, pattern --      [11-09-19 @ 11:03]  SPEP Interpretation: Normal Electrophoresis Pattern      [11-09-19 @ 11:24]

## 2019-11-21 NOTE — PROGRESS NOTE ADULT - SUBJECTIVE AND OBJECTIVE BOX
Patient is a 54y old  Female who presents with a chief complaint of Vertigo (2019 10:42)    pt seen in icu [  ], reg med floor [   ], bed [  ], chair at bedside [   ], a+o x3 [  ], lethargic [  ],  nad [  ]    khan [  ], ngt [  ], peg [  ], et tube [  ], cent line [  ], picc line [  ]        Allergies    No Known Drug Allergies  pineapple (Hives)        Vitals    T(F): 98 (19 @ 05:05), Max: 99.1 (19 @ 20:41)  HR: 65 (19 @ 05:05) (65 - 78)  BP: 114/54 (19 @ 05:05) (96/35 - 142/68)  RR: 18 (19 @ 05:05) (16 - 18)  SpO2: 100% (19 @ 05:05) (96% - 100%)  Wt(kg): --  CAPILLARY BLOOD GLUCOSE      POCT Blood Glucose.: 197 mg/dL (2019 11:13)      Labs                          9.5    11.07 )-----------( 194      ( 2019 12:11 )             29.2           138  |  101  |  106<H>  ----------------------------<  162<H>  4.7   |  24  |  8.30<H>    Ca    9.2      2019 12:11              .Urine   @ 15:32   <10,000 CFU/mL Normal Urogenital Jinny  --  --          Radiology Results      Meds    MEDICATIONS  (STANDING):  amLODIPine   Tablet 10 milliGRAM(s) Oral daily  aspirin  chewable 81 milliGRAM(s) Oral daily  atorvastatin 40 milliGRAM(s) Oral at bedtime  chlorhexidine 4% Liquid 1 Application(s) Topical <User Schedule>  dextrose 5%. 1000 milliLiter(s) (50 mL/Hr) IV Continuous <Continuous>  dextrose 50% Injectable 12.5 Gram(s) IV Push once  dextrose 50% Injectable 25 Gram(s) IV Push once  dextrose 50% Injectable 25 Gram(s) IV Push once  folic acid 1 milliGRAM(s) Oral daily  gabapentin 300 milliGRAM(s) Oral at bedtime  heparin  Injectable 5000 Unit(s) SubCutaneous every 12 hours  hydrALAZINE 25 milliGRAM(s) Oral two times a day  insulin lispro (HumaLOG) corrective regimen sliding scale   SubCutaneous Before meals and at bedtime  labetalol 200 milliGRAM(s) Oral daily  lisinopril 40 milliGRAM(s) Oral daily  melatonin 3 milliGRAM(s) Oral at bedtime  multivitamin/minerals 1 Tablet(s) Oral daily  senna 2 Tablet(s) Oral at bedtime  sevelamer carbonate 800 milliGRAM(s) Oral three times a day with meals  topiramate 50 milliGRAM(s) Oral <User Schedule>  topiramate 100 milliGRAM(s) Oral <User Schedule>      MEDICATIONS  (PRN):  dextrose 40% Gel 15 Gram(s) Oral once PRN Blood Glucose LESS THAN 70 milliGRAM(s)/deciliter  diphenhydrAMINE   Elixir 12.5 milliGRAM(s) Oral every 6 hours PRN Rash and/or Itching  glucagon  Injectable 1 milliGRAM(s) IntraMuscular once PRN Glucose LESS THAN 70 milligrams/deciliter  ondansetron Injectable 4 milliGRAM(s) IV Push every 8 hours PRN Nausea and/or Vomiting      Physical Exam    Neuro :  no focal deficits  Respiratory: CTA B/L  CV: RRR, S1S2, no murmurs,   Abdominal: Soft, NT, ND +BS,  Extremities: No edema, + peripheral pulses    ASSESSMENT    Dizziness and giddiness  Myocardial infarct, old  ESRD (end stage renal disease) on dialysis  Asthma occurring only with upper respiratory infection  Anemia in chronic renal disease  Claustrophobia  Uterine leiomyoma, unspecified location  AUSTIN (obstructive sleep apnea)  Gastroesophageal reflux disease without esophagitis  Vertigo  HLD (hyperlipidemia)  Essential hypertension  Chronic kidney disease, unspecified stage  Diabetes mellitus, type 2  Morbid obesity  AV fistula  S/P craniotomy  S/P hernia repair  S/P  section  CRF (chronic renal failure)      PLAN Patient is a 54y old  Female who presents with a chief complaint of Vertigo (2019 10:42)    pt seen in icu [  ], reg med floor [ x  ], bed [  ], chair at bedside [ x  ], a+o x3 [ x ], lethargic [  ],  nad [x  ]    pt stated vertiginous symptoms has improved greatly      Allergies    No Known Drug Allergies  pineapple (Hives)        Vitals    T(F): 98 (19 @ 05:05), Max: 99.1 (19 @ 20:41)  HR: 65 (19 @ 05:05) (65 - 78)  BP: 114/54 (19 @ 05:05) (96/35 - 142/68)  RR: 18 (19 @ 05:05) (16 - 18)  SpO2: 100% (19 @ 05:05) (96% - 100%)  Wt(kg): --  CAPILLARY BLOOD GLUCOSE      POCT Blood Glucose.: 197 mg/dL (2019 11:13)      Labs                          9.5    11.07 )-----------( 194      ( 2019 12:11 )             29.2           138  |  101  |  106<H>  ----------------------------<  162<H>  4.7   |  24  |  8.30<H>    Ca    9.2      2019 12:11              .Urine  11-06 @ 15:32   <10,000 CFU/mL Normal Urogenital Jinny  --  --          Radiology Results      Meds    MEDICATIONS  (STANDING):  amLODIPine   Tablet 10 milliGRAM(s) Oral daily  aspirin  chewable 81 milliGRAM(s) Oral daily  atorvastatin 40 milliGRAM(s) Oral at bedtime  chlorhexidine 4% Liquid 1 Application(s) Topical <User Schedule>  dextrose 5%. 1000 milliLiter(s) (50 mL/Hr) IV Continuous <Continuous>  dextrose 50% Injectable 12.5 Gram(s) IV Push once  dextrose 50% Injectable 25 Gram(s) IV Push once  dextrose 50% Injectable 25 Gram(s) IV Push once  folic acid 1 milliGRAM(s) Oral daily  gabapentin 300 milliGRAM(s) Oral at bedtime  heparin  Injectable 5000 Unit(s) SubCutaneous every 12 hours  hydrALAZINE 25 milliGRAM(s) Oral two times a day  insulin lispro (HumaLOG) corrective regimen sliding scale   SubCutaneous Before meals and at bedtime  labetalol 200 milliGRAM(s) Oral daily  lisinopril 40 milliGRAM(s) Oral daily  melatonin 3 milliGRAM(s) Oral at bedtime  multivitamin/minerals 1 Tablet(s) Oral daily  senna 2 Tablet(s) Oral at bedtime  sevelamer carbonate 800 milliGRAM(s) Oral three times a day with meals  topiramate 50 milliGRAM(s) Oral <User Schedule>  topiramate 100 milliGRAM(s) Oral <User Schedule>      MEDICATIONS  (PRN):  dextrose 40% Gel 15 Gram(s) Oral once PRN Blood Glucose LESS THAN 70 milliGRAM(s)/deciliter  diphenhydrAMINE   Elixir 12.5 milliGRAM(s) Oral every 6 hours PRN Rash and/or Itching  glucagon  Injectable 1 milliGRAM(s) IntraMuscular once PRN Glucose LESS THAN 70 milligrams/deciliter  ondansetron Injectable 4 milliGRAM(s) IV Push every 8 hours PRN Nausea and/or Vomiting      Physical Exam      Neuro :  no focal deficits, ill-sustained horizontal gaze-induced nystagmus changing directions in the direction of gaze; [place] Hall[place] negative  Respiratory: CTA B/L  CV: RRR, S1S2, no murmurs,   Abdominal: Soft, NT, ND +BS,  Extremities: No edema, + peripheral pulses,      ASSESSMENT    peripheral vertigo,   Intractable migraine with aura with status migrainosus.   left hip calcific tendinitis  h/o  ESRD on HD (M/W/F),   HTN,   migraine,   DM no longer on meds  Myocardial infarct, old  ESRD (end stage renal disease) on dialysis  Asthma occurring only with upper respiratory infection  Anemia in chronic renal disease  Claustrophobia  Uterine leiomyoma, unspecified location  AUSTIN (obstructive sleep apnea)  Gastroesophageal reflux disease without esophagitis  Vertigo  HLD (hyperlipidemia)  Morbid obesity  AV fistula  S/P craniotomy  S/P hernia repair  S/P  section        PLAN         cont aspirin, statin,   cont meclizine to 50mg tid,  ct head neg noted  s/p trial acute migraine treatment- intravenous valproate 500 mg Q6H x 3 doses with metoclopramide 10 mg IV q6h x 3 doses and magnesium po 400 mg TID .  topamax increased to 50 mg bid  carotid duplex noted : no significant stenosis   cta head and neck neg for large vessel occlusion noted above   mri neg for acute patho noted above  neuro f/u   inview of exclusion of acute stroke, unruptured aneurysms and vascular dissections the likelihood of vestibular migraine increases and indeed the treatment of vestibular migraine has helped her symptoms.   Recommend continue topiramate and vestibular therapy and follow up in the neurology office as out-patient.   Topiramate recommended 50 mg in AM and 100 mg at night; Advised to take the morning dose after dialysis on dialysis days.  completed solumedrol 1 gram daily x 3 days  ent eval rec outpt ent  ce q8 x 2 neg noted above  echo with EF = 65%,  Grade I diastolic dysfunction (Impaired relaxation). Agitated saline injection was negative for intracardiac shunt. Trivial pericardial effusion is seen noted above.  orthostatic positive with standing bp above 100  hgba1c 4.6 noted above,  renal f/u   cont hd as per renal   pulm f/u noted   pft outpt  PT recs home with home PT   ct left hip-pelv with mineralization in the distal aspect of the left gluteus medius muscle at the level of the left hip. These may represent foci of calcium hydroxyapatite deposition or age indeterminant   proximally retracted intramuscular enthesophytes from the left greater trochanter noted above   mri bony pelv with calcific tendinitis noted  pain mgmt eval noted  gabapentin 300mg po q hs  completed course of decadron for pain  cont oxycodone 5mg po q 6 hours prn  cont current meds   d/c plan for rehab with vestibular therapy

## 2019-11-21 NOTE — PROGRESS NOTE ADULT - SUBJECTIVE AND OBJECTIVE BOX
54 year old female from home with PMHx ESRD on HD via right AVF (M/W/F), HTN, hx DM no longer on meds now with episodes of hypoglycemia , Anemia, Asthma, Claustrophobia, GERD, HLD, MI, [place],  Uterine Leiomyoma and PSHx of AV Fistula, R Antecubital AVF, , Craniotomy, and Hernia Repair gastric Bypass (2017) presents with dizziness since Monday night. Reports onset of dizziness described as spinning sensation 2 days agoAwake, alert, in NAD. Neurology on board. Pt was treated with po med for vertigo. Pt continue c/o dizziness.  Discharge planning to acute rehab and EENT consultant as OP for probable vestibular disorder.    INTERVAL HPI/OVERNIGHT EVENTS: no new complaints    MEDICATIONS  (STANDING):  amLODIPine   Tablet 10 milliGRAM(s) Oral daily  aspirin  chewable 81 milliGRAM(s) Oral daily  atorvastatin 40 milliGRAM(s) Oral at bedtime  chlorhexidine 4% Liquid 1 Application(s) Topical <User Schedule>  dextrose 5%. 1000 milliLiter(s) (50 mL/Hr) IV Continuous <Continuous>  dextrose 50% Injectable 12.5 Gram(s) IV Push once  dextrose 50% Injectable 25 Gram(s) IV Push once  dextrose 50% Injectable 25 Gram(s) IV Push once  folic acid 1 milliGRAM(s) Oral daily  gabapentin 300 milliGRAM(s) Oral at bedtime  heparin  Injectable 5000 Unit(s) SubCutaneous every 12 hours  hydrALAZINE 25 milliGRAM(s) Oral two times a day  insulin lispro (HumaLOG) corrective regimen sliding scale   SubCutaneous Before meals and at bedtime  labetalol 200 milliGRAM(s) Oral daily  lisinopril 40 milliGRAM(s) Oral daily  melatonin 3 milliGRAM(s) Oral at bedtime  multivitamin/minerals 1 Tablet(s) Oral daily  senna 2 Tablet(s) Oral at bedtime  sevelamer carbonate 800 milliGRAM(s) Oral three times a day with meals  topiramate 50 milliGRAM(s) Oral <User Schedule>  topiramate 100 milliGRAM(s) Oral <User Schedule>    MEDICATIONS  (PRN):  dextrose 40% Gel 15 Gram(s) Oral once PRN Blood Glucose LESS THAN 70 milliGRAM(s)/deciliter  diphenhydrAMINE   Elixir 12.5 milliGRAM(s) Oral every 6 hours PRN Rash and/or Itching  glucagon  Injectable 1 milliGRAM(s) IntraMuscular once PRN Glucose LESS THAN 70 milligrams/deciliter  ondansetron Injectable 4 milliGRAM(s) IV Push every 8 hours PRN Nausea and/or Vomiting      __________________________________________________  REVIEW OF SYSTEMS:    CONSTITUTIONAL: No fever,   EYES: no acute visual disturbances  NECK: No pain or stiffness  RESPIRATORY: No cough; No shortness of breath  CARDIOVASCULAR: No chest pain, no palpitations  GASTROINTESTINAL: No pain. No nausea or vomiting; No diarrhea   NEUROLOGICAL: Vertigo  MUSCULOSKELETAL: No joint pain, no muscle pain  GENITOURINARY: ESRD on HD  PSYCHIATRY: no depression , no anxiety  ALL OTHER  ROS negative        Vital Signs Last 24 Hrs  T(C): 36.8 (2019 16:07), Max: 37.3 (2019 20:41)  T(F): 98.3 (2019 16:07), Max: 99.1 (2019 20:41)  HR: 68 (2019 16:07) (65 - 78)  BP: 137/70 (2019 16:07) (114/54 - 142/68)  BP(mean): --  RR: 16 (2019 16:07) (16 - 18)  SpO2: 100% (2019 16:07) (98% - 100%)    ________________________________________________  PHYSICAL EXAM:  GENERAL: NAD  HEENT: Normocephalic;  conjunctivae and sclerae clear; moist mucous membranes;   NECK : supple  CHEST/LUNG: Clear to auscultation bilaterally with good air entry   HEART: S1 S2  regular; no murmurs, gallops or rubs  ABDOMEN: Soft, Nontender, Nondistended; Bowel sounds present  EXTREMITIES: no cyanosis; no edema; no calf tenderness  SKIN: Rt AVF w/ good thrill and bruit midline abdominal CS scar . Skin normal color for raNERVOUS SYSTEM:  Awake and alert; Oriented  to place, person and time ;   _________________________________________________  LABS:                        9.5    11.07 )-----------( 194      ( 2019 12:11 )             29.2         138  |  101  |  106<H>  ----------------------------<  162<H>  4.7   |  24  |  8.30<H>    Ca    9.2      2019 12:11          CAPILLARY BLOOD GLUCOSE      POCT Blood Glucose.: 96 mg/dL (2019 16:27)  POCT Blood Glucose.: 197 mg/dL (2019 11:13)  POCT Blood Glucose.: 143 mg/dL (2019 08:03)  POCT Blood Glucose.: 294 mg/dL (2019 20:54)  POCT Blood Glucose.: 289 mg/dL (2019 16:46)        RADIOLOGY & ADDITIONAL TESTS:    Imaging  Reviewed:  YES/NO    Consultant(s) Notes Reviewed:   YES/ No      Plan of care was discussed with patient and /or primary care giver; all questions and concerns were addressed

## 2019-11-22 ENCOUNTER — TRANSCRIPTION ENCOUNTER (OUTPATIENT)
Age: 54
End: 2019-11-22

## 2019-11-22 VITALS
OXYGEN SATURATION: 98 % | RESPIRATION RATE: 16 BRPM | DIASTOLIC BLOOD PRESSURE: 66 MMHG | HEART RATE: 85 BPM | SYSTOLIC BLOOD PRESSURE: 111 MMHG | TEMPERATURE: 98 F

## 2019-11-22 LAB
ANION GAP SERPL CALC-SCNC: 12 MMOL/L — SIGNIFICANT CHANGE UP (ref 5–17)
BUN SERPL-MCNC: 59 MG/DL — HIGH (ref 7–18)
CALCIUM SERPL-MCNC: 8.5 MG/DL — SIGNIFICANT CHANGE UP (ref 8.4–10.5)
CHLORIDE SERPL-SCNC: 98 MMOL/L — SIGNIFICANT CHANGE UP (ref 96–108)
CO2 SERPL-SCNC: 27 MMOL/L — SIGNIFICANT CHANGE UP (ref 22–31)
CREAT SERPL-MCNC: 6.22 MG/DL — HIGH (ref 0.5–1.3)
GLUCOSE BLDC GLUCOMTR-MCNC: 110 MG/DL — HIGH (ref 70–99)
GLUCOSE BLDC GLUCOMTR-MCNC: 143 MG/DL — HIGH (ref 70–99)
GLUCOSE SERPL-MCNC: 220 MG/DL — HIGH (ref 70–99)
HCT VFR BLD CALC: 30 % — LOW (ref 34.5–45)
HGB BLD-MCNC: 9.6 G/DL — LOW (ref 11.5–15.5)
MCHC RBC-ENTMCNC: 30.5 PG — SIGNIFICANT CHANGE UP (ref 27–34)
MCHC RBC-ENTMCNC: 32 GM/DL — SIGNIFICANT CHANGE UP (ref 32–36)
MCV RBC AUTO: 95.2 FL — SIGNIFICANT CHANGE UP (ref 80–100)
NRBC # BLD: 0 /100 WBCS — SIGNIFICANT CHANGE UP (ref 0–0)
PLATELET # BLD AUTO: 181 K/UL — SIGNIFICANT CHANGE UP (ref 150–400)
POTASSIUM SERPL-MCNC: 4.2 MMOL/L — SIGNIFICANT CHANGE UP (ref 3.5–5.3)
POTASSIUM SERPL-SCNC: 4.2 MMOL/L — SIGNIFICANT CHANGE UP (ref 3.5–5.3)
RBC # BLD: 3.15 M/UL — LOW (ref 3.8–5.2)
RBC # FLD: 13.5 % — SIGNIFICANT CHANGE UP (ref 10.3–14.5)
SODIUM SERPL-SCNC: 137 MMOL/L — SIGNIFICANT CHANGE UP (ref 135–145)
WBC # BLD: 11.33 K/UL — HIGH (ref 3.8–10.5)
WBC # FLD AUTO: 11.33 K/UL — HIGH (ref 3.8–10.5)

## 2019-11-22 PROCEDURE — 70498 CT ANGIOGRAPHY NECK: CPT

## 2019-11-22 PROCEDURE — 83735 ASSAY OF MAGNESIUM: CPT

## 2019-11-22 PROCEDURE — 81001 URINALYSIS AUTO W/SCOPE: CPT

## 2019-11-22 PROCEDURE — 84702 CHORIONIC GONADOTROPIN TEST: CPT

## 2019-11-22 PROCEDURE — 72195 MRI PELVIS W/O DYE: CPT

## 2019-11-22 PROCEDURE — 72192 CT PELVIS W/O DYE: CPT

## 2019-11-22 PROCEDURE — 80061 LIPID PANEL: CPT

## 2019-11-22 PROCEDURE — 82550 ASSAY OF CK (CPK): CPT

## 2019-11-22 PROCEDURE — 97110 THERAPEUTIC EXERCISES: CPT

## 2019-11-22 PROCEDURE — 93005 ELECTROCARDIOGRAM TRACING: CPT

## 2019-11-22 PROCEDURE — 82310 ASSAY OF CALCIUM: CPT

## 2019-11-22 PROCEDURE — 93306 TTE W/DOPPLER COMPLETE: CPT

## 2019-11-22 PROCEDURE — 87086 URINE CULTURE/COLONY COUNT: CPT

## 2019-11-22 PROCEDURE — 84165 PROTEIN E-PHORESIS SERUM: CPT

## 2019-11-22 PROCEDURE — 86803 HEPATITIS C AB TEST: CPT

## 2019-11-22 PROCEDURE — 82962 GLUCOSE BLOOD TEST: CPT

## 2019-11-22 PROCEDURE — 70551 MRI BRAIN STEM W/O DYE: CPT

## 2019-11-22 PROCEDURE — 82553 CREATINE MB FRACTION: CPT

## 2019-11-22 PROCEDURE — 84484 ASSAY OF TROPONIN QUANT: CPT

## 2019-11-22 PROCEDURE — 84100 ASSAY OF PHOSPHORUS: CPT

## 2019-11-22 PROCEDURE — 83970 ASSAY OF PARATHORMONE: CPT

## 2019-11-22 PROCEDURE — 85610 PROTHROMBIN TIME: CPT

## 2019-11-22 PROCEDURE — 85730 THROMBOPLASTIN TIME PARTIAL: CPT

## 2019-11-22 PROCEDURE — 83036 HEMOGLOBIN GLYCOSYLATED A1C: CPT

## 2019-11-22 PROCEDURE — 84443 ASSAY THYROID STIM HORMONE: CPT

## 2019-11-22 PROCEDURE — 97116 GAIT TRAINING THERAPY: CPT

## 2019-11-22 PROCEDURE — 36415 COLL VENOUS BLD VENIPUNCTURE: CPT

## 2019-11-22 PROCEDURE — 80048 BASIC METABOLIC PNL TOTAL CA: CPT

## 2019-11-22 PROCEDURE — 97530 THERAPEUTIC ACTIVITIES: CPT

## 2019-11-22 PROCEDURE — 86038 ANTINUCLEAR ANTIBODIES: CPT

## 2019-11-22 PROCEDURE — 70450 CT HEAD/BRAIN W/O DYE: CPT

## 2019-11-22 PROCEDURE — 82746 ASSAY OF FOLIC ACID SERUM: CPT

## 2019-11-22 PROCEDURE — 82607 VITAMIN B-12: CPT

## 2019-11-22 PROCEDURE — 99261: CPT

## 2019-11-22 PROCEDURE — 73501 X-RAY EXAM HIP UNI 1 VIEW: CPT

## 2019-11-22 PROCEDURE — 82306 VITAMIN D 25 HYDROXY: CPT

## 2019-11-22 PROCEDURE — 99285 EMERGENCY DEPT VISIT HI MDM: CPT | Mod: 25

## 2019-11-22 PROCEDURE — 80074 ACUTE HEPATITIS PANEL: CPT

## 2019-11-22 PROCEDURE — 86140 C-REACTIVE PROTEIN: CPT

## 2019-11-22 PROCEDURE — 85652 RBC SED RATE AUTOMATED: CPT

## 2019-11-22 PROCEDURE — 93880 EXTRACRANIAL BILAT STUDY: CPT

## 2019-11-22 PROCEDURE — 85027 COMPLETE CBC AUTOMATED: CPT

## 2019-11-22 PROCEDURE — 84155 ASSAY OF PROTEIN SERUM: CPT

## 2019-11-22 PROCEDURE — 82652 VIT D 1 25-DIHYDROXY: CPT

## 2019-11-22 PROCEDURE — 80053 COMPREHEN METABOLIC PANEL: CPT

## 2019-11-22 PROCEDURE — 71045 X-RAY EXAM CHEST 1 VIEW: CPT

## 2019-11-22 PROCEDURE — 70496 CT ANGIOGRAPHY HEAD: CPT

## 2019-11-22 PROCEDURE — 83090 ASSAY OF HOMOCYSTEINE: CPT

## 2019-11-22 PROCEDURE — 83921 ORGANIC ACID SINGLE QUANT: CPT

## 2019-11-22 RX ORDER — SEVELAMER CARBONATE 2400 MG/1
1 POWDER, FOR SUSPENSION ORAL
Qty: 42 | Refills: 0
Start: 2019-11-22 | End: 2019-12-05

## 2019-11-22 RX ORDER — HYDRALAZINE HCL 50 MG
0 TABLET ORAL
Qty: 90 | Refills: 0 | DISCHARGE

## 2019-11-22 RX ORDER — ERYTHROPOIETIN 10000 [IU]/ML
2000 INJECTION, SOLUTION INTRAVENOUS; SUBCUTANEOUS ONCE
Refills: 0 | Status: DISCONTINUED | OUTPATIENT
Start: 2019-11-22 | End: 2019-11-22

## 2019-11-22 RX ORDER — TOPIRAMATE 25 MG
100 TABLET ORAL
Refills: 0 | Status: DISCONTINUED | OUTPATIENT
Start: 2019-11-22 | End: 2019-11-22

## 2019-11-22 RX ORDER — TOPIRAMATE 25 MG
1 TABLET ORAL
Qty: 14 | Refills: 0
Start: 2019-11-22 | End: 2019-12-05

## 2019-11-22 RX ORDER — HYDRALAZINE HCL 50 MG
1 TABLET ORAL
Qty: 0 | Refills: 0 | DISCHARGE
Start: 2019-11-22

## 2019-11-22 RX ORDER — ERYTHROPOIETIN 10000 [IU]/ML
2000 INJECTION, SOLUTION INTRAVENOUS; SUBCUTANEOUS ONCE
Refills: 0 | Status: COMPLETED | OUTPATIENT
Start: 2019-11-22 | End: 2019-11-22

## 2019-11-22 RX ORDER — INSULIN GLARGINE 100 [IU]/ML
0 INJECTION, SOLUTION SUBCUTANEOUS
Qty: 18 | Refills: 0 | DISCHARGE

## 2019-11-22 RX ADMIN — CHLORHEXIDINE GLUCONATE 1 APPLICATION(S): 213 SOLUTION TOPICAL at 05:57

## 2019-11-22 RX ADMIN — AMLODIPINE BESYLATE 10 MILLIGRAM(S): 2.5 TABLET ORAL at 05:55

## 2019-11-22 RX ADMIN — Medication 25 MILLIGRAM(S): at 05:55

## 2019-11-22 RX ADMIN — Medication 81 MILLIGRAM(S): at 15:12

## 2019-11-22 RX ADMIN — SEVELAMER CARBONATE 800 MILLIGRAM(S): 2400 POWDER, FOR SUSPENSION ORAL at 08:41

## 2019-11-22 RX ADMIN — Medication 650 MILLIGRAM(S): at 06:24

## 2019-11-22 RX ADMIN — Medication 650 MILLIGRAM(S): at 05:54

## 2019-11-22 RX ADMIN — ERYTHROPOIETIN 2000 UNIT(S): 10000 INJECTION, SOLUTION INTRAVENOUS; SUBCUTANEOUS at 11:18

## 2019-11-22 RX ADMIN — Medication 1 TABLET(S): at 15:13

## 2019-11-22 RX ADMIN — LISINOPRIL 40 MILLIGRAM(S): 2.5 TABLET ORAL at 05:55

## 2019-11-22 RX ADMIN — Medication 1 MILLIGRAM(S): at 15:13

## 2019-11-22 RX ADMIN — Medication 100 MILLIGRAM(S): at 00:24

## 2019-11-22 RX ADMIN — HEPARIN SODIUM 5000 UNIT(S): 5000 INJECTION INTRAVENOUS; SUBCUTANEOUS at 05:58

## 2019-11-22 RX ADMIN — Medication 200 MILLIGRAM(S): at 05:55

## 2019-11-22 NOTE — PROGRESS NOTE ADULT - PROBLEM/PLAN-3
Acetaminophen 500 mg
Acetaminophen over-the-counter 400 mg every 6 hours as needed as needed
Pt was inform of  message below and she verbalized understanding. Thanks
Using language line  Yg Vitale [id number]:  Patient was seen on 8/14/17 and placed on Naproxen. Since taking the Naproxen her stomach as been upset stomach and vomited 4 to 5 times. She stopped the Naproxen and her symptoms are gone.    Isak Jackson
DISPLAY PLAN FREE TEXT
none

## 2019-11-22 NOTE — PROGRESS NOTE ADULT - ASSESSMENT
53 yo F w/ PMH ESRD on HD MWF (via [facility]F; [facility]; Dr. Beard), HTN, DM, Hx vertigo, gastric bypass (2017) who presented from HD unit today for evaluation of dizziness and hypoglycemia. Nephrology is consulted for ESRD management.    ESRD on HD MWF  - continue HD on MWF schedule    - HD yesterday was delayed 2/2 water contamination w/ chloramines. This has been rectified per charge HD RN. Will plan for HD today and again tomorrow to maintain MWF schedule    Hyperkalemia  - low K diet  - K is WNL despite not having HD for over 48 hrs  - continue MWF dialysis and dietary restriction, no need for lokelma/veltassa on non-HD days at this time    Anemia  - Hgb at goal  - no indication for LYDIA at this time    HTN  - BP controlled  - goal BP <130/80  - pain/dizziness control  - monitor on current regimen    MBD  - continue sevelamer TID w/ meals  - low phos diet
53 yo F w/ PMH ESRD on HD MWF (via [facility]; [facility]; Dr. Beard), HTN, DM, Hx vertigo, gastric bypass (2017) who presented from HD unit today for evaluation of dizziness and hypoglycemia. Nephrology is consulted for ESRD management.    ESRD on HD MWF  - continue HD on MWF schedule    - received call from HD RN stating there was chloramine in water this AM and HD has been delayed. Ok to have HD tomorrow Thursday 11/14/19 instead.    Hyperkalemia  - low K diet  - repeat BMP; may need lokelma or veltassa on non-HD days if persistently hyperkalemic    Anemia  - Hgb at goal  - no indication for LYDIA at this time    HTN  - BP controlled  - goal BP <130/80  - pain/dizziness control  - monitor on current regimen    MBD  - continue sevelamer TID w/ meals  - low phos diet
53 yo F w/ PMH ESRD on HD MWF (via [facility]; [facility]; Dr. Beard), HTN, DM, Hx vertigo, gastric bypass (2017) who presented from HD unit today for evaluation of dizziness and hypoglycemia. Nephrology is consulted for ESRD management.    ESRD on HD MWF  - continue HD on MWF schedule    Hyperkalemia  - Improved post HD  - can continue lisinopril for now; may need lokelma or veltassa on non-HD days to help control potassium    Anemia  - Hgb at goal  - no indication for LYDIA at this time    HTN  - BP improving  - goal BP <130/80  - pain/dizziness control  - monitor on current regimen    MBD  - continue sevelamer TID w/ meals  - low phos diet    Chest pain  - new symptom today  - did not have any hypotensive episodes during HD  - had 1.8 L UF removed during 4 hr session of HD  - recommend STAT EKG and trops to eval pressure-pain (discussed w/ primary)  - remainder of w/u per primary
53 yo F w/ PMH ESRD on HD MWF (via [facility]; [facility]; Dr. Beard), HTN, DM, Hx vertigo, gastric bypass (2017) who presented from HD unit today for evaluation of dizziness and hypoglycemia. Nephrology is consulted for ESRD management.    ESRD on HD MWF  - continue HD on MWF schedule    Hyperkalemia  - low K diet  - continue MWF dialysis and dietary restriction    Anemia  - Hgb at goal  - no indication for LYDIA at this time    HTN  - BP controlled  - goal BP <130/80  - pain/dizziness control  - monitor on current regimen    MBD  - continue sevelamer TID w/ meals  - low phos diet
53 yo F w/ PMH ESRD on HD MWF (via [facility]; [facility]; Dr. Beard), HTN, DM, Hx vertigo, gastric bypass (2017) who presented from HD unit today for evaluation of dizziness and hypoglycemia. Nephrology is consulted for ESRD management.    ESRD on HD MWF  - continue HD on MWF schedule    Hyperkalemia  Will Dialyse in am.  Kayexalate can be given today.    Anemia  - Hgb at goal  - no indication for LYDIA at this time    HTN  - BP improving  - goal BP <130/80  - pain/dizziness control  - monitor on current regimen    MBD  - continue sevelamer TID w/ meals  - low phos diet    Chest pain  - new symptom today  - did not have any hypotensive episodes during HD  - had 1.8 L UF removed during 4 hr session of HD  - recommend STAT EKG and trops to eval pressure-pain (discussed w/ primary)  - remainder of w/u per primary
53 yo F w/ PMH ESRD on HD MWF (via [facility]; [facility]; Dr. Beard), HTN, DM, Hx vertigo, gastric bypass (2017) who presented from HD unit today for evaluation of dizziness and hypoglycemia. Nephrology is consulted for ESRD management.    ESRD on HD MWF  - did not have HD yesterday due to scheduling issue; continue HD today as ordered    - continue HD on MWF schedule; next HD tomorrow 11/21 per pt preference    - BMP today is pending    - BUN is likely very elevated from steroids    Hyperkalemia  - low K diet  - BMP today is pending  - please call if K >5.5    Anemia  - Hgb at goal  - no indication for LYDIA at this time    HTN  - BP controlled  - goal BP <130/80  - pain/dizziness control  - monitor on current regimen    MBD  - continue sevelamer TID w/ meals  - low phos diet
53 yo F w/ PMH ESRD on HD MWF (via [facility]; [facility]; Dr. Beard), HTN, DM, Hx vertigo, gastric bypass (2017) who presented from HD unit today for evaluation of dizziness and hypoglycemia. Nephrology is consulted for ESRD management.    ESRD on HD MWF  - did not have HD yesterday due to scheduling issue; continue HD today as ordered    - continue HD on MWF schedule; next HD tomorrow 11/22    - BMP today is pending    - BUN is likely very elevated from steroids    Hyperkalemia  - low K diet  - BMP today is pending  - please call if K >5.5    Anemia  - Hgb at goal  - no indication for LYDIA at this time    HTN  - BP controlled  - goal BP <130/80  - pain/dizziness control  - monitor on current regimen    MBD  - continue sevelamer TID w/ meals  - low phos diet
54 year old female from home with PMHx ESRD on HD via right AVF (M/W/F), HTN, hx DM no longer on meds now with episodes of hypoglycemia , Anemia, Asthma, Claustrophobia, GERD, HLD, MI, [place],  Uterine Leiomyoma and PSHx of AV Fistula, R Antecubital AVF, , Craniotomy, and Hernia Repair gastric Bypass (2017) presents with dizziness and abnormal EKG.  1.Await MRI.  2.PT.  3.HTN-cont bp medication.  4.DM-Diet.  5.Vertigo-meclizine.  6.ESRD-HD as per renal.  7.Lipid d/o-statin.  8.GI and DVT prophylaxis.
54 year old female from home with PMHx ESRD on HD via right [facility] (M/W/F), HTN, hx DM no longer on meds now with episodes of hypoglycemia , Anemia, Asthma, Claustrophobia, GERD, HLD, MI, [place],  Uterine Leiomyoma and PSHx of AV Fistula, R Antecubital AVF, , Craniotomy, and Hernia Repair gastric Bypass (2017) presents with dizziness and abnormal EKG.  1.D/C Tele monitoring.  2.Echocardiogram.  3.HTN-cont bp medication.  4.DM-Diet.  5.Vertigo-meclizinw.  6.ESRD-HD as per renal.  7.Lipid d/o-statin.  8.GI and DVT prophylaxis.
54 year old female from home with PMHx ESRD on HD via right [facility] (M/W/F), HTN, hx DM no longer on meds now with episodes of hypoglycemia , Anemia, Asthma, Claustrophobia, GERD, HLD, MI, [place],  Uterine Leiomyoma and PSHx of AV Fistula, R Antecubital AVF, , Craniotomy, and Hernia Repair gastric Bypass (2017) presents with dizziness and abnormal EKG.  1.Hyperkalemia-HD today.  2.Echocardiogram.  3.HTN-cont bp medication.  4.DM-Diet.  5.Vertigo-meclizine.  6.ESRD-HD as per renal.  7.Lipid d/o-statin.  8.GI and DVT prophylaxis.
54 year old female from home with PMHx ESRD on HD via right [facility] (M/W/F), HTN, hx DM no longer on meds now with episodes of hypoglycemia , Anemia, Asthma, Claustrophobia, GERD, HLD, MI, [place],  Uterine Leiomyoma and PSHx of AV Fistula, R Antecubital AVF, , Craniotomy, and Hernia Repair gastric Bypass (2017) presents with dizziness and abnormal EKG.  1.Hyperkalemia-HD today.  2.Lt hip xray.  3.HTN-cont bp medication.  4.DM-Diet.  5.Vertigo-meclizine.  6.ESRD-HD as per renal.  7.Lipid d/o-statin.  8.GI and DVT prophylaxis.
54 year old female from home with PMHx ESRD on HD via right [facility]F (M/W/F), HTN, hx DM no longer on meds now with episodes of hypoglycemia , Anemia, Asthma, Claustrophobia, GERD, HLD, MI, [place],  Uterine Leiomyoma and PSHx of AV Fistula, R Antecubital AVF, , Craniotomy, and Hernia Repair gastric Bypass (2017) presents with dizziness and abnormal EKG.  1.Hyperkalemis-kayexalate 15gm pox1.  2.Echocardiogram.  3.HTN-cont bp medication.  4.DM-Diet.  5.Vertigo-meclizinw.  6.ESRD-HD as per renal.  7.Lipid d/o-statin.  8.GI and DVT prophylaxis.
54 year old female from home with PMHx ESRD on HD via right [facility] (M/W/F), HTN, hx DM no longer on meds now with episodes of hypoglycemia , Anemia, Asthma, Claustrophobia, GERD, HLD, MI, [place],  Uterine Leiomyoma and PSHx of AV Fistula, R Antecubital AVF, , Craniotomy, and Hernia Repair gastric Bypass (2017) presents with dizziness and abnormal EKG.  1.PT.  2.Lipid d/o-statin.  3.HTN-cont bp medication.  4.DM-Diet.  5.Vertigo-meclizine,trial of steroid.  6.ESRD-HD as per renal.  7.GI and DVT prophylaxis.
54 year old female from home with PMHx ESRD on HD via right [facility] (M/W/F), HTN, hx DM no longer on meds now with episodes of hypoglycemia , Anemia, Asthma, Claustrophobia, GERD, HLD, MI, [place],  Uterine Leiomyoma and PSHx of AV Fistula, R Antecubital AVF, , Craniotomy, and Hernia Repair gastric Bypass (2017) presents with dizziness and abnormal EKG.  1.PT.  2.Lipid d/o-statin.  3.HTN-cont bp medication.  4.DM-Diet.  5.Vertigo-meclizine.  6.ESRD-HD as per renal.  7.GI and DVT prophylaxis.
54 year old female from home with PMHx ESRD on HD via right [facility] (M/W/F), HTN, hx DM no longer on meds now with episodes of hypoglycemia , Anemia, Asthma, Claustrophobia, GERD, HLD, MI, [place],  Uterine Leiomyoma and PSHx of AV Fistula, R Antecubital AVF, , Craniotomy, and Hernia Repair gastric Bypass (2017) presents with dizziness and abnormal EKG.  1.Tele monitoring.  2.Neurology eval noted.  3.Orthostatic BP -.  4.Echocardiogram.  5.HTN-cont bp medication.  6.DM-Diet.  7.ESRD-HD as per renal.  8.Lipid d/o-statin.  9.GI and DVT prophylaxis.
54 year old female from home with PMHx ESRD on HD via right [facility]F (M/W/F), HTN, hx DM no longer on meds now with episodes of hypoglycemia , Anemia, Asthma, Claustrophobia, GERD, HLD, MI, [place],  Uterine Leiomyoma and PSHx of AV Fistula, R Antecubital AVF, , Craniotomy, and Hernia Repair gastric Bypass (2017) presents with dizziness since Monday night. Reports onset of dizziness described as spinning sensation 2 days ago. Reports she feels as if she were drunk and when she walks feels like shes walking on air. Reports she went to her HD session this morning and told staff of her symptoms who then sent her to ED, patient reports she did not receive her HD today, last received 2 days ago. Reports remote hx vertigo for which she used to take medication but symptoms has not occurred for many years.pt denies any Denies headache, focal weakness , numbness N/V, abdominal pain, hypotension, urinary symptoms, hematuria, melena, hematochezia. Denies smoking, alcohol, illicit drug use. allergic to pineapple.    In ED :   NIHSS score on admission : 2  Ct head is negative for any acute event or change (did not receive TPA)  EKG: NSR  , T1 -ve , f/u T2  UA -ve   CXR : Mild cardiomegaly. No acute infiltrate.    LMP : 5 years ago     Pt is admitted for management of vertigo.
55 yo F w/ PMH ESRD on HD MWF (via [facility]; [facility]; Dr. Beard), HTN, DM, Hx vertigo, gastric bypass (2017) who presented from HD unit today for evaluation of dizziness and hypoglycemia. Nephrology is consulted for ESRD management.    ESRD on HD MWF  - consent obtained and placed in chart  - renal diet    - continue HD on MWF schedule    Hyperkalemia  - low potassium diet  - HD tomorrow  - lokelma 10 grams PO today for mild hyperkalemia  - repeat BMP jessie AM  - can continue lisinopril for now; may need lokelma or veltassa on non-HD days to help control potassium    Anemia  - Hgb at goal  - no indication for LYDIA at this time    HTN  - BP remains elevated today  - goal BP <130/80  - recommend continuing amlodipine and hydralazine (home meds)  - increase labetalol to 200 mg BID  - monitor    MBD  - continue sevelamer TID w/ meals  - low phos diet
55 yo F w/ PMH ESRD on HD MWF (via [facility]; [facility]; Dr. Beard), HTN, DM, Hx vertigo, gastric bypass (2017) who presented from HD unit today for evaluation of dizziness and hypoglycemia. Nephrology is consulted for ESRD management.    ESRD on HD MWF  - consent obtained and placed in chart  - renal diet    - continue HD on MWF schedule    Hyperkalemia  - low potassium diet  - completed HD today; recommend repeat BMP 4 hrs after HD completed to re-eval K (? related to CP; no rhythm abnormalities on tele during HD)  - repeat BMP jessie AM as well  - can continue lisinopril for now; may need lokelma or veltassa on non-HD days to help control potassium    Anemia  - Hgb at goal  - no indication for LYDIA at this time    HTN  - BP improving  - goal BP <130/80  - pain/dizziness control  - monitor on current regimen    MBD  - continue sevelamer TID w/ meals  - low phos diet    Chest pain  - new symptom today  - did not have any hypotensive episodes during HD  - had 1.8 L UF removed during 4 hr session of HD  - recommend STAT EKG and trops to eval pressure-pain (discussed w/ primary)  - remainder of w/u per primary
55 yo F w/ PMH ESRD on HD MWF (via [facility]; [facility]; Dr. Beard), HTN, DM, Hx vertigo, gastric bypass (2017) who presented from HD unit today for evaluation of dizziness and hypoglycemia. Nephrology is consulted for ESRD management.    ESRD on HD MWF  - continue HD on MWF schedule    Hyperkalemia  - on low K diet  - likely from ACE-I ?  - recommend veltassa 8.4 grams or lokelma 10 grams PO on non-HD days  - may need recirculation study to r/o AVG dysfunction as etiology for hyperkalemia (can be done as outpatient if she is being discharged today)    Anemia  - Hgb at goal  - no indication for LYDIA at this time    HTN  - BP improving  - goal BP <130/80  - pain/dizziness control  - monitor on current regimen    MBD  - continue sevelamer TID w/ meals  - low phos diet
55 yo F w/ PMH ESRD on HD MWF (via [facility]; [facility]; Dr. Beard), HTN, DM, Hx vertigo, gastric bypass (2017) who presented from HD unit today for evaluation of dizziness and hypoglycemia. Nephrology is consulted for ESRD management.    ESRD on HD MWF  - continue HD on MWF schedule  - HD Wednesday was delayed 2/2 water contamination w/ chloramines. s/p HD Thursday and planned for today to maintain MWF schedule    Hyperkalemia  - Improved  - low K diet  - continue MWF dialysis     Anemia  - Hgb at goal  - no indication for LYDIA at this time    HTN  - BP controlled  - goal BP <130/80  - pain/dizziness control  - monitor on current regimen    MBD  - Check serum phos  - continue sevelamer TID w/ meals  - low phos diet
55 yo F w/ PMH ESRD on HD MWF (via [facility]; [facility]; Dr. Beard), HTN, DM, Hx vertigo, gastric bypass (2017) who presented from HD unit today for evaluation of dizziness and hypoglycemia. Nephrology is consulted for ESRD management.    ESRD on HD MWF  - did not have HD yesterday due to scheduling issue; continue HD today as ordered    - continue HD on MWF schedule; next HD tomorrow 11/20    - BUN is likely very elevated from steroids    Hyperkalemia  - low K diet  - continue MWF dialysis and dietary restriction  - HD again tomorrow    Anemia  - Hgb at goal  - no indication for LYDIA at this time    HTN  - BP controlled  - goal BP <130/80  - pain/dizziness control  - monitor on current regimen    MBD  - continue sevelamer TID w/ meals  - low phos diet
55 yo F w/ PMH ESRD on HD MWF (via [facility]; [facility]; Dr. Beard), HTN, DM, Hx vertigo, gastric bypass (2017) who presented from HD unit today for evaluation of dizziness and hypoglycemia. Work up for vertigo per neuro, likely vestibular in origin. Nephrology is consulted for ESRD management.    ESRD on HD MWF  - had HD yesterday w/o event  - HD again today to maintain MWF schedule pending discharge to acute rehab ([facility])    - BUN is likely very elevated from steroids    Hyperkalemia  - low K diet  - BMP today is pending  - please call if K >5.5    Anemia  - Hgb below goal  - has not been on LYDIA previously  - will start epogen 2,000 units IV w/ HD today  - r/o hemolysis    HTN  - BP controlled  - goal BP <130/80  - pain/dizziness control  - monitor on current regimen    MBD  - continue sevelamer TID w/ meals  - low phos diet
A/P Likely vestibular migraine and analgesic overuse headache. Recommend a trial of acute migraine treatment- intravenous valproate 500 mg Q6H x 3 doses with metoclopramide 10 mg IV q6h x 3 doses and magnesium po 400 mg TID . Will follow up tomorrow for results/effects of treatment trial
Discussed using [place]  in the presence of her brother; reviewed the process of excluding acute stroke, unruptured aneurysms and vascular dissections; inv iew of exclusion of these conditions the likelihood of vestibular migraine increases and indeed the treatment of vestibular migraine has helped her symptoms. Recommend continue topiramate and vestibular therapy and follow up in the neurology office as out-patient. Topiramate recommended 50 mg in AM and 100 mg at night; Advised to take the morning dose after dialysis on dialysis days.
ESRD on HD MWF  - continue HD on MWF schedule    Hyperkalemia  - Improved post HD  - can continue lisinopril for now; may need lokelma or veltassa on non-HD days to help control potassium    Anemia  - Hgb at goal  - no indication for LYDIA at this time    HTN  - BP improving  - goal BP <130/80  - pain/dizziness control  - monitor on current regimen    MBD  - continue sevelamer TID w/ meals  - low phos diet    Chest pain  - new symptom today  - did not have any hypotensive episodes during HD  - had 1.8 L UF removed during 4 hr session of HD  - recommend STAT EKG and trops to eval pressure-pain (discussed w/ primary)  - remainder of w/u per primary
Intractable migraine with aura with status migrainosus. Continue topiramate - increase to 50 mg BID; promethazine 50 mg suppository now for acute treatment of persistent aura of migraine, warning her of drowsiness and follow up
Likely vestibular migraine; there is no evidence of stroke, aneurysm, dissection, mass in the brain. Previous migraine treatment had helped; recommend Depacon 500 mg IV,  followed by metoclopramide 10 mg IV and diphenhydramine 12.5 mg IV repeated Q6H x 3 doses. Will follow
Peripheral vertigo    Recommend:  Meclazine 50mg tid, switch to prn when symptoms resolve.  If the patient continues to have vertigo, can consider trial of benzo.  mary QUEZADA and Dr. Lee
Vertigo on patient with headache, now headache has resolved but the patient continues to have vertigo.  Concerning for central vs. peripheral causes of vertigo.  Should consider vertigenous migraines but less likely as vertigo continues despite resolution of the headaches.    Recommend:  increase Meclazine to 50mg TID  MRI brain    dw Dr. Lee
54 year old female from home admitted for dizziness
54 year old female from home with PMHx ESRD on HD via right AVF (M/W/F), HTN, hx DM no longer on meds now with episodes of hypoglycemia , Anemia, Asthma, Claustrophobia, GERD, HLD, MI, [place],  Uterine Leiomyoma and PSHx of AV Fistula, R Antecubital AVF, , Craniotomy, and Hernia Repair gastric Bypass (2017) presents with dizziness since Monday night. Reports onset of dizziness described as spinning sensation 2 days ago. Reports she feels as if she were drunk and when she walks feels like shes walking on air. Reports she went to her HD session this morning and told staff of her symptoms who then sent her to ED, patient reports she did not receive her HD today, last received 2 days ago. Reports remote hx vertigo for which she used to take medication but symptoms has not occurred for many years.pt denies any Denies headache, focal weakness , numbness N/V, abdominal pain, hypotension, urinary symptoms, hematuria, melena, hematochezia. Denies smoking, alcohol, illicit drug use. allergic to pineapple.    In ED :   NIHSS score on admission : 2  Ct head is negative for any acute event or change (did not receive TPA)  EKG: NSR  , T1 -ve , f/u T2  UA -ve   CXR : Mild cardiomegaly. No acute infiltrate.    LMP : 5 years ago     Pt is admitted for management of vertigo.

## 2019-11-22 NOTE — PROGRESS NOTE ADULT - SUBJECTIVE AND OBJECTIVE BOX
[facility]   MD Maia Gonzalez D.O. Fatima Sheikh, D.O. Ruoru Wong, PA    From 7 AM - 5 PM:  OFFICE: [phone]  Dr. Beckham cell: [phone]  Dr. Charles cell: [phone]  Dr. Gamino cell: [phone]  BRANDEN Carrasco cell: [phone]    From 5 PM - 7 AM: Answering Service: [phone]        RENAL FOLLOW UP NOTE  --------------------------------------------------------------------------------  HPI: Pt seen and examined. Still has dizziness but denies any other symptoms. No N/V/D, back pain, SOB, CP.        PAST HISTORY  --------------------------------------------------------------------------------  No significant changes to PMH, PSH, FHx, SHx, unless otherwise noted    ALLERGIES & MEDICATIONS  --------------------------------------------------------------------------------  Allergies    No Known Drug Allergies  pineapple (Hives)    Intolerances      Standing Inpatient Medications  amLODIPine   Tablet 10 milliGRAM(s) Oral daily  aspirin  chewable 81 milliGRAM(s) Oral daily  atorvastatin 40 milliGRAM(s) Oral at bedtime  chlorhexidine 4% Liquid 1 Application(s) Topical <User Schedule>  dextrose 5%. 1000 milliLiter(s) IV Continuous <Continuous>  dextrose 50% Injectable 12.5 Gram(s) IV Push once  dextrose 50% Injectable 25 Gram(s) IV Push once  dextrose 50% Injectable 25 Gram(s) IV Push once  folic acid 1 milliGRAM(s) Oral daily  gabapentin 300 milliGRAM(s) Oral at bedtime  heparin  Injectable 5000 Unit(s) SubCutaneous every 12 hours  hydrALAZINE 25 milliGRAM(s) Oral two times a day  insulin lispro (HumaLOG) corrective regimen sliding scale   SubCutaneous Before meals and at bedtime  labetalol 200 milliGRAM(s) Oral daily  lisinopril 40 milliGRAM(s) Oral daily  melatonin 3 milliGRAM(s) Oral at bedtime  multivitamin/minerals 1 Tablet(s) Oral daily  senna 2 Tablet(s) Oral at bedtime  sevelamer carbonate 800 milliGRAM(s) Oral three times a day with meals  topiramate 50 milliGRAM(s) Oral <User Schedule>  topiramate 100 milliGRAM(s) Oral <User Schedule>    PRN Inpatient Medications  acetaminophen   Tablet .. 650 milliGRAM(s) Oral every 6 hours PRN  dextrose 40% Gel 15 Gram(s) Oral once PRN  diphenhydrAMINE   Elixir 12.5 milliGRAM(s) Oral every 6 hours PRN  glucagon  Injectable 1 milliGRAM(s) IntraMuscular once PRN  ondansetron Injectable 4 milliGRAM(s) IV Push every 8 hours PRN      REVIEW OF SYSTEMS  --------------------------------------------------------------------------------  General: no fever  CVS: no chest pain  RESP: no sob, no cough  ABD: no abdominal pain  : no dysuria,  MSK: no edema     VITALS/PHYSICAL EXAM  --------------------------------------------------------------------------------  T(C): 36.8 (11-22-19 @ 05:12), Max: 37.2 (11-21-19 @ 20:48)  HR: 66 (11-22-19 @ 05:12) (66 - 82)  BP: 114/62 (11-22-19 @ 05:12) (114/62 - 142/54)  RR: 17 (11-22-19 @ 05:12) (16 - 17)  SpO2: 100% (11-22-19 @ 05:12) (96% - 100%)  Wt(kg): --        Physical Exam:  	Gen: NAD  	HEENT: MMM  	Pulm: CTA B/L  	CV: S1S2, + murmur  	Abd: Soft, +BS  	Ext: No LE edema B/L              Neuro: Awake   	Skin: Warm and Dry   	Vascular access: RAVG w/ good thrill and bruit    LABS/STUDIES  --------------------------------------------------------------------------------              9.6    11.33 >-----------<  181      [11-22-19 @ 10:13]              30.0     x   |  x   |  x   ----------------------------<  x       [11-22-19 @ 10:13]  x    |  x   |  6.22        Ca     9.2     [11-21-19 @ 12:11]            Creatinine Trend:  SCr 6.22 [11-22 @ 10:13]  SCr 8.30 [11-21 @ 12:11]  SCr 11.70 [11-19 @ 08:56]  SCr 7.39 [11-17 @ 06:17]  SCr 4.88 [11-16 @ 07:18]    Urinalysis - [11-06-19 @ 09:05]      Color Yellow / Appearance Clear / SG 1.015 / pH 9.0      Gluc 50 / Ketone Negative  / Bili Negative / Urobili Negative       Blood Negative / Protein 100 / Leuk Est Negative / Nitrite Negative      RBC 0-2 / WBC 0-2 / Hyaline  / Gran  / Sq Epi  / Non Sq Epi  / Bacteria Trace      PTH -- (Ca 8.9)      [11-07-19 @ 10:25]   173  Vitamin D (25OH) 24.4      [11-07-19 @ 10:25]  HbA1c 4.6      [11-07-19 @ 10:26]  TSH 0.73      [11-07-19 @ 06:31]  Lipid: chol 140, , HDL 41, LDL 75      [11-07-19 @ 06:31]    HBsAg Nonreact      [11-08-19 @ 15:20]  HCV 0.09, Nonreact      [11-08-19 @ 15:20]    DICKSON: titer Negative, pattern --      [11-09-19 @ 11:03]  SPEP Interpretation: Normal Electrophoresis Pattern      [11-09-19 @ 11:24]

## 2019-11-22 NOTE — PROGRESS NOTE ADULT - PROBLEM SELECTOR PLAN 7
Cont meds  Neuro follow up.  CTA of head and neck neg  MRI of Brain noted  ENT follow up as OP  PT/Rehab.

## 2019-11-22 NOTE — PROGRESS NOTE ADULT - REASON FOR ADMISSION
Vertigo

## 2019-11-22 NOTE — PROGRESS NOTE ADULT - SUBJECTIVE AND OBJECTIVE BOX
Patient is a 54y old  Female who presents with a chief complaint of Vertigo (22 Nov 2019 11:13)    Awake, alert, comfortable in bed in NAD.       INTERVAL HPI/OVERNIGHT EVENTS:      VITAL SIGNS:  T(F): 98.9 (11-22-19 @ 09:18)  HR: 70 (11-22-19 @ 09:18)  BP: 105/39 (11-22-19 @ 09:18)  RR: 16 (11-22-19 @ 09:18)  SpO2: 100% (11-22-19 @ 09:18)  Wt(kg): --  I&O's Detail          REVIEW OF SYSTEMS:    CONSTITUTIONAL:  No fevers, chills, sweats    HEENT:  Eyes:  No diplopia or blurred vision. ENT:  No earache, sore throat or runny nose.    CARDIOVASCULAR:  No pressure, squeezing, tightness, or heaviness about the chest; no palpitations.    RESPIRATORY:  Per HPI    GASTROINTESTINAL:  No abdominal pain, nausea, vomiting or diarrhea.    GENITOURINARY:  No dysuria, frequency or urgency.    NEUROLOGIC:  No paresthesias, fasciculations, seizures or weakness.    PSYCHIATRIC:  No disorder of thought or mood.      PHYSICAL EXAM:    Constitutional: Well developed and nourished  Eyes:Perrla  ENMT: normal  Neck:supple  Respiratory: good air entry  Cardiovascular: S1 S2 regular  Gastrointestinal: Soft, Non tender  Extremities: No edema  Vascular:normal  Neurological:Awake, alert,Ox3  Musculoskeletal:Normal      MEDICATIONS  (STANDING):  amLODIPine   Tablet 10 milliGRAM(s) Oral daily  aspirin  chewable 81 milliGRAM(s) Oral daily  atorvastatin 40 milliGRAM(s) Oral at bedtime  chlorhexidine 4% Liquid 1 Application(s) Topical <User Schedule>  dextrose 5%. 1000 milliLiter(s) (50 mL/Hr) IV Continuous <Continuous>  dextrose 50% Injectable 12.5 Gram(s) IV Push once  dextrose 50% Injectable 25 Gram(s) IV Push once  dextrose 50% Injectable 25 Gram(s) IV Push once  epoetin leela Injectable 2000 Unit(s) IV Push once  folic acid 1 milliGRAM(s) Oral daily  gabapentin 300 milliGRAM(s) Oral at bedtime  heparin  Injectable 5000 Unit(s) SubCutaneous every 12 hours  hydrALAZINE 25 milliGRAM(s) Oral two times a day  insulin lispro (HumaLOG) corrective regimen sliding scale   SubCutaneous Before meals and at bedtime  labetalol 200 milliGRAM(s) Oral daily  lisinopril 40 milliGRAM(s) Oral daily  melatonin 3 milliGRAM(s) Oral at bedtime  multivitamin/minerals 1 Tablet(s) Oral daily  senna 2 Tablet(s) Oral at bedtime  sevelamer carbonate 800 milliGRAM(s) Oral three times a day with meals  topiramate 50 milliGRAM(s) Oral <User Schedule>  topiramate 100 milliGRAM(s) Oral <User Schedule>    MEDICATIONS  (PRN):  acetaminophen   Tablet .. 650 milliGRAM(s) Oral every 6 hours PRN Mild Pain (1 - 3), Moderate Pain (4 - 6)  dextrose 40% Gel 15 Gram(s) Oral once PRN Blood Glucose LESS THAN 70 milliGRAM(s)/deciliter  diphenhydrAMINE   Elixir 12.5 milliGRAM(s) Oral every 6 hours PRN Rash and/or Itching  glucagon  Injectable 1 milliGRAM(s) IntraMuscular once PRN Glucose LESS THAN 70 milligrams/deciliter  ondansetron Injectable 4 milliGRAM(s) IV Push every 8 hours PRN Nausea and/or Vomiting      Allergies    No Known Drug Allergies  pineapple (Hives)    Intolerances        LABS:                        9.6    11.33 )-----------( 181      ( 22 Nov 2019 10:13 )             30.0     11-22    x   |  x   |  x   ----------------------------<  x   x    |  x   |  6.22<H>    Ca    9.2      21 Nov 2019 12:11                CAPILLARY BLOOD GLUCOSE      POCT Blood Glucose.: 143 mg/dL (22 Nov 2019 07:40)  POCT Blood Glucose.: 140 mg/dL (21 Nov 2019 21:04)  POCT Blood Glucose.: 96 mg/dL (21 Nov 2019 16:27)        RADIOLOGY & ADDITIONAL TESTS:    CXR:    Ct scan chest:    ekg;    echo: Patient is a 54y old  Female who presents with a chief complaint of Vertigo (22 Nov 2019 11:13)    Awake, alert, comfortable in bed in NAD. Having HD. Still with dizziness. Will need PT/Rehab      INTERVAL HPI/OVERNIGHT EVENTS:      VITAL SIGNS:  T(F): 98.9 (11-22-19 @ 09:18)  HR: 70 (11-22-19 @ 09:18)  BP: 105/39 (11-22-19 @ 09:18)  RR: 16 (11-22-19 @ 09:18)  SpO2: 100% (11-22-19 @ 09:18)  Wt(kg): --  I&O's Detail          REVIEW OF SYSTEMS:    CONSTITUTIONAL:  No fevers, chills, sweats    HEENT:  Eyes:  No diplopia or blurred vision. ENT:  No earache, sore throat or runny nose.    CARDIOVASCULAR:  No pressure, squeezing, tightness, or heaviness about the chest; no palpitations.    RESPIRATORY:  Per HPI    GASTROINTESTINAL:  No abdominal pain, nausea, vomiting or diarrhea.    GENITOURINARY:  No dysuria, frequency or urgency.    NEUROLOGIC:  No paresthesias, fasciculations, seizures or weakness.    PSYCHIATRIC:  No disorder of thought or mood.      PHYSICAL EXAM:    Constitutional: Well developed and nourished  Eyes:Perrla  ENMT: normal  Neck:supple  Respiratory: good air entry  Cardiovascular: S1 S2 regular  Gastrointestinal: Soft, Non tender  Extremities: No edema  Vascular:normal  Neurological:Awake, alert,Ox3  Musculoskeletal:Normal      MEDICATIONS  (STANDING):  amLODIPine   Tablet 10 milliGRAM(s) Oral daily  aspirin  chewable 81 milliGRAM(s) Oral daily  atorvastatin 40 milliGRAM(s) Oral at bedtime  chlorhexidine 4% Liquid 1 Application(s) Topical <User Schedule>  dextrose 5%. 1000 milliLiter(s) (50 mL/Hr) IV Continuous <Continuous>  dextrose 50% Injectable 12.5 Gram(s) IV Push once  dextrose 50% Injectable 25 Gram(s) IV Push once  dextrose 50% Injectable 25 Gram(s) IV Push once  epoetin leela Injectable 2000 Unit(s) IV Push once  folic acid 1 milliGRAM(s) Oral daily  gabapentin 300 milliGRAM(s) Oral at bedtime  heparin  Injectable 5000 Unit(s) SubCutaneous every 12 hours  hydrALAZINE 25 milliGRAM(s) Oral two times a day  insulin lispro (HumaLOG) corrective regimen sliding scale   SubCutaneous Before meals and at bedtime  labetalol 200 milliGRAM(s) Oral daily  lisinopril 40 milliGRAM(s) Oral daily  melatonin 3 milliGRAM(s) Oral at bedtime  multivitamin/minerals 1 Tablet(s) Oral daily  senna 2 Tablet(s) Oral at bedtime  sevelamer carbonate 800 milliGRAM(s) Oral three times a day with meals  topiramate 50 milliGRAM(s) Oral <User Schedule>  topiramate 100 milliGRAM(s) Oral <User Schedule>    MEDICATIONS  (PRN):  acetaminophen   Tablet .. 650 milliGRAM(s) Oral every 6 hours PRN Mild Pain (1 - 3), Moderate Pain (4 - 6)  dextrose 40% Gel 15 Gram(s) Oral once PRN Blood Glucose LESS THAN 70 milliGRAM(s)/deciliter  diphenhydrAMINE   Elixir 12.5 milliGRAM(s) Oral every 6 hours PRN Rash and/or Itching  glucagon  Injectable 1 milliGRAM(s) IntraMuscular once PRN Glucose LESS THAN 70 milligrams/deciliter  ondansetron Injectable 4 milliGRAM(s) IV Push every 8 hours PRN Nausea and/or Vomiting      Allergies    No Known Drug Allergies  pineapple (Hives)    Intolerances        LABS:                        9.6    11.33 )-----------( 181      ( 22 Nov 2019 10:13 )             30.0     11-22    x   |  x   |  x   ----------------------------<  x   x    |  x   |  6.22<H>    Ca    9.2      21 Nov 2019 12:11                CAPILLARY BLOOD GLUCOSE      POCT Blood Glucose.: 143 mg/dL (22 Nov 2019 07:40)  POCT Blood Glucose.: 140 mg/dL (21 Nov 2019 21:04)  POCT Blood Glucose.: 96 mg/dL (21 Nov 2019 16:27)        RADIOLOGY & ADDITIONAL TESTS:    CXR:    Ct scan chest:    ekg;    echo:

## 2019-11-22 NOTE — DISCHARGE NOTE NURSING/CASE MANAGEMENT/SOCIAL WORK - [facility]SD[facility]UADDAPPT_GEN_ALL_CORE_FT
Continue management per medical staff at facility.   Follow-up with your primary care physician within 1 week after discharge from rehab. Call for appointment.

## 2019-11-22 NOTE — PROGRESS NOTE ADULT - SUBJECTIVE AND OBJECTIVE BOX
Patient is a 54y old  Female who presents with a chief complaint of Vertigo (2019 10:53)    pt seen in icu [  ], reg med floor [   ], bed [  ], chair at bedside [   ], a+o x3 [  ], lethargic [  ],  nad [  ]    khan [  ], ngt [  ], peg [  ], et tube [  ], cent line [  ], picc line [  ]        Allergies    No Known Drug Allergies  pineapple (Hives)        Vitals    T(F): 98.9 (19 @ 09:18), Max: 99 (19 @ 20:48)  HR: 70 (19 @ 09:18) (66 - 82)  BP: 105/39 (19 @ 09:18) (105/39 - 142/54)  RR: 16 (19 @ 09:18) (16 - 17)  SpO2: 100% (19 @ 09:18) (96% - 100%)  Wt(kg): --  CAPILLARY BLOOD GLUCOSE      POCT Blood Glucose.: 143 mg/dL (2019 07:40)      Labs                          9.6    11.33 )-----------( 181      ( 2019 10:13 )             30.0       11-22    x   |  x   |  x   ----------------------------<  x   x    |  x   |  6.22<H>    Ca    9.2      2019 12:11              .Urine  11-06 @ 15:32   <10,000 CFU/mL Normal Urogenital Jinny  --  --          Radiology Results      Meds    MEDICATIONS  (STANDING):  amLODIPine   Tablet 10 milliGRAM(s) Oral daily  aspirin  chewable 81 milliGRAM(s) Oral daily  atorvastatin 40 milliGRAM(s) Oral at bedtime  chlorhexidine 4% Liquid 1 Application(s) Topical <User Schedule>  dextrose 5%. 1000 milliLiter(s) (50 mL/Hr) IV Continuous <Continuous>  dextrose 50% Injectable 12.5 Gram(s) IV Push once  dextrose 50% Injectable 25 Gram(s) IV Push once  dextrose 50% Injectable 25 Gram(s) IV Push once  epoetin leela Injectable 2000 Unit(s) IV Push once  folic acid 1 milliGRAM(s) Oral daily  gabapentin 300 milliGRAM(s) Oral at bedtime  heparin  Injectable 5000 Unit(s) SubCutaneous every 12 hours  hydrALAZINE 25 milliGRAM(s) Oral two times a day  insulin lispro (HumaLOG) corrective regimen sliding scale   SubCutaneous Before meals and at bedtime  labetalol 200 milliGRAM(s) Oral daily  lisinopril 40 milliGRAM(s) Oral daily  melatonin 3 milliGRAM(s) Oral at bedtime  multivitamin/minerals 1 Tablet(s) Oral daily  senna 2 Tablet(s) Oral at bedtime  sevelamer carbonate 800 milliGRAM(s) Oral three times a day with meals  topiramate 50 milliGRAM(s) Oral <User Schedule>  topiramate 100 milliGRAM(s) Oral <User Schedule>      MEDICATIONS  (PRN):  acetaminophen   Tablet .. 650 milliGRAM(s) Oral every 6 hours PRN Mild Pain (1 - 3), Moderate Pain (4 - 6)  dextrose 40% Gel 15 Gram(s) Oral once PRN Blood Glucose LESS THAN 70 milliGRAM(s)/deciliter  diphenhydrAMINE   Elixir 12.5 milliGRAM(s) Oral every 6 hours PRN Rash and/or Itching  glucagon  Injectable 1 milliGRAM(s) IntraMuscular once PRN Glucose LESS THAN 70 milligrams/deciliter  ondansetron Injectable 4 milliGRAM(s) IV Push every 8 hours PRN Nausea and/or Vomiting      Physical Exam    Neuro :  no focal deficits  Respiratory: CTA B/L  CV: RRR, S1S2, no murmurs,   Abdominal: Soft, NT, ND +BS,  Extremities: No edema, + peripheral pulses    ASSESSMENT    Dizziness and giddiness  Myocardial infarct, old  ESRD (end stage renal disease) on dialysis  Asthma occurring only with upper respiratory infection  Anemia in chronic renal disease  Claustrophobia  Uterine leiomyoma, unspecified location  AUSTIN (obstructive sleep apnea)  Gastroesophageal reflux disease without esophagitis  Vertigo  HLD (hyperlipidemia)  Essential hypertension  Chronic kidney disease, unspecified stage  Diabetes mellitus, type 2  Morbid obesity  AV fistula  S/P craniotomy  S/P hernia repair  S/P  section  CRF (chronic renal failure)      PLAN Patient is a 54y old  Female who presents with a chief complaint of Vertigo (2019 10:53)    pt seen in icu [  ], reg med floor [ x  ], bed [  ], chair at bedside [ x  ], a+o x3 [ x ], lethargic [  ],  nad [x  ]    pt stated feeling much better    Allergies    No Known Drug Allergies  pineapple (Hives)        Vitals    T(F): 98.9 (19 @ 09:18), Max: 99 (19 @ 20:48)  HR: 70 (19 @ 09:18) (66 - 82)  BP: 105/39 (19 @ 09:18) (105/39 - 142/54)  RR: 16 (19 @ 09:18) (16 - 17)  SpO2: 100% (19 @ 09:18) (96% - 100%)  Wt(kg): --  CAPILLARY BLOOD GLUCOSE      POCT Blood Glucose.: 143 mg/dL (2019 07:40)      Labs                          9.6    11.33 )-----------( 181      ( 2019 10:13 )             30.0       11-22    x   |  x   |  x   ----------------------------<  x   x    |  x   |  6.22<H>    Ca    9.2      2019 12:11              .Urine  11-06 @ 15:32   <10,000 CFU/mL Normal Urogenital Jinny  --  --          Radiology Results      Meds    MEDICATIONS  (STANDING):  amLODIPine   Tablet 10 milliGRAM(s) Oral daily  aspirin  chewable 81 milliGRAM(s) Oral daily  atorvastatin 40 milliGRAM(s) Oral at bedtime  chlorhexidine 4% Liquid 1 Application(s) Topical <User Schedule>  dextrose 5%. 1000 milliLiter(s) (50 mL/Hr) IV Continuous <Continuous>  dextrose 50% Injectable 12.5 Gram(s) IV Push once  dextrose 50% Injectable 25 Gram(s) IV Push once  dextrose 50% Injectable 25 Gram(s) IV Push once  epoetin leela Injectable 2000 Unit(s) IV Push once  folic acid 1 milliGRAM(s) Oral daily  gabapentin 300 milliGRAM(s) Oral at bedtime  heparin  Injectable 5000 Unit(s) SubCutaneous every 12 hours  hydrALAZINE 25 milliGRAM(s) Oral two times a day  insulin lispro (HumaLOG) corrective regimen sliding scale   SubCutaneous Before meals and at bedtime  labetalol 200 milliGRAM(s) Oral daily  lisinopril 40 milliGRAM(s) Oral daily  melatonin 3 milliGRAM(s) Oral at bedtime  multivitamin/minerals 1 Tablet(s) Oral daily  senna 2 Tablet(s) Oral at bedtime  sevelamer carbonate 800 milliGRAM(s) Oral three times a day with meals  topiramate 50 milliGRAM(s) Oral <User Schedule>  topiramate 100 milliGRAM(s) Oral <User Schedule>      MEDICATIONS  (PRN):  acetaminophen   Tablet .. 650 milliGRAM(s) Oral every 6 hours PRN Mild Pain (1 - 3), Moderate Pain (4 - 6)  dextrose 40% Gel 15 Gram(s) Oral once PRN Blood Glucose LESS THAN 70 milliGRAM(s)/deciliter  diphenhydrAMINE   Elixir 12.5 milliGRAM(s) Oral every 6 hours PRN Rash and/or Itching  glucagon  Injectable 1 milliGRAM(s) IntraMuscular once PRN Glucose LESS THAN 70 milligrams/deciliter  ondansetron Injectable 4 milliGRAM(s) IV Push every 8 hours PRN Nausea and/or Vomiting      Physical Exam      Neuro :  no focal deficits, ill-sustained horizontal gaze-induced nystagmus changing directions in the direction of gaze; [place] negative  Respiratory: CTA B/L  CV: RRR, S1S2, no murmurs,   Abdominal: Soft, NT, ND +BS,  Extremities: No edema, + peripheral pulses,      ASSESSMENT    peripheral vertigo,   Intractable migraine with aura with status migrainosus.   left hip calcific tendinitis  h/o  ESRD on HD (M/W/F),   HTN,   migraine,   DM no longer on meds  Myocardial infarct, old  ESRD (end stage renal disease) on dialysis  Asthma occurring only with upper respiratory infection  Anemia in chronic renal disease  Claustrophobia  Uterine leiomyoma, unspecified location  AUSTIN (obstructive sleep apnea)  Gastroesophageal reflux disease without esophagitis  Vertigo  HLD (hyperlipidemia)  Morbid obesity  AV fistula  S/P craniotomy  S/P hernia repair  S/P  section        PLAN         cont aspirin, statin,   cont meclizine to 50mg tid,  ct head neg noted  s/p trial acute migraine treatment- intravenous valproate 500 mg Q6H x 3 doses with metoclopramide 10 mg IV q6h x 3 doses and magnesium po 400 mg TID .  topamax increased to 50 mg bid  carotid duplex noted : no significant stenosis   cta head and neck neg for large vessel occlusion noted above   mri neg for acute patho noted above  neuro f/u   inview of exclusion of acute stroke, unruptured aneurysms and vascular dissections the likelihood of vestibular migraine increases and indeed the treatment of vestibular migraine has helped her symptoms.   Recommend continue topiramate and vestibular therapy and follow up in the neurology office as out-patient.   Topiramate recommended 50 mg in AM and 100 mg at night; Advised to take the morning dose after dialysis on dialysis days.  completed solumedrol 1 gram daily x 3 days  ent eval rec outpt ent  ce q8 x 2 neg noted above  echo with EF = 65%,  Grade I diastolic dysfunction (Impaired relaxation). Agitated saline injection was negative for intracardiac shunt. Trivial pericardial effusion is seen noted above.  orthostatic positive with standing bp above 100  hgba1c 4.6 noted above,  renal f/u   cont hd as per renal   pt for hd today  pulm f/u noted   pft outpt  PT recs home with home PT   ct left hip-pelv with mineralization in the distal aspect of the left gluteus medius muscle at the level of the left hip. These may represent foci of calcium hydroxyapatite deposition or age indeterminant   proximally retracted intramuscular enthesophytes from the left greater trochanter noted above   mri bony pelv with calcific tendinitis noted  pain mgmt eval noted  gabapentin 300mg po q hs  completed course of decadron for pain  cont oxycodone 5mg po q 6 hours prn  cont current meds   d/c plan for acute rehab with vestibular therapy

## 2019-11-22 NOTE — DISCHARGE NOTE NURSING/CASE MANAGEMENT/SOCIAL WORK - PATIENT PORTAL LINK FT
You can access the FollowMyHealth Patient Portal offered by [facility] by registering at the following website: [email or web address]. By joining [employer]’s FollowMyHealth portal, you will also be able to view your health information using other applications (apps) compatible with our system.

## 2019-11-22 NOTE — PROGRESS NOTE ADULT - PROBLEM SELECTOR PROBLEM 1
Diabetes mellitus, type 2
ESRD (end stage renal disease) on dialysis
Vertigo
Hip pain, acute, left
Vertigo
Vertigo

## 2019-12-01 ENCOUNTER — OUTPATIENT (OUTPATIENT)
Dept: OUTPATIENT SERVICES | Facility: HOSPITAL | Age: 54
LOS: 1 days | End: 2019-12-01
Payer: MEDICARE

## 2019-12-01 DIAGNOSIS — Z98.89 OTHER SPECIFIED POSTPROCEDURAL STATES: Chronic | ICD-10-CM

## 2019-12-01 DIAGNOSIS — N18.9 CHRONIC KIDNEY DISEASE, UNSPECIFIED: Chronic | ICD-10-CM

## 2019-12-01 DIAGNOSIS — I77.0 ARTERIOVENOUS FISTULA, ACQUIRED: Chronic | ICD-10-CM

## 2019-12-01 PROCEDURE — G9001: CPT

## 2019-12-13 DIAGNOSIS — Z71.89 OTHER SPECIFIED COUNSELING: ICD-10-CM

## 2020-03-11 ENCOUNTER — APPOINTMENT (OUTPATIENT)
Dept: ENDOCRINOLOGY | Facility: CLINIC | Age: 55
End: 2020-03-11
Payer: MEDICARE

## 2020-03-11 VITALS
BODY MASS INDEX: 34.96 KG/M2 | WEIGHT: 190 LBS | HEART RATE: 72 BPM | DIASTOLIC BLOOD PRESSURE: 71 MMHG | HEIGHT: 62 IN | TEMPERATURE: 98.7 F | RESPIRATION RATE: 17 BRPM | OXYGEN SATURATION: 99 % | SYSTOLIC BLOOD PRESSURE: 143 MMHG

## 2020-03-11 LAB — GLUCOSE BLDC GLUCOMTR-MCNC: 93

## 2020-03-11 PROCEDURE — 99214 OFFICE O/P EST MOD 30 MIN: CPT | Mod: 25

## 2020-03-11 PROCEDURE — 82962 GLUCOSE BLOOD TEST: CPT

## 2020-03-11 NOTE — ASSESSMENT
[FreeTextEntry1] : The diabetes seems to be well controlled after the gastric bypass\par She is apparently having low blood sugar during the night\par Will do a work up for diabetes\par Advised to eat several small meals during the day and snack before going to sleep\par Patient will come back in 4 weeks

## 2020-03-11 NOTE — HISTORY OF PRESENT ILLNESS
[FreeTextEntry1] : Patient feels well , she is asymptomatic. Her weight has not changed. She is exercising regularly and following the diet. Her blood glucose at home are well controlled, they are usually around 100 mg/dl. She say that she has sometime low blood glucose during the night, after she has the gastric bypass 3 years ago.. She denies chest pain, or SOB. Denies numbness, tingling or burning sensation on her extremities. Taking her medications regularly. She has not seen the Ophthalmologist recently. She has not seen Podiatrist recently. She has not seen the Cardiologist recently.\par

## 2020-08-12 ENCOUNTER — APPOINTMENT (OUTPATIENT)
Dept: ENDOCRINOLOGY | Facility: CLINIC | Age: 55
End: 2020-08-12
Payer: MEDICARE

## 2020-08-12 VITALS
SYSTOLIC BLOOD PRESSURE: 128 MMHG | RESPIRATION RATE: 16 BRPM | HEIGHT: 62 IN | DIASTOLIC BLOOD PRESSURE: 74 MMHG | OXYGEN SATURATION: 97 % | HEART RATE: 77 BPM | BODY MASS INDEX: 33.86 KG/M2 | WEIGHT: 184 LBS | TEMPERATURE: 97.7 F

## 2020-08-12 LAB — GLUCOSE BLDC GLUCOMTR-MCNC: 117

## 2020-08-12 PROCEDURE — 82962 GLUCOSE BLOOD TEST: CPT

## 2020-08-12 PROCEDURE — 99214 OFFICE O/P EST MOD 30 MIN: CPT | Mod: 25

## 2020-08-12 NOTE — HISTORY OF PRESENT ILLNESS
[FreeTextEntry1] : Patient is on hemodialysis 3 times per week, she says that her BS at low during the day, are frequently below 80 mg/dl. When her BS goes down she has cold sweats, feels cold, with palpitations. She has history of diabetes, not taking any medications at present time. She has lost weight.

## 2020-08-12 NOTE — ASSESSMENT
[FreeTextEntry1] : Patient is having hypoglycemia without antidiabetic medications\par She gets dialysis 3 time per week\par Will do a work up for hypoglycemia\par The patient will call back for results

## 2020-08-12 NOTE — PHYSICAL EXAM
[No Acute Distress] : no acute distress [Alert] : alert [No Neck Mass] : no neck mass was observed [Thyroid Not Enlarged] : the thyroid was not enlarged [Clear to Auscultation] : lungs were clear to auscultation bilaterally [No Respiratory Distress] : no respiratory distress [Normal PMI] : the apical impulse was normal [Normal Rate] : heart rate was normal

## 2020-09-05 LAB
ACTH SER-ACNC: 10.5 PG/ML
ALBUMIN SERPL ELPH-MCNC: 4.5 G/DL
ALP BLD-CCNC: 112 U/L
ALT SERPL-CCNC: 20 U/L
ANION GAP SERPL CALC-SCNC: 16 MMOL/L
AST SERPL-CCNC: 16 U/L
BILIRUB DIRECT SERPL-MCNC: 0.2 MG/DL
BILIRUB INDIRECT SERPL-MCNC: 0.3 MG/DL
BILIRUB SERPL-MCNC: 0.5 MG/DL
BUN SERPL-MCNC: 24 MG/DL
C PEPTIDE SERPL-MCNC: 9 NG/ML
CALCIUM SERPL-MCNC: 9 MG/DL
CALCIUM SERPL-MCNC: 9 MG/DL
CHLORIDE SERPL-SCNC: 98 MMOL/L
CO2 SERPL-SCNC: 29 MMOL/L
CORTIS SERPL-MCNC: 8.9 UG/DL
CREAT SERPL-MCNC: 5.89 MG/DL
CREAT SPEC-SCNC: 48 MG/DL
ESTIMATED AVERAGE GLUCOSE: 82 MG/DL
FRUCTOSAMINE SERPL-MCNC: 291 UMOL/L
GLUCOSE BS SERPL-MCNC: 72 MG/DL
GLUCOSE SERPL-MCNC: 81 MG/DL
HBA1C MFR BLD HPLC: 4.5 %
MICROALBUMIN 24H UR DL<=1MG/L-MCNC: 60.9 MG/DL
MICROALBUMIN/CREAT 24H UR-RTO: 1271 MG/G
PARATHYROID HORMONE INTACT: 418 PG/ML
POTASSIUM SERPL-SCNC: 5.5 MMOL/L
PROT SERPL-MCNC: 6.6 G/DL
SODIUM SERPL-SCNC: 142 MMOL/L

## 2020-11-18 ENCOUNTER — APPOINTMENT (OUTPATIENT)
Dept: ENDOCRINOLOGY | Facility: CLINIC | Age: 55
End: 2020-11-18

## 2021-01-25 NOTE — PROGRESS NOTE ADULT - PROBLEM SELECTOR PROBLEM 7
Tazorac Pregnancy And Lactation Text: This medication is not safe during pregnancy. It is unknown if this medication is excreted in breast milk. Azithromycin Pregnancy And Lactation Text: This medication is considered safe during pregnancy and is also secreted in breast milk. High Dose Vitamin A Pregnancy And Lactation Text: High dose vitamin A therapy is contraindicated during pregnancy and breast feeding. Dapsone Counseling: I discussed with the patient the risks of dapsone including but not limited to hemolytic anemia, agranulocytosis, rashes, methemoglobinemia, kidney failure, peripheral neuropathy, headaches, GI upset, and liver toxicity.  Patients who start dapsone require monitoring including baseline LFTs and weekly CBCs for the first month, then every month thereafter.  The patient verbalized understanding of the proper use and possible adverse effects of dapsone.  All of the patient's questions and concerns were addressed. Topical Sulfur Applications Pregnancy And Lactation Text: This medication is Pregnancy Category C and has an unknown safety profile during pregnancy. It is unknown if this topical medication is excreted in breast milk. Sarecycline Pregnancy And Lactation Text: This medication is Pregnancy Category D and not consider safe during pregnancy. It is also excreted in breast milk. Erythromycin Counseling:  I discussed with the patient the risks of erythromycin including but not limited to GI upset, allergic reaction, drug rash, diarrhea, increase in liver enzymes, and yeast infections. Benzoyl Peroxide Counseling: Patient counseled that medicine may cause skin irritation and bleach clothing.  In the event of skin irritation, the patient was advised to reduce the amount of the drug applied or use it less frequently.   The patient verbalized understanding of the proper use and possible adverse effects of benzoyl peroxide.  All of the patient's questions and concerns were addressed. Detail Level: Zone Tazorac Counseling:  Patient advised that medication is irritating and drying.  Patient may need to apply sparingly and wash off after an hour before eventually leaving it on overnight.  The patient verbalized understanding of the proper use and possible adverse effects of tazorac.  All of the patient's questions and concerns were addressed. Topical Clindamycin Counseling: Patient counseled that this medication may cause skin irritation or allergic reactions.  In the event of skin irritation, the patient was advised to reduce the amount of the drug applied or use it less frequently.   The patient verbalized understanding of the proper use and possible adverse effects of clindamycin.  All of the patient's questions and concerns were addressed. Minocycline Counseling: Patient advised regarding possible photosensitivity and discoloration of the teeth, skin, lips, tongue and gums.  Patient instructed to avoid sunlight, if possible.  When exposed to sunlight, patients should wear protective clothing, sunglasses, and sunscreen.  The patient was instructed to call the office immediately if the following severe adverse effects occur:  hearing changes, easy bruising/bleeding, severe headache, or vision changes.  The patient verbalized understanding of the proper use and possible adverse effects of minocycline.  All of the patient's questions and concerns were addressed. Spironolactone Pregnancy And Lactation Text: This medication can cause feminization of the male fetus and should be avoided during pregnancy. The active metabolite is also found in breast milk. Dapsone Pregnancy And Lactation Text: This medication is Pregnancy Category C and is not considered safe during pregnancy or breast feeding. Benzoyl Peroxide Pregnancy And Lactation Text: This medication is Pregnancy Category C. It is unknown if benzoyl peroxide is excreted in breast milk. Anxiety Erythromycin Pregnancy And Lactation Text: This medication is Pregnancy Category B and is considered safe during pregnancy. It is also excreted in breast milk. Bactrim Counseling:  I discussed with the patient the risks of sulfa antibiotics including but not limited to GI upset, allergic reaction, drug rash, diarrhea, dizziness, photosensitivity, and yeast infections.  Rarely, more serious reactions can occur including but not limited to aplastic anemia, agranulocytosis, methemoglobinemia, blood dyscrasias, liver or kidney failure, lung infiltrates or desquamative/blistering drug rashes. Spironolactone Counseling: Patient advised regarding risks of diarrhea, abdominal pain, hyperkalemia, birth defects (for female patients), liver toxicity and renal toxicity. The patient may need blood work to monitor liver and kidney function and potassium levels while on therapy. The patient verbalized understanding of the proper use and possible adverse effects of spironolactone.  All of the patient's questions and concerns were addressed. Doxycycline Counseling:  Patient counseled regarding possible photosensitivity and increased risk for sunburn.  Patient instructed to avoid sunlight, if possible.  When exposed to sunlight, patients should wear protective clothing, sunglasses, and sunscreen.  The patient was instructed to call the office immediately if the following severe adverse effects occur:  hearing changes, easy bruising/bleeding, severe headache, or vision changes.  The patient verbalized understanding of the proper use and possible adverse effects of doxycycline.  All of the patient's questions and concerns were addressed. Tetracycline Counseling: Patient counseled regarding possible photosensitivity and increased risk for sunburn.  Patient instructed to avoid sunlight, if possible.  When exposed to sunlight, patients should wear protective clothing, sunglasses, and sunscreen.  The patient was instructed to call the office immediately if the following severe adverse effects occur:  hearing changes, easy bruising/bleeding, severe headache, or vision changes.  The patient verbalized understanding of the proper use and possible adverse effects of tetracycline.  All of the patient's questions and concerns were addressed. Patient understands to avoid pregnancy while on therapy due to potential birth defects. Topical Clindamycin Pregnancy And Lactation Text: This medication is Pregnancy Category B and is considered safe during pregnancy. It is unknown if it is excreted in breast milk. Isotretinoin Pregnancy And Lactation Text: This medication is Pregnancy Category X and is considered extremely dangerous during pregnancy. It is unknown if it is excreted in breast milk. Use Enhanced Medication Counseling?: No Bactrim Pregnancy And Lactation Text: This medication is Pregnancy Category D and is known to cause fetal risk.  It is also excreted in breast milk. Topical Retinoid counseling:  Patient advised to apply a pea-sized amount only at bedtime and wait 30 minutes after washing their face before applying.  If too drying, patient may add a non-comedogenic moisturizer. The patient verbalized understanding of the proper use and possible adverse effects of retinoids.  All of the patient's questions and concerns were addressed. Isotretinoin Counseling: Patient should get monthly blood tests, not donate blood, not drive at night if vision affected, not share medication, and not undergo elective surgery for 6 months after tx completed. Side effects reviewed, pt to contact office should one occur. Azithromycin Counseling:  I discussed with the patient the risks of azithromycin including but not limited to GI upset, allergic reaction, drug rash, diarrhea, and yeast infections. High Dose Vitamin A Counseling: Side effects reviewed, pt to contact office should one occur. Birth Control Pills Pregnancy And Lactation Text: This medication should be avoided if pregnant and for the first 30 days post-partum. Doxycycline Pregnancy And Lactation Text: This medication is Pregnancy Category D and not consider safe during pregnancy. It is also excreted in breast milk but is considered safe for shorter treatment courses. Topical Sulfur Applications Counseling: Topical Sulfur Counseling: Patient counseled that this medication may cause skin irritation or allergic reactions.  In the event of skin irritation, the patient was advised to reduce the amount of the drug applied or use it less frequently.   The patient verbalized understanding of the proper use and possible adverse effects of topical sulfur application.  All of the patient's questions and concerns were addressed. Topical Retinoid Pregnancy And Lactation Text: This medication is Pregnancy Category C. It is unknown if this medication is excreted in breast milk. Sarecycline Counseling: Patient advised regarding possible photosensitivity and discoloration of the teeth, skin, lips, tongue and gums.  Patient instructed to avoid sunlight, if possible.  When exposed to sunlight, patients should wear protective clothing, sunglasses, and sunscreen.  The patient was instructed to call the office immediately if the following severe adverse effects occur:  hearing changes, easy bruising/bleeding, severe headache, or vision changes.  The patient verbalized understanding of the proper use and possible adverse effects of sarecycline.  All of the patient's questions and concerns were addressed. Birth Control Pills Counseling: Birth Control Pill Counseling: I discussed with the patient the potential side effects of OCPs including but not limited to increased risk of stroke, heart attack, thrombophlebitis, deep venous thrombosis, hepatic adenomas, breast changes, GI upset, headaches, and depression.  The patient verbalized understanding of the proper use and possible adverse effects of OCPs. All of the patient's questions and concerns were addressed. Patient Specific Counseling (Will Not Stick From Patient To Patient): continue aquaphor to the lips

## 2021-04-05 ENCOUNTER — APPOINTMENT (OUTPATIENT)
Dept: ENDOCRINOLOGY | Facility: CLINIC | Age: 56
End: 2021-04-05
Payer: MEDICARE

## 2021-04-05 VITALS
OXYGEN SATURATION: 98 % | RESPIRATION RATE: 16 BRPM | HEIGHT: 62 IN | BODY MASS INDEX: 34.04 KG/M2 | SYSTOLIC BLOOD PRESSURE: 174 MMHG | WEIGHT: 185 LBS | HEART RATE: 63 BPM | DIASTOLIC BLOOD PRESSURE: 95 MMHG | TEMPERATURE: 97.7 F

## 2021-04-05 DIAGNOSIS — I10 ESSENTIAL (PRIMARY) HYPERTENSION: ICD-10-CM

## 2021-04-05 LAB
GLUCOSE BLDC GLUCOMTR-MCNC: 105
GLUCOSE BLDC GLUCOMTR-MCNC: 80
HBA1C MFR BLD HPLC: 4.6

## 2021-04-05 PROCEDURE — 99214 OFFICE O/P EST MOD 30 MIN: CPT | Mod: 25

## 2021-04-05 PROCEDURE — 83036 HEMOGLOBIN GLYCOSYLATED A1C: CPT | Mod: QW

## 2021-04-05 PROCEDURE — 99072 ADDL SUPL MATRL&STAF TM PHE: CPT

## 2021-04-05 PROCEDURE — 82962 GLUCOSE BLOOD TEST: CPT

## 2021-04-05 RX ORDER — LABETALOL HYDROCHLORIDE 200 MG/1
200 TABLET, FILM COATED ORAL
Qty: 30 | Refills: 0 | Status: COMPLETED | COMMUNITY
Start: 2018-05-22 | End: 2021-04-05

## 2021-04-05 RX ORDER — MECLIZINE HYDROCHLORIDE 25 MG/1
25 TABLET ORAL
Qty: 30 | Refills: 0 | Status: COMPLETED | COMMUNITY
Start: 2018-05-22 | End: 2021-04-05

## 2021-04-05 RX ORDER — OXYCODONE AND ACETAMINOPHEN 5; 325 MG/1; MG/1
5-325 TABLET ORAL
Qty: 6 | Refills: 0 | Status: COMPLETED | COMMUNITY
Start: 2018-07-12 | End: 2021-04-05

## 2021-04-05 RX ORDER — LISINOPRIL 40 MG/1
40 TABLET ORAL
Refills: 0 | Status: ACTIVE | COMMUNITY
Start: 2018-05-09

## 2021-04-05 RX ORDER — FLUTICASONE PROPIONATE 50 UG/1
50 SPRAY, METERED NASAL
Qty: 16 | Refills: 0 | Status: COMPLETED | COMMUNITY
Start: 2018-05-01 | End: 2021-04-05

## 2021-04-05 RX ORDER — OMEPRAZOLE 40 MG/1
40 CAPSULE, DELAYED RELEASE ORAL
Qty: 30 | Refills: 0 | Status: COMPLETED | COMMUNITY
Start: 2018-05-22 | End: 2021-04-05

## 2021-04-05 RX ORDER — SEVELAMER CARBONATE 800 MG/1
800 TABLET, FILM COATED ORAL
Qty: 180 | Refills: 0 | Status: COMPLETED | COMMUNITY
Start: 2018-05-04 | End: 2021-04-05

## 2021-04-05 RX ORDER — METFORMIN HYDROCHLORIDE 500 MG/1
500 TABLET, COATED ORAL
Qty: 60 | Refills: 0 | Status: COMPLETED | COMMUNITY
Start: 2021-02-22 | End: 2021-04-05

## 2021-04-05 RX ORDER — AMLODIPINE BESYLATE 10 MG/1
10 TABLET ORAL
Qty: 30 | Refills: 0 | Status: COMPLETED | COMMUNITY
Start: 2018-05-22 | End: 2021-04-05

## 2021-04-05 RX ORDER — OMEGA-3-ACID ETHYL ESTERS CAPSULES 1 G/1
1 CAPSULE, LIQUID FILLED ORAL
Qty: 30 | Refills: 0 | Status: COMPLETED | COMMUNITY
Start: 2018-05-01 | End: 2021-04-05

## 2021-04-05 RX ORDER — GABAPENTIN 100 MG/1
100 CAPSULE ORAL
Qty: 90 | Refills: 0 | Status: COMPLETED | COMMUNITY
Start: 2018-05-22 | End: 2021-04-05

## 2021-04-05 RX ORDER — BLOOD-GLUCOSE METER
W/DEVICE KIT MISCELLANEOUS
Refills: 0 | Status: ACTIVE | COMMUNITY
Start: 2018-06-13

## 2021-04-05 RX ORDER — SEVELAMER CARBONATE 800 MG/1
0.8 POWDER, FOR SUSPENSION ORAL
Qty: 90 | Refills: 0 | Status: COMPLETED | COMMUNITY
Start: 2018-05-09 | End: 2021-04-05

## 2021-04-05 RX ORDER — ROSUVASTATIN CALCIUM 10 MG/1
10 TABLET, FILM COATED ORAL
Qty: 30 | Refills: 0 | Status: COMPLETED | COMMUNITY
Start: 2018-05-22 | End: 2021-04-05

## 2021-04-05 RX ORDER — LABETALOL HYDROCHLORIDE 300 MG/1
300 TABLET, FILM COATED ORAL
Refills: 0 | Status: ACTIVE | COMMUNITY
Start: 2021-03-22

## 2021-04-05 NOTE — ASSESSMENT
[FreeTextEntry1] : The patient has been having on and off low blood glucose during the day and during the night\par Not taking any diabetic medications for diabetes\par She will bring all her medication next visit\par Will do a work up for hypoglycemia\par Her BS dropped from 105 to 80 while she was in the office\par She took glucose tablets

## 2021-04-05 NOTE — HISTORY OF PRESENT ILLNESS
[FreeTextEntry1] : Patient had a gastric by pass 5 years ago, she is now on hemodialysis. Her weight has not changed. She is walking regularly and following the diet. Her blood glucose at home are well controlled, they are usually around 50 to 80 mg/dl, but she says that since she had the by-pass surgery she is having on and off low BS below 50 mg/dl during the day.  She has also low blood glucose during the night. She denies chest pain, or SOB. Denies numbness, tingling or burning sensation on her extremities. Taking her medications regularly. She has not seen the Ophthalmologist recently. She has not seen Podiatrist recently. She has not seen the Cardiologist recently.\par

## 2021-04-06 RX ORDER — GLUCAGON 1 MG
1 KIT INJECTION
Qty: 1 | Refills: 3 | Status: COMPLETED | COMMUNITY
Start: 2021-04-05 | End: 2021-04-06

## 2021-04-19 ENCOUNTER — APPOINTMENT (OUTPATIENT)
Dept: ENDOCRINOLOGY | Facility: CLINIC | Age: 56
End: 2021-04-19

## 2021-04-22 NOTE — PATIENT PROFILE ADULT. - VISION (WITH CORRECTIVE LENSES IF THE PATIENT USUALLY WEARS THEM):
Normal vision: sees adequately in most situations; can see medication labels, newsprint Pro-BNP at baseline, ProBNP 1.4K  -Hold bumetanide due to diarrhea for now   -c/w Carvedilol, Valsartan  -Screening EKG pending

## 2021-06-17 NOTE — PHYSICAL THERAPY INITIAL EVALUATION ADULT - ASR WT BEARING STATUS EVAL
Patient Instructions by Jennifer Conte MD at 7/22/2019  8:45 AM     Author: Jennifer Conte MD Service: -- Author Type: Physician    Filed: 7/22/2019  9:36 AM Encounter Date: 7/22/2019 Status: Addendum    : Jennifer Conte MD (Physician)    Related Notes: Original Note by Jennifer Conte MD (Physician) filed at 7/22/2019  9:36 AM       We talked about having you go back to see your therapist a few times now before heading off to school  I wonder if you have been experiencing some symptoms of mild depression  i'm glad you are starting to feel better  We decided to leave the medicine as it is for now:  Fluoxetine 40 mg daily    I also sent in refill for your inhaler to have just in case    Please return in six months for your next med check, or sooner with any concerns      7/22/2019  Wt Readings from Last 1 Encounters:   07/22/19 (!) 221 lb (100.2 kg) (99 %, Z= 2.21)*     * Growth percentiles are based on CDC (Girls, 2-20 Years) data.       Acetaminophen Dosing Instructions  (May take every 4-6 hours)      WEIGHT   AGE Infant/Children's  160mg/5ml Children's   Chewable Tabs  80 mg each Luiz Strength  Chewable Tabs  160 mg     Milliliter (ml) Soft Chew Tabs Chewable Tabs   6-11 lbs 0-3 months 1.25 ml     12-17 lbs 4-11 months 2.5 ml     18-23 lbs 12-23 months 3.75 ml     24-35 lbs 2-3 years 5 ml 2 tabs    36-47 lbs 4-5 years 7.5 ml 3 tabs    48-59 lbs 6-8 years 10 ml 4 tabs 2 tabs   60-71 lbs 9-10 years 12.5 ml 5 tabs 2.5 tabs   72-95 lbs 11 years 15 ml 6 tabs 3 tabs   96 lbs and over 12 years   4 tabs     Ibuprofen Dosing Instructions- Liquid  (May take every 6-8 hours)      WEIGHT   AGE Concentrated Drops   50 mg/1.25 ml Infant/Children's   100 mg/5ml     Dropperful Milliliter (ml)   12-17 lbs 6- 11 months 1 (1.25 ml)    18-23 lbs 12-23 months 1 1/2 (1.875 ml)    24-35 lbs 2-3 years  5 ml   36-47 lbs 4-5 years  7.5 ml   48-59 lbs 6-8 years  10 ml   60-71 lbs 9-10 years  12.5 ml    72-95 lbs 11 years  15 ml       Ibuprofen Dosing Instructions- Tablets/Caplets  (May take every 6-8 hours)    WEIGHT AGE Children's   Chewable Tabs   50 mg Luiz Strength   Chewable Tabs   100 mg Luiz Strength   Caplets    100 mg     Tablet Tablet Caplet   24-35 lbs 2-3 years 2 tabs     36-47 lbs 4-5 years 3 tabs     48-59 lbs 6-8 years 4 tabs 2 tabs 2 caps   60-71 lbs 9-10 years 5 tabs 2.5 tabs 2.5 caps   72-95 lbs 11 years 6 tabs 3 tabs 3 caps         Patient Education             [facility] Patient Handout   18 to 21 Year Visits     Your Daily Life    Visit the dentist at least twice a year.    Protect your hearing at work, home, and concerts.    Eat a variety of healthy foods.    Eat breakfast every morning.    Drink plenty of water.    Make sure to get enough calcium.    Have 3 or more servings of low-fat (1%) or fat-free milk and other low-fat dairy products each day.    Aim for 1 hour of vigorous physical activity.    Be proud of yourself when you do something well.  Healthy Behavior Choices    Support friends who choose not to use drugs, alcohol, tobacco, steroids, or diet pills.    If you use drugs or alcohol, you can talk to us about it. We can help you with quitting or cutting down on your use.    Make healthy decisions about your sexual behavior.    If you are sexually active, always practice safe sex. Always use a condom to prevent STIs.    All sexual activity should be something you want. No one should ever force or try to convince you.    Find safe activities at school and in the community. Violence and Injuries    Do not drink and drive or ride in a vehicle with someone who has been using drugs or alcohol.    If you feel unsafe driving or riding with someone, call someone you trust to drive you.    Always wear a seat belt in the car.    Know the rules for safe driving.    Never allow physical harm of yourself or others at home or school.    Always deal with conflict using  nonviolence.    Remember that healthy dating relationships are built on respect and that saying no is OK.    Fighting and carrying weapons can be dangerous.  Your Feelings    Figure out healthy ways to deal with stress.    Try your best to solve problems and make decisions on your own.    Most people have daily ups and downs. But if you are feeling sad, depressed, nervous, irritable, hopeless, or angry, talk with me or another health professional.    We understand sexuality is an important part of your development. If you have any questions or concerns, we are here for you. School and Friends    Take responsibility for being organized enough to succeed in work or school.    Find new activities you enjoy.    Consider volunteering and helping others in the community on an issue that interests or concerns you.    Form healthy friendships and find fun, safe things to do with friends.    As you get older, making and keeping friends is important. You may find that you drift away from some of your old friends--thats normal.    Evaluate your friendships and keep those that are healthy.    It is still important to stay connected with your family.              no weight-bearing restrictions

## 2021-08-25 ENCOUNTER — APPOINTMENT (OUTPATIENT)
Dept: ENDOCRINOLOGY | Facility: CLINIC | Age: 56
End: 2021-08-25

## 2021-11-08 NOTE — ASSESSMENT
[FreeTextEntry1] : The patient is having hypoglycemia, ?reactive hypoglycemia vs fasting hypoglycemia\par She gets hemodialysis 3 time s per week and apparently is not connected to this procedure\par Apparently the BS drops after 3 hours of eating\par Advised to carry glucose tablets at all times\par She had a bypass surgery in May 2019 and since then the hypoglycemia is occurring more frequently.\par Will do a work up for hypoglycemia\par Will do a 4 hours GTT\par Patient will come back in 2 weeks for results\par \par 
(0) swallows foods and liquids w/o difficulty

## 2021-11-10 ENCOUNTER — APPOINTMENT (OUTPATIENT)
Dept: ENDOCRINOLOGY | Facility: CLINIC | Age: 56
End: 2021-11-10
Payer: MEDICARE

## 2021-11-10 VITALS
BODY MASS INDEX: 33.49 KG/M2 | HEIGHT: 62 IN | OXYGEN SATURATION: 98 % | SYSTOLIC BLOOD PRESSURE: 162 MMHG | RESPIRATION RATE: 16 BRPM | HEART RATE: 77 BPM | DIASTOLIC BLOOD PRESSURE: 85 MMHG | WEIGHT: 182 LBS | TEMPERATURE: 98 F

## 2021-11-10 LAB — GLUCOSE BLDC GLUCOMTR-MCNC: 83

## 2021-11-10 PROCEDURE — 83036 HEMOGLOBIN GLYCOSYLATED A1C: CPT | Mod: QW

## 2021-11-10 PROCEDURE — 82962 GLUCOSE BLOOD TEST: CPT

## 2021-11-10 PROCEDURE — 99214 OFFICE O/P EST MOD 30 MIN: CPT | Mod: 25

## 2021-11-10 NOTE — HISTORY OF PRESENT ILLNESS
[FreeTextEntry1] : Patient feels well , she is asymptomatic. Her weight has decreased. She is not exercising regularly or following the diet. Her blood glucose at home are not/well controlled, they are usually around 60 to 90 mg/dl. She denies low blood glucose during the night. She denies chest pain, or SOB. Denies numbness, tingling or burning sensation on her extremities. Taking her medications regularly. She has not seen the Ophthalmologist recently. She has not seen Podiatrist recently. She has seen the Cardiologist recently. \par

## 2021-11-10 NOTE — ASSESSMENT
[FreeTextEntry1] : Good diabetic control\par She has lost weight\par Her blood sugar are around 60 to 90 mg/dl\par No recent blood tests\par Will do the blood tests today\par She will call me next week for results\par No need for diabetic medications at present time\par

## 2021-11-23 ENCOUNTER — NON-APPOINTMENT (OUTPATIENT)
Age: 56
End: 2021-11-23

## 2021-11-26 LAB
ALBUMIN SERPL ELPH-MCNC: 4.2 G/DL
ALP BLD-CCNC: 91 U/L
ALT SERPL-CCNC: 22 U/L
ANION GAP SERPL CALC-SCNC: 15 MMOL/L
AST SERPL-CCNC: 22 U/L
BILIRUB DIRECT SERPL-MCNC: 0.1 MG/DL
BILIRUB INDIRECT SERPL-MCNC: 0.2 MG/DL
BILIRUB SERPL-MCNC: 0.3 MG/DL
BUN SERPL-MCNC: 15 MG/DL
CALCIUM SERPL-MCNC: 9 MG/DL
CHLORIDE SERPL-SCNC: 97 MMOL/L
CHOLEST SERPL-MCNC: 89 MG/DL
CO2 SERPL-SCNC: 30 MMOL/L
CREAT SERPL-MCNC: 4.13 MG/DL
ESTIMATED AVERAGE GLUCOSE: 82 MG/DL
FRUCTOSAMINE SERPL-MCNC: 285 UMOL/L
GLUCOSE BS SERPL-MCNC: 96 MG/DL
GLUCOSE SERPL-MCNC: 104 MG/DL
HBA1C MFR BLD HPLC: 4.1
HBA1C MFR BLD HPLC: 4.5 %
HDLC SERPL-MCNC: 42 MG/DL
LDLC SERPL CALC-MCNC: 32 MG/DL
NONHDLC SERPL-MCNC: 47 MG/DL
POTASSIUM SERPL-SCNC: 4.2 MMOL/L
PROT SERPL-MCNC: 6.2 G/DL
SODIUM SERPL-SCNC: 141 MMOL/L
T4 FREE SERPL-MCNC: 1.2 NG/DL
TRIGL SERPL-MCNC: 71 MG/DL
TSH SERPL-ACNC: 0.53 UIU/ML

## 2022-01-13 ENCOUNTER — NON-APPOINTMENT (OUTPATIENT)
Age: 57
End: 2022-01-13

## 2022-03-09 ENCOUNTER — APPOINTMENT (OUTPATIENT)
Dept: ENDOCRINOLOGY | Facility: CLINIC | Age: 57
End: 2022-03-09
Payer: MEDICARE

## 2022-03-09 PROCEDURE — 99443: CPT

## 2022-03-09 NOTE — ASSESSMENT
[FreeTextEntry1] : The patient has on and off hypoglycemia not related to her meals.\par Will repeat the c-peptide, insulin levels, proinsulin levels, FBS, HbA1c\par She was advised that she may need an endoscopic sonography to determine the presence of a small pancreatic lesion\par There is a possibility that the hypoglycemia is related to the Gastric Bypass surgery\par She will speak with the surgeons in the next few weeks\par Patient will come back in 4 weeks

## 2022-03-09 NOTE — HISTORY OF PRESENT ILLNESS
[Home] : at home, [unfilled] , at the time of the visit. [Medical Office: ([facility])___] : at the medical office located in  [Verbal consent obtained from patient] : the patient, [unfilled] [FreeTextEntry1] : Patient has a history of Type 2 diabetes since 1997. In 2016 she had a Gastric Bypass, her diabetes went away but one year after the surgery she started having low blood sugars at any time of the day, that improve with food containing sugar. She is on Hemodialysis since 2015. Her c-peptide levels are elevated with a normal FBS. A 2 h GTT was done and it was normal except for the fasting hyperinsulinemia with normal FBS. The cortisol level was normal. The MRI of the abdomen on 12/21 was negative for a pancreatic lesion.

## 2022-03-09 NOTE — DATA REVIEWED
[FreeTextEntry1] : The FBS was 104 mg/dl with low normal HbA1c. The Fructosamine was normal. The thyroid tests were normal.

## 2022-05-06 NOTE — DIETITIAN INITIAL EVALUATION ADULT. - NS FNS WEIGHT USED FOR CALC
adjusted/Ht=5' 5.5"   Adjusted USA=839.2    Admission eo=920 lb   BMI=31.8   Wts a bit fluctuated in-house may due to fluid shift from HD
yes

## 2022-11-01 ENCOUNTER — APPOINTMENT (OUTPATIENT)
Dept: ENDOCRINOLOGY | Facility: CLINIC | Age: 57
End: 2022-11-01

## 2022-11-01 VITALS
HEIGHT: 62 IN | TEMPERATURE: 98.1 F | OXYGEN SATURATION: 99 % | HEART RATE: 60 BPM | RESPIRATION RATE: 16 BRPM | WEIGHT: 178 LBS | DIASTOLIC BLOOD PRESSURE: 86 MMHG | SYSTOLIC BLOOD PRESSURE: 161 MMHG | BODY MASS INDEX: 32.76 KG/M2

## 2022-11-01 DIAGNOSIS — Z99.2 DEPENDENCE ON RENAL DIALYSIS: ICD-10-CM

## 2022-11-01 LAB
GLUCOSE BLDC GLUCOMTR-MCNC: 93
HBA1C MFR BLD HPLC: 5.1

## 2022-11-01 PROCEDURE — 99214 OFFICE O/P EST MOD 30 MIN: CPT | Mod: 25

## 2022-11-01 PROCEDURE — 82962 GLUCOSE BLOOD TEST: CPT

## 2022-11-01 PROCEDURE — 83036 HEMOGLOBIN GLYCOSYLATED A1C: CPT | Mod: QW

## 2022-11-01 NOTE — ASSESSMENT
[FreeTextEntry1] : The diabetes is well controlled\par She is still having the hypoglycemia on and off\par Advised to continue following the diet low in CHO.\par Patient will do the blood tests\par She will call me back for results\par Eat frequent small meals\par Will continue observation

## 2022-11-01 NOTE — HISTORY OF PRESENT ILLNESS
[FreeTextEntry1] : Patient feels well , she is asymptomatic. She has lost weight. She is exercising regularly and following the diet. Her blood glucose at home are well controlled, they are usually around 70 to 80 mg/dl. She denies low blood glucose during the night. She denies chest pain, or SOB. Denies numbness, tingling or burning sensation on her extremities. Taking her medications regularly. She has seen the Ophthalmologist recently. She has not seen Podiatrist recently. She has not seen the Cardiologist recently. The FBS and HbA1c done in the office were fine.\par

## 2022-12-13 NOTE — PATIENT PROFILE ADULT - ...
What Is The Reason For Today's Visit?: Surveillance against skin cancer recurrences [place] Depth?: 0.2mm Year Excised?: 2021 06-Nov-2019 12:41:11

## 2023-02-07 ENCOUNTER — APPOINTMENT (OUTPATIENT)
Dept: ENDOCRINOLOGY | Facility: CLINIC | Age: 58
End: 2023-02-07

## 2023-02-18 NOTE — ED PROVIDER NOTE - NIH STROKE SCALE: 2. BEST GAZE
RN from [facility] calling.  Culture is positive for E-coli and they got the results faxed to them.    She faxed over the results also.    Advised that PCP talked to patient yesterday and based on symptoms, changed antibiotic. Provider also saw the results.    Caller will let  know.    Cindy Patel RN on 2/18/2023 at 9:01 AM       (0) Normal

## 2023-07-07 NOTE — ED ADULT NURSE NOTE - NSHOSCREENINGQ1_ED_ALL_ED
Impression: Headache, unspecified: R51.9. Plan: Posterior segment is unremarkable.  Recommend further workup with PCP/ Neurologist. No

## 2023-07-25 ENCOUNTER — APPOINTMENT (OUTPATIENT)
Dept: ENDOCRINOLOGY | Facility: CLINIC | Age: 58
End: 2023-07-25
Payer: MEDICARE

## 2023-07-25 VITALS
DIASTOLIC BLOOD PRESSURE: 69 MMHG | BODY MASS INDEX: 31.47 KG/M2 | HEART RATE: 62 BPM | HEIGHT: 62 IN | SYSTOLIC BLOOD PRESSURE: 124 MMHG | WEIGHT: 171 LBS | OXYGEN SATURATION: 98 % | RESPIRATION RATE: 16 BRPM | TEMPERATURE: 97.3 F

## 2023-07-25 DIAGNOSIS — E11.21 TYPE 2 DIABETES MELLITUS WITH DIABETIC NEPHROPATHY: ICD-10-CM

## 2023-07-25 PROCEDURE — 82962 GLUCOSE BLOOD TEST: CPT

## 2023-07-25 PROCEDURE — 99214 OFFICE O/P EST MOD 30 MIN: CPT | Mod: 25

## 2023-07-26 LAB
GLUCOSE BLDC GLUCOMTR-MCNC: 171
HBA1C MFR BLD HPLC: 4.3

## 2023-07-27 PROBLEM — E11.21 DIABETIC NEPHROPATHY: Status: ACTIVE | Noted: 2021-11-10

## 2023-07-27 NOTE — HISTORY OF PRESENT ILLNESS
[FreeTextEntry1] : The patient says that for the past 6 months she has been having low blood sugars 3 to 4 hours after her dinner. Previously she has been work up for hypoglycemia. About 2 years ago her FBS was fine 81 mg/dl but the c-peptide was elevated. She had a gastric bypass in 2016 and she is also on hemodialysis. At the office today the hbA1c was low and the PPS was 171

## 2023-07-27 NOTE — ASSESSMENT
[FreeTextEntry1] : The patient has been having low blood sugars at night\par Her blood glucose at night sometimes goes down to 44 mg/dl\par In 2021 she had a negative MRI of the abdomen to r/o a pancreatic lesion\par Will repeat the work up for hypoglycemia\par She is traveling to [facility]\par She will come back in 4 weeks

## 2023-08-29 ENCOUNTER — APPOINTMENT (OUTPATIENT)
Dept: ENDOCRINOLOGY | Facility: CLINIC | Age: 58
End: 2023-08-29

## 2024-01-03 NOTE — SWALLOW VFSS/MBS ASSESSMENT ADULT - NS SWALLOW VFSS REC ASPIR MON
pneumonia/throat clearing/cough/gurgly voice/oral hygiene/position upright (90Y)
Abormal VS: Temp > 100F or < 96.8F; SBP < 90 mmHG; HR > 120bpm; Resp > 24/min

## 2024-01-30 ENCOUNTER — APPOINTMENT (OUTPATIENT)
Dept: ENDOCRINOLOGY | Facility: CLINIC | Age: 59
End: 2024-01-30
Payer: MEDICARE

## 2024-01-30 VITALS
BODY MASS INDEX: 31.65 KG/M2 | HEIGHT: 62 IN | RESPIRATION RATE: 16 BRPM | TEMPERATURE: 97.6 F | WEIGHT: 172 LBS | SYSTOLIC BLOOD PRESSURE: 144 MMHG | OXYGEN SATURATION: 99 % | HEART RATE: 70 BPM | DIASTOLIC BLOOD PRESSURE: 81 MMHG

## 2024-01-30 DIAGNOSIS — N18.4 CHRONIC KIDNEY DISEASE, STAGE 4 (SEVERE): ICD-10-CM

## 2024-01-30 PROCEDURE — 99214 OFFICE O/P EST MOD 30 MIN: CPT | Mod: 25

## 2024-01-30 PROCEDURE — 82962 GLUCOSE BLOOD TEST: CPT

## 2024-01-30 RX ORDER — GLUCAGON INJECTION, SOLUTION 0.5 MG/.1ML
0.5 INJECTION, SOLUTION SUBCUTANEOUS
Qty: 2 | Refills: 1 | Status: ACTIVE | COMMUNITY
Start: 2021-04-06 | End: 1900-01-01

## 2024-01-30 RX ORDER — BLOOD SUGAR DIAGNOSTIC
STRIP MISCELLANEOUS
Qty: 400 | Refills: 2 | Status: ACTIVE | COMMUNITY
Start: 2018-06-02

## 2024-01-30 RX ORDER — ISOPROPYL ALCOHOL 70 ML/100ML
SWAB TOPICAL
Qty: 400 | Refills: 3 | Status: ACTIVE | COMMUNITY
Start: 2019-10-16 | End: 1900-01-01

## 2024-01-31 ENCOUNTER — NON-APPOINTMENT (OUTPATIENT)
Age: 59
End: 2024-01-31

## 2024-01-31 PROBLEM — N18.4 CHRONIC RENAL FAILURE, STAGE 4 (SEVERE): Status: ACTIVE | Noted: 2019-10-29

## 2024-01-31 LAB
GLUCOSE BLDC GLUCOMTR-MCNC: 107
HBA1C MFR BLD HPLC: 4.4

## 2024-01-31 NOTE — HISTORY OF PRESENT ILLNESS
[FreeTextEntry1] : The patient feels well, but she is still having low blood sugars. She had a negative work up previously for hypoglycemia including a negative MRI of the abdomen.

## 2024-01-31 NOTE — ASSESSMENT
[FreeTextEntry1] : A work up will be done for hypoglycemia Advised to carry glucose tablets Gvoke HypoPen Glucagon) was prescribed Given a CGM system To see Nutritionist RTC  in 4 weeks

## 2024-02-02 ENCOUNTER — APPOINTMENT (OUTPATIENT)
Dept: ENDOCRINOLOGY | Facility: CLINIC | Age: 59
End: 2024-02-02

## 2024-02-16 ENCOUNTER — NON-APPOINTMENT (OUTPATIENT)
Age: 59
End: 2024-02-16

## 2024-02-27 ENCOUNTER — APPOINTMENT (OUTPATIENT)
Dept: ENDOCRINOLOGY | Facility: CLINIC | Age: 59
End: 2024-02-27
Payer: MEDICARE

## 2024-02-27 VITALS
TEMPERATURE: 97.3 F | SYSTOLIC BLOOD PRESSURE: 168 MMHG | HEART RATE: 68 BPM | OXYGEN SATURATION: 99 % | BODY MASS INDEX: 31.28 KG/M2 | DIASTOLIC BLOOD PRESSURE: 91 MMHG | RESPIRATION RATE: 16 BRPM | HEIGHT: 62 IN | WEIGHT: 170 LBS

## 2024-02-27 LAB — GLUCOSE BLDC GLUCOMTR-MCNC: 85

## 2024-02-27 PROCEDURE — 99214 OFFICE O/P EST MOD 30 MIN: CPT | Mod: 25

## 2024-02-27 PROCEDURE — 82962 GLUCOSE BLOOD TEST: CPT

## 2024-02-27 NOTE — ASSESSMENT
[FreeTextEntry1] : The patient has hypoglycemia Most likely related to the hemodialysis and bypass surgery The CGM shows her sugar increases after eating, otherwise is low. Advised to eat regularly every 2 to 3 hours Advised to see the Nutritionist She will call me at the endo of the weeks for results

## 2024-02-27 NOTE — HISTORY OF PRESENT ILLNESS
[FreeTextEntry1] : The patient feels well, she has been having low blood sugar during the night and early morning. She has DEXCOM. The patient is on hemodialysis and s/p gastric bypass surgery.

## 2024-02-29 LAB
ACTH SER-ACNC: 12.2 PG/ML
ALBUMIN SERPL ELPH-MCNC: 4.1 G/DL
ALP BLD-CCNC: 793 U/L
ALT SERPL-CCNC: 24 U/L
ANION GAP SERPL CALC-SCNC: 13 MMOL/L
AST SERPL-CCNC: 19 U/L
BILIRUB DIRECT SERPL-MCNC: 0.2 MG/DL
BILIRUB INDIRECT SERPL-MCNC: 0.2 MG/DL
BILIRUB SERPL-MCNC: 0.4 MG/DL
BUN SERPL-MCNC: 27 MG/DL
C PEPTIDE SERPL-MCNC: 5.4 NG/ML
CALCIUM SERPL-MCNC: 9 MG/DL
CHLORIDE SERPL-SCNC: 102 MMOL/L
CHOLEST SERPL-MCNC: 109 MG/DL
CO2 SERPL-SCNC: 28 MMOL/L
CORTIS SERPL-MCNC: 6.9 UG/DL
CREAT SERPL-MCNC: 6.11 MG/DL
EGFR: 7 ML/MIN/1.73M2
ESTIMATED AVERAGE GLUCOSE: 74 MG/DL
ESTRADIOL SERPL-MCNC: 15 PG/ML
FRUCTOSAMINE SERPL-MCNC: 274 UMOL/L
FSH SERPL-MCNC: 81.9 IU/L
GH SERPL-MCNC: 5.92 NG/ML
GLUCOSE BS SERPL-MCNC: 77 MG/DL
GLUCOSE SERPL-MCNC: 82 MG/DL
HBA1C MFR BLD HPLC: 4.2 %
HDLC SERPL-MCNC: 47 MG/DL
IGF-1 INTERP: NORMAL
IGF-I BLD-MCNC: 102 NG/ML
LDLC SERPL CALC-MCNC: 46 MG/DL
LH SERPL-ACNC: 42.3 IU/L
NONHDLC SERPL-MCNC: 62 MG/DL
POTASSIUM SERPL-SCNC: 5.3 MMOL/L
PROLACTIN SERPL-MCNC: 24.6 NG/ML
PROT SERPL-MCNC: 6.3 G/DL
SODIUM SERPL-SCNC: 143 MMOL/L
T4 FREE SERPL-MCNC: 1 NG/DL
TRIGL SERPL-MCNC: 79 MG/DL
TSH SERPL-ACNC: 1.23 UIU/ML

## 2024-02-29 RX ORDER — BLOOD-GLUCOSE SENSOR
EACH MISCELLANEOUS
Qty: 9 | Refills: 3 | Status: ACTIVE | COMMUNITY
Start: 2024-02-29 | End: 1900-01-01

## 2024-03-05 ENCOUNTER — APPOINTMENT (OUTPATIENT)
Dept: ENDOCRINOLOGY | Facility: CLINIC | Age: 59
End: 2024-03-05
Payer: MEDICARE

## 2024-03-05 DIAGNOSIS — E16.2 HYPOGLYCEMIA, UNSPECIFIED: ICD-10-CM

## 2024-03-05 DIAGNOSIS — E11.9 TYPE 2 DIABETES MELLITUS W/OUT COMPLICATIONS: ICD-10-CM

## 2024-03-05 DIAGNOSIS — Z98.84 BARIATRIC SURGERY STATUS: ICD-10-CM

## 2024-03-05 PROCEDURE — 95251 CONT GLUC MNTR ANALYSIS I&R: CPT

## 2024-03-05 PROCEDURE — 95249 CONT GLUC MNTR PT PROV EQP: CPT

## 2024-03-05 PROCEDURE — G0108 DIAB MANAGE TRN  PER INDIV: CPT

## 2024-03-07 NOTE — PHYSICAL THERAPY INITIAL EVALUATION ADULT - GENERAL OBSERVATIONS, REHAB EVAL
PSRs- please schedule pt.    Pt received supine in bed with HOB elevated, right arm AV fistula in place, alert and cooperative during eval

## 2024-03-08 DIAGNOSIS — K86.2 CYST OF PANCREAS: ICD-10-CM

## 2024-03-13 NOTE — ED PROVIDER NOTE - CHIEF COMPLAINT
Patient to room with c/o left, upper wisdom tooth pain beginning two weeks ago.    The patient is a 54y Female complaining of dizziness.

## 2024-03-20 ENCOUNTER — NON-APPOINTMENT (OUTPATIENT)
Age: 59
End: 2024-03-20

## 2024-04-09 ENCOUNTER — APPOINTMENT (OUTPATIENT)
Dept: ENDOCRINOLOGY | Facility: CLINIC | Age: 59
End: 2024-04-09

## 2024-04-16 ENCOUNTER — APPOINTMENT (OUTPATIENT)
Dept: ENDOCRINOLOGY | Facility: CLINIC | Age: 59
End: 2024-04-16

## 2024-09-23 NOTE — PROGRESS NOTE ADULT - PROBLEM/PLAN-1
CM case conference with psych clinic worker (SAFIA Hill) who reports receiving message re: client's new cell phone and updated in system.    Client also missed psych intake appointment and no show letter was mailed to client, asking for call back to reschedule.     CM received call from [facility] program ([place]abella: [phone]) who inquired about Client's psych eval appt outcome.    CM informed worker of Client's missing appointment.    Worker reports will have to dismiss his application and encouraged CM to call program once the psych evaluation is completing. CM in agreement.    DISPLAY PLAN FREE TEXT

## 2024-11-07 ENCOUNTER — APPOINTMENT (OUTPATIENT)
Dept: ENDOCRINOLOGY | Facility: CLINIC | Age: 59
End: 2024-11-07
Payer: MEDICARE

## 2024-11-07 VITALS
TEMPERATURE: 98.2 F | OXYGEN SATURATION: 98 % | HEIGHT: 62 IN | SYSTOLIC BLOOD PRESSURE: 131 MMHG | BODY MASS INDEX: 31.47 KG/M2 | HEART RATE: 68 BPM | DIASTOLIC BLOOD PRESSURE: 76 MMHG | WEIGHT: 171 LBS | RESPIRATION RATE: 16 BRPM

## 2024-11-07 DIAGNOSIS — E11.9 TYPE 2 DIABETES MELLITUS W/OUT COMPLICATIONS: ICD-10-CM

## 2024-11-07 DIAGNOSIS — N18.4 CHRONIC KIDNEY DISEASE, STAGE 4 (SEVERE): ICD-10-CM

## 2024-11-07 DIAGNOSIS — E16.2 HYPOGLYCEMIA, UNSPECIFIED: ICD-10-CM

## 2024-11-07 LAB — GLUCOSE BLDC GLUCOMTR-MCNC: 113

## 2024-11-07 PROCEDURE — 82962 GLUCOSE BLOOD TEST: CPT

## 2024-11-07 PROCEDURE — 99214 OFFICE O/P EST MOD 30 MIN: CPT | Mod: 25

## 2025-01-10 NOTE — PROGRESS NOTE ADULT - PROBLEM SELECTOR PROBLEM 1
Event Note
Asthma
Dysphagia
Vertigo
Vertigo
Dysphagia
Shoulder pain, right
Vertigo
Asthma

## 2025-01-22 NOTE — DISCHARGE NOTE ADULT - IF YOU ARE A SMOKER, IT IS IMPORTANT FOR YOUR HEALTH TO STOP SMOKING. PLEASE BE AWARE THAT SECOND HAND SMOKE IS ALSO HARMFUL.
L/m for patient that her upcoming appointment is a 6 month follow up and she can discuss questions about the cardiac cath Dr Lee would like her to have at that appointment. Let patient know if she has any other questions to call the office.  
Pt called and wanted to know what the reason is for her to come in for her upcoming appointment on 1/28/25.  Please call back   
Statement Selected

## 2025-02-06 ENCOUNTER — APPOINTMENT (OUTPATIENT)
Dept: ENDOCRINOLOGY | Facility: CLINIC | Age: 60
End: 2025-02-06

## 2025-06-01 NOTE — PROGRESS NOTE ADULT - SUBJECTIVE AND OBJECTIVE BOX
CHIEF COMPLAINT:Patient is a 54y old  Female who presents with a chief complaint of Vertigo.Pt appears comfortable.    	  REVIEW OF SYSTEMS:  CONSTITUTIONAL: No fever, weight loss, or fatigue  EYES: No eye pain, visual disturbances, or discharge  ENT:  No difficulty hearing, tinnitus, vertigo; No sinus or throat pain  NECK: No pain or stiffness  RESPIRATORY: No cough, wheezing, chills or hemoptysis; No Shortness of Breath  CARDIOVASCULAR: No chest pain, palpitations, passing out, dizziness, or leg swelling  GASTROINTESTINAL: No abdominal or epigastric pain. No nausea, vomiting, or hematemesis; No diarrhea or constipation. No melena or hematochezia.  GENITOURINARY: No dysuria, frequency, hematuria, or incontinence  NEUROLOGICAL: No headaches, memory loss, loss of strength, numbness, or tremors  SKIN: No itching, burning, rashes, or lesions   LYMPH Nodes: No enlarged glands  ENDOCRINE: No heat or cold intolerance; No hair loss  MUSCULOSKELETAL: No joint pain or swelling; No muscle, back, or extremity pain  PSYCHIATRIC: No depression, anxiety, mood swings, or difficulty sleeping  HEME/LYMPH: No easy bruising, or bleeding gums  ALLERGY AND IMMUNOLOGIC: No hives or eczema	      PHYSICAL EXAM:  T(C): 36.8 (11-11-19 @ 04:36), Max: 37.1 (11-10-19 @ 23:37)  HR: 73 (11-11-19 @ 04:36) (68 - 75)  BP: 155/64 (11-11-19 @ 04:36) (137/63 - 155/64)  RR: 17 (11-11-19 @ 04:36) (16 - 18)  SpO2: 98% (11-11-19 @ 04:36) (95% - 98%)      Appearance: Normal	  HEENT:   Normal oral mucosa, PERRL, EOMI	  Lymphatic: No lymphadenopathy  Cardiovascular: Normal S1 S2, No JVD, No murmurs, No edema  Respiratory: Lungs clear to auscultation	  Psychiatry: A & O x 3, Mood & affect appropriate  Gastrointestinal:  Soft, Non-tender, + BS	  Skin: No rashes, No ecchymoses, No cyanosis	  Neurologic: Non-focal  Extremities: Normal range of motion, No clubbing, cyanosis or edema  Vascular: Peripheral pulses palpable 2+ bilaterally    MEDICATIONS  (STANDING):  amLODIPine   Tablet 10 milliGRAM(s) Oral daily  aspirin  chewable 81 milliGRAM(s) Oral daily  atorvastatin 40 milliGRAM(s) Oral at bedtime  chlorhexidine 4% Liquid 1 Application(s) Topical <User Schedule>  cyanocobalamin 1000 MICROGram(s) Oral daily  folic acid 1 milliGRAM(s) Oral daily  gabapentin 100 milliGRAM(s) Oral daily  heparin  Injectable 5000 Unit(s) SubCutaneous every 12 hours  hydrALAZINE 25 milliGRAM(s) Oral two times a day  insulin lispro (HumaLOG) corrective regimen sliding scale   SubCutaneous Before meals and at bedtime  labetalol 200 milliGRAM(s) Oral daily  lisinopril 40 milliGRAM(s) Oral daily  LORazepam     Tablet 0.5 milliGRAM(s) Oral two times a day  meclizine 25 milliGRAM(s) Oral three times a day  multivitamin/minerals 1 Tablet(s) Oral daily  promethazine Suppository 25 milliGRAM(s) Rectal at bedtime  sevelamer carbonate 800 milliGRAM(s) Oral three times a day with meals  topiramate 50 milliGRAM(s) Oral at bedtime        	  LABS:	 	                       10.5   5.53  )-----------( 159      ( 11 Nov 2019 06:25 )             33.7     11-11    140  |  107  |  67<H>  ----------------------------<  63<L>  5.5<H>   |  23  |  9.50<H>    Ca    8.9      11 Nov 2019 06:25  Phos  7.2     11-11  Mg     2.7     11-11    TPro  6.0  /  Alb  3.0<L>  /  TBili  0.5  /  DBili  x   /  AST  10  /  ALT  16  /  AlkPhos  61  11-11    proBNP:   Lipid Profile: Cholesterol 140  LDL 75  HDL 41      HgA1c: Hemoglobin A1C, Whole Blood: 4.6 % (11-07 @ 10:26)    TSH: Thyroid Stimulating Hormone, Serum: 0.73 uU/mL (11-07 @ 06:31) cont home rosuvastatin 5mg qhs (atorva inpatient)